# Patient Record
Sex: FEMALE | Race: OTHER | HISPANIC OR LATINO | URBAN - METROPOLITAN AREA
[De-identification: names, ages, dates, MRNs, and addresses within clinical notes are randomized per-mention and may not be internally consistent; named-entity substitution may affect disease eponyms.]

---

## 2023-02-14 ENCOUNTER — INPATIENT (INPATIENT)
Facility: HOSPITAL | Age: 48
LOS: 13 days | Discharge: HOME | DRG: 871 | End: 2023-02-28
Attending: INTERNAL MEDICINE | Admitting: HOSPITALIST
Payer: COMMERCIAL

## 2023-02-14 VITALS
TEMPERATURE: 101 F | DIASTOLIC BLOOD PRESSURE: 68 MMHG | RESPIRATION RATE: 20 BRPM | OXYGEN SATURATION: 98 % | HEIGHT: 67 IN | HEART RATE: 150 BPM | SYSTOLIC BLOOD PRESSURE: 102 MMHG | WEIGHT: 143.96 LBS

## 2023-02-14 DIAGNOSIS — Z90.49 ACQUIRED ABSENCE OF OTHER SPECIFIED PARTS OF DIGESTIVE TRACT: Chronic | ICD-10-CM

## 2023-02-14 LAB
ALBUMIN SERPL ELPH-MCNC: 3.7 G/DL — SIGNIFICANT CHANGE UP (ref 3.3–5)
ALP SERPL-CCNC: 51 U/L — SIGNIFICANT CHANGE UP (ref 40–120)
ALT FLD-CCNC: 15 U/L — SIGNIFICANT CHANGE UP (ref 10–45)
ANION GAP SERPL CALC-SCNC: 11 MMOL/L — SIGNIFICANT CHANGE UP (ref 5–17)
APPEARANCE UR: CLEAR — SIGNIFICANT CHANGE UP
APTT BLD: 29.7 SEC — SIGNIFICANT CHANGE UP (ref 27.5–35.5)
AST SERPL-CCNC: 23 U/L — SIGNIFICANT CHANGE UP (ref 10–40)
BASOPHILS # BLD AUTO: 0.04 K/UL — SIGNIFICANT CHANGE UP (ref 0–0.2)
BASOPHILS NFR BLD AUTO: 1.7 % — SIGNIFICANT CHANGE UP (ref 0–2)
BILIRUB SERPL-MCNC: 0.7 MG/DL — SIGNIFICANT CHANGE UP (ref 0.2–1.2)
BILIRUB UR-MCNC: NEGATIVE — SIGNIFICANT CHANGE UP
BUN SERPL-MCNC: 13 MG/DL — SIGNIFICANT CHANGE UP (ref 7–23)
CALCIUM SERPL-MCNC: 8.8 MG/DL — SIGNIFICANT CHANGE UP (ref 8.4–10.5)
CHLORIDE SERPL-SCNC: 103 MMOL/L — SIGNIFICANT CHANGE UP (ref 96–108)
CO2 SERPL-SCNC: 23 MMOL/L — SIGNIFICANT CHANGE UP (ref 22–31)
COLOR SPEC: SIGNIFICANT CHANGE UP
CREAT SERPL-MCNC: 0.8 MG/DL — SIGNIFICANT CHANGE UP (ref 0.5–1.3)
DIFF PNL FLD: NEGATIVE — SIGNIFICANT CHANGE UP
EGFR: 93 ML/MIN/1.73M2 — SIGNIFICANT CHANGE UP
EOSINOPHIL # BLD AUTO: 0.04 K/UL — SIGNIFICANT CHANGE UP (ref 0–0.5)
EOSINOPHIL NFR BLD AUTO: 1.7 % — SIGNIFICANT CHANGE UP (ref 0–6)
GAS PNL BLDV: SIGNIFICANT CHANGE UP
GAS PNL BLDV: SIGNIFICANT CHANGE UP
GLUCOSE SERPL-MCNC: 101 MG/DL — HIGH (ref 70–99)
GLUCOSE UR QL: NEGATIVE — SIGNIFICANT CHANGE UP
HCT VFR BLD CALC: 30.7 % — LOW (ref 34.5–45)
HGB BLD-MCNC: 9.5 G/DL — LOW (ref 11.5–15.5)
INR BLD: 1.3 RATIO — HIGH (ref 0.88–1.16)
KETONES UR-MCNC: NEGATIVE — SIGNIFICANT CHANGE UP
LEUKOCYTE ESTERASE UR-ACNC: NEGATIVE — SIGNIFICANT CHANGE UP
LYMPHOCYTES # BLD AUTO: 0.21 K/UL — LOW (ref 1–3.3)
LYMPHOCYTES # BLD AUTO: 8.7 % — LOW (ref 13–44)
MCHC RBC-ENTMCNC: 26.8 PG — LOW (ref 27–34)
MCHC RBC-ENTMCNC: 30.9 GM/DL — LOW (ref 32–36)
MCV RBC AUTO: 86.7 FL — SIGNIFICANT CHANGE UP (ref 80–100)
MONOCYTES # BLD AUTO: 0.29 K/UL — SIGNIFICANT CHANGE UP (ref 0–0.9)
MONOCYTES NFR BLD AUTO: 12.2 % — SIGNIFICANT CHANGE UP (ref 2–14)
NEUTROPHILS # BLD AUTO: 1.73 K/UL — LOW (ref 1.8–7.4)
NEUTROPHILS NFR BLD AUTO: 73.1 % — SIGNIFICANT CHANGE UP (ref 43–77)
NITRITE UR-MCNC: NEGATIVE — SIGNIFICANT CHANGE UP
PH UR: 6 — SIGNIFICANT CHANGE UP (ref 5–8)
PLATELET # BLD AUTO: 147 K/UL — LOW (ref 150–400)
POTASSIUM SERPL-MCNC: 3.4 MMOL/L — LOW (ref 3.5–5.3)
POTASSIUM SERPL-SCNC: 3.4 MMOL/L — LOW (ref 3.5–5.3)
PROT SERPL-MCNC: 5.9 G/DL — LOW (ref 6–8.3)
PROT UR-MCNC: NEGATIVE — SIGNIFICANT CHANGE UP
PROTHROM AB SERPL-ACNC: 15 SEC — HIGH (ref 10.5–13.4)
RAPID RVP RESULT: SIGNIFICANT CHANGE UP
RBC # BLD: 3.54 M/UL — LOW (ref 3.8–5.2)
RBC # FLD: 17.4 % — HIGH (ref 10.3–14.5)
SARS-COV-2 RNA SPEC QL NAA+PROBE: SIGNIFICANT CHANGE UP
SODIUM SERPL-SCNC: 137 MMOL/L — SIGNIFICANT CHANGE UP (ref 135–145)
SP GR SPEC: 1.01 — SIGNIFICANT CHANGE UP (ref 1.01–1.02)
UROBILINOGEN FLD QL: NEGATIVE — SIGNIFICANT CHANGE UP
WBC # BLD: 2.37 K/UL — LOW (ref 3.8–10.5)
WBC # FLD AUTO: 2.37 K/UL — LOW (ref 3.8–10.5)

## 2023-02-14 PROCEDURE — 71046 X-RAY EXAM CHEST 2 VIEWS: CPT | Mod: 26

## 2023-02-14 PROCEDURE — 99285 EMERGENCY DEPT VISIT HI MDM: CPT

## 2023-02-14 RX ORDER — POTASSIUM CHLORIDE 20 MEQ
40 PACKET (EA) ORAL ONCE
Refills: 0 | Status: COMPLETED | OUTPATIENT
Start: 2023-02-14 | End: 2023-02-14

## 2023-02-14 RX ORDER — KETOROLAC TROMETHAMINE 30 MG/ML
15 SYRINGE (ML) INJECTION ONCE
Refills: 0 | Status: DISCONTINUED | OUTPATIENT
Start: 2023-02-14 | End: 2023-02-14

## 2023-02-14 RX ORDER — SODIUM CHLORIDE 9 MG/ML
1000 INJECTION INTRAMUSCULAR; INTRAVENOUS; SUBCUTANEOUS ONCE
Refills: 0 | Status: COMPLETED | OUTPATIENT
Start: 2023-02-14 | End: 2023-02-14

## 2023-02-14 RX ORDER — VANCOMYCIN HCL 1 G
1000 VIAL (EA) INTRAVENOUS ONCE
Refills: 0 | Status: COMPLETED | OUTPATIENT
Start: 2023-02-14 | End: 2023-02-14

## 2023-02-14 RX ORDER — ACETAMINOPHEN 500 MG
975 TABLET ORAL ONCE
Refills: 0 | Status: COMPLETED | OUTPATIENT
Start: 2023-02-14 | End: 2023-02-14

## 2023-02-14 RX ORDER — PIPERACILLIN AND TAZOBACTAM 4; .5 G/20ML; G/20ML
3.38 INJECTION, POWDER, LYOPHILIZED, FOR SOLUTION INTRAVENOUS ONCE
Refills: 0 | Status: COMPLETED | OUTPATIENT
Start: 2023-02-14 | End: 2023-02-14

## 2023-02-14 RX ADMIN — Medication 975 MILLIGRAM(S): at 22:36

## 2023-02-14 RX ADMIN — SODIUM CHLORIDE 1000 MILLILITER(S): 9 INJECTION INTRAMUSCULAR; INTRAVENOUS; SUBCUTANEOUS at 22:29

## 2023-02-14 RX ADMIN — SODIUM CHLORIDE 1000 MILLILITER(S): 9 INJECTION INTRAMUSCULAR; INTRAVENOUS; SUBCUTANEOUS at 22:36

## 2023-02-14 RX ADMIN — SODIUM CHLORIDE 1000 MILLILITER(S): 9 INJECTION INTRAMUSCULAR; INTRAVENOUS; SUBCUTANEOUS at 21:27

## 2023-02-14 RX ADMIN — Medication 975 MILLIGRAM(S): at 21:27

## 2023-02-14 RX ADMIN — Medication 250 MILLIGRAM(S): at 22:48

## 2023-02-14 RX ADMIN — Medication 15 MILLIGRAM(S): at 23:38

## 2023-02-14 RX ADMIN — Medication 40 MILLIEQUIVALENT(S): at 22:34

## 2023-02-14 RX ADMIN — PIPERACILLIN AND TAZOBACTAM 200 GRAM(S): 4; .5 INJECTION, POWDER, LYOPHILIZED, FOR SOLUTION INTRAVENOUS at 22:30

## 2023-02-14 NOTE — ED PROVIDER NOTE - PROGRESS NOTE DETAILS
During prior hospital admission in October, patient had STARR suspected to be due to antibiotics. She was on meropenem, TMP-SMX, and isovuconazole at the time.

## 2023-02-14 NOTE — ED PROVIDER NOTE - NS ED ROS FT
CONSTITUTIONAL: No weakness, (+) fevers, (+) chills  EYES/ENT: No visual changes  NECK: No pain or stiffness  RESPIRATORY: (+) cough, no wheezing, hemoptysis; No shortness of breath  CARDIOVASCULAR: No chest pain or palpitations  GASTROINTESTINAL: No abdominal or epigastric pain. (+) vomiting, no hematemesis; (+) diarrhea, (+) constipation. No melena or hematochezia.  GENITOURINARY: No dysuria, frequency or hematuria  NEUROLOGICAL: No numbness or weakness  SKIN: No itching, rashes

## 2023-02-14 NOTE — ED PROVIDER NOTE - OBJECTIVE STATEMENT
Patient is a 47-year-old woman with past medical history of non-Hodgkins lymphoma (last chemo 1/23/2023, now in remission) presenting with fever for 2 weeks. Patient had just finished a course of Prednisone when the fever started. Fever was constant, measured at 102-104F for first week, then only at night for the second week. She has also noticed "steaming" of urine. Patient endorses chronic cough, chest tightness, vomiting, alternating constipation/diarrhea, but none of these beginning recently. Patient denies headache, chest pain, dyspnea, abdominal pain, dysuria. Patient had pulmonary embolism in October during admission for COVID, now on Eliquis.

## 2023-02-14 NOTE — ED PROVIDER NOTE - CLINICAL SUMMARY MEDICAL DECISION MAKING FREE TEXT BOX
ap 47 F w/ hx of nonhodgkins lymphoma, last chemo 1/23 here w/ fever intermittent for 2 weeks, pt w/ intermittnet vomiting, cough, burning w/ urination, no back pain, no cp, no sob, pt w/ persitent tachycardia and reports that she is on metoprolol for palpitations, no medications taken today, ptcompliant w/ home eliquis, she has known allergy to iv contrast, plan for alsb imaging and reassessment. ap 47 F w/ hx of non-hodgkin's lymphoma, last chemo 1/23 here w/ fever intermittent for 2 weeks, pt w/ intermittent vomiting, cough, burning w/ urination, no back pain, no cp, no sob, pt w/ persistent tachycardia and reports that she is on metoprolol for palpitations, no medications taken today, compliant w/ home Eliquis, she has known allergy to iv contrast, plan for imaging and reassessment. most likely admission.

## 2023-02-14 NOTE — ED PROVIDER NOTE - DIFFERENTIAL DIAGNOSIS
fever in cancer pt, possible uti vs pna vs intraabdominal infection. vs port infection Differential Diagnosis

## 2023-02-14 NOTE — ED PROVIDER NOTE - PHYSICAL EXAMINATION
Constitutional: NAD  HEENT: PERRL, EOMI  Respiratory: CTA b/l, good air entry b/l, no wheezing, no rhonchi, no rales, without accessory muscle use and no intercostal retractions  Cardiovascular: regular, tachycardic, normal S1S2, no M/R/G  Gastrointestinal: abdomen soft, NTND, no masses palpable, BS normal  Extremities: Warm, well perfused, no edema  Neurological: AAOx3, CN Grossly intact  Skin: Normal temperature, warm, dry

## 2023-02-14 NOTE — ED ADULT NURSE NOTE - OBJECTIVE STATEMENT
pt has a history of non hodgkins lymphoma.  she reports she is in remission and has had a fever for 2 weeks after she stopped her prednisone      piv placed for flluid/  pt is alert and active.

## 2023-02-14 NOTE — ED PROVIDER NOTE - RAPID ASSESSMENT
47y F w/ pmhx non hodgkin's lymphoma (last chemo 1/23/2023, now in remission) presents to the ED c/o fevers, vomiting, burning urination x2wk. Pt went to oncologist yesterday got cultures, blood work, and xray. Reports that the first week pt had diarrhea and second week had constipation. Pt is well appearing in triage.     Eladia FLOWER (Saurabh) have documented this rapid assessment note under the dictation of Mic Kim) which has been reviewed and affirmed to be accurate. Patient was seen as a QDOC patient. The patient will be seen and further worked up in the main emergency department and their care will be completed by the main emergency department team along with a thorough physical exam. Receiving team will follow up on labs, analgesia, any clinical imaging, reassess and disposition as clinically indicated, all decisions regarding the progression of care will be made at their discretion. 47y F w/ pmhx non hodgkin's lymphoma (last chemo 1/23/2023, now in remission) presents to the ED c/o fevers, vomiting, burning urination x2wk. Pt went to oncologist yesterday got cultures, blood work, and xray. Reports that the first week pt had diarrhea and second week had constipation. Pt is well appearing in triage.     IEladia (Svetlana) have documented this rapid assessment note under the dictation of Mic Kim) which has been reviewed and affirmed to be accurate. Patient was seen as a QDOC patient. The patient will be seen and further worked up in the main emergency department and their care will be completed by the main emergency department team along with a thorough physical exam. Receiving team will follow up on labs, analgesia, any clinical imaging, reassess and disposition as clinically indicated, all decisions regarding the progression of care will be made at their discretion.    IDr. Kim saw patient as a rapid assessment initially.  The rest of care was performed by another attending, and the rapid assessment was documented by svetlana in my presence. Receiving team will follow up on labs, analgesia, any clinical imaging, and perform reassessment and disposition of the patient as clinically indicated. All decisions regarding the progression of care will be made at their discretion.

## 2023-02-15 DIAGNOSIS — D64.9 ANEMIA, UNSPECIFIED: ICD-10-CM

## 2023-02-15 DIAGNOSIS — I26.99 OTHER PULMONARY EMBOLISM WITHOUT ACUTE COR PULMONALE: ICD-10-CM

## 2023-02-15 DIAGNOSIS — A41.9 SEPSIS, UNSPECIFIED ORGANISM: ICD-10-CM

## 2023-02-15 DIAGNOSIS — C85.90 NON-HODGKIN LYMPHOMA, UNSPECIFIED, UNSPECIFIED SITE: ICD-10-CM

## 2023-02-15 LAB
ALBUMIN SERPL ELPH-MCNC: 2.8 G/DL — LOW (ref 3.3–5)
ALP SERPL-CCNC: 37 U/L — LOW (ref 40–120)
ALT FLD-CCNC: 9 U/L — LOW (ref 10–45)
ANION GAP SERPL CALC-SCNC: 8 MMOL/L — SIGNIFICANT CHANGE UP (ref 5–17)
AST SERPL-CCNC: 14 U/L — SIGNIFICANT CHANGE UP (ref 10–40)
BILIRUB SERPL-MCNC: 0.5 MG/DL — SIGNIFICANT CHANGE UP (ref 0.2–1.2)
BUN SERPL-MCNC: 10 MG/DL — SIGNIFICANT CHANGE UP (ref 7–23)
CALCIUM SERPL-MCNC: 8.1 MG/DL — LOW (ref 8.4–10.5)
CHLORIDE SERPL-SCNC: 111 MMOL/L — HIGH (ref 96–108)
CO2 SERPL-SCNC: 21 MMOL/L — LOW (ref 22–31)
CREAT SERPL-MCNC: 0.78 MG/DL — SIGNIFICANT CHANGE UP (ref 0.5–1.3)
EGFR: 95 ML/MIN/1.73M2 — SIGNIFICANT CHANGE UP
FERRITIN SERPL-MCNC: 344 NG/ML — HIGH (ref 15–150)
FOLATE SERPL-MCNC: 7.9 NG/ML — SIGNIFICANT CHANGE UP
GLUCOSE SERPL-MCNC: 122 MG/DL — HIGH (ref 70–99)
HAPTOGLOB SERPL-MCNC: <20 MG/DL — LOW (ref 34–200)
HCG SERPL-ACNC: <2 MIU/ML — SIGNIFICANT CHANGE UP
HCT VFR BLD CALC: 25.4 % — LOW (ref 34.5–45)
HCT VFR BLD CALC: 25.9 % — LOW (ref 34.5–45)
HGB BLD-MCNC: 7.6 G/DL — LOW (ref 11.5–15.5)
HGB BLD-MCNC: 8 G/DL — LOW (ref 11.5–15.5)
IRON SATN MFR SERPL: 16 % — SIGNIFICANT CHANGE UP (ref 14–50)
IRON SATN MFR SERPL: 37 UG/DL — SIGNIFICANT CHANGE UP (ref 30–160)
LDH SERPL L TO P-CCNC: 284 U/L — HIGH (ref 50–242)
MCHC RBC-ENTMCNC: 26.5 PG — LOW (ref 27–34)
MCHC RBC-ENTMCNC: 27.2 PG — SIGNIFICANT CHANGE UP (ref 27–34)
MCHC RBC-ENTMCNC: 29.9 GM/DL — LOW (ref 32–36)
MCHC RBC-ENTMCNC: 30.9 GM/DL — LOW (ref 32–36)
MCV RBC AUTO: 88.1 FL — SIGNIFICANT CHANGE UP (ref 80–100)
MCV RBC AUTO: 88.5 FL — SIGNIFICANT CHANGE UP (ref 80–100)
MRSA PCR RESULT.: SIGNIFICANT CHANGE UP
NRBC # BLD: 0 /100 WBCS — SIGNIFICANT CHANGE UP (ref 0–0)
NRBC # BLD: 0 /100 WBCS — SIGNIFICANT CHANGE UP (ref 0–0)
PLATELET # BLD AUTO: 101 K/UL — LOW (ref 150–400)
PLATELET # BLD AUTO: 97 K/UL — LOW (ref 150–400)
POTASSIUM SERPL-MCNC: 3.8 MMOL/L — SIGNIFICANT CHANGE UP (ref 3.5–5.3)
POTASSIUM SERPL-SCNC: 3.8 MMOL/L — SIGNIFICANT CHANGE UP (ref 3.5–5.3)
PROT SERPL-MCNC: 4.5 G/DL — LOW (ref 6–8.3)
RBC # BLD: 2.87 M/UL — LOW (ref 3.8–5.2)
RBC # BLD: 2.87 M/UL — LOW (ref 3.8–5.2)
RBC # BLD: 2.94 M/UL — LOW (ref 3.8–5.2)
RBC # FLD: 17.5 % — HIGH (ref 10.3–14.5)
RBC # FLD: 17.5 % — HIGH (ref 10.3–14.5)
RETICS #: 66.3 K/UL — SIGNIFICANT CHANGE UP (ref 25–125)
RETICS/RBC NFR: 2.4 % — SIGNIFICANT CHANGE UP (ref 0.5–2.5)
S AUREUS DNA NOSE QL NAA+PROBE: SIGNIFICANT CHANGE UP
SODIUM SERPL-SCNC: 140 MMOL/L — SIGNIFICANT CHANGE UP (ref 135–145)
TIBC SERPL-MCNC: 226 UG/DL — SIGNIFICANT CHANGE UP (ref 220–430)
TRANSFERRIN SERPL-MCNC: 175 MG/DL — LOW (ref 200–360)
UIBC SERPL-MCNC: 189 UG/DL — SIGNIFICANT CHANGE UP (ref 110–370)
VIT B12 SERPL-MCNC: 1316 PG/ML — HIGH (ref 232–1245)
WBC # BLD: 1.54 K/UL — LOW (ref 3.8–10.5)
WBC # BLD: 1.92 K/UL — LOW (ref 3.8–10.5)
WBC # FLD AUTO: 1.54 K/UL — LOW (ref 3.8–10.5)
WBC # FLD AUTO: 1.92 K/UL — LOW (ref 3.8–10.5)

## 2023-02-15 PROCEDURE — 74177 CT ABD & PELVIS W/CONTRAST: CPT | Mod: 26

## 2023-02-15 PROCEDURE — 71275 CT ANGIOGRAPHY CHEST: CPT | Mod: 26

## 2023-02-15 PROCEDURE — 99223 1ST HOSP IP/OBS HIGH 75: CPT

## 2023-02-15 PROCEDURE — 93306 TTE W/DOPPLER COMPLETE: CPT | Mod: 26

## 2023-02-15 RX ORDER — METOPROLOL TARTRATE 50 MG
2.5 TABLET ORAL ONCE
Refills: 0 | Status: COMPLETED | OUTPATIENT
Start: 2023-02-15 | End: 2023-02-15

## 2023-02-15 RX ORDER — ACETAMINOPHEN 500 MG
325 TABLET ORAL ONCE
Refills: 0 | Status: COMPLETED | OUTPATIENT
Start: 2023-02-15 | End: 2023-02-15

## 2023-02-15 RX ORDER — FILGRASTIM 480MCG/1.6
480 VIAL (ML) INJECTION DAILY
Refills: 0 | Status: DISCONTINUED | OUTPATIENT
Start: 2023-02-15 | End: 2023-02-17

## 2023-02-15 RX ORDER — METOPROLOL TARTRATE 50 MG
5 TABLET ORAL ONCE
Refills: 0 | Status: COMPLETED | OUTPATIENT
Start: 2023-02-15 | End: 2023-02-15

## 2023-02-15 RX ORDER — DIGOXIN 250 MCG
1 TABLET ORAL
Qty: 0 | Refills: 0 | DISCHARGE

## 2023-02-15 RX ORDER — SODIUM CHLORIDE 9 MG/ML
1000 INJECTION INTRAMUSCULAR; INTRAVENOUS; SUBCUTANEOUS
Refills: 0 | Status: COMPLETED | OUTPATIENT
Start: 2023-02-15 | End: 2023-02-15

## 2023-02-15 RX ORDER — VANCOMYCIN HCL 1 G
1000 VIAL (EA) INTRAVENOUS EVERY 12 HOURS
Refills: 0 | Status: DISCONTINUED | OUTPATIENT
Start: 2023-02-15 | End: 2023-02-17

## 2023-02-15 RX ORDER — APIXABAN 2.5 MG/1
1 TABLET, FILM COATED ORAL
Qty: 0 | Refills: 0 | DISCHARGE

## 2023-02-15 RX ORDER — ACETAMINOPHEN 500 MG
1000 TABLET ORAL ONCE
Refills: 0 | Status: COMPLETED | OUTPATIENT
Start: 2023-02-15 | End: 2023-02-15

## 2023-02-15 RX ORDER — PIPERACILLIN AND TAZOBACTAM 4; .5 G/20ML; G/20ML
3.38 INJECTION, POWDER, LYOPHILIZED, FOR SOLUTION INTRAVENOUS EVERY 8 HOURS
Refills: 0 | Status: DISCONTINUED | OUTPATIENT
Start: 2023-02-15 | End: 2023-02-17

## 2023-02-15 RX ORDER — METOPROLOL TARTRATE 50 MG
25 TABLET ORAL DAILY
Refills: 0 | Status: DISCONTINUED | OUTPATIENT
Start: 2023-02-15 | End: 2023-02-16

## 2023-02-15 RX ORDER — METOPROLOL TARTRATE 50 MG
1 TABLET ORAL
Qty: 0 | Refills: 0 | DISCHARGE

## 2023-02-15 RX ORDER — LANOLIN ALCOHOL/MO/W.PET/CERES
3 CREAM (GRAM) TOPICAL AT BEDTIME
Refills: 0 | Status: DISCONTINUED | OUTPATIENT
Start: 2023-02-15 | End: 2023-02-17

## 2023-02-15 RX ORDER — DIGOXIN 250 MCG
125 TABLET ORAL DAILY
Refills: 0 | Status: DISCONTINUED | OUTPATIENT
Start: 2023-02-15 | End: 2023-02-28

## 2023-02-15 RX ORDER — APIXABAN 2.5 MG/1
5 TABLET, FILM COATED ORAL EVERY 12 HOURS
Refills: 0 | Status: DISCONTINUED | OUTPATIENT
Start: 2023-02-15 | End: 2023-02-17

## 2023-02-15 RX ORDER — SELEXIPAG 800 UG/1
1 TABLET, COATED ORAL
Qty: 0 | Refills: 0 | DISCHARGE

## 2023-02-15 RX ORDER — SODIUM CHLORIDE 9 MG/ML
1000 INJECTION INTRAMUSCULAR; INTRAVENOUS; SUBCUTANEOUS ONCE
Refills: 0 | Status: COMPLETED | OUTPATIENT
Start: 2023-02-15 | End: 2023-02-15

## 2023-02-15 RX ORDER — ONDANSETRON 8 MG/1
4 TABLET, FILM COATED ORAL EVERY 8 HOURS
Refills: 0 | Status: DISCONTINUED | OUTPATIENT
Start: 2023-02-15 | End: 2023-02-22

## 2023-02-15 RX ORDER — ACETAMINOPHEN 500 MG
650 TABLET ORAL EVERY 6 HOURS
Refills: 0 | Status: DISCONTINUED | OUTPATIENT
Start: 2023-02-15 | End: 2023-02-17

## 2023-02-15 RX ADMIN — Medication 125 MICROGRAM(S): at 05:11

## 2023-02-15 RX ADMIN — SODIUM CHLORIDE 100 MILLILITER(S): 9 INJECTION INTRAMUSCULAR; INTRAVENOUS; SUBCUTANEOUS at 12:05

## 2023-02-15 RX ADMIN — Medication 250 MILLIGRAM(S): at 05:12

## 2023-02-15 RX ADMIN — Medication 650 MILLIGRAM(S): at 18:12

## 2023-02-15 RX ADMIN — Medication 650 MILLIGRAM(S): at 17:42

## 2023-02-15 RX ADMIN — Medication 2.5 MILLIGRAM(S): at 21:59

## 2023-02-15 RX ADMIN — Medication 15 MILLIGRAM(S): at 00:00

## 2023-02-15 RX ADMIN — APIXABAN 5 MILLIGRAM(S): 2.5 TABLET, FILM COATED ORAL at 05:12

## 2023-02-15 RX ADMIN — SODIUM CHLORIDE 1000 MILLILITER(S): 9 INJECTION INTRAMUSCULAR; INTRAVENOUS; SUBCUTANEOUS at 21:10

## 2023-02-15 RX ADMIN — PIPERACILLIN AND TAZOBACTAM 25 GRAM(S): 4; .5 INJECTION, POWDER, LYOPHILIZED, FOR SOLUTION INTRAVENOUS at 22:16

## 2023-02-15 RX ADMIN — PIPERACILLIN AND TAZOBACTAM 25 GRAM(S): 4; .5 INJECTION, POWDER, LYOPHILIZED, FOR SOLUTION INTRAVENOUS at 14:20

## 2023-02-15 RX ADMIN — Medication 250 MILLIGRAM(S): at 19:06

## 2023-02-15 RX ADMIN — PIPERACILLIN AND TAZOBACTAM 25 GRAM(S): 4; .5 INJECTION, POWDER, LYOPHILIZED, FOR SOLUTION INTRAVENOUS at 06:12

## 2023-02-15 RX ADMIN — APIXABAN 5 MILLIGRAM(S): 2.5 TABLET, FILM COATED ORAL at 19:04

## 2023-02-15 RX ADMIN — Medication 400 MILLIGRAM(S): at 23:45

## 2023-02-15 RX ADMIN — Medication 25 MILLIGRAM(S): at 21:28

## 2023-02-15 RX ADMIN — SODIUM CHLORIDE 100 MILLILITER(S): 9 INJECTION INTRAMUSCULAR; INTRAVENOUS; SUBCUTANEOUS at 15:40

## 2023-02-15 RX ADMIN — Medication 325 MILLIGRAM(S): at 21:15

## 2023-02-15 RX ADMIN — SODIUM CHLORIDE 100 MILLILITER(S): 9 INJECTION INTRAMUSCULAR; INTRAVENOUS; SUBCUTANEOUS at 02:18

## 2023-02-15 NOTE — H&P ADULT - HISTORY OF PRESENT ILLNESS
48 yo f w pmh nhl s/p chemo (?in remission), pe, ?pah, covid, p/w fevers for the last few days, a/w generalized weakness over the last couple of weeks along with poor po intake. denies chest pain, palpitations, lightheadedness, cough, wheeze, abdominal discomfort. patient does endorse n+v, dysuria, arriaza. of note, last session of chemo reportedly on 1/23/2023. Pt grew concerned so went to oncologist yesterday where he got cultures, blood work, and xray. as he did not feel improved, patient presents to [facility] er for further evaluation.  48 yo f w pmh nhl s/p chemo (?in remission), pe, ?pah, covid, p/w fevers for the last few days, a/w generalized weakness over the last couple of weeks along with poor po intake. denies chest pain, palpitations, lightheadedness, cough, wheeze, abdominal discomfort. patient does endorse n+v, dysuria, arriaza. of note, last session of chemo reportedly on 1/23/2023. Pt grew concerned so went to oncologist yesterday where she got cultures, blood work, and xray. as she did not feel improved, patient presents to [facility] er for further evaluation.

## 2023-02-15 NOTE — ED ADULT NURSE REASSESSMENT NOTE - NS ED NURSE REASSESS COMMENT FT1
Handoff report received from ROMEO Ag. Pt resting comfortably in pink 51 with friend at bedside. Pt A&O x 4, ambulatory independently, VSS. Pt denies any pain or needs at this time. Stretcher locked in lowest position and side rails up.
Pt heart rate elevated, Anais Duenas aware on teams at 1848
Pt remains tachy to 140s, contacted  Karsten who is on her team, advised her of v/s and have received orders for fluid bolus and additional meds.
Pt taken upstairs, RN at bedside to receive, pt on Zoll monitor with RN and transport
Pt sleeping comfortable in stretcher, no complaints of pain or discomfort from pt.

## 2023-02-15 NOTE — CONSULT NOTE ADULT - ASSESSMENT
47 f, diagnosed with NHL 7/2022 and started on R-CHOP, last dose 1/23/23 and had a PET and was told she is in remission, usually has 5 days of prednisone after chemo and after that completed started to have fever and chills whish she has been getting since, no significant cough, abd pain, diarrhea or dysuria  here febrile to 102.5, tachy, no hypoxia  WBC: 2  chest/abd CT: No PE, Innumerable lung nodules which are nonspecific. In this clinical setting, differential includes infection (in which case, consider fungus) or neoplasm/known lymphoma. Indeterminate low-attenuation lesion in the liver.    NHL on chemo until 1/23 and was told she is in remission based on a PET now with fever for over 2 weeks and no focal symptoms, CT with innumerable lung nodules which could be neoplasm or infection likely fungal  leukopenia but but no profound neutropenia  * f/u the blood cx, urine cx and MRSA PCR  * check fungitell, galactomannan   * sputum cx  * for now c/w vanco and zosyn  * hem/onc  and pulmonary eval  * will need pt's previous imaging to compare these nodules     The above assessment and plan was discussed with the primary team    Melanie Stubbs MD  contact on teams  After 5pm and on weekends call [phone] 47 f, diagnosed with NHL 7/2022 and started on R-CHOP, last dose 1/23/23 and had a PET and was told she is in remission, usually has 5 days of prednisone after chemo and after that completed started to have fever and chills whish she has been getting since, no significant cough, abd pain, diarrhea or dysuria  here febrile to 102.5, tachy, no hypoxia  WBC: 2  chest/abd CT: No PE, Innumerable lung nodules which are nonspecific. In this clinical setting, differential includes infection (in which case, consider fungus) or neoplasm/known lymphoma. Indeterminate low-attenuation lesion in the liver.    NHL on chemo until 1/23 and was told she is in remission based on a PET now with fever for over 2 weeks and no focal symptoms, CT with innumerable lung nodules which could be neoplasm or infection likely fungal  leukopenia but but no profound neutropenia  fever, tachycardia, sepsis  * f/u the blood cx, urine cx and MRSA PCR  * check fungitell, galactomannan   * sputum cx  * for now c/w vanco and zosyn  * hem/onc  and pulmonary eval  * will need pt's previous imaging to compare these nodules     The above assessment and plan was discussed with the primary team    Melanie Stubbs MD  contact on teams  After 5pm and on weekends call [phone]

## 2023-02-15 NOTE — H&P ADULT - NSHPADDITIONALINFOADULT_GEN_ALL_CORE
full code  activity as tolerated   vte ppx w home ac  regular diet    **obtain outpatient records and clarify home meds in am**

## 2023-02-15 NOTE — CONSULT NOTE ADULT - SUBJECTIVE AND OBJECTIVE BOX
HPI:  48 yo f w pmh nhl s/p chemo (?in remission), pe, ?pah, covid, p/w fevers for the last few days, a/w generalized weakness over the last couple of weeks along with poor po intake. denies chest pain, palpitations, lightheadedness, cough, wheeze, abdominal discomfort. patient does endorse n+v, dysuria, arriaza. of note, last session of chemo reportedly on 1/23/2023. Pt grew concerned so went to oncologist yesterday where she got cultures, blood work, and xray. as she did not feel improved, patient presents to [facility] er for further evaluation.  (15 Feb 2023 01:39)    PAST MEDICAL & SURGICAL HISTORY:  Non-Hodgkin&#x27;s lymphoma      Pulmonary embolism      History of appendectomy      History of cholecystectomy          No Known Allergies      FAMILY HISTORY:    Social History:  no pertinent social history (15 Feb 2023 01:39)    Medications:  acetaminophen     Tablet .. 650 milliGRAM(s) Oral every 6 hours PRN Temp greater or equal to 38C (100.4F), Mild Pain (1 - 3)  aluminum hydroxide/magnesium hydroxide/simethicone Suspension 30 milliLiter(s) Oral every 4 hours PRN Dyspepsia  apixaban 5 milliGRAM(s) Oral every 12 hours  digoxin     Tablet 125 MICROGram(s) Oral daily  melatonin 3 milliGRAM(s) Oral at bedtime PRN Insomnia  metoprolol succinate ER 25 milliGRAM(s) Oral daily  ondansetron Injectable 4 milliGRAM(s) IV Push every 8 hours PRN Nausea and/or Vomiting  piperacillin/tazobactam IVPB.. 3.375 Gram(s) IV Intermittent every 8 hours  vancomycin  IVPB 1000 milliGRAM(s) IV Intermittent every 12 hours    Labs:                        7.6    1.54  )-----------( 101      ( 15 Feb 2023 06:23 )             25.4   Reticulocyte Percent: 2.4 % (02.15.23 @ 06:23)   Auto Neutrophil #: 1.73 K/uL (02.14.23 @ 21:27)   02-15    140  |  111<H>  |  10  ----------------------------<  122<H>  3.8   |  21<L>  |  0.78    Ca    8.1<L>      15 Feb 2023 06:23    TPro  4.5<L>  /  Alb  2.8<L>  /  TBili  0.5  /  DBili  x   /  AST  14  /  ALT  9<L>  /  AlkPhos  37<L>  02-15    Haptoglobin, Serum: <20: Test Repeated mg/dL (02.15.23 @ 06:23)  Vitamin B12, Serum: 1316 pg/mL (02.15.23 @ 06:23)   Ferritin, Serum: 344 ng/mL (02.15.23 @ 06:23)   Iron with Total Binding Capacity in AM (02.15.23 @ 06:23)   Iron - Total Binding Capacity.: 226 ug/dL   % Saturation, Iron: 16 %   Iron Total, Serum: 37 ug/dL   Unsaturated Iron Binding Capacity: 189 ug/dL   Lactate Dehydrogenase, Serum: 284 U/L (02.15.23 @ 06:23)      Radiology:     < from: CT Angio Chest PE Protocol w/ IV Cont (02.15.23 @ 02:17) >  IMPRESSION:    No pulmonary embolism.    Innumerable lung nodules which are nonspecific. In this clinical setting,   differential includes infection (in which case, consider fungus) or   neoplasm/known lymphoma.    Indeterminate low-attenuation lesion in the liver.      < end of copied text >          ROS:  No pain, no fever  No lumps in neck or pain  No Sob or chest pain  No palpitations   No abdominal pain. No change in bowel habit. No blood in stools  No weakness in extremities  No leg swelling  Rest of comprehensive ROS was negative    Vital Signs Last 24 Hrs  T(C): 36.1 (15 Feb 2023 08:15), Max: 39.2 (14 Feb 2023 21:20)  T(F): 97 (15 Feb 2023 08:15), Max: 102.5 (14 Feb 2023 21:20)  HR: 100 (15 Feb 2023 14:24) (80 - 150)  BP: 116/73 (15 Feb 2023 14:24) (94/61 - 122/77)  BP(mean): 75 (15 Feb 2023 03:47) (72 - 81)  RR: 18 (15 Feb 2023 14:24) (18 - 22)  SpO2: 100% (15 Feb 2023 14:24) (98% - 100%)    Parameters below as of 15 Feb 2023 14:24  Patient On (Oxygen Delivery Method): room air        Physical Exam:  Patient comfortable  AXOX3  Neck supple, no LN's  Chest: bilateral breath sounds, no wheeze or rales  CVS: regular heart rate without murmur  Abdomen: soft, BS+, no massess or organomegaly  CNS: no gross deficit  Musculoskeletal: Normal range of motion  Skin: no rash       HPI:  46 yo f w pmh nhl s/p chemo (?in remission), PE, ?pah, covid, admitted 2/15/23 with fevers for the last few days, a/w generalized weakness over the last couple of weeks along with poor po intake. denied chest pain, palpitations, lightheadedness, cough, wheeze, abdominal discomfort. patient did endorse n+v, dysuria, arriaza. of note, last session of chemo reportedly on 1/23/2023. Pt grew concerned so went to oncologist, where she got cultures, blood work, and xray. as she did not feel improved, and thus came to hospital  PAST MEDICAL & SURGICAL HISTORY:  Non-Hodgkin&#x27;s lymphoma      Pulmonary embolism      History of appendectomy      History of cholecystectomy          Allergies ? Iv contrast       FAMILY HISTORY: no h/o of lymphoma    Social History:  [employer] smoker, quit 2009, 2.6 pack years  ETOH not currently, used to drink socially    Medications:  acetaminophen     Tablet .. 650 milliGRAM(s) Oral every 6 hours PRN Temp greater or equal to 38C (100.4F), Mild Pain (1 - 3)  aluminum hydroxide/magnesium hydroxide/simethicone Suspension 30 milliLiter(s) Oral every 4 hours PRN Dyspepsia  apixaban 5 milliGRAM(s) Oral every 12 hours  digoxin     Tablet 125 MICROGram(s) Oral daily  melatonin 3 milliGRAM(s) Oral at bedtime PRN Insomnia  metoprolol succinate ER 25 milliGRAM(s) Oral daily  ondansetron Injectable 4 milliGRAM(s) IV Push every 8 hours PRN Nausea and/or Vomiting  piperacillin/tazobactam IVPB.. 3.375 Gram(s) IV Intermittent every 8 hours  vancomycin  IVPB 1000 milliGRAM(s) IV Intermittent every 12 hours    Labs:                        7.6    1.54  )-----------( 101      ( 15 Feb 2023 06:23 )             25.4   Reticulocyte Percent: 2.4 % (02.15.23 @ 06:23)   Auto Neutrophil #: 1.73 K/uL (02.14.23 @ 21:27)   02-15    140  |  111<H>  |  10  ----------------------------<  122<H>  3.8   |  21<L>  |  0.78    Ca    8.1<L>      15 Feb 2023 06:23    TPro  4.5<L>  /  Alb  2.8<L>  /  TBili  0.5  /  DBili  x   /  AST  14  /  ALT  9<L>  /  AlkPhos  37<L>  02-15    Haptoglobin, Serum: <20: Test Repeated mg/dL (02.15.23 @ 06:23)  Vitamin B12, Serum: 1316 pg/mL (02.15.23 @ 06:23)   Ferritin, Serum: 344 ng/mL (02.15.23 @ 06:23)   Iron with Total Binding Capacity in AM (02.15.23 @ 06:23)   Iron - Total Binding Capacity.: 226 ug/dL   % Saturation, Iron: 16 %   Iron Total, Serum: 37 ug/dL   Unsaturated Iron Binding Capacity: 189 ug/dL   Lactate Dehydrogenase, Serum: 284 U/L (02.15.23 @ 06:23)      Radiology:     < from: CT Angio Chest PE Protocol w/ IV Cont (02.15.23 @ 02:17) >  IMPRESSION:    No pulmonary embolism.    Innumerable lung nodules which are nonspecific. In this clinical setting,   differential includes infection (in which case, consider fungus) or   neoplasm/known lymphoma.    Indeterminate low-attenuation lesion in the liver.      < end of copied text >          ROS:  No pain, fever +, weakness +  No lumps in neck or pain  No Sobon exertion, no  chest pain  No palpitations   No abdominal pain. No change in bowel habit. No blood in stools  No weakness in extremities  No leg swelling  Rest of comprehensive ROS was negative    Vital Signs Last 24 Hrs  T(C): 36.1 (15 Feb 2023 08:15), Max: 39.2 (14 Feb 2023 21:20)  T(F): 97 (15 Feb 2023 08:15), Max: 102.5 (14 Feb 2023 21:20)  HR: 100 (15 Feb 2023 14:24) (80 - 150)  BP: 116/73 (15 Feb 2023 14:24) (94/61 - 122/77)  BP(mean): 75 (15 Feb 2023 03:47) (72 - 81)  RR: 18 (15 Feb 2023 14:24) (18 - 22)  SpO2: 100% (15 Feb 2023 14:24) (98% - 100%)    Parameters below as of 15 Feb 2023 14:24  Patient On (Oxygen Delivery Method): room air        Physical Exam:  Patient comfortable  AXOX3  Neck supple, no LN's  Chest: bilateral breath sounds, no wheeze or rales  CVS: regular heart rate without murmur  Abdomen: soft, BS+, no massess or organomegaly  CNS: no gross deficit  Musculoskeletal: Normal range of motion  Skin: no rash

## 2023-02-15 NOTE — CHART NOTE - NSCHARTNOTEFT_GEN_A_CORE
Notified by RN, patient tachycardic 140s on tele monitor.  Patient seen and examined at bedside.      Vital Signs Last 24 Hrs  T(C): 37.5 (15 Feb 2023 21:25), Max: 37.8 (14 Feb 2023 22:50)  T(F): 99.5 (15 Feb 2023 21:25), Max: 100 (14 Feb 2023 22:50)  HR: 142 (15 Feb 2023 21:25) (80 - 158)  BP: 104/68 (15 Feb 2023 21:25) (94/61 - 133/94)  BP(mean): 76 (15 Feb 2023 21:25) (72 - 81)  RR: 21 (15 Feb 2023 21:25) (18 - 25)  SpO2: 97% (15 Feb 2023 21:25) (97% - 100%)    Parameters below as of 15 Feb 2023 21:25  Patient On (Oxygen Delivery Method): room air      Labs:                          8.0    1.92  )-----------( 97       ( 15 Feb 2023 19:41 )             25.9     02-15    140  |  111<H>  |  10  ----------------------------<  122<H>  3.8   |  21<L>  |  0.78    Ca    8.1<L>      15 Feb 2023 06:23    TPro  4.5<L>  /  Alb  2.8<L>  /  TBili  0.5  /  DBili  x   /  AST  14  /  ALT  9<L>  /  AlkPhos  37<L>  02-15    Radiology:    Physical Exam:  General: NAD, nontoxic  Neurology: A&Ox3, nonfocal, GO x 4  Head:  Normocephalic, atraumatic  Respiratory:   CV: RRR, S1S2  Abdominal: Soft, NT, ND  MSK: No edema, + peripheral pulses, FROM all 4 extremity    Assessment & Plan:  48 yo f w pmh nhl s/p chemo (?in remission), pe, ?pah, covid, p/w fevers for the last few days, a/w generalized weakness over the last couple of weeks along with poor po intake. denies chest pain, palpitations, lightheadedness, cough, wheeze, abdominal discomfort. patient does endorse n+v, dysuria, arriaza. of note, last session of chemo reportedly on 1/23/2023. Pt grew concerned so went to oncologist yesterday where she got cultures, blood work, and xray. as she did not feel improved, patient presents to [facility] er for further evaluation.  (15 Feb 2023 01:39) Notified by RN, patient tachycardic 140s on tele monitor.  Patient seen and examined at bedside. Found patient to be febrile 101.7. Received tylenol 650 earlier per RN.  Endorsed fatigue, +Chills. Denied headache, lightheadedness, SOB, CP, palpitation, dysuria, n/v/abdominal pain.    ROS: as above    PAST MEDICAL HISTORY:  Non-Hodgkin's lymphoma   Pulmonary embolism.     PAST SURGICAL HISTORY:  History of appendectomy   History of cholecystectomy.    Vital Signs Last 24 Hrs  T(C): 37.5 (15 Feb 2023 21:25), Max: 37.8 (14 Feb 2023 22:50)  T(F): 99.5 (15 Feb 2023 21:25), Max: 100 (14 Feb 2023 22:50)  HR: 142 (15 Feb 2023 21:25) (80 - 158)  BP: 104/68 (15 Feb 2023 21:25) (94/61 - 133/94)  BP(mean): 76 (15 Feb 2023 21:25) (72 - 81)  RR: 21 (15 Feb 2023 21:25) (18 - 25)  SpO2: 97% (15 Feb 2023 21:25) (97% - 100%)    Parameters below as of 15 Feb 2023 21:25  Patient On (Oxygen Delivery Method): room air      Labs:                          8.0    1.92  )-----------( 97       ( 15 Feb 2023 19:41 )             25.9     02-15    140  |  111<H>  |  10  ----------------------------<  122<H>  3.8   |  21<L>  |  0.78    Ca    8.1<L>      15 Feb 2023 06:23    TPro  4.5<L>  /  Alb  2.8<L>  /  TBili  0.5  /  DBili  x   /  AST  14  /  ALT  9<L>  /  AlkPhos  37<L>  02-15    Radiology:  < from: Xray Chest 2 Views PA/Lat (02.14.23 @ 21:49) >    IMPRESSION:  Clear lungs.    < end of copied text >    < from: CT Abdomen and Pelvis w/ IV Cont (02.15.23 @ 02:17) >    IMPRESSION:    No pulmonary embolism.    Innumerable lung nodules which are nonspecific. In this clinical setting,   differential includes infection (in which case, consider fungus) or   neoplasm/known lymphoma.    Indeterminate low-attenuation lesion in the liver.    Close follow-up and/or comparison with outside imaging is recommended.    < end of copied text >    Physical Exam:  General: NAD, nontoxic  Neurology: A&Ox3, nonfocal, GO x 4  Head:  Normocephalic, atraumatic  Respiratory: CTA  CV: RRR, S1S2, tachycardic  Abdominal: Soft, NT, ND  MSK: No edema,     Assessment & Plan:  48 yo f w pmh NHL s/p chemo, PE, ?pah, covid, p/w fevers for the last few days, a/w generalized weakness over the last couple of weeks along with poor po intake. denies chest pain, palpitations, lightheadedness, cough, wheeze, abdominal discomfort. patient does endorse n+v, dysuria, arriaza. of note, last session of chemo reportedly on 1/23/2023. Pt grew concerned so went to oncologist yesterday where she got cultures, blood work, and xray. as she did not feel improved, patient presents to [facility] er for further evaluation.  (15 Feb 2023 01:39) Notified by RN, patient tachycardic 140s on tele monitor.  Patient seen and examined at bedside. Found patient to be febrile 101.7. Received tylenol 650 earlier per RN.  Endorsed fatigue, +Chills. Denied headache, lightheadedness, SOB, CP, palpitation, dysuria, n/v/abdominal pain.    ROS: as above    PAST MEDICAL HISTORY:  Non-Hodgkin's lymphoma   Pulmonary embolism.     PAST SURGICAL HISTORY:  History of appendectomy   History of cholecystectomy.    Vital Signs Last 24 Hrs  T(C): 37.5 (15 Feb 2023 21:25), Max: 37.8 (14 Feb 2023 22:50)  T(F): 99.5 (15 Feb 2023 21:25), Max: 100 (14 Feb 2023 22:50)  HR: 142 (15 Feb 2023 21:25) (80 - 158)  BP: 104/68 (15 Feb 2023 21:25) (94/61 - 133/94)  BP(mean): 76 (15 Feb 2023 21:25) (72 - 81)  RR: 21 (15 Feb 2023 21:25) (18 - 25)  SpO2: 97% (15 Feb 2023 21:25) (97% - 100%)    Parameters below as of 15 Feb 2023 21:25  Patient On (Oxygen Delivery Method): room air      Labs:                          8.0    1.92  )-----------( 97       ( 15 Feb 2023 19:41 )             25.9     02-15    140  |  111<H>  |  10  ----------------------------<  122<H>  3.8   |  21<L>  |  0.78    Ca    8.1<L>      15 Feb 2023 06:23    TPro  4.5<L>  /  Alb  2.8<L>  /  TBili  0.5  /  DBili  x   /  AST  14  /  ALT  9<L>  /  AlkPhos  37<L>  02-15    Radiology:  < from: Xray Chest 2 Views PA/Lat (02.14.23 @ 21:49) >    IMPRESSION:  Clear lungs.    < end of copied text >    < from: CT Abdomen and Pelvis w/ IV Cont (02.15.23 @ 02:17) >    IMPRESSION:    No pulmonary embolism.    Innumerable lung nodules which are nonspecific. In this clinical setting,   differential includes infection (in which case, consider fungus) or   neoplasm/known lymphoma.    Indeterminate low-attenuation lesion in the liver.    Close follow-up and/or comparison with outside imaging is recommended.    < end of copied text >    Physical Exam:  General: NAD, nontoxic  Neurology: A&Ox3, nonfocal, GO x 4  Head:  Normocephalic, atraumatic  Respiratory: CTA  CV: RRR, S1S2, tachycardic  Abdominal: Soft, NT, ND  MSK: No edema,     Assessment & Plan:  48 yo f w pmh NHL s/p chemo, PE, ?pah, covid, p/w fevers for the last few days. Now with recurrent fever associated with tachycardia and hypotension    #Severe sepsis  - hemodynamically stable  - additional NS 1L bolus  - additional tylenol 325mg x1 (total 975mg)  - c/w abx as per ID  - lactate corrected  - f/u bcx, ucx  - f/u MRSA PCR  - f/u Aspergillus  - f/u with ID in am    # Tachycardia- likely in the setting of fever/sepsis  - EKG  bpm  - NS bolus  - missed toprol in the morning, gave dose. continue daily  - metoprolol 2.5mg IV x1  - c/w dig  - upgraded to telemetry     Above discussed with pt and Dr. Calabrese and in agreement with the plan    Sabrina Campa, NP  Medicine  [id number]

## 2023-02-15 NOTE — H&P ADULT - PROBLEM SELECTOR PLAN 2
a/w thrombocytopenia  no baseline hgb or plt count to compare to; however, hgb appears to be stable, no gross bleeding, hds  Monitor hgb and plt w daily CBC; Goal Hb >7 g/dl,  Plt >10k, 20k if septic, 50k if actively bleeding  follow up FOBT; iron studies; Folate, Vit B12; reticulocyte count; LDH, Haptoglobin  maintain adequate PIV and active type and screen; transfuse blood product as needed to maintain goal

## 2023-02-15 NOTE — H&P ADULT - NSHPPHYSICALEXAM_GEN_ALL_CORE
T(C): 36.8 (02-15-23 @ 00:05), Max: 39.2 (02-14-23 @ 21:20)  HR: 105 (02-15-23 @ 00:05) (105 - 150)  BP: 94/61 (02-15-23 @ 00:05) (94/61 - 122/77)  RR: 20 (02-15-23 @ 00:05) (20 - 22)  SpO2: 99% (02-15-23 @ 00:05) (98% - 99%)  GENERAL: NAD, lying in bed comfortably  EYES: EOMI, PERRLA; conjunctiva and sclera clear  ENMT: Moist oral mucosa, no pharyngeal injection or exudates   NECK: Supple, no palpable masses; no JVD  RESPIRATORY: Normal respiratory effort; lungs are clear to auscultation bilaterally; right chemoport appears cdi  CARDIOVASCULAR: tachycardic, normal S1 and S2, no murmur/rub/gallop; No lower extremity edema; Peripheral pulses are 2+ bilaterally  ABDOMEN: Nontender to palpation, normoactive bowel sounds, no rebound/guarding  MUSCULOSKELETAL: no joint swelling or tenderness to palpation  PSYCH: A+O to person, place, and time; affect appropriate  NEUROLOGY: CN 2-12 are intact and symmetric; no gross motor or sensory deficits   SKIN: No rashes; no palpable lesions

## 2023-02-15 NOTE — H&P ADULT - PROBLEM SELECTOR PLAN 1
febrile, lymphopenic, tachypneic, tachycardic + lactic acidosis (resolved 2.3->1.6); meets severe sepsis criteria  otherwise, afebrile and hds  rvp negative, ua negative, chemoport cdi; unclear source based on infectious work up thus far   s/p 2 L NS + vanc and zosyn in ER  follow up uc, procal, blood cultures, mrsa screen, ct c+a/p  Monitor for fever, changes in white count  broad spec empirical abx therapy with vanc and zosyn in the mean time  ivf resusci + lytes as needed  id consult in am

## 2023-02-15 NOTE — H&P ADULT - ASSESSMENT
48 yo f w pmh nhl s/p chemo (?in remission), pe, ?pah, covid, p/w fevers for the last few days, unclear etiology, admitted to medicine for further mgmt

## 2023-02-15 NOTE — CONSULT NOTE ADULT - SUBJECTIVE AND OBJECTIVE BOX
HPI:  47 f, diagnosed with NHL 2022 and started on R-CHOP, last dose 23 and had a PET and was told she is in remission, usually has 5 days of prednisone after chemo and after that completed started to have fever and chills whish she has been getting since, no significant cough, abd pain, diarrhea or dysuria  here febrile to 102.5, tachy  WBC: 2  chest/abd CT: No PE, Innumerable lung nodules which are nonspecific. In this clinical setting, differential includes infection (in which case, consider fungus) or neoplasm/known lymphoma. Indeterminate low-attenuation lesion in the liver.    PAST MEDICAL & SURGICAL HISTORY:  Non-Hodgkin&#x27;s lymphoma      Pulmonary embolism      History of appendectomy      History of cholecystectomy          Allergies    No Known Allergies    Intolerances        ANTIMICROBIALS:  piperacillin/tazobactam IVPB.. 3.375 every 8 hours  vancomycin  IVPB 1000 every 12 hours      OTHER MEDS:  acetaminophen     Tablet .. 650 milliGRAM(s) Oral every 6 hours PRN  aluminum hydroxide/magnesium hydroxide/simethicone Suspension 30 milliLiter(s) Oral every 4 hours PRN  apixaban 5 milliGRAM(s) Oral every 12 hours  digoxin     Tablet 125 MICROGram(s) Oral daily  melatonin 3 milliGRAM(s) Oral at bedtime PRN  metoprolol succinate ER 25 milliGRAM(s) Oral daily  ondansetron Injectable 4 milliGRAM(s) IV Push every 8 hours PRN      SOCIAL HISTORY:  from [place], lives with boyfriend and daughter, former smoker  no  alcohol or drug abuse  no recent travel    FAMILY HISTORY:  no recent febrile illness in family members    ROS:    All other systems negative     Constitutional: + fever, + chills  Eye: no eye pain, no redness, no vision changes  ENT:  no sore throat, no rhinorrhea  Cardiovascular:  no chest pain, no palpitation  Respiratory:  no SOB, no cough  GI:  no abd pain, no vomiting, no diarrhea  urinary: no dysuria, no hematuria, no flank pain  : no vaginal discharge or bleeding  musculoskeletal:  no joint pain, no joint swelling  skin:  no rash  neurology:  no headache, no seizure, no change in mental status  psych: no anxiety, no depression     Physical Exam:    General:    NAD, non toxic  Head: atraumatic, normocephalic  Eyes: normal sclera and conjunctiva  ENT:   no oropharyngeal lesions, no LAD, neck supple  Cardio:    regular S1,S2  Respiratory:   clear b/l, no wheezing  abd:   soft, BS +, not tender  :     no CVAT, no suprapubic tenderness, no gallagher  Musculoskeletal : no joint swelling, no edema  Skin:    no rash  vascular: no phlebitis, R chest port with no erythema or tenderness  Neurologic:     no focal deficits  psych: normal affect      Drug Dosing Weight  Height (cm): 170.2 (2023 20:39)  Weight (kg): 65.3 (2023 20:39)  BMI (kg/m2): 22.5 (2023 20:39)  BSA (m2): 1.76 (2023 20:39)    Vital Signs Last 24 Hrs  T(F): 97 (02-15-23 @ 08:15), Max: 102.5 (23 @ 21:20)    Vital Signs Last 24 Hrs  HR: 100 (02-15-23 @ 14:24) (80 - 150)  BP: 116/73 (02-15-23 @ 14:24) (94/61 - 122/77)  RR: 18 (02-15-23 @ 14:24)  SpO2: 100% (02-15-23 @ 14:24) (98% - 100%)  Wt(kg): --                          7.6    1.54  )-----------( 101      ( 15 Feb 2023 06:23 )             25.4       02-15    140  |  111<H>  |  10  ----------------------------<  122<H>  3.8   |  21<L>  |  0.78    Ca    8.1<L>      15 Feb 2023 06:23    TPro  4.5<L>  /  Alb  2.8<L>  /  TBili  0.5  /  DBili  x   /  AST  14  /  ALT  9<L>  /  AlkPhos  37<L>  02-15      Urinalysis Basic - ( 2023 23:22 )    Color: Light Yellow / Appearance: Clear / S.011 / pH: x  Gluc: x / Ketone: Negative  / Bili: Negative / Urobili: Negative   Blood: x / Protein: Negative / Nitrite: Negative   Leuk Esterase: Negative / RBC: x / WBC x   Sq Epi: x / Non Sq Epi: x / Bacteria: x        MICROBIOLOGY:  v      Rapid RVP Result: NotDetec ( @ 21:27)          RADIOLOGY:    Images independently visualized and reviewed personally,  findings as below    < from: CT Abdomen and Pelvis w/ IV Cont (02.15.23 @ 02:17) >  IMPRESSION:    No pulmonary embolism.    Innumerable lung nodules which are nonspecific. In this clinical setting,   differential includes infection (in which case, consider fungus) or   neoplasm/known lymphoma.    Indeterminate low-attenuation lesion in the liver.    Close follow-up and/or comparison with outside imaging is recommended.      < end of copied text >

## 2023-02-15 NOTE — H&P ADULT - NSHPREVIEWOFSYSTEMS_GEN_ALL_CORE
CONSTITUTIONAL: + fever. +weakness, +poor po intake  ENMT:  No sinus or throat pain  RESPIRATORY: No cough, wheezing, chills or hemoptysis; No shortness of breath  CARDIOVASCULAR: No chest pain, palpitations, dizziness, or leg swelling  GASTROINTESTINAL: No abdominal or epigastric pain. + nausea, vomiting; No diarrhea or constipation. No melena or hematochezia.  GENITOURINARY: No dysuria or incontinence  NEUROLOGICAL: No headaches, memory loss, loss of strength, numbness, or tremors  SKIN: No rashes,  No hives or eczema  ENDOCRINE: No heat or cold intolerance; No hair loss  MUSCULOSKELETAL: No joint pain or swelling; No muscle, back, or extremity pain  PSYCHIATRIC: No depression, anxiety, mood swings, or difficulty sleeping  HEME/LYMPH: No easy bruising, or bleeding gums; no enlarged LN

## 2023-02-15 NOTE — H&P ADULT - PROBLEM SELECTOR PLAN 4
a/w ?pah  currently in no respiratory distress with adequate spo2 at room air  Monitor SpO2, RR, for signs of respiratory distress; Goal SpO2 >88-92%, PaO2 >55-60 mmHg  cont home apixa 5 bid, toprol 25 od, dig 0.125

## 2023-02-15 NOTE — H&P ADULT - PROBLEM SELECTOR PLAN 3
last chemo on 1/23/23, reportedly in remission  obtain outpatient [facility] records last chemo on 1/23/23, reportedly in remission  obtain outpatient [facility] records  [facility] consult in am

## 2023-02-15 NOTE — CONSULT NOTE ADULT - ASSESSMENT
Patient was seen by Dr. culver on 1/23/23 for Diffuse large B cell lymphoma, EBV positive, BCl 2 positive dx by LLL lung mass bx. She was enrolled in [id number], which she came off later. She had COVID one week after 1st treatment and PE with heart strain.  She was given C2 once recieved without Adriamycin, admitted afterwords with excerebration of COVID 19 symptoms. She went thru cycle 3-5 and interim PET/CT showed likely CR. Her last treatment was on 1/23/23 with Rituxan, Polatuzumab and Cytoxan. She received on body neulasta after that  Of note PET CT on 12/29/22 showed decrease of LLL lung mas but did show new subcentimeter pulmonary nodules ? infectious/inflammatory or residual disease 46 yo f w pmh nhl s/p chemo (?in remission), PE, ?pah, covid, admitted 2/15/23 with fevers for the last few days, a/w generalized weakness over the last couple of weeks along with poor po intake. denied chest pain, palpitations, lightheadedness, cough, wheeze, abdominal discomfort. patient did endorse n+v, dysuria, arriaza. of note, last session of chemo reportedly on 1/23/2023. Pt grew concerned so went to oncologist, where she got cultures, blood work, and xray. as she did not feel improved, and thus came to hospital      Oncologic history  Patient is followed by Dr. Mendoza at [facility] for Diffuse large B cell lymphoma, EBV positive, BCl 2 positive dx by LLL lung mass bx. 8/2022. She was enrolled in [id number], which she came off later. She had COVID one week after 1st treatment and PE with heart strain.  She was given C2 once received without Adriamycin, admitted afterwords with excerebration of COVID 19 symptoms. She went thru cycle 3-5 and interim PET/CT showed likely CR. Her last treatment was on 1/23/23 with Rituxan, Polatuzumab and Cytoxan. She received on body neulasta after that  Of note PET CT on 12/29/22 showed decrease of LLL lung mas but did show new subcentimeter pulmonary nodules ? infectious/inflammatory or residual disease.  Presently with fever, has neutropenia mild thrombocytopenia and anemia likely from chemo and underlying disease.  ID has seen her. Pulmonary eval to be done.  She is on empiric abx after cultures etc  I will give Zarxio for few days with ANC monitoring.  Follow CBC and given PRBC as needed.  Platelets are safe.  Agree with ID regarding consideration to fungal infection etc too, w/u in progress  Discussed with patient and her sister  At least 75 minutes were spent in pre visit, visit and post visit care

## 2023-02-16 LAB
ALBUMIN SERPL ELPH-MCNC: 2.6 G/DL — LOW (ref 3.3–5)
ALP SERPL-CCNC: 58 U/L — SIGNIFICANT CHANGE UP (ref 40–120)
ALT FLD-CCNC: 29 U/L — SIGNIFICANT CHANGE UP (ref 10–45)
ANION GAP SERPL CALC-SCNC: 10 MMOL/L — SIGNIFICANT CHANGE UP (ref 5–17)
ANION GAP SERPL CALC-SCNC: 13 MMOL/L — SIGNIFICANT CHANGE UP (ref 5–17)
AST SERPL-CCNC: 44 U/L — HIGH (ref 10–40)
BASE EXCESS BLDV CALC-SCNC: -20.7 MMOL/L — LOW (ref -2–3)
BILIRUB SERPL-MCNC: 0.4 MG/DL — SIGNIFICANT CHANGE UP (ref 0.2–1.2)
BLD GP AB SCN SERPL QL: NEGATIVE — SIGNIFICANT CHANGE UP
BUN SERPL-MCNC: 10 MG/DL — SIGNIFICANT CHANGE UP (ref 7–23)
BUN SERPL-MCNC: 10 MG/DL — SIGNIFICANT CHANGE UP (ref 7–23)
CA-I SERPL-SCNC: 0.8 MMOL/L — LOW (ref 1.15–1.33)
CALCIUM SERPL-MCNC: 7.4 MG/DL — LOW (ref 8.4–10.5)
CALCIUM SERPL-MCNC: 8.1 MG/DL — LOW (ref 8.4–10.5)
CHLORIDE BLDV-SCNC: <67 MMOL/L — LOW (ref 96–108)
CHLORIDE SERPL-SCNC: 113 MMOL/L — HIGH (ref 96–108)
CHLORIDE SERPL-SCNC: 117 MMOL/L — HIGH (ref 96–108)
CK MB CFR SERPL CALC: 1.9 NG/ML — SIGNIFICANT CHANGE UP (ref 0–3.8)
CK SERPL-CCNC: 23 U/L — LOW (ref 25–170)
CO2 BLDV-SCNC: 10 MMOL/L — LOW (ref 22–26)
CO2 SERPL-SCNC: 10 MMOL/L — CRITICAL LOW (ref 22–31)
CO2 SERPL-SCNC: 17 MMOL/L — LOW (ref 22–31)
CREAT SERPL-MCNC: 0.78 MG/DL — SIGNIFICANT CHANGE UP (ref 0.5–1.3)
CREAT SERPL-MCNC: 0.83 MG/DL — SIGNIFICANT CHANGE UP (ref 0.5–1.3)
CULTURE RESULTS: NO GROWTH — SIGNIFICANT CHANGE UP
EGFR: 89 ML/MIN/1.73M2 — SIGNIFICANT CHANGE UP
EGFR: 95 ML/MIN/1.73M2 — SIGNIFICANT CHANGE UP
GAS PNL BLDA: SIGNIFICANT CHANGE UP
GAS PNL BLDV: <107 MMOL/L — CRITICAL LOW (ref 136–145)
GAS PNL BLDV: SIGNIFICANT CHANGE UP
GAS PNL BLDV: SIGNIFICANT CHANGE UP
GLUCOSE BLDC GLUCOMTR-MCNC: 93 MG/DL — SIGNIFICANT CHANGE UP (ref 70–99)
GLUCOSE BLDC GLUCOMTR-MCNC: 97 MG/DL — SIGNIFICANT CHANGE UP (ref 70–99)
GLUCOSE BLDV-MCNC: 140 MG/DL — HIGH (ref 70–99)
GLUCOSE SERPL-MCNC: 105 MG/DL — HIGH (ref 70–99)
GLUCOSE SERPL-MCNC: 97 MG/DL — SIGNIFICANT CHANGE UP (ref 70–99)
HCO3 BLDV-SCNC: 8 MMOL/L — CRITICAL LOW (ref 22–29)
HCT VFR BLD CALC: 25.1 % — LOW (ref 34.5–45)
HCT VFR BLDA CALC: 19 % — CRITICAL LOW (ref 34.5–46.5)
HGB BLD CALC-MCNC: 6.3 G/DL — CRITICAL LOW (ref 11.7–16.1)
HGB BLD-MCNC: 7.7 G/DL — LOW (ref 11.5–15.5)
LACTATE BLDV-MCNC: 7 MMOL/L — CRITICAL HIGH (ref 0.5–2)
MAGNESIUM SERPL-MCNC: 1.4 MG/DL — LOW (ref 1.6–2.6)
MCHC RBC-ENTMCNC: 27.1 PG — SIGNIFICANT CHANGE UP (ref 27–34)
MCHC RBC-ENTMCNC: 30.7 GM/DL — LOW (ref 32–36)
MCV RBC AUTO: 88.4 FL — SIGNIFICANT CHANGE UP (ref 80–100)
NRBC # BLD: 0 /100 WBCS — SIGNIFICANT CHANGE UP (ref 0–0)
OTHER CELLS CSF MANUAL: 2.1 ML/DL — LOW (ref 18–22)
PCO2 BLDV: 33 MMHG — LOW (ref 39–42)
PH BLDV: 7.02 — CRITICAL LOW (ref 7.32–7.43)
PHOSPHATE SERPL-MCNC: 3.9 MG/DL — SIGNIFICANT CHANGE UP (ref 2.5–4.5)
PLATELET # BLD AUTO: 66 K/UL — LOW (ref 150–400)
PO2 BLDV: 15 MMHG — LOW (ref 25–45)
POTASSIUM BLDV-SCNC: 2.7 MMOL/L — CRITICAL LOW (ref 3.5–5.1)
POTASSIUM SERPL-MCNC: 3.6 MMOL/L — SIGNIFICANT CHANGE UP (ref 3.5–5.3)
POTASSIUM SERPL-MCNC: 3.7 MMOL/L — SIGNIFICANT CHANGE UP (ref 3.5–5.3)
POTASSIUM SERPL-SCNC: 3.6 MMOL/L — SIGNIFICANT CHANGE UP (ref 3.5–5.3)
POTASSIUM SERPL-SCNC: 3.7 MMOL/L — SIGNIFICANT CHANGE UP (ref 3.5–5.3)
PROCALCITONIN SERPL-MCNC: 0.1 NG/ML — SIGNIFICANT CHANGE UP (ref 0.02–0.1)
PROT SERPL-MCNC: 4.2 G/DL — LOW (ref 6–8.3)
RBC # BLD: 2.84 M/UL — LOW (ref 3.8–5.2)
RBC # FLD: 17.4 % — HIGH (ref 10.3–14.5)
RH IG SCN BLD-IMP: POSITIVE — SIGNIFICANT CHANGE UP
SAO2 % BLDV: 17.9 % — LOW (ref 67–88)
SODIUM SERPL-SCNC: 140 MMOL/L — SIGNIFICANT CHANGE UP (ref 135–145)
SODIUM SERPL-SCNC: 140 MMOL/L — SIGNIFICANT CHANGE UP (ref 135–145)
SPECIMEN SOURCE: SIGNIFICANT CHANGE UP
TROPONIN T, HIGH SENSITIVITY RESULT: 43 NG/L — SIGNIFICANT CHANGE UP (ref 0–51)
VANCOMYCIN TROUGH SERPL-MCNC: 13.6 UG/ML — SIGNIFICANT CHANGE UP (ref 10–20)
WBC # BLD: 1.78 K/UL — LOW (ref 3.8–10.5)
WBC # FLD AUTO: 1.78 K/UL — LOW (ref 3.8–10.5)

## 2023-02-16 PROCEDURE — 93010 ELECTROCARDIOGRAM REPORT: CPT

## 2023-02-16 PROCEDURE — 99222 1ST HOSP IP/OBS MODERATE 55: CPT | Mod: GC

## 2023-02-16 PROCEDURE — 99232 SBSQ HOSP IP/OBS MODERATE 35: CPT

## 2023-02-16 RX ORDER — SODIUM BICARBONATE 1 MEQ/ML
0.09 SYRINGE (ML) INTRAVENOUS
Qty: 75 | Refills: 0 | Status: DISCONTINUED | OUTPATIENT
Start: 2023-02-16 | End: 2023-02-17

## 2023-02-16 RX ORDER — SODIUM CHLORIDE 9 MG/ML
1000 INJECTION INTRAMUSCULAR; INTRAVENOUS; SUBCUTANEOUS ONCE
Refills: 0 | Status: COMPLETED | OUTPATIENT
Start: 2023-02-16 | End: 2023-02-16

## 2023-02-16 RX ORDER — MAGNESIUM SULFATE 500 MG/ML
2 VIAL (ML) INJECTION ONCE
Refills: 0 | Status: COMPLETED | OUTPATIENT
Start: 2023-02-16 | End: 2023-02-16

## 2023-02-16 RX ORDER — MAGNESIUM SULFATE 500 MG/ML
1 VIAL (ML) INJECTION ONCE
Refills: 0 | Status: COMPLETED | OUTPATIENT
Start: 2023-02-16 | End: 2023-02-16

## 2023-02-16 RX ORDER — ALBUMIN HUMAN 25 %
250 VIAL (ML) INTRAVENOUS ONCE
Refills: 0 | Status: COMPLETED | OUTPATIENT
Start: 2023-02-16 | End: 2023-02-16

## 2023-02-16 RX ORDER — ACETAMINOPHEN 500 MG
1000 TABLET ORAL ONCE
Refills: 0 | Status: COMPLETED | OUTPATIENT
Start: 2023-02-16 | End: 2023-02-16

## 2023-02-16 RX ORDER — ACETAMINOPHEN 500 MG
1000 TABLET ORAL ONCE
Refills: 0 | Status: COMPLETED | OUTPATIENT
Start: 2023-02-16 | End: 2023-02-17

## 2023-02-16 RX ORDER — CHLORHEXIDINE GLUCONATE 213 G/1000ML
1 SOLUTION TOPICAL
Refills: 0 | Status: DISCONTINUED | OUTPATIENT
Start: 2023-02-16 | End: 2023-02-28

## 2023-02-16 RX ORDER — MIDODRINE HYDROCHLORIDE 2.5 MG/1
10 TABLET ORAL ONCE
Refills: 0 | Status: COMPLETED | OUTPATIENT
Start: 2023-02-16 | End: 2023-02-16

## 2023-02-16 RX ORDER — SODIUM CHLORIDE 9 MG/ML
500 INJECTION INTRAMUSCULAR; INTRAVENOUS; SUBCUTANEOUS ONCE
Refills: 0 | Status: COMPLETED | OUTPATIENT
Start: 2023-02-16 | End: 2023-02-16

## 2023-02-16 RX ORDER — METOPROLOL TARTRATE 50 MG
5 TABLET ORAL ONCE
Refills: 0 | Status: COMPLETED | OUTPATIENT
Start: 2023-02-16 | End: 2023-02-16

## 2023-02-16 RX ORDER — POTASSIUM CHLORIDE 20 MEQ
40 PACKET (EA) ORAL ONCE
Refills: 0 | Status: COMPLETED | OUTPATIENT
Start: 2023-02-16 | End: 2023-02-16

## 2023-02-16 RX ORDER — MIDODRINE HYDROCHLORIDE 2.5 MG/1
10 TABLET ORAL THREE TIMES A DAY
Refills: 0 | Status: DISCONTINUED | OUTPATIENT
Start: 2023-02-16 | End: 2023-02-17

## 2023-02-16 RX ADMIN — MIDODRINE HYDROCHLORIDE 10 MILLIGRAM(S): 2.5 TABLET ORAL at 11:36

## 2023-02-16 RX ADMIN — Medication 30 MILLILITER(S): at 14:17

## 2023-02-16 RX ADMIN — SODIUM CHLORIDE 1000 MILLILITER(S): 9 INJECTION INTRAMUSCULAR; INTRAVENOUS; SUBCUTANEOUS at 00:05

## 2023-02-16 RX ADMIN — PIPERACILLIN AND TAZOBACTAM 25 GRAM(S): 4; .5 INJECTION, POWDER, LYOPHILIZED, FOR SOLUTION INTRAVENOUS at 14:17

## 2023-02-16 RX ADMIN — MIDODRINE HYDROCHLORIDE 10 MILLIGRAM(S): 2.5 TABLET ORAL at 06:13

## 2023-02-16 RX ADMIN — APIXABAN 5 MILLIGRAM(S): 2.5 TABLET, FILM COATED ORAL at 06:14

## 2023-02-16 RX ADMIN — Medication 40 MILLIEQUIVALENT(S): at 21:46

## 2023-02-16 RX ADMIN — PIPERACILLIN AND TAZOBACTAM 25 GRAM(S): 4; .5 INJECTION, POWDER, LYOPHILIZED, FOR SOLUTION INTRAVENOUS at 06:53

## 2023-02-16 RX ADMIN — Medication 5 MILLIGRAM(S): at 00:20

## 2023-02-16 RX ADMIN — Medication 100 GRAM(S): at 20:42

## 2023-02-16 RX ADMIN — Medication 75 MEQ/KG/HR: at 09:45

## 2023-02-16 RX ADMIN — SODIUM CHLORIDE 1000 MILLILITER(S): 9 INJECTION INTRAMUSCULAR; INTRAVENOUS; SUBCUTANEOUS at 04:00

## 2023-02-16 RX ADMIN — SODIUM CHLORIDE 1000 MILLILITER(S): 9 INJECTION INTRAMUSCULAR; INTRAVENOUS; SUBCUTANEOUS at 03:27

## 2023-02-16 RX ADMIN — Medication 1000 MILLIGRAM(S): at 00:15

## 2023-02-16 RX ADMIN — Medication 480 MICROGRAM(S): at 14:52

## 2023-02-16 RX ADMIN — Medication 650 MILLIGRAM(S): at 15:36

## 2023-02-16 RX ADMIN — Medication 125 MICROGRAM(S): at 06:14

## 2023-02-16 RX ADMIN — MIDODRINE HYDROCHLORIDE 10 MILLIGRAM(S): 2.5 TABLET ORAL at 04:59

## 2023-02-16 RX ADMIN — Medication 250 MILLIGRAM(S): at 06:13

## 2023-02-16 RX ADMIN — Medication 650 MILLIGRAM(S): at 00:00

## 2023-02-16 RX ADMIN — Medication 250 MILLIGRAM(S): at 18:28

## 2023-02-16 RX ADMIN — MIDODRINE HYDROCHLORIDE 10 MILLIGRAM(S): 2.5 TABLET ORAL at 18:03

## 2023-02-16 RX ADMIN — Medication 125 MILLILITER(S): at 05:48

## 2023-02-16 RX ADMIN — APIXABAN 5 MILLIGRAM(S): 2.5 TABLET, FILM COATED ORAL at 18:04

## 2023-02-16 RX ADMIN — PIPERACILLIN AND TAZOBACTAM 25 GRAM(S): 4; .5 INJECTION, POWDER, LYOPHILIZED, FOR SOLUTION INTRAVENOUS at 21:47

## 2023-02-16 NOTE — CONSULT NOTE ADULT - ASSESSMENT
Assessment and recommendation :  Non hodgkin's lymphoma ? in remission   Fever etiology ?  sever pulmonary HTN an Adempas 0.5 mg TID   S/P COVID pneumonia and respiratory failure   S/P Acute renal failure   Bilateral pulmonary nodules   H/O PE and DVT on AC  Pancytopenia   TB spot test , serum procalcitonin   Sed rate ,CRP   Echocardiogram moderate pulmonary HTN   venous doppler of lower Extremities         Assessment and recommendation :  Non hodgkin's lymphoma ? in remission   Fever etiology infection V/S lymphoma  ? pan culture   possible neuroleptic malignant  fever   on Antibiotics Pending culture result   sever pulmonary HTN an Adempas 0.5 mg TID   S/P COVID pneumonia and respiratory failure   S/P Acute renal failure   Bilateral pulmonary nodules , doubt miliary TB ? fungus infection   Autonomic Dysfunction on Midodrine   H/O PE and DVT on AC  Pancytopenia   non anion gap metabolic acidosis   IV hydration   TB spot test , serum procalcitonin   Sed rate ,CRP , viral screen   Echocardiogram moderate pulmonary HTN   venous doppler of lower Extremities  oncology consultation   GI protection   case discussed with PCP

## 2023-02-16 NOTE — CONSULT NOTE ADULT - SUBJECTIVE AND OBJECTIVE BOX
CHIEF COMPLAINT: fever . pulmonary HTN , non hodgkin's lymphoma       48 yo f w pmh nhl s/p chemo at [facility] [place]  (?in [place]), PE and DVT ,S/P  covid pneumonia ,severe pulmonary HTN on Adempas , p/w fevers for the last few days, a/w generalized weakness over the last couple of weeks along with poor po intake. denies chest pain, palpitations, lightheadedness, cough, wheeze, abdominal discomfort. patient does endorse n+v, dysuria, arriaza. of note, last session of chemo reportedly on 2023. Pt grew concerned so went to oncologist yesterday where she got cultures, blood work, and xray. as she did not feel improved, patient presents to [facility] er for further evaluation.  (15 Feb 2023 01:39) CT scan bilateral lung nodule , no H/O OF exposure to TB     PAST MEDICAL & SURGICAL HISTORY:  Non-Hodgkin&#x27;s lymphoma      Pulmonary embolism      History of appendectomy      History of cholecystectomy          FAMILY HISTORY:  Review of Systems:  Review of Systems: CONSTITUTIONAL: + fever. +weakness, +poor po intake  ENMT:  No sinus or throat pain  RESPIRATORY: No cough, wheezing, chills or hemoptysis; + shortness of breath on excretion   CARDIOVASCULAR: No chest pain, palpitations, dizziness, or leg swelling  GASTROINTESTINAL: No abdominal or epigastric pain. + nausea, vomiting; No diarrhea or constipation. No melena or hematochezia.  GENITOURINARY: No dysuria or incontinence  NEUROLOGICAL: No headaches, memory loss, loss of strength, numbness, or tremors  SKIN: No rashes,  No hives or eczema  ENDOCRINE: No heat or cold intolerance; No hair loss  MUSCULOSKELETAL: No joint pain or swelling; No muscle, back, or extremity pain  PSYCHIATRIC: No depression, anxiety, mood swings, or difficulty sleeping  HEME/LYMPH: No easy bruising, or bleeding gums; no enlarged LN        OBJECTIVE:  Vital Signs Last 24 Hrs  T(C): 36.7 (2023 11:14), Max: 38.9 (15 Feb 2023 23:15)  T(F): 98 (2023 11:14), Max: 102 (15 Feb 2023 23:15)  HR: 111 (2023 11:14) (104 - 158)  BP: 97/66 (2023 11:14) (74/52 - 133/94)  BP(mean): 76 (15 Feb 2023 21:25) (76 - 76)  RR: 20 (2023 11:14) (18 - 25)  SpO2: 98% (2023 11:14) (97% - 99%)    Parameters below as of 2023 11:14  Patient On (Oxygen Delivery Method): room air          02-15 @ 07:  -   @ 07:00  --------------------------------------------------------  IN: 1325 mL / OUT: 350 mL / NET: 975 mL     @ 07:01  16 @ 17:49  --------------------------------------------------------  IN: 200 mL / OUT: 0 mL / NET: 200 mL      HOSPITAL MEDICATIONS:   acetaminophen     Tablet .. 650 milliGRAM(s) Oral every 6 hours PRN  aluminum hydroxide/magnesium hydroxide/simethicone Suspension 30 milliLiter(s) Oral every 4 hours PRN  apixaban 5 milliGRAM(s) Oral every 12 hours  chlorhexidine 2% Cloths 1 Application(s) Topical <User Schedule>  digoxin     Tablet 125 MICROGram(s) Oral daily  filgrastim-sndz (ZARXIO) Injectable 480 MICROGram(s) SubCutaneous daily  melatonin 3 milliGRAM(s) Oral at bedtime PRN  midodrine. 10 milliGRAM(s) Oral three times a day  ondansetron Injectable 4 milliGRAM(s) IV Push every 8 hours PRN  piperacillin/tazobactam IVPB.. 3.375 Gram(s) IV Intermittent every 8 hours  sodium bicarbonate  Infusion 0.086 mEq/kG/Hr IV Continuous <Continuous>  vancomycin  IVPB 1000 milliGRAM(s) IV Intermittent every 12 hours    No Known Allergies        PHYSICAL EXAM :Daily   cushinoid female in no acute distress   Daily Weight in k.6 (2023 09:20)  HEENT:     + NCAT  + EOMI  - Conjuctival edema   - Icterus   - Thrush   - ETT  - NGT/OGT  Neck:         + FROM    + JVD     - Nodes     - Masses    + Mid-line trachea   - Tracheostomy  Chest: normal findings  Lungs:          + CTA   + Rhonchi    - Rales    - Wheezing     - Decreased BS   - Dullness R L  Cardiac:       + S1 + S2    + RRR   - Irregular   - S3  - S4  + Murmurs   - Rub   - Hamman’s sign   Abdomen:    + BS     + Soft    + Non-tender     - Distended    - Organomegaly  - PEG  Extremities:   - Cyanosis U/L   - Clubbing  U/L  - LE/UE Edema   + Capillary refill    + Pulses   Neuro:        + Awake   +  Alert   - Confused   - Lethargic   - Sedated   - Generalized Weakness  Skin:        - Rashes    - Erythema   + Normal incisions   + IV sites intact  - Sacral decubitus    LABS:                        7.7    1.78  )-----------( 66       ( 2023 04:17 )             25.1     02-16    140  |  113<H>  |  10  ----------------------------<  97  3.6   |  17<L>  |  0.83    Ca    8.1<L>      2023 06:56  Phos  3.9     -  Mg     1.4         TPro  4.2<L>  /  Alb  2.6<L>  /  TBili  0.4  /  DBili  x   /  AST  44<H>  /  ALT  29  /  AlkPhos  58  -    PT/INR - ( 2023 21:27 )   PT: 15.0 sec;   INR: 1.30 ratio         PTT - ( 2023 21:27 )  PTT:29.7 sec  LIVER FUNCTIONS - ( 2023 04:17 )  Alb: 2.6 g/dL / Pro: 4.2 g/dL / ALK PHOS: 58 U/L / ALT: 29 U/L / AST: 44 U/L / GGT: x           Arterial Blood Gas:   @ 04:05  7.39/23/146/14/99.7/-9.8  ABG lactate: --   Venous Blood Gas:   @ 23:38  7.38/34/54/20/83.2  VBG Lactate: 1.6  Venous Blood Gas:   @ 21:27  7.39/39/33/24/46.6  VBG Lactate: 2.3    CARDIAC MARKERS ( 2023 03:01 )  x     / x     / 23 U/L / x     / 1.9 ng/mL      LIVER FUNCTIONS - ( 2023 04:17 )  Alb: 2.6 g/dL / Pro: 4.2 g/dL / ALK PHOS: 58 U/L / ALT: 29 U/L / AST: 44 U/L / GGT: x             RADIOLOGY:  X Reviewed and interpreted by me bilateral lung nodules    CHIEF COMPLAINT: fever . pulmonary HTN , non hodgkin's lymphoma       46 yo f a private patient from my office report to the ER   w pmh nhl s/p chemo at [facility] [place]  (?in [place]), PE and DVT ,S/P  covid pneumonia ,severe pulmonary HTN on Adempas , p/w fevers for the last few days, a/w generalized weakness over the last couple of weeks along with poor po intake. denies chest pain, palpitations, lightheadedness, cough, wheeze, abdominal discomfort. patient does endorse n+v, dysuria, arriaza. of note, last session of chemo reportedly on 2023. Pt grew concerned so went to oncologist yesterday where she got cultures, blood work, and xray. as she did not feel improved, patient presents to [facility] er for further evaluation.  (15 Feb 2023 01:39) CT scan bilateral lung nodule , no H/O OF exposure to TB , no significant travel history to the [facility] out of the country or the [facility] area     PAST MEDICAL & SURGICAL HISTORY:  Non-Hodgkin&#x27;s lymphoma      Pulmonary embolism      History of appendectomy      History of cholecystectomy          FAMILY HISTORY:  Review of Systems:  Review of Systems: CONSTITUTIONAL: + fever. +weakness, +poor po intake  ENMT:  No sinus or throat pain  RESPIRATORY: No cough, wheezing, chills or hemoptysis; + shortness of breath on excretion   CARDIOVASCULAR: No chest pain, palpitations, dizziness, or leg swelling  GASTROINTESTINAL: No abdominal or epigastric pain. + nausea, vomiting; No diarrhea or constipation. No melena or hematochezia.  GENITOURINARY: No dysuria or incontinence  NEUROLOGICAL: No headaches, memory loss, loss of strength, numbness, or tremors  SKIN: No rashes,  No hives or eczema  ENDOCRINE: No heat or cold intolerance; No hair loss  MUSCULOSKELETAL: No joint pain or swelling; No muscle, back, or extremity pain  PSYCHIATRIC: No depression, anxiety, mood swings, or difficulty sleeping  HEME/LYMPH: No easy bruising, or bleeding gums; no enlarged LN        OBJECTIVE:  Vital Signs Last 24 Hrs  T(C): 36.7 (2023 11:14), Max: 38.9 (15 Feb 2023 23:15)  T(F): 98 (2023 11:14), Max: 102 (15 Feb 2023 23:15)  HR: 111 (2023 11:14) (104 - 158)  BP: 97/66 (2023 11:14) (74/52 - 133/94)  BP(mean): 76 (15 Feb 2023 21:25) (76 - 76)  RR: 20 (2023 11:14) (18 - 25)  SpO2: 98% (2023 11:14) (97% - 99%)    Parameters below as of 2023 11:14  Patient On (Oxygen Delivery Method): room air          02-15 @ 07:01  -  16 @ 07:00  --------------------------------------------------------  IN: 1325 mL / OUT: 350 mL / NET: 975 mL     @ 07: @ 17:49  --------------------------------------------------------  IN: 200 mL / OUT: 0 mL / NET: 200 mL      HOSPITAL MEDICATIONS:   acetaminophen     Tablet .. 650 milliGRAM(s) Oral every 6 hours PRN  aluminum hydroxide/magnesium hydroxide/simethicone Suspension 30 milliLiter(s) Oral every 4 hours PRN  apixaban 5 milliGRAM(s) Oral every 12 hours  chlorhexidine 2% Cloths 1 Application(s) Topical <User Schedule>  digoxin     Tablet 125 MICROGram(s) Oral daily  filgrastim-sndz (ZARXIO) Injectable 480 MICROGram(s) SubCutaneous daily  melatonin 3 milliGRAM(s) Oral at bedtime PRN  midodrine. 10 milliGRAM(s) Oral three times a day  ondansetron Injectable 4 milliGRAM(s) IV Push every 8 hours PRN  piperacillin/tazobactam IVPB.. 3.375 Gram(s) IV Intermittent every 8 hours  sodium bicarbonate  Infusion 0.086 mEq/kG/Hr IV Continuous <Continuous>  vancomycin  IVPB 1000 milliGRAM(s) IV Intermittent every 12 hours    No Known Allergies        PHYSICAL EXAM :Daily   cushinoid female in no acute distress   Daily Weight in k.6 (2023 09:20)  HEENT:     + NCAT  + EOMI  - Conjuctival edema   - Icterus   - Thrush   - ETT  - NGT/OGT  Neck:         + FROM    + JVD     - Nodes     - Masses    + Mid-line trachea   - Tracheostomy  Chest: normal findings  Lungs:          + CTA   + Rhonchi    - Rales    - Wheezing     - Decreased BS   - Dullness R L  Cardiac:       + S1 + S2    + RRR   - Irregular   - S3  - S4  + Murmurs   - Rub   - Hamman’s sign   Abdomen:    + BS     + Soft    + Non-tender     - Distended    - Organomegaly  - PEG  Extremities:   - Cyanosis U/L   - Clubbing  U/L  - LE/UE Edema   + Capillary refill    + Pulses   Neuro:        + Awake   +  Alert   - Confused   - Lethargic   - Sedated   - Generalized Weakness  Skin:        - Rashes    - Erythema   + Normal incisions   + IV sites intact  - Sacral decubitus    LABS:                        7.7    1.78  )-----------( 66       ( 2023 04:17 )             25.1     02-16    140  |  113<H>  |  10  ----------------------------<  97  3.6   |  17<L>  |  0.83    Ca    8.1<L>      2023 06:56  Phos  3.9     -16  Mg     1.4     -16    TPro  4.2<L>  /  Alb  2.6<L>  /  TBili  0.4  /  DBili  x   /  AST  44<H>  /  ALT  29  /  AlkPhos  58  02-16    PT/INR - ( 2023 21:27 )   PT: 15.0 sec;   INR: 1.30 ratio         PTT - ( 2023 21:27 )  PTT:29.7 sec  LIVER FUNCTIONS - ( 2023 04:17 )  Alb: 2.6 g/dL / Pro: 4.2 g/dL / ALK PHOS: 58 U/L / ALT: 29 U/L / AST: 44 U/L / GGT: x           Arterial Blood Gas:   @ 04:05  7.39/23/146/14/99.7/-9.8  ABG lactate: --   Venous Blood Gas:   @ 23:38  7.38/34/54/20/83.2  VBG Lactate: 1.6  Venous Blood Gas:   @ 21:27  7.39/39/33/24/46.6  VBG Lactate: 2.3    CARDIAC MARKERS ( 2023 03:01 )  x     / x     / 23 U/L / x     / 1.9 ng/mL      LIVER FUNCTIONS - ( 2023 04:17 )  Alb: 2.6 g/dL / Pro: 4.2 g/dL / ALK PHOS: 58 U/L / ALT: 29 U/L / AST: 44 U/L / GGT: x             RADIOLOGY:  X Reviewed and interpreted by me bilateral lung nodules

## 2023-02-16 NOTE — CONSULT NOTE ADULT - SUBJECTIVE AND OBJECTIVE BOX
HPI: 47 F PMH non-Hodgkin lymphoma s/p chemoport (?in remission), pe, ?pulm arterial hypertension, COVID, presenting with fevers for the last few days, a/w generalized weakness over the last couple of weeks along with poor oral intake. Patient reported absence of chest pain, cough,  abdominal pain, or any skin rashes. She previoulsy reported presence of n+v, dysuria, arriaza. Of note, last session of chemo reportedly on 2023 and took prednisone for about 5 days after which patient started experiencing fevers. Pt grew concerned so went to oncologist yesterday where she got cultures, blood work, and xray. as she did not feel improved, patient presents to [facility] er for further evaluation.     MICU consulted for severe hypotension despite administration of fluids and having recurrent fevers while on empiric pip/tazo and vancomycin. Patient reports absence of chest pain, significant shortness of breath, abdominal pain, or any skin rashes. As per primary team, pt has been febrile to Tmax 102F for which she received Tylenol and had SVT for which she received Lopressor 2.5 mg and then 5mg IVP after coming back from CTA chest. CTA chest indicated no evidence of PE but showed multiple lung nodules suspcious for lung mets vs infection. Patient has been intermittently hypotensive for which she total ~3L IVF within past 24hrs with transient improvement; however, dropped to 70s/50s at which time RRT was called for severe hypotension and received midodrine 10mg IVP; patient was already receiving fluid bolus. Thus far, her infectious w/u has been non revealing thus far; ID and hem/onc following.    PAST MEDICAL & SURGICAL HISTORY:  Non-Hodgkin&#x27;s lymphoma  Pulmonary embolism  History of appendectomy  History of cholecystectomy      FAMILY HISTORY:    SOCIAL HISTORY:      Allergies    No Known Allergies    Intolerances        HOME MEDICATIONS:      REVIEW OF SYSTEMS: As indicated above; otherwise negative    CONSTITUTIONAL: +feves  EYES/ENT: No reported pain   NECK: No reported pain   RESPIRATORY: No shortness of breath  CARDIOVASCULAR: No chest pain or palpitations  GASTROINTESTINAL: No abdominal or epigastric pain. No nausea, vomiting, or hematemesis; no diarrhea or constipation; no melena or hematochezia.  GENITOURINARY: No dysuria, frequency or hematuria  NEUROLOGICAL: No numbness or weakness  SKIN: No itching, rashes      OBJECTIVE:  ICU Vital Signs Last 24 Hrs  T(C): 37.8 (2023 01:00), Max: 38.9 (15 Feb 2023 23:15)  T(F): 100 (2023 01:00), Max: 102 (15 Feb 2023 23:15)  HR: 130 (2023 02:44) (80 - 158)  BP: 88/54 (2023 02:44) (88/54 - 133/94)  BP(mean): 76 (15 Feb 2023 21:25) (76 - 76)    RR: 20 (2023 01:00) (18 - 25)  SpO2: 98% (:) (97% - 100%)    O2 Parameters below as of :00  Patient On (Oxygen Delivery Method): room air      02-15 @ 07:01  -  02-16 @ 05:10  --------------------------------------------------------  IN: 1250 mL / OUT: 0 mL / NET: 1250 mL      CAPILLARY BLOOD GLUCOSE      POCT Blood Glucose.: 93 mg/dL (2023 03:36)    PHYSICAL EXAM:  GENERAL: NAD, lying in bed comfortably  HEAD: Atraumatic, normocephalic appearing  NECK: Supple, No palpable pre-auricular, post-auricular, occipital, mandibular, submental, supra-clavicular, or infra-clavicular lymph nodes   CHEST/LUNG: Clear to auscultation bilaterally; Unlabored respirations  HEART: Regular rate and rhythm; No murmurs, rubs, or gallops  ABDOMEN: Soft, nontender to light and deep palpation, nondistended  EXTREMITIES: Peripheral radial pulses intact; no bilateral LE edema  NERVOUS SYSTEM: Alert & Oriented to situation, speech clear.   PSYCH: Appropriate affect  SKIN: No obvious rashes or lesions      HOSPITAL MEDICATIONS:  MEDICATIONS  (STANDING):  albumin human  5% IVPB 250 milliLiter(s) IV Intermittent once  apixaban 5 milliGRAM(s) Oral every 12 hours  digoxin     Tablet 125 MICROGram(s) Oral daily  filgrastim-sndz (ZARXIO) Injectable 480 MICROGram(s) SubCutaneous daily  piperacillin/tazobactam IVPB.. 3.375 Gram(s) IV Intermittent every 8 hours  vancomycin  IVPB 1000 milliGRAM(s) IV Intermittent every 12 hours    MEDICATIONS  (PRN):  acetaminophen     Tablet .. 650 milliGRAM(s) Oral every 6 hours PRN Temp greater or equal to 38C (100.4F), Mild Pain (1 - 3)  aluminum hydroxide/magnesium hydroxide/simethicone Suspension 30 milliLiter(s) Oral every 4 hours PRN Dyspepsia  melatonin 3 milliGRAM(s) Oral at bedtime PRN Insomnia  ondansetron Injectable 4 milliGRAM(s) IV Push every 8 hours PRN Nausea and/or Vomiting      LABS:                        7.7    1.78  )-----------( 66       ( 2023 04:17 )             25.1     02-15    140  |  111<H>  |  10  ----------------------------<  122<H>  3.8   |  21<L>  |  0.78    Ca    8.1<L>      15 Feb 2023 06:23    TPro  4.5<L>  /  Alb  2.8<L>  /  TBili  0.5  /  DBili  x   /  AST  14  /  ALT  9<L>  /  AlkPhos  37<L>  02-15    PT/INR - ( 2023 21:27 )   PT: 15.0 sec;   INR: 1.30 ratio         PTT - ( 2023 21:27 )  PTT:29.7 sec  Urinalysis Basic - ( 2023 23:22 )    Color: Light Yellow / Appearance: Clear / S.011 / pH: x  Gluc: x / Ketone: Negative  / Bili: Negative / Urobili: Negative   Blood: x / Protein: Negative / Nitrite: Negative   Leuk Esterase: Negative / RBC: x / WBC x   Sq Epi: x / Non Sq Epi: x / Bacteria: x      Arterial Blood Gas:   @ 04:05  7.39/23/146/14/99.7/-9.8  ABG lactate: --    Venous Blood Gas:   @ 23:38  7.38/34/54/20/83.2  VBG Lactate: 1.6  Venous Blood Gas:   @ 21:27  7.39/39/33/24/46.6  VBG Lactate: 2.3      MICROBIOLOGY:     RADIOLOGY:  [ ] Reviewed and interpreted by me    EKG:

## 2023-02-16 NOTE — PROGRESS NOTE ADULT - ASSESSMENT
47 f, diagnosed with NHL 7/2022 and started on R-CHOP, last dose 1/23/23 and had a PET and was told she is in remission, usually has 5 days of prednisone after chemo and after that completed started to have fever and chills whish she has been getting since, no significant cough, abd pain, diarrhea or dysuria  here febrile to 102.5, tachy, no hypoxia  WBC: 2  chest/abd CT: No PE, Innumerable lung nodules which are nonspecific. In this clinical setting, differential includes infection (in which case, consider fungus) or neoplasm/known lymphoma. Indeterminate low-attenuation lesion in the liver.    NHL on chemo until 1/23 and was told she is in remission based on a PET now with fever for over 2 weeks and no focal symptoms, CT with innumerable lung nodules which could be neoplasm or infection likely fungal  leukopenia but no profound neutropenia  fever, tachycardia, sepsis  * blood cx, urine cx and MRSA PCR negative  * f/u fungitell, galactomannan   * sputum cx if pt can provide  * for now c/w vanco and zosyn  * hem/onc  and pulmonary eval  * will need pt's previous imaging to compare these nodules     The above assessment and plan was discussed with the primary team    Melanie Stubbs MD  contact on teams  After 5pm and on weekends call [phone]

## 2023-02-16 NOTE — CONSULT NOTE ADULT - ASSESSMENT
47 F PMH non-Hodgkin lymphoma s/p chemoport (?in remission), pe, pulm arterial hypertension, COVID, presenting with fevers with pancytopenia in setting of recent chemo treatment and prednisone concerning for severe sepsis.    EXAM: Notable to have hemodynamically stable with 90's/60's s/p fluid resuscitation    RECOMMENDATIONS:  -Continue with midodrine 10mg TID and wean as tolerated  -Continue with fluid resuscitation as patient's vitals are fluid responsive  -Follow up infectious workup

## 2023-02-16 NOTE — CONSULT NOTE ADULT - ASSESSMENT
48 yo f w pmh nhl s/p chemo (?in remission), pe, ?pah, covid, p/w fevers for the last few days, a/w generalized weakness over the last couple of weeks along with poor po intake. denies chest pain, palpitations, lightheadedness, cough, wheeze, abdominal discomfort. patient does endorse n+v, dysuria, arriaza. of note, last session of chemo reportedly on 1/23/2023. Pt grew concerned so went to oncologist yesterday where she got cultures, blood work, and xray. as she did not feel improved, patient presents to [facility] er for further evaluation.  (15 Feb 2023 01:39)    BP monitoring,continue current antihypertensive meds, low salt diet,followup with PMD in 1-2 weeks      CHRONIC KIDNEY DISEASE, STAGE 1:   Serum creatinine is stable at ***, approximating a GFR of *** ml/min.   There is no progression.  No uremic symptoms. No evidence of  worsening  Anemia. Fluid status stable.   Will continue to avoid nephrotoxic drugs.  Patient remains asymptomatic.  Continue current therapy.         46 yo f w pmh nhl s/p chemo (?in remission), pe, ?pah, covid, p/w fevers for the last few days, a/w generalized weakness over the last couple of weeks along with poor po intake. denies chest pain, palpitations, lightheadedness, cough, wheeze, abdominal discomfort. patient does endorse n+v, dysuria, arriaza. of note, last session of chemo reportedly on 1/23/2023. Pt grew concerned so went to oncologist yesterday where she got cultures, blood work, and xray. as she did not feel improved, patient presents to [facility] er for further evaluation.  (15 Feb 2023 01:39)  BP monitoring,continue current antihypertensive meds, low salt diet,followup with PMD in 1-2 weeks        Serum creatinine is stable at 0.8, approximating a GFR of is controlled  ml/min.   There is no progression.  No uremic symptoms. pos  evidence of  worsening  Anemia. Fluid status stable.   Will continue to avoid nephrotoxic drugs.  Patient remains asymptomatic.  Continue current therapy.      BP monitoring,continue current , low salt diet,followup with PMD in 1-2 weeks    Admit for septic workup and ID evaluation,send blood and urine cx,serial lactate levels,monitor vitals closley,ivfs hydration,monitor urine output and renal profile,iv abx as per id cons

## 2023-02-16 NOTE — PROGRESS NOTE ADULT - SUBJECTIVE AND OBJECTIVE BOX
Follow Up: fever    Interval History/ROS: pt was febrile last night and hypotensive but no more fever today, no SOB, abd pain, diarrhea          Allergies  No Known Allergies        ANTIMICROBIALS:  piperacillin/tazobactam IVPB.. 3.375 every 8 hours  vancomycin  IVPB 1000 every 12 hours      OTHER MEDS:  acetaminophen     Tablet .. 650 milliGRAM(s) Oral every 6 hours PRN  aluminum hydroxide/magnesium hydroxide/simethicone Suspension 30 milliLiter(s) Oral every 4 hours PRN  apixaban 5 milliGRAM(s) Oral every 12 hours  chlorhexidine 2% Cloths 1 Application(s) Topical <User Schedule>  digoxin     Tablet 125 MICROGram(s) Oral daily  filgrastim-sndz (ZARXIO) Injectable 480 MICROGram(s) SubCutaneous daily  melatonin 3 milliGRAM(s) Oral at bedtime PRN  midodrine. 10 milliGRAM(s) Oral three times a day  ondansetron Injectable 4 milliGRAM(s) IV Push every 8 hours PRN  sodium bicarbonate  Infusion 0.086 mEq/kG/Hr IV Continuous <Continuous>      Vital Signs Last 24 Hrs  T(C): 36.7 (2023 11:14), Max: 38.9 (15 Feb 2023 23:15)  T(F): 98 (2023 11:14), Max: 102 (15 Feb 2023 23:15)  HR: 111 (2023 11:14) (104 - 158)  BP: 97/66 (2023 11:14) (74/52 - 133/94)  BP(mean): 76 (15 Feb 2023 21:25) (76 - 76)  RR: 20 (2023 11:14) (18 - 25)  SpO2: 98% (2023 11:14) (97% - 99%)    Parameters below as of 2023 11:14  Patient On (Oxygen Delivery Method): room air        Physical Exam:  General:    NAD, non toxic    Respiratory:   clear b/l, no wheezing  abd:   soft, BS +, not tender  :     no CVAT, no suprapubic tenderness, no gallagher  Musculoskeletal : no joint swelling, no edema  Skin:    no rash  vascular: no phlebitis, R chest port with no erythema or tenderness                            7.7    1.78  )-----------( 66       ( 2023 04:17 )             25.1       02-    140  |  113<H>  |  10  ----------------------------<  97  3.6   |  17<L>  |  0.83    Ca    8.1<L>      2023 06:56  Phos  3.9       Mg     1.4         TPro  4.2<L>  /  Alb  2.6<L>  /  TBili  0.4  /  DBili  x   /  AST  44<H>  /  ALT  29  /  AlkPhos  58        Urinalysis Basic - ( 2023 23:22 )    Color: Light Yellow / Appearance: Clear / S.011 / pH: x  Gluc: x / Ketone: Negative  / Bili: Negative / Urobili: Negative   Blood: x / Protein: Negative / Nitrite: Negative   Leuk Esterase: Negative / RBC: x / WBC x   Sq Epi: x / Non Sq Epi: x / Bacteria: x        MICROBIOLOGY:  v  Clean Catch Clean Catch (Midstream)  23   No growth  --  --      .Blood Blood-Peripheral  23   No growth to date.  --  --      .Blood Blood-Peripheral  23   No growth to date.  --  --          Rapid RVP Result: NotDetec ( @ 21:27)        RADIOLOGY:  Images independently visualized and reviewed personally, findings as below  < from: CT Abdomen and Pelvis w/ IV Cont (02.15.23 @ 02:17) >  IMPRESSION:    No pulmonary embolism.    Innumerable lung nodules which are nonspecific. In this clinical setting,   differential includes infection (in which case, consider fungus) or   neoplasm/known lymphoma.    Indeterminate low-attenuation lesion in the liver.    Close follow-up and/or comparison with outside imaging is recommended.      < end of copied text >

## 2023-02-16 NOTE — RAPID RESPONSE TEAM SUMMARY - NSOTHERINTERVENTIONSRRT_GEN_ALL_CORE
Pt with improvement to BP 90s/50s for MAP >55 without intervention, 1L IVF hung by primary team prior to RRT and still infusing. Pt remaining asymptomatic, midodrine 10mg given and full set of labs including blood cultures, procalcitonin, fungitell and ABG with lytes drawn.     Shock likely septic in nature, less likely cardiogenic/obstructive. Continue empiric Zosyn and Vancomycin, would consider anti-fungal coverage, f/u remainder of w/u and ID recommendations. Pt with TTE revealing EF 40s% and RV dysfunction; would hold metoprolol at this time. Pt with h/o PE on Eliquis, CTA negative for PE on admission and pt breathing comfortably on room air - would continue Eliquis and f/u pulmonary regarding lung nodules.     RRT ended with stable hemodynamics, discussed plan with primary team and pt at bedside. MICU to see pt for consult, previously consulted by primary team prior to RRT - f/u recommendations.

## 2023-02-16 NOTE — CONSULT NOTE ADULT - ATTENDING COMMENTS
47F Hx DLBCL on CXT last 1/23/23, PE, Pulm HTN, Covid PNA admitted for Neutropenic Sepsis in RRT for Febrile Hypotension, Tachycardia 130, BP 88/54 mmHg.   - Notable for Fever of 102*F and Tachycardia improved after fluid resuscitation   - CT Chest c/w Multiple Lung Nodules concerns for infection vs. metastasis    - Neutropenic Sepsis empiric ABx Coverage plus Antifungal coverage   - Continue IV fluid and 5%v albumin resuscitation, stress dose steroid   - Midodrine 10mg PO TID   - Not MICU Candidate at this time    Patient seen and examined with ICU Resident/Fellow at bedside after lab data, medical records and radiology reports reviewed. I have read and agreeable in general with resident's Documented Note, Assessment and Management Plan which reflected my opinions from bedside round and discussion.

## 2023-02-16 NOTE — CONSULT NOTE ADULT - SUBJECTIVE AND OBJECTIVE BOX
Patient is a 47y Female whom presented to the hospital with     PAST MEDICAL & SURGICAL HISTORY:  Non-Hodgkin&#x27;s lymphoma      Pulmonary embolism      History of appendectomy      History of cholecystectomy          MEDICATIONS  (STANDING):  apixaban 5 milliGRAM(s) Oral every 12 hours  chlorhexidine 2% Cloths 1 Application(s) Topical <User Schedule>  digoxin     Tablet 125 MICROGram(s) Oral daily  filgrastim-sndz (ZARXIO) Injectable 480 MICROGram(s) SubCutaneous daily  midodrine. 10 milliGRAM(s) Oral three times a day  piperacillin/tazobactam IVPB.. 3.375 Gram(s) IV Intermittent every 8 hours  potassium chloride    Tablet ER 40 milliEquivalent(s) Oral once  sodium bicarbonate  Infusion 0.086 mEq/kG/Hr (75 mL/Hr) IV Continuous <Continuous>  vancomycin  IVPB 1000 milliGRAM(s) IV Intermittent every 12 hours      Allergies    No Known Allergies    Intolerances        SOCIAL HISTORY:  Denies ETOh,Smoking,     FAMILY HISTORY:      REVIEW OF SYSTEMS:    CONSTITUTIONAL: No weakness, fevers or chills  EYES/ENT: No visual changes;  no throat pain   NECK: No pain or stiffness  RESPIRATORY: No cough, wheezing, hemoptysis; No shortness of breath  CARDIOVASCULAR: No chest pain or palpitations  GASTROINTESTINAL: No abdominal or epigastric pain. No nausea, vomiting,     No diarrhea or constipation. No melena   GENITOURINARY: No dysuria, frequency or hematuria  NEUROLOGICAL: No numbness or weakness  SKIN: dry      VITAL:  T(C): , Max: 38.9 (02-15-23 @ 23:15)  T(F): , Max: 102 (02-15-23 @ 23:15)  HR: 119 (23 @ 20:20)  BP: 95/63 (23 @ 20:20)  BP(mean): 76 (02-15-23 @ 21:25)  RR: 17 (23 @ 20:20)  SpO2: 96% (23 @ 20:20)  Wt(kg): --    I and O's:    02-15 @ 07:01  -   @ 07:00  --------------------------------------------------------  IN: 1325 mL / OUT: 350 mL / NET: 975 mL     @ 07:01  -   @ 21:09  --------------------------------------------------------  IN: 950 mL / OUT: 0 mL / NET: 950 mL          PHYSICAL EXAM:    Constitutional: NAD  HEENT: conjunctive   clear   Neck:  No JVD  Respiratory: CTAB  Cardiovascular: S1 and S2  Gastrointestinal: BS+, soft, NT/ND  Extremities: No peripheral edema  Neurological: A/O x 3, no focal deficits  Psychiatric: Normal mood, normal affect  : No Roman  Skin: No rashes  Access: Not applicable    LABS:                        7.7    1.78  )-----------( 66       ( 2023 04:17 )             25.1     02-16    140  |  113<H>  |  10  ----------------------------<  97  3.6   |  17<L>  |  0.83    Ca    8.1<L>      2023 06:56  Phos  3.9       Mg     1.4         TPro  4.2<L>  /  Alb  2.6<L>  /  TBili  0.4  /  DBili  x   /  AST  44<H>  /  ALT  29  /  AlkPhos  58        Urine Studies:  Urinalysis Basic - ( 2023 23:22 )    Color: Light Yellow / Appearance: Clear / S.011 / pH: x  Gluc: x / Ketone: Negative  / Bili: Negative / Urobili: Negative   Blood: x / Protein: Negative / Nitrite: Negative   Leuk Esterase: Negative / RBC: x / WBC x   Sq Epi: x / Non Sq Epi: x / Bacteria: x            RADIOLOGY & ADDITIONAL STUDIES:                   Patient is a 47y Female whom presented to the hospital with abnormal electrolyte     PAST MEDICAL & SURGICAL HISTORY:  Non-Hodgkin&#x27;s lymphoma      Pulmonary embolism      History of appendectomy      History of cholecystectomy          MEDICATIONS  (STANDING):  apixaban 5 milliGRAM(s) Oral every 12 hours  chlorhexidine 2% Cloths 1 Application(s) Topical <User Schedule>  digoxin     Tablet 125 MICROGram(s) Oral daily  filgrastim-sndz (ZARXIO) Injectable 480 MICROGram(s) SubCutaneous daily  midodrine. 10 milliGRAM(s) Oral three times a day  piperacillin/tazobactam IVPB.. 3.375 Gram(s) IV Intermittent every 8 hours  potassium chloride    Tablet ER 40 milliEquivalent(s) Oral once  sodium bicarbonate  Infusion 0.086 mEq/kG/Hr (75 mL/Hr) IV Continuous <Continuous>  vancomycin  IVPB 1000 milliGRAM(s) IV Intermittent every 12 hours      Allergies    No Known Allergies    Intolerances        SOCIAL HISTORY:  Denies ETOh,Smoking,     FAMILY HISTORY:      REVIEW OF SYSTEMS:    CONSTITUTIONAL: No weakness, fevers or chills  RESPIRATORY: No cough, wheezing, hemoptysis; No shortness of breath  CARDIOVASCULAR: No chest pain or palpitations  GASTROINTESTINAL: No abdominal or epigastric pain. No nausea, vomiting,     No diarrhea or constipation. No melena   SKIN: dry      VITAL:  T(C): , Max: 38.9 (02-15-23 @ 23:15)  T(F): , Max: 102 (02-15-23 @ 23:15)  HR: 119 (23 @ 20:20)  BP: 95/63 (23 @ 20:20)  BP(mean): 76 (02-15-23 @ 21:25)  RR: 17 (23 @ 20:20)  SpO2: 96% (23 @ 20:20)  Wt(kg): --    I and O's:    02-15 @ 07:01  -   @ 07:00  --------------------------------------------------------  IN: 1325 mL / OUT: 350 mL / NET: 975 mL    02-16 @ 07:01  -   @ 21:09  --------------------------------------------------------  IN: 950 mL / OUT: 0 mL / NET: 950 mL          PHYSICAL EXAM:    Constitutional: NAD  HEENT: conjunctive   clear   Neck:  No JVD  Respiratory: CTAB  Cardiovascular: S1 and S2  Gastrointestinal: BS+, soft, NT/ND  Extremities: No peripheral edema  Neurological:  no focal deficits  Psychiatric: Normal mood, normal affect  : No Roman  Skin: dry   Access: Not applicable    LABS:                        7.7    1.78  )-----------( 66       ( 2023 04:17 )             25.1     02-16    140  |  113<H>  |  10  ----------------------------<  97  3.6   |  17<L>  |  0.83    Ca    8.1<L>      2023 06:56  Phos  3.9       Mg     1.4         TPro  4.2<L>  /  Alb  2.6<L>  /  TBili  0.4  /  DBili  x   /  AST  44<H>  /  ALT  29  /  AlkPhos  58        Urine Studies:  Urinalysis Basic - ( 2023 23:22 )    Color: Light Yellow / Appearance: Clear / S.011 / pH: x  Gluc: x / Ketone: Negative  / Bili: Negative / Urobili: Negative   Blood: x / Protein: Negative / Nitrite: Negative   Leuk Esterase: Negative / RBC: x / WBC x   Sq Epi: x / Non Sq Epi: x / Bacteria: x            RADIOLOGY & ADDITIONAL STUDIES:

## 2023-02-16 NOTE — PROGRESS NOTE ADULT - ASSESSMENT
46 yo f w pmh nhl s/p chemo (?in remission), pe, ?pah, covid, p/w fevers for the last few days, unclear etiology, admitted to medicine for further mgmt

## 2023-02-16 NOTE — CHART NOTE - NSCHARTNOTEFT_GEN_A_CORE
Medicine PA Note     48 yo f w pmh nhl s/p chemo (?in remission), pe, ?pah, covid, p/w fevers for the last few days, unclear etiology, admitted to medicine for further mgmt.    Pt arrived to the floor from ED with HR in the 150s and Febrile to 102F orally. Ofrimev 1G x1 dose give, NS 1L bolus given to manage blood pressure and 5mg lopressor IVP x1. Pts HR improved, however became increasingly hypotensive throughout the night. MICU consult called. RRT called. NS 500cc x1 given and midodrine 10mg x1 dose given for BP support.  See RRT note for full details. Awaiting final MICU recommendations, in the interim, recommend 5% Albumin 250cc bolus x1.     Will continue to monitor patient. Will endorse to AM team.       Vital Signs Last 24 Hrs  T(C): 37.8 (16 Feb 2023 01:00), Max: 38.9 (15 Feb 2023 23:15)  T(F): 100 (16 Feb 2023 01:00), Max: 102 (15 Feb 2023 23:15)  HR: 130 (16 Feb 2023 02:44) (80 - 158)  BP: 88/54 (16 Feb 2023 02:44) (88/54 - 133/94)  BP(mean): 76 (15 Feb 2023 21:25) (76 - 76)  RR: 20 (16 Feb 2023 01:00) (18 - 25)  SpO2: 98% (16 Feb 2023 01:00) (97% - 100%)    Parameters below as of 16 Feb 2023 01:00  Patient On (Oxygen Delivery Method): room air    Labs:                        7.7    1.78  )-----------( 66       ( 16 Feb 2023 04:17 )             25.1     02-15    140  |  111<H>  |  10  ----------------------------<  122<H>  3.8   |  21<L>  |  0.78    Ca    8.1<L>      15 Feb 2023 06:23    TPro  4.5<L>  /  Alb  2.8<L>  /  TBili  0.5  /  DBili  x   /  AST  14  /  ALT  9<L>  /  AlkPhos  37<L>  02-15    ABG - ( 16 Feb 2023 04:05 )  pH, Arterial: 7.39  pH, Blood: x     /  pCO2: 23    /  pO2: 146   / HCO3: 14    / Base Excess: -9.8  /  SaO2: 99.7 Medicine PA Note     48 yo f w pmh nhl s/p chemo (?in remission), pe, ?pah, covid, p/w fevers for the last few days, unclear etiology, admitted to medicine for further mgmt.    Pt arrived to the floor from ED with HR in the 150s and Febrile to 102F orally. Ofrimev 1G x1 dose give, NS 1L bolus given to manage blood pressure and 5mg lopressor IVP x1. Pts HR improved, however became increasingly hypotensive throughout the night. MICU consult called. RRT called. NS 500cc x1 given and midodrine 10mg x1 dose given for BP support.  See RRT note for full details. Will follow MICU recommendations, in the interim, recommend 5% Albumin 250cc bolus x1.     Will continue to monitor patient. Will endorse to AM team.       Vital Signs Last 24 Hrs  T(C): 37.8 (16 Feb 2023 01:00), Max: 38.9 (15 Feb 2023 23:15)  T(F): 100 (16 Feb 2023 01:00), Max: 102 (15 Feb 2023 23:15)  HR: 130 (16 Feb 2023 02:44) (80 - 158)  BP: 88/54 (16 Feb 2023 02:44) (88/54 - 133/94)  BP(mean): 76 (15 Feb 2023 21:25) (76 - 76)  RR: 20 (16 Feb 2023 01:00) (18 - 25)  SpO2: 98% (16 Feb 2023 01:00) (97% - 100%)    Parameters below as of 16 Feb 2023 01:00  Patient On (Oxygen Delivery Method): room air    Labs:                        7.7    1.78  )-----------( 66       ( 16 Feb 2023 04:17 )             25.1     02-15    140  |  111<H>  |  10  ----------------------------<  122<H>  3.8   |  21<L>  |  0.78    Ca    8.1<L>      15 Feb 2023 06:23    TPro  4.5<L>  /  Alb  2.8<L>  /  TBili  0.5  /  DBili  x   /  AST  14  /  ALT  9<L>  /  AlkPhos  37<L>  02-15    ABG - ( 16 Feb 2023 04:05 )  pH, Arterial: 7.39  pH, Blood: x     /  pCO2: 23    /  pO2: 146   / HCO3: 14    / Base Excess: -9.8  /  SaO2: 99.7 Medicine PA Note     46 yo f w pmh nhl s/p chemo (?in remission), pe, ?pah, covid, p/w fevers for the last few days, unclear etiology, admitted to medicine for further mgmt.    Pt arrived to the floor from ED with HR in the 150s and Febrile to 102F orally. Ofrimev 1G x1 dose give, NS 1L bolus given to manage blood pressure and 5mg lopressor IVP x1. Pts HR improved, however became increasingly hypotensive throughout the night. MICU consult called. RRT called. NS 500cc x1 given and midodrine 10mg x1 dose given for BP support.  See RRT note for full details. Will follow MICU recommendations, in the interim, recommended 5% Albumin 250cc bolus x1.     Will continue to monitor patient. Will discuss with Dr. Alva. Will endorse to AM team.       Vital Signs Last 24 Hrs  T(C): 37.8 (16 Feb 2023 01:00), Max: 38.9 (15 Feb 2023 23:15)  T(F): 100 (16 Feb 2023 01:00), Max: 102 (15 Feb 2023 23:15)  HR: 130 (16 Feb 2023 02:44) (80 - 158)  BP: 88/54 (16 Feb 2023 02:44) (88/54 - 133/94)  BP(mean): 76 (15 Feb 2023 21:25) (76 - 76)  RR: 20 (16 Feb 2023 01:00) (18 - 25)  SpO2: 98% (16 Feb 2023 01:00) (97% - 100%)    Parameters below as of 16 Feb 2023 01:00  Patient On (Oxygen Delivery Method): room air    Labs:                        7.7    1.78  )-----------( 66       ( 16 Feb 2023 04:17 )             25.1     02-15    140  |  111<H>  |  10  ----------------------------<  122<H>  3.8   |  21<L>  |  0.78    Ca    8.1<L>      15 Feb 2023 06:23    TPro  4.5<L>  /  Alb  2.8<L>  /  TBili  0.5  /  DBili  x   /  AST  14  /  ALT  9<L>  /  AlkPhos  37<L>  02-15    ABG - ( 16 Feb 2023 04:05 )  pH, Arterial: 7.39  pH, Blood: x     /  pCO2: 23    /  pO2: 146   / HCO3: 14    / Base Excess: -9.8  /  SaO2: 99.7    Becca Hicks PA-C  Department of Medicine  Spectra #[id number]

## 2023-02-16 NOTE — PROGRESS NOTE ADULT - SUBJECTIVE AND OBJECTIVE BOX
Patient is a 47y old  Female who presents with a chief complaint of fever (2023 21:09)      SUBJECTIVE / OVERNIGHT EVENTS: s/p RRT for Hypotesnion this morning   RRT for Febrile Hypotension, Tachycardia 130, BP 88/54 mmHg. Fluid bolus recommended   -     MEDICATIONS  (STANDING):  apixaban 5 milliGRAM(s) Oral every 12 hours  chlorhexidine 2% Cloths 1 Application(s) Topical <User Schedule>  digoxin     Tablet 125 MICROGram(s) Oral daily  filgrastim-sndz (ZARXIO) Injectable 480 MICROGram(s) SubCutaneous daily  midodrine. 10 milliGRAM(s) Oral three times a day  piperacillin/tazobactam IVPB.. 3.375 Gram(s) IV Intermittent every 8 hours  sodium bicarbonate  Infusion 0.086 mEq/kG/Hr (75 mL/Hr) IV Continuous <Continuous>  vancomycin  IVPB 1000 milliGRAM(s) IV Intermittent every 12 hours    MEDICATIONS  (PRN):  acetaminophen     Tablet .. 650 milliGRAM(s) Oral every 6 hours PRN Temp greater or equal to 38C (100.4F), Mild Pain (1 - 3)  aluminum hydroxide/magnesium hydroxide/simethicone Suspension 30 milliLiter(s) Oral every 4 hours PRN Dyspepsia  melatonin 3 milliGRAM(s) Oral at bedtime PRN Insomnia  ondansetron Injectable 4 milliGRAM(s) IV Push every 8 hours PRN Nausea and/or Vomiting      CAPILLARY BLOOD GLUCOSE      POCT Blood Glucose.: 93 mg/dL (2023 03:36)    I&O's Summary    15 Feb 2023 07:  -  2023 07:00  --------------------------------------------------------  IN: 1325 mL / OUT: 350 mL / NET: 975 mL    2023 07:01  -  2023 22:29  --------------------------------------------------------  IN: 950 mL / OUT: 0 mL / NET: 950 mL      T(C): 37.2 (23 @ 20:20), Max: 37.2 (23 @ 20:20)  HR: 119 (23 @ 20:20) (111 - 119)  BP: 95/63 (23 @ 20:20) (95/63 - 97/66)  RR: 17 (23 @ 20:20) (17 - 20)  SpO2: 96% (23 @ 20:20) (96% - 98%)    PHYSICAL EXAM:  GENERAL: NAD, well-developed  HEAD:  Atraumatic, Normocephalic  EYES: EOMI, PERRLA, conjunctiva and sclera clear  NECK: Supple, No JVD  CHEST/LUNG: Clear to auscultation bilaterally; No wheeze  HEART: Regular rate and rhythm; No murmurs, rubs, or gallops  ABDOMEN: Soft, Nontender, Nondistended; Bowel sounds present  EXTREMITIES:  2+ Peripheral Pulses, No clubbing, cyanosis, or edema  PSYCH: AAOx3  NEUROLOGY: non-focal  SKIN: No rashes or lesions    LABS:                        7.7    1.78  )-----------( 66       ( 2023 04:17 )             25.1     02-16    140  |  113<H>  |  10  ----------------------------<  97  3.6   |  17<L>  |  0.83    Ca    8.1<L>      2023 06:56  Phos  3.9     02-16  Mg     1.4     -16    TPro  4.2<L>  /  Alb  2.6<L>  /  TBili  0.4  /  DBili  x   /  AST  44<H>  /  ALT  29  /  AlkPhos  58  02-16      CARDIAC MARKERS ( 2023 03:01 )  x     / x     / 23 U/L / x     / 1.9 ng/mL      Urinalysis Basic - ( 2023 23:22 )    Color: Light Yellow / Appearance: Clear / S.011 / pH: x  Gluc: x / Ketone: Negative  / Bili: Negative / Urobili: Negative   Blood: x / Protein: Negative / Nitrite: Negative   Leuk Esterase: Negative / RBC: x / WBC x   Sq Epi: x / Non Sq Epi: x / Bacteria: x        RADIOLOGY & ADDITIONAL TESTS:    Imaging Personally Reviewed:    Consultant(s) Notes Reviewed:      Care Discussed with Consultants/Other Providers:

## 2023-02-17 LAB
A1C WITH ESTIMATED AVERAGE GLUCOSE RESULT: 5 % — SIGNIFICANT CHANGE UP (ref 4–5.6)
ALBUMIN SERPL ELPH-MCNC: 2.1 G/DL — LOW (ref 3.3–5)
ALBUMIN SERPL ELPH-MCNC: 3.2 G/DL — LOW (ref 3.3–5)
ALP SERPL-CCNC: 37 U/L — LOW (ref 40–120)
ALP SERPL-CCNC: 64 U/L — SIGNIFICANT CHANGE UP (ref 40–120)
ALT FLD-CCNC: 21 U/L — SIGNIFICANT CHANGE UP (ref 10–45)
ALT FLD-CCNC: 36 U/L — SIGNIFICANT CHANGE UP (ref 10–45)
ANION GAP SERPL CALC-SCNC: 14 MMOL/L — SIGNIFICANT CHANGE UP (ref 5–17)
ANION GAP SERPL CALC-SCNC: 15 MMOL/L — SIGNIFICANT CHANGE UP (ref 5–17)
ANION GAP SERPL CALC-SCNC: 16 MMOL/L — SIGNIFICANT CHANGE UP (ref 5–17)
APPEARANCE UR: CLEAR — SIGNIFICANT CHANGE UP
AST SERPL-CCNC: 24 U/L — SIGNIFICANT CHANGE UP (ref 10–40)
AST SERPL-CCNC: 43 U/L — HIGH (ref 10–40)
BACTERIA # UR AUTO: NEGATIVE — SIGNIFICANT CHANGE UP
BASE EXCESS BLDV CALC-SCNC: -12.1 MMOL/L — LOW (ref -2–3)
BASE EXCESS BLDV CALC-SCNC: -8.4 MMOL/L — LOW (ref -2–3)
BASOPHILS # BLD AUTO: 0.03 K/UL — SIGNIFICANT CHANGE UP (ref 0–0.2)
BASOPHILS NFR BLD AUTO: 0.3 % — SIGNIFICANT CHANGE UP (ref 0–2)
BILIRUB SERPL-MCNC: 0.5 MG/DL — SIGNIFICANT CHANGE UP (ref 0.2–1.2)
BILIRUB SERPL-MCNC: 0.8 MG/DL — SIGNIFICANT CHANGE UP (ref 0.2–1.2)
BILIRUB UR-MCNC: NEGATIVE — SIGNIFICANT CHANGE UP
BUN SERPL-MCNC: 10 MG/DL — SIGNIFICANT CHANGE UP (ref 7–23)
BUN SERPL-MCNC: 12 MG/DL — SIGNIFICANT CHANGE UP (ref 7–23)
BUN SERPL-MCNC: 6 MG/DL — LOW (ref 7–23)
CA-I SERPL-SCNC: 1.17 MMOL/L — SIGNIFICANT CHANGE UP (ref 1.15–1.33)
CA-I SERPL-SCNC: 1.19 MMOL/L — SIGNIFICANT CHANGE UP (ref 1.15–1.33)
CALCIUM SERPL-MCNC: 5 MG/DL — CRITICAL LOW (ref 8.4–10.5)
CALCIUM SERPL-MCNC: 8.3 MG/DL — LOW (ref 8.4–10.5)
CALCIUM SERPL-MCNC: 8.7 MG/DL — SIGNIFICANT CHANGE UP (ref 8.4–10.5)
CHLORIDE BLDV-SCNC: 108 MMOL/L — SIGNIFICANT CHANGE UP (ref 96–108)
CHLORIDE BLDV-SCNC: 109 MMOL/L — HIGH (ref 96–108)
CHLORIDE SERPL-SCNC: 106 MMOL/L — SIGNIFICANT CHANGE UP (ref 96–108)
CHLORIDE SERPL-SCNC: 110 MMOL/L — HIGH (ref 96–108)
CHLORIDE SERPL-SCNC: <70 MMOL/L — LOW (ref 96–108)
CK MB CFR SERPL CALC: 1.4 NG/ML — SIGNIFICANT CHANGE UP (ref 0–3.8)
CK MB CFR SERPL CALC: 1.9 NG/ML — SIGNIFICANT CHANGE UP (ref 0–3.8)
CK SERPL-CCNC: 17 U/L — LOW (ref 25–170)
CK SERPL-CCNC: 24 U/L — LOW (ref 25–170)
CO2 BLDV-SCNC: 15 MMOL/L — LOW (ref 22–26)
CO2 BLDV-SCNC: 18 MMOL/L — LOW (ref 22–26)
CO2 SERPL-SCNC: 14 MMOL/L — LOW (ref 22–31)
CO2 SERPL-SCNC: 19 MMOL/L — LOW (ref 22–31)
CO2 SERPL-SCNC: <10 MMOL/L — CRITICAL LOW (ref 22–31)
COLOR SPEC: YELLOW — SIGNIFICANT CHANGE UP
CORTIS AM PEAK SERPL-MCNC: 17.1 UG/DL — SIGNIFICANT CHANGE UP (ref 6–18.4)
CREAT SERPL-MCNC: 0.39 MG/DL — LOW (ref 0.5–1.3)
CREAT SERPL-MCNC: 0.89 MG/DL — SIGNIFICANT CHANGE UP (ref 0.5–1.3)
CREAT SERPL-MCNC: 0.95 MG/DL — SIGNIFICANT CHANGE UP (ref 0.5–1.3)
DIFF PNL FLD: NEGATIVE — SIGNIFICANT CHANGE UP
EGFR: 125 ML/MIN/1.73M2 — SIGNIFICANT CHANGE UP
EGFR: 75 ML/MIN/1.73M2 — SIGNIFICANT CHANGE UP
EGFR: 81 ML/MIN/1.73M2 — SIGNIFICANT CHANGE UP
EOSINOPHIL # BLD AUTO: 0.04 K/UL — SIGNIFICANT CHANGE UP (ref 0–0.5)
EOSINOPHIL NFR BLD AUTO: 0.4 % — SIGNIFICANT CHANGE UP (ref 0–6)
EPI CELLS # UR: 4 /HPF — SIGNIFICANT CHANGE UP
ESTIMATED AVERAGE GLUCOSE: 97 MG/DL — SIGNIFICANT CHANGE UP (ref 68–114)
FUNGITELL: <31 PG/ML — SIGNIFICANT CHANGE UP
GAS PNL BLDV: 136 MMOL/L — SIGNIFICANT CHANGE UP (ref 136–145)
GAS PNL BLDV: 138 MMOL/L — SIGNIFICANT CHANGE UP (ref 136–145)
GAS PNL BLDV: SIGNIFICANT CHANGE UP
GLUCOSE BLDV-MCNC: 111 MG/DL — HIGH (ref 70–99)
GLUCOSE BLDV-MCNC: 94 MG/DL — SIGNIFICANT CHANGE UP (ref 70–99)
GLUCOSE SERPL-MCNC: 103 MG/DL — HIGH (ref 70–99)
GLUCOSE SERPL-MCNC: 77 MG/DL — SIGNIFICANT CHANGE UP (ref 70–99)
GLUCOSE SERPL-MCNC: 98 MG/DL — SIGNIFICANT CHANGE UP (ref 70–99)
GLUCOSE UR QL: NEGATIVE — SIGNIFICANT CHANGE UP
GRAM STN FLD: SIGNIFICANT CHANGE UP
HCO3 BLDV-SCNC: 14 MMOL/L — LOW (ref 22–29)
HCO3 BLDV-SCNC: 17 MMOL/L — LOW (ref 22–29)
HCT VFR BLD CALC: 21.2 % — LOW (ref 34.5–45)
HCT VFR BLD CALC: 26.5 % — LOW (ref 34.5–45)
HCT VFR BLDA CALC: 27 % — LOW (ref 34.5–46.5)
HCT VFR BLDA CALC: 27 % — LOW (ref 34.5–46.5)
HGB BLD CALC-MCNC: 8.9 G/DL — LOW (ref 11.7–16.1)
HGB BLD CALC-MCNC: 9.1 G/DL — LOW (ref 11.7–16.1)
HGB BLD-MCNC: 6.5 G/DL — CRITICAL LOW (ref 11.5–15.5)
HGB BLD-MCNC: 8.4 G/DL — LOW (ref 11.5–15.5)
HYALINE CASTS # UR AUTO: 3 /LPF — HIGH (ref 0–2)
IMM GRANULOCYTES NFR BLD AUTO: 1.4 % — HIGH (ref 0–0.9)
KETONES UR-MCNC: NEGATIVE — SIGNIFICANT CHANGE UP
LACTATE BLDV-MCNC: 3.4 MMOL/L — HIGH (ref 0.5–2)
LACTATE BLDV-MCNC: 6.7 MMOL/L — CRITICAL HIGH (ref 0.5–2)
LACTATE SERPL-SCNC: 4.7 MMOL/L — CRITICAL HIGH (ref 0.5–2)
LEUKOCYTE ESTERASE UR-ACNC: NEGATIVE — SIGNIFICANT CHANGE UP
LYMPHOCYTES # BLD AUTO: 0.52 K/UL — LOW (ref 1–3.3)
LYMPHOCYTES # BLD AUTO: 5.1 % — LOW (ref 13–44)
MAGNESIUM SERPL-MCNC: 1.2 MG/DL — LOW (ref 1.6–2.6)
MAGNESIUM SERPL-MCNC: 2.1 MG/DL — SIGNIFICANT CHANGE UP (ref 1.6–2.6)
MCHC RBC-ENTMCNC: 27.7 PG — SIGNIFICANT CHANGE UP (ref 27–34)
MCHC RBC-ENTMCNC: 27.7 PG — SIGNIFICANT CHANGE UP (ref 27–34)
MCHC RBC-ENTMCNC: 30.7 GM/DL — LOW (ref 32–36)
MCHC RBC-ENTMCNC: 31.7 GM/DL — LOW (ref 32–36)
MCV RBC AUTO: 87.5 FL — SIGNIFICANT CHANGE UP (ref 80–100)
MCV RBC AUTO: 90.2 FL — SIGNIFICANT CHANGE UP (ref 80–100)
MONOCYTES # BLD AUTO: 0.58 K/UL — SIGNIFICANT CHANGE UP (ref 0–0.9)
MONOCYTES NFR BLD AUTO: 5.7 % — SIGNIFICANT CHANGE UP (ref 2–14)
NEUTROPHILS # BLD AUTO: 8.85 K/UL — HIGH (ref 1.8–7.4)
NEUTROPHILS NFR BLD AUTO: 87.1 % — HIGH (ref 43–77)
NITRITE UR-MCNC: NEGATIVE — SIGNIFICANT CHANGE UP
NRBC # BLD: 0 /100 WBCS — SIGNIFICANT CHANGE UP (ref 0–0)
NRBC # BLD: 0 /100 WBCS — SIGNIFICANT CHANGE UP (ref 0–0)
PCO2 BLDV: 32 MMHG — LOW (ref 39–42)
PCO2 BLDV: 35 MMHG — LOW (ref 39–42)
PH BLDV: 7.25 — LOW (ref 7.32–7.43)
PH BLDV: 7.3 — LOW (ref 7.32–7.43)
PH UR: 6 — SIGNIFICANT CHANGE UP (ref 5–8)
PHOSPHATE SERPL-MCNC: 3.2 MG/DL — SIGNIFICANT CHANGE UP (ref 2.5–4.5)
PHOSPHATE SERPL-MCNC: 3.9 MG/DL — SIGNIFICANT CHANGE UP (ref 2.5–4.5)
PLATELET # BLD AUTO: 108 K/UL — LOW (ref 150–400)
PLATELET # BLD AUTO: 83 K/UL — LOW (ref 150–400)
PO2 BLDV: 26 MMHG — SIGNIFICANT CHANGE UP (ref 25–45)
PO2 BLDV: 29 MMHG — SIGNIFICANT CHANGE UP (ref 25–45)
POTASSIUM BLDV-SCNC: 3.8 MMOL/L — SIGNIFICANT CHANGE UP (ref 3.5–5.1)
POTASSIUM BLDV-SCNC: 4.2 MMOL/L — SIGNIFICANT CHANGE UP (ref 3.5–5.1)
POTASSIUM SERPL-MCNC: 2.8 MMOL/L — CRITICAL LOW (ref 3.5–5.3)
POTASSIUM SERPL-MCNC: 3.8 MMOL/L — SIGNIFICANT CHANGE UP (ref 3.5–5.3)
POTASSIUM SERPL-MCNC: 4 MMOL/L — SIGNIFICANT CHANGE UP (ref 3.5–5.3)
POTASSIUM SERPL-SCNC: 2.8 MMOL/L — CRITICAL LOW (ref 3.5–5.3)
POTASSIUM SERPL-SCNC: 3.8 MMOL/L — SIGNIFICANT CHANGE UP (ref 3.5–5.3)
POTASSIUM SERPL-SCNC: 4 MMOL/L — SIGNIFICANT CHANGE UP (ref 3.5–5.3)
PROT SERPL-MCNC: 3.3 G/DL — LOW (ref 6–8.3)
PROT SERPL-MCNC: 5.1 G/DL — LOW (ref 6–8.3)
PROT UR-MCNC: ABNORMAL
RBC # BLD: 2.35 M/UL — LOW (ref 3.8–5.2)
RBC # BLD: 3.03 M/UL — LOW (ref 3.8–5.2)
RBC # FLD: 17.9 % — HIGH (ref 10.3–14.5)
RBC # FLD: 18.1 % — HIGH (ref 10.3–14.5)
RBC CASTS # UR COMP ASSIST: 3 /HPF — SIGNIFICANT CHANGE UP (ref 0–4)
SAO2 % BLDV: 28.3 % — LOW (ref 67–88)
SAO2 % BLDV: 36.4 % — LOW (ref 67–88)
SODIUM SERPL-SCNC: 139 MMOL/L — SIGNIFICANT CHANGE UP (ref 135–145)
SODIUM SERPL-SCNC: 141 MMOL/L — SIGNIFICANT CHANGE UP (ref 135–145)
SODIUM SERPL-SCNC: 87 MMOL/L — CRITICAL LOW (ref 135–145)
SP GR SPEC: 1.02 — SIGNIFICANT CHANGE UP (ref 1.01–1.02)
SPECIMEN SOURCE: SIGNIFICANT CHANGE UP
TROPONIN T, HIGH SENSITIVITY RESULT: 16 NG/L — SIGNIFICANT CHANGE UP (ref 0–51)
TROPONIN T, HIGH SENSITIVITY RESULT: 34 NG/L — SIGNIFICANT CHANGE UP (ref 0–51)
TSH SERPL-MCNC: 2.19 UIU/ML — SIGNIFICANT CHANGE UP (ref 0.27–4.2)
UROBILINOGEN FLD QL: NEGATIVE — SIGNIFICANT CHANGE UP
VANCOMYCIN TROUGH SERPL-MCNC: 14.7 UG/ML — SIGNIFICANT CHANGE UP (ref 10–20)
WBC # BLD: 10.31 K/UL — SIGNIFICANT CHANGE UP (ref 3.8–10.5)
WBC # BLD: 12.52 K/UL — HIGH (ref 3.8–10.5)
WBC # FLD AUTO: 10.31 K/UL — SIGNIFICANT CHANGE UP (ref 3.8–10.5)
WBC # FLD AUTO: 12.52 K/UL — HIGH (ref 3.8–10.5)
WBC UR QL: 4 /HPF — SIGNIFICANT CHANGE UP (ref 0–5)

## 2023-02-17 PROCEDURE — 99291 CRITICAL CARE FIRST HOUR: CPT | Mod: GC,25

## 2023-02-17 PROCEDURE — 93308 TTE F-UP OR LMTD: CPT | Mod: 26,GC

## 2023-02-17 PROCEDURE — 76604 US EXAM CHEST: CPT | Mod: 26,GC

## 2023-02-17 PROCEDURE — 99233 SBSQ HOSP IP/OBS HIGH 50: CPT

## 2023-02-17 PROCEDURE — 93970 EXTREMITY STUDY: CPT | Mod: 26

## 2023-02-17 RX ORDER — CEFEPIME 1 G/1
INJECTION, POWDER, FOR SOLUTION INTRAMUSCULAR; INTRAVENOUS
Refills: 0 | Status: COMPLETED | OUTPATIENT
Start: 2023-02-17 | End: 2023-02-21

## 2023-02-17 RX ORDER — ENOXAPARIN SODIUM 100 MG/ML
40 INJECTION SUBCUTANEOUS EVERY 24 HOURS
Refills: 0 | Status: DISCONTINUED | OUTPATIENT
Start: 2023-02-17 | End: 2023-02-18

## 2023-02-17 RX ORDER — CEFEPIME 1 G/1
2000 INJECTION, POWDER, FOR SOLUTION INTRAMUSCULAR; INTRAVENOUS EVERY 8 HOURS
Refills: 0 | Status: COMPLETED | OUTPATIENT
Start: 2023-02-17 | End: 2023-02-21

## 2023-02-17 RX ORDER — VORICONAZOLE 10 MG/ML
200 INJECTION, POWDER, LYOPHILIZED, FOR SOLUTION INTRAVENOUS EVERY 12 HOURS
Refills: 0 | Status: DISCONTINUED | OUTPATIENT
Start: 2023-02-17 | End: 2023-02-28

## 2023-02-17 RX ORDER — SODIUM CHLORIDE 9 MG/ML
1000 INJECTION INTRAMUSCULAR; INTRAVENOUS; SUBCUTANEOUS ONCE
Refills: 0 | Status: COMPLETED | OUTPATIENT
Start: 2023-02-17 | End: 2023-02-17

## 2023-02-17 RX ORDER — ACETAMINOPHEN 500 MG
1000 TABLET ORAL ONCE
Refills: 0 | Status: COMPLETED | OUTPATIENT
Start: 2023-02-17 | End: 2023-02-18

## 2023-02-17 RX ORDER — POLYETHYLENE GLYCOL 3350 17 G/17G
17 POWDER, FOR SOLUTION ORAL ONCE
Refills: 0 | Status: DISCONTINUED | OUTPATIENT
Start: 2023-02-17 | End: 2023-02-17

## 2023-02-17 RX ORDER — ENOXAPARIN SODIUM 100 MG/ML
60 INJECTION SUBCUTANEOUS EVERY 12 HOURS
Refills: 0 | Status: DISCONTINUED | OUTPATIENT
Start: 2023-02-17 | End: 2023-02-17

## 2023-02-17 RX ORDER — FUROSEMIDE 40 MG
20 TABLET ORAL ONCE
Refills: 0 | Status: COMPLETED | OUTPATIENT
Start: 2023-02-17 | End: 2023-02-17

## 2023-02-17 RX ORDER — SENNA PLUS 8.6 MG/1
2 TABLET ORAL AT BEDTIME
Refills: 0 | Status: DISCONTINUED | OUTPATIENT
Start: 2023-02-17 | End: 2023-02-17

## 2023-02-17 RX ORDER — CEFEPIME 1 G/1
INJECTION, POWDER, FOR SOLUTION INTRAMUSCULAR; INTRAVENOUS
Refills: 0 | Status: DISCONTINUED | OUTPATIENT
Start: 2023-02-17 | End: 2023-02-17

## 2023-02-17 RX ORDER — CHLORHEXIDINE GLUCONATE 213 G/1000ML
1 SOLUTION TOPICAL
Refills: 0 | Status: DISCONTINUED | OUTPATIENT
Start: 2023-02-17 | End: 2023-02-22

## 2023-02-17 RX ORDER — MIDODRINE HYDROCHLORIDE 2.5 MG/1
10 TABLET ORAL THREE TIMES A DAY
Refills: 0 | Status: DISCONTINUED | OUTPATIENT
Start: 2023-02-17 | End: 2023-02-28

## 2023-02-17 RX ORDER — CEFEPIME 1 G/1
2000 INJECTION, POWDER, FOR SOLUTION INTRAMUSCULAR; INTRAVENOUS ONCE
Refills: 0 | Status: COMPLETED | OUTPATIENT
Start: 2023-02-17 | End: 2023-02-17

## 2023-02-17 RX ORDER — ENOXAPARIN SODIUM 100 MG/ML
70 INJECTION SUBCUTANEOUS EVERY 12 HOURS
Refills: 0 | Status: DISCONTINUED | OUTPATIENT
Start: 2023-02-17 | End: 2023-02-17

## 2023-02-17 RX ADMIN — MIDODRINE HYDROCHLORIDE 10 MILLIGRAM(S): 2.5 TABLET ORAL at 18:04

## 2023-02-17 RX ADMIN — Medication 480 MICROGRAM(S): at 12:33

## 2023-02-17 RX ADMIN — CHLORHEXIDINE GLUCONATE 1 APPLICATION(S): 213 SOLUTION TOPICAL at 05:08

## 2023-02-17 RX ADMIN — MIDODRINE HYDROCHLORIDE 10 MILLIGRAM(S): 2.5 TABLET ORAL at 12:33

## 2023-02-17 RX ADMIN — VORICONAZOLE 200 MILLIGRAM(S): 10 INJECTION, POWDER, LYOPHILIZED, FOR SOLUTION INTRAVENOUS at 18:05

## 2023-02-17 RX ADMIN — Medication 20 MILLIGRAM(S): at 09:31

## 2023-02-17 RX ADMIN — Medication 1000 MILLIGRAM(S): at 00:35

## 2023-02-17 RX ADMIN — ENOXAPARIN SODIUM 60 MILLIGRAM(S): 100 INJECTION SUBCUTANEOUS at 05:41

## 2023-02-17 RX ADMIN — ONDANSETRON 4 MILLIGRAM(S): 8 TABLET, FILM COATED ORAL at 00:01

## 2023-02-17 RX ADMIN — MIDODRINE HYDROCHLORIDE 10 MILLIGRAM(S): 2.5 TABLET ORAL at 05:41

## 2023-02-17 RX ADMIN — Medication 20 MILLIGRAM(S): at 14:44

## 2023-02-17 RX ADMIN — VORICONAZOLE 200 MILLIGRAM(S): 10 INJECTION, POWDER, LYOPHILIZED, FOR SOLUTION INTRAVENOUS at 05:41

## 2023-02-17 RX ADMIN — Medication 75 MEQ/KG/HR: at 01:48

## 2023-02-17 RX ADMIN — CEFEPIME 100 MILLIGRAM(S): 1 INJECTION, POWDER, FOR SOLUTION INTRAMUSCULAR; INTRAVENOUS at 13:41

## 2023-02-17 RX ADMIN — Medication 400 MILLIGRAM(S): at 00:06

## 2023-02-17 RX ADMIN — Medication 25 GRAM(S): at 00:17

## 2023-02-17 RX ADMIN — CEFEPIME 100 MILLIGRAM(S): 1 INJECTION, POWDER, FOR SOLUTION INTRAMUSCULAR; INTRAVENOUS at 21:49

## 2023-02-17 RX ADMIN — Medication 5 MILLIGRAM(S): at 00:07

## 2023-02-17 RX ADMIN — SODIUM CHLORIDE 1000 MILLILITER(S): 9 INJECTION INTRAMUSCULAR; INTRAVENOUS; SUBCUTANEOUS at 01:06

## 2023-02-17 RX ADMIN — CEFEPIME 100 MILLIGRAM(S): 1 INJECTION, POWDER, FOR SOLUTION INTRAMUSCULAR; INTRAVENOUS at 05:02

## 2023-02-17 RX ADMIN — Medication 125 MICROGRAM(S): at 05:41

## 2023-02-17 RX ADMIN — CHLORHEXIDINE GLUCONATE 1 APPLICATION(S): 213 SOLUTION TOPICAL at 05:04

## 2023-02-17 RX ADMIN — ENOXAPARIN SODIUM 70 MILLIGRAM(S): 100 INJECTION SUBCUTANEOUS at 18:04

## 2023-02-17 NOTE — PROGRESS NOTE ADULT - ASSESSMENT
46 yo f w pmh nhl s/p chemo (?in remission), PE, ?pah, covid, admitted 2/15/23 with fevers for the last few days, a/w generalized weakness over the last couple of weeks along with poor po intake. denied chest pain, palpitations, lightheadedness, cough, wheeze, abdominal discomfort. patient did endorse n+v, dysuria, arriaza. of note, last session of chemo reportedly on 1/23/2023. Pt grew concerned so went to oncologist, where she got cultures, blood work, and xray. as she did not feel improved, and thus came to hospital      Oncologic history  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 8/2022. She was enrolled in [id number], which she came off later. She had COVID one week after 1st treatment and PE with heart strain.  She was given C2 once received without Adriamycin, admitted afterwords with excerebration of COVID 19 symptoms. She went thru cycle 3-5 and interim PET/CT showed likely CR. Her last treatment was on 1/23/23 with Rituxan, Polatuzumab and Cytoxan. She received on body neulasta after that  Of note PET CT on 12/29/22 showed decrease of LLL lung mas but did show new subcentimeter pulmonary nodules ? infectious/inflammatory or residual disease.  After admission on 2/15/23 being unmanaged for sepsis, ID following, on abx and Voriconazole. Pulmonary has also seen her  Seen by cardiology for SVT  I had started her on Zarxio for neutropenia, now ANC better and thus can dc. Platelets are stable and safe  Presently with fever, has neutropenia mild thrombocytopenia and anemia likely from chemo and underlying disease.   46 yo f w pmh nhl s/p chemo (?in remission), PE, ?pah, covid, admitted 2/15/23 with fevers for the last few days, a/w generalized weakness over the last couple of weeks along with poor po intake. denied chest pain, palpitations, lightheadedness, cough, wheeze, abdominal discomfort. patient did endorse n+v, dysuria, arriaza. of note, last session of chemo reportedly on 1/23/2023. Pt grew concerned so went to oncologist, where she got cultures, blood work, and xray. as she did not feel improved, and thus came to hospital      Oncologic history  Patient is followed by Dr. Mendoza at [facility] for Diffuse large B cell lymphoma, EBV positive, BCl 2 positive dx by LLL lung mass bx. 8/2022. She was enrolled in [id number], which she came off later. She had COVID one week after 1st treatment and PE with heart strain.  She was given C2 once received without Adriamycin, admitted afterwords with excerebration of COVID 19 symptoms. She went thru cycle 3-5 and interim PET/CT showed likely CR. Her last treatment was on 1/23/23 with Rituxan, Polatuzumab and Cytoxan. She received on body neulasta after that  Of note PET CT on 12/29/22 showed decrease of LLL lung mas but did show new subcentimeter pulmonary nodules ? infectious/inflammatory or residual disease.  After admission on 2/15/23 being unmanaged for sepsis, ID following, on abx and Voriconazole. Pulmonary has also seen her  Seen by cardiology for SVT  I had started her on Zarxio for neutropenia, now ANC better and thus can dc. Platelets are stable and safe  Management will stay as per MICU, ID and pulmonary

## 2023-02-17 NOTE — PROGRESS NOTE ADULT - ASSESSMENT
48 yo f w pmh nhl s/p chemo (?in remission), pe, ?pah, covid, p/w fevers for the last few days, a/w generalized weakness over the last couple of weeks along with poor po intake. denies chest pain, palpitations, lightheadedness, cough, wheeze, abdominal discomfort. patient does endorse n+v, dysuria, arriaza. of note, last session of chemo reportedly on 1/23/2023. Pt grew concerned so went to oncologist yesterday where she got cultures, blood work, and xray. as she did not feel improved, patient presents to [facility] er for further evaluation.  (15 Feb 2023 01:39)  BP monitoring,continue current antihypertensive meds, low salt diet,followup with PMD in 1-2 weeks        Serum creatinine is stable at 0.8, approximating a GFR of is controlled  ml/min.   There is no progression.  No uremic symptoms. pos  evidence of  worsening  Anemia. Fluid status stable.   Will continue to avoid nephrotoxic drugs.  Patient remains asymptomatic.  Continue current therapy.      BP monitoring,continue current , low salt diet,followup with PMD in 1-2 weeks    Admit for septic workup and ID evaluation,send blood and urine cx,serial lactate levels,monitor vitals closley,ivfs hydration,monitor urine output and renal profile,iv abx as per id cons\  cefepime   IVPB      cefepime   IVPB 2000 milliGRAM(s) IV Intermittent every 8 hours  voriconazole 200 milliGRAM(s) Oral every 12 hours    dvt enoxaparin Injectable 70 milliGRAM(s) SubCutaneous every 12 hours

## 2023-02-17 NOTE — RAPID RESPONSE TEAM SUMMARY - NSADDTLFINDINGSRRT_GEN_ALL_CORE
Upon arrival the patient was noted to have sever Rigors. IV tylenol was ordered prior to RRT. 's Hrt rate also 170's spo2 100% on RA Rectal temperature 102.9. Per primary team the patient is currently on Vanco and Piptazo. Blood cx RVP UA and sputum if possible to be sent. May also check MRSA. Patient currently on sodium Hco3 gtt. 
On arrival pt SpO2 100% on RA, RR 18, BP 90s/60s, HR 110s sinus tach, afebrile, BG 90s. Pt awake and alert, lying comfortably in bed, denying all complaints at this time - states she is just "scared." As per primary team, pt has been febrile to Tmax 102F for which she received tylenol, and tachycardic to 140s for which she received Lopressor 2.5 mg IVP x2. She also has been intermittently hypotensive for which she total 2.5L IVF this evening with transient improvement however dropped to 70s/50s at which time RRT was called. To note, she is on empiric vancomycin and zosyn, her infectious w/u has been non revealing thus far; ID following.

## 2023-02-17 NOTE — CHART NOTE - NSCHARTNOTEFT_GEN_A_CORE
: Radha Matamoros    INDICATION: hypotension, abnormal CT chest    PROCEDURE:  [x] LIMITED ECHO  [x] LIMITED CHEST  [ ] LIMITED RETROPERITONEAL  [ ] LIMITED ABDOMINAL  [ ] LIMITED DVT  [ ] NEEDLE GUIDANCE VASCULAR  [ ] NEEDLE GUIDANCE THORACENTESIS  [ ] NEEDLE GUIDANCE PARACENTESIS  [ ] NEEDLE GUIDANCE PERICARDIOCENTESIS  [ ] OTHER    FINDINGS:  A line predominance in anterior bilateral lung fields  B lines at bilater bases, trace R pleural effusion   LVSF normal  RV severely enlarged with septal bowing during both systole and diastole   IVC 1.1 cm      INTERPRETATION:  Bibasilar B lines, trace R pleural effusion not amenable to drainage   Severe RV enlargement with septal bowing    Images uploaded on Photos to [employer] : Christy Marks    INDICATION: hypotension, abnormal CT chest    PROCEDURE:  [x] LIMITED ECHO  [x] LIMITED CHEST  [ ] LIMITED RETROPERITONEAL  [ ] LIMITED ABDOMINAL  [ ] LIMITED DVT  [ ] NEEDLE GUIDANCE VASCULAR  [ ] NEEDLE GUIDANCE THORACENTESIS  [ ] NEEDLE GUIDANCE PARACENTESIS  [ ] NEEDLE GUIDANCE PERICARDIOCENTESIS  [ ] OTHER    FINDINGS:  A line predominance in anterior bilateral lung fields  B lines at bilateral bases, trace R pleural effusion   LVSF normal  RV severely enlarged with septal bowing during both systole and diastole   IVC 1.1 cm      INTERPRETATION:  Bibasilar B lines, trace R pleural effusion not amenable to drainage   Severe RV enlargement with septal bowing    Images uploaded on [employer]    I have assisted and supervised entire procedure.    Elie Harrison MD

## 2023-02-17 NOTE — CHART NOTE - NSCHARTNOTEFT_GEN_A_CORE
HPI:  48 yo f w pmh nhl s/p chemo (?in remission), pe, ?pah, covid, p/w fevers for the last few days, a/w generalized weakness over the last couple of weeks along with poor po intake. denies chest pain, palpitations, lightheadedness, cough, wheeze, abdominal discomfort. patient does endorse n+v, dysuria, arriaza. of note, last session of chemo reportedly on 1/23/2023. Pt grew concerned so went to oncologist yesterday where she got cultures, blood work, and xray. as she did not feel improved, patient presents to [facility] er for further evaluation.  (15 Feb 2023 01:39)     Patient is s/p RRT for symptomatic SVT above one 160s. During RRT with rectal temperature of 103F. IV tylenol given. Lopressor 5mg IVP x1 given. See RRT note for full detail.  Post RRT, patient remains on the unit with improved HR to 120s.    F/u with CBC, CMP, mag, phos, PT/PTT/INR, cardiac enzymes, VBG with lytes and lactate, and BCx x 2  Discussed with RN  Will Discuss with Dr Alva.   Continue to monitor  Will endorse to primary team in AM.    ICU Vital Signs Last 24 Hrs  T(C): 37.2 (16 Feb 2023 20:20), Max: 37.8 (16 Feb 2023 01:00)  T(F): 99 (16 Feb 2023 20:20), Max: 100 (16 Feb 2023 01:00)  HR: 119 (16 Feb 2023 20:20) (104 - 130)  BP: 95/63 (16 Feb 2023 20:20) (74/52 - 97/66)  BP(mean): --  ABP: --  ABP(mean): --  RR: 17 (16 Feb 2023 20:20) (17 - 20)  SpO2: 96% (16 Feb 2023 20:20) (96% - 98%)    O2 Parameters below as of 16 Feb 2023 20:20  Patient On (Oxygen Delivery Method): room air    Becca Hicks PA-C  Department of Medicine  Spectra [phone]

## 2023-02-17 NOTE — PROGRESS NOTE ADULT - SUBJECTIVE AND OBJECTIVE BOX
Ender Francois, PGY3  Internal Medicine  Pager [phone] ([facility])    OVERNIGHT EVENTS / SUBJECTIVE: Patient seen and examined at bedside.     OBJECTIVE:    VITAL SIGNS:  ICU Vital Signs Last 24 Hrs  T(C): 36.5 (17 Feb 2023 04:00), Max: 39.6 (17 Feb 2023 01:36)  T(F): 97.7 (17 Feb 2023 04:00), Max: 103.2 (17 Feb 2023 01:36)  HR: 117 (17 Feb 2023 07:00) (104 - 167)  BP: 84/60 (17 Feb 2023 07:00) (84/60 - 97/66)  BP(mean): 67 (17 Feb 2023 07:00) (65 - 75)  ABP: --  ABP(mean): --  RR: 33 (17 Feb 2023 07:00) (17 - 33)  SpO2: 95% (17 Feb 2023 07:00) (94% - 100%)    O2 Parameters below as of 17 Feb 2023 02:40  Patient On (Oxygen Delivery Method): room air              02-16 @ 07:01  -  02-17 @ 07:00  --------------------------------------------------------  IN: 1100 mL / OUT: 0 mL / NET: 1100 mL      CAPILLARY BLOOD GLUCOSE      POCT Blood Glucose.: 97 mg/dL (16 Feb 2023 23:22)      PHYSICAL EXAM:    General: NAD  HEENT: NC/AT; PERRL, clear conjunctiva  Neck: supple  Respiratory: CTA b/l  Cardiovascular: +S1/S2; RRR  Abdomen: soft, NT/ND; +BS x4  Extremities: WWP, 2+ peripheral pulses b/l; no LE edema  Skin: normal color and turgor; no rash  Neurological:    MEDICATIONS:  MEDICATIONS  (STANDING):  cefepime   IVPB      cefepime   IVPB 2000 milliGRAM(s) IV Intermittent every 8 hours  chlorhexidine 2% Cloths 1 Application(s) Topical <User Schedule>  chlorhexidine 4% Liquid 1 Application(s) Topical <User Schedule>  digoxin     Tablet 125 MICROGram(s) Oral daily  enoxaparin Injectable 60 milliGRAM(s) SubCutaneous every 12 hours  filgrastim-sndz (ZARXIO) Injectable 480 MICROGram(s) SubCutaneous daily  midodrine. 10 milliGRAM(s) Oral three times a day  voriconazole 200 milliGRAM(s) Oral every 12 hours    MEDICATIONS  (PRN):  ondansetron Injectable 4 milliGRAM(s) IV Push every 8 hours PRN Nausea and/or Vomiting      ALLERGIES:  Allergies    No Known Allergies    Intolerances        LABS:                        8.4    10.31 )-----------( 83       ( 17 Feb 2023 01:21 )             26.5     Hemoglobin: 8.4 g/dL (02-17 @ 01:21)  Hemoglobin: 6.5 g/dL (02-16 @ 23:54)  Hemoglobin: 7.7 g/dL (02-16 @ 04:17)  Hemoglobin: 8.0 g/dL (02-15 @ 19:41)  Hemoglobin: 7.6 g/dL (02-15 @ 06:23)    CBC Full  -  ( 17 Feb 2023 01:21 )  WBC Count : 10.31 K/uL  RBC Count : 3.03 M/uL  Hemoglobin : 8.4 g/dL  Hematocrit : 26.5 %  Platelet Count - Automated : 83 K/uL  Mean Cell Volume : 87.5 fl  Mean Cell Hemoglobin : 27.7 pg  Mean Cell Hemoglobin Concentration : 31.7 gm/dL  Auto Neutrophil # : 8.85 K/uL  Auto Lymphocyte # : 0.52 K/uL  Auto Monocyte # : 0.58 K/uL  Auto Eosinophil # : 0.04 K/uL  Auto Basophil # : 0.03 K/uL  Auto Neutrophil % : 87.1 %  Auto Lymphocyte % : 5.1 %  Auto Monocyte % : 5.7 %  Auto Eosinophil % : 0.4 %  Auto Basophil % : 0.3 %    02-17    139  |  110<H>  |  10  ----------------------------<  103<H>  3.8   |  14<L>  |  0.95    Ca    8.3<L>      17 Feb 2023 01:21  Phos  3.2     02-16  Mg     1.2     02-16    TPro  5.1<L>  /  Alb  3.2<L>  /  TBili  0.8  /  DBili  x   /  AST  43<H>  /  ALT  36  /  AlkPhos  64  02-17    Creatinine Trend: 0.95<--, 0.39<--, 0.83<--, 0.78<--, 0.78<--, 0.80<--  LIVER FUNCTIONS - ( 17 Feb 2023 01:21 )  Alb: 3.2 g/dL / Pro: 5.1 g/dL / ALK PHOS: 64 U/L / ALT: 36 U/L / AST: 43 U/L / GGT: x               hs Troponin:                34 <<== 02-17-23 @ 01:21                16 <<== 02-16-23 @ 23:51                43 <<== 02-16-23 @ 03:01    ABG - ( 16 Feb 2023 04:05 )  pH, Arterial: 7.39  pH, Blood: x     /  pCO2: 23    /  pO2: 146   / HCO3: 14    / Base Excess: -9.8  /  SaO2: 99.7        04:45 - VBG - pH: 7.30  | pCO2: 35    | pO2: 29    | Lactate: 3.4    00:25 - VBG - pH: 7.25  | pCO2: 32    | pO2: 26    | Lactate: 6.7    23:26 - VBG - pH: 7.02  | pCO2: 33    | pO2: 15    | Lactate: 7.0      CSF:    EKG:   MICROBIOLOGY:    Culture - Blood (collected 16 Feb 2023 04:17)  Source: .Blood Blood-Peripheral  Preliminary Report (17 Feb 2023 07:02):    No growth to date.    Culture - Urine (collected 14 Feb 2023 23:22)  Source: Clean Catch Clean Catch (Midstream)  Final Report (16 Feb 2023 03:59):    No growth    Culture - Blood (collected 14 Feb 2023 21:15)  Source: .Blood Blood-Peripheral  Preliminary Report (16 Feb 2023 02:03):    No growth to date.    Culture - Blood (collected 14 Feb 2023 21:00)  Source: .Blood Blood-Peripheral  Preliminary Report (16 Feb 2023 02:03):    No growth to date.      IMAGING:      Labs, imaging, EKG personally reviewed    RADIOLOGY & ADDITIONAL TESTS: Reviewed. Ender Francois, PGY3  Internal Medicine  Pager [phone] ([facility])    OVERNIGHT EVENTS / SUBJECTIVE: Patient seen and examined at bedside. Reports feeling tired. Denies cough or SOB.    OBJECTIVE:    VITAL SIGNS:  ICU Vital Signs Last 24 Hrs  T(C): 36.5 (17 Feb 2023 04:00), Max: 39.6 (17 Feb 2023 01:36)  T(F): 97.7 (17 Feb 2023 04:00), Max: 103.2 (17 Feb 2023 01:36)  HR: 117 (17 Feb 2023 07:00) (104 - 167)  BP: 84/60 (17 Feb 2023 07:00) (84/60 - 97/66)  BP(mean): 67 (17 Feb 2023 07:00) (65 - 75)  ABP: --  ABP(mean): --  RR: 33 (17 Feb 2023 07:00) (17 - 33)  SpO2: 95% (17 Feb 2023 07:00) (94% - 100%)    O2 Parameters below as of 17 Feb 2023 02:40  Patient On (Oxygen Delivery Method): room air      02-16 @ 07:01  -  02-17 @ 07:00  --------------------------------------------------------  IN: 1100 mL / OUT: 0 mL / NET: 1100 mL      CAPILLARY BLOOD GLUCOSE      POCT Blood Glucose.: 97 mg/dL (16 Feb 2023 23:22)      PHYSICAL EXAM:    General: NAD  HEENT: NC/AT; PERRL, clear conjunctiva  Neck: supple  Respiratory: CTA b/l  Cardiovascular: +S1/S2; RRR  Abdomen: soft, NT/ND; +BS x4  Extremities: WWP, 2+ peripheral pulses b/l; no LE edema  Skin: normal color and turgor; no rash  Neurological:    MEDICATIONS:  MEDICATIONS  (STANDING):  cefepime   IVPB      cefepime   IVPB 2000 milliGRAM(s) IV Intermittent every 8 hours  chlorhexidine 2% Cloths 1 Application(s) Topical <User Schedule>  chlorhexidine 4% Liquid 1 Application(s) Topical <User Schedule>  digoxin     Tablet 125 MICROGram(s) Oral daily  enoxaparin Injectable 60 milliGRAM(s) SubCutaneous every 12 hours  filgrastim-sndz (ZARXIO) Injectable 480 MICROGram(s) SubCutaneous daily  midodrine. 10 milliGRAM(s) Oral three times a day  voriconazole 200 milliGRAM(s) Oral every 12 hours    MEDICATIONS  (PRN):  ondansetron Injectable 4 milliGRAM(s) IV Push every 8 hours PRN Nausea and/or Vomiting    ALLERGIES:  Allergies    No Known Allergies    Intolerances    LABS:                        8.4    10.31 )-----------( 83       ( 17 Feb 2023 01:21 )             26.5     Hemoglobin: 8.4 g/dL (02-17 @ 01:21)  Hemoglobin: 6.5 g/dL (02-16 @ 23:54)  Hemoglobin: 7.7 g/dL (02-16 @ 04:17)  Hemoglobin: 8.0 g/dL (02-15 @ 19:41)  Hemoglobin: 7.6 g/dL (02-15 @ 06:23)    CBC Full  -  ( 17 Feb 2023 01:21 )  WBC Count : 10.31 K/uL  RBC Count : 3.03 M/uL  Hemoglobin : 8.4 g/dL  Hematocrit : 26.5 %  Platelet Count - Automated : 83 K/uL  Mean Cell Volume : 87.5 fl  Mean Cell Hemoglobin : 27.7 pg  Mean Cell Hemoglobin Concentration : 31.7 gm/dL  Auto Neutrophil # : 8.85 K/uL  Auto Lymphocyte # : 0.52 K/uL  Auto Monocyte # : 0.58 K/uL  Auto Eosinophil # : 0.04 K/uL  Auto Basophil # : 0.03 K/uL  Auto Neutrophil % : 87.1 %  Auto Lymphocyte % : 5.1 %  Auto Monocyte % : 5.7 %  Auto Eosinophil % : 0.4 %  Auto Basophil % : 0.3 %    02-17    139  |  110<H>  |  10  ----------------------------<  103<H>  3.8   |  14<L>  |  0.95    Ca    8.3<L>      17 Feb 2023 01:21  Phos  3.2     02-16  Mg     1.2     02-16    TPro  5.1<L>  /  Alb  3.2<L>  /  TBili  0.8  /  DBili  x   /  AST  43<H>  /  ALT  36  /  AlkPhos  64  02-17    Creatinine Trend: 0.95<--, 0.39<--, 0.83<--, 0.78<--, 0.78<--, 0.80<--  LIVER FUNCTIONS - ( 17 Feb 2023 01:21 )  Alb: 3.2 g/dL / Pro: 5.1 g/dL / ALK PHOS: 64 U/L / ALT: 36 U/L / AST: 43 U/L / GGT: x               hs Troponin:                34 <<== 02-17-23 @ 01:21                16 <<== 02-16-23 @ 23:51                43 <<== 02-16-23 @ 03:01    ABG - ( 16 Feb 2023 04:05 )  pH, Arterial: 7.39  pH, Blood: x     /  pCO2: 23    /  pO2: 146   / HCO3: 14    / Base Excess: -9.8  /  SaO2: 99.7        04:45 - VBG - pH: 7.30  | pCO2: 35    | pO2: 29    | Lactate: 3.4    00:25 - VBG - pH: 7.25  | pCO2: 32    | pO2: 26    | Lactate: 6.7    23:26 - VBG - pH: 7.02  | pCO2: 33    | pO2: 15    | Lactate: 7.0      CSF:    EKG:   MICROBIOLOGY:    Culture - Blood (collected 16 Feb 2023 04:17)  Source: .Blood Blood-Peripheral  Preliminary Report (17 Feb 2023 07:02):    No growth to date.    Culture - Urine (collected 14 Feb 2023 23:22)  Source: Clean Catch Clean Catch (Midstream)  Final Report (16 Feb 2023 03:59):    No growth    Culture - Blood (collected 14 Feb 2023 21:15)  Source: .Blood Blood-Peripheral  Preliminary Report (16 Feb 2023 02:03):    No growth to date.    Culture - Blood (collected 14 Feb 2023 21:00)  Source: .Blood Blood-Peripheral  Preliminary Report (16 Feb 2023 02:03):    No growth to date.      IMAGING:      Labs, imaging, EKG personally reviewed    RADIOLOGY & ADDITIONAL TESTS: Reviewed.

## 2023-02-17 NOTE — PROGRESS NOTE ADULT - ASSESSMENT
47 F PMH non-Hodgkin lymphoma s/p chemoport (?in remission), PE, pHTN, presenting with recurrent fevers with pancytopenia in setting of recent chemo treatment and prednisone concerning for severe sepsis. Found to be requiring intermittent fluids and continues to be febrile while on broad-spectrum bacterial antibiotic coverage. CT chest imaging concerning for fungal infection vs metastasis. MICU consulted for further management.      PLAN    =====Neurologic=====  - No active issues, AOx4    =====Pulmonary=====  # DLBCL with pulmonary nodules  - as in heme-onc section below    # workup for pna  - as in ID section below    # pulmonary arterial HTN  > appreciate pulm recs    =====Cardiovascular=====  # HFrEF (EF ~42) with RV dysfunction based on previous echo (2/15/23)  - Bedside POCUS noted to have dilated RV  > caution with fluids  > DC-ed coreg     # SVT  > digoxin 125 mcg po qD  > follow up digoxin level    # hypotension  - to SBP 70s  - possibly multifactorial, including from sepsis  - s/p IV albumin x1  > midodrine 10 mg po tid    =====GI=====  #Diet - regular    =====Renal/=====  - PMH STARR while on antibiotics  > appreciate renal recs    =====Endocrine=====  - No active issues.     =====Infectious Disease=====  # severe sepsis with unclear etiology in setting of known malginancy and recent chemotx for cancer   - iso immunocompromise, hx and CT chest imaging c/f infection (e.g. fungal pna) vs progression of known cancer  > switched Zosyn to cefepime  > continue vanc level   > follow up vanc level  > started voriconazole 200mg po bid  > appreciate ID recs re: voriconazole; hold off antiviral given low suspicion for viral infection (RVP negative)  > supportive care: Tylenol and cooling measures (if patient agrees as she reported that cooling measures made her uncomfortable)  > follow up BCx, UA, UCx  > Aspergillus galactomannan Ag, Aspergillus Ab  > appreciate ID recs    =====Heme/Onc=====  # DLBCL  > appreciate heme-onc recs    #leukopenia on filgastrim  #anemia  #thrombocytopenia  - possibly from recent chemo and cancer  > Hb goal >7  > Zarxio per heme-onc (5-day course)  > Trend Plt    #DVT PPX  > Lovenox 60 bid - if Doppler neg, switch to Lovenox 40 qD for DVT PPX  > Follow up doppler b/l LE    =====Ethics=====  FULL CODE. 47 F PMH non-Hodgkin lymphoma s/p chemoport (?in remission), PE, pHTN, presenting with recurrent fevers with pancytopenia in setting of recent chemo treatment and prednisone concerning for severe sepsis. Found to be requiring intermittent fluids and continues to be febrile while on broad-spectrum bacterial antibiotic coverage. CT chest imaging concerning for fungal infection vs metastasis. MICU consulted for further management.      PLAN    =====Neurologic=====  - No active issues, AOx4    =====Pulmonary=====  # DLBCL with pulmonary nodules  - as in heme-onc section below    # workup for pna  - as in ID section below    # pulmonary arterial HTN  > RV systolic pressure =51mmHg on TTE    =====Cardiovascular=====  # HFrEF (EF ~42) with RV dysfunction based on previous echo (2/15/23)  - Bedside POCUS noted to have dilated RV  > caution with fluids  > DC-ed coreg     # SVT  > digoxin 125 mcg po qD  > follow up digoxin level    # hypotension  - to SBP 70s  - possibly multifactorial, including from sepsis  - s/p IV albumin x1  > midodrine 10 mg po tid    =====GI=====  #Diet - regular    =====Renal/=====  - PMH STARR while on antibiotics  > appreciate renal recs    =====Endocrine=====  - No active issues.     =====Infectious Disease=====  # severe sepsis with unclear etiology in setting of known malginancy and recent chemotx for cancer   - iso immunocompromise, hx and CT chest imaging c/f infection (e.g. fungal pna) vs progression of known cancer  > switched Zosyn to cefepime (2/17-)  > MRSA swab negative, vanc dc'd  > started voriconazole 200mg po bid  > appreciate ID recs re: voriconazole; hold off antiviral given low suspicion for viral infection (RVP negative)  > supportive care: Tylenol and cooling measures (if patient agrees as she reported that cooling measures made her uncomfortable)  > follow up BCx, UA, UCx  > Aspergillus galactomannan Ag, Aspergillus Ab  > appreciate ID recs    =====Heme/Onc=====  # DLBCL  > appreciate heme-onc recs    #leukopenia on filgastrim  #anemia  #thrombocytopenia  - possibly from recent chemo and cancer  > Hb goal >7  > Zarxio per heme-onc (5-day course)  > Trend Plt    #DVT PPX  > Lovenox 60 bid - if Doppler neg, switch to Lovenox 40 qD for DVT PPX  > Follow up doppler b/l LE    =====Ethics=====  FULL CODE.

## 2023-02-17 NOTE — PROGRESS NOTE ADULT - SUBJECTIVE AND OBJECTIVE BOX
Patient is a 47y Female whom presented to the hospital with abnormal electrolyte     PAST MEDICAL & SURGICAL HISTORY:  Non-Hodgkin&#x27;s lymphoma      Pulmonary embolism      History of appendectomy      History of cholecystectomy          MEDICATIONS  (STANDING):  apixaban 5 milliGRAM(s) Oral every 12 hours  chlorhexidine 2% Cloths 1 Application(s) Topical <User Schedule>  digoxin     Tablet 125 MICROGram(s) Oral daily  filgrastim-sndz (ZARXIO) Injectable 480 MICROGram(s) SubCutaneous daily  midodrine. 10 milliGRAM(s) Oral three times a day  piperacillin/tazobactam IVPB.. 3.375 Gram(s) IV Intermittent every 8 hours  potassium chloride    Tablet ER 40 milliEquivalent(s) Oral once  sodium bicarbonate  Infusion 0.086 mEq/kG/Hr (75 mL/Hr) IV Continuous <Continuous>  vancomycin  IVPB 1000 milliGRAM(s) IV Intermittent every 12 hours      Allergies    No Known Allergies    Intolerances        SOCIAL HISTORY:  Denies ETOh,Smoking,     FAMILY HISTORY:      REVIEW OF SYSTEMS:    CONSTITUTIONAL: No weakness, fevers or chills  RESPIRATORY: No cough, wheezing, hemoptysis; No shortness of breath  CARDIOVASCULAR: No chest pain or palpitations  GASTROINTESTINAL: No abdominal or epigastric pain. No nausea, vomiting,     No diarrhea or constipation. No melena   SKIN: dry      VITAL:  T(C): , Max: 38.9 (02-15-23 @ 23:15)  T(F): , Max: 102 (02-15-23 @ 23:15)  HR: 119 (23 @ 20:20)  BP: 95/63 (23 @ 20:20)  BP(mean): 76 (02-15-23 @ 21:25)  RR: 17 (23 @ 20:20)  SpO2: 96% (23 @ 20:20)  Wt(kg): --    I and O's:    02-15 @ 07:01  -   @ 07:00  --------------------------------------------------------  IN: 1325 mL / OUT: 350 mL / NET: 975 mL    02-16 @ 07:01  -   @ 21:09  --------------------------------------------------------  IN: 950 mL / OUT: 0 mL / NET: 950 mL          PHYSICAL EXAM:    Constitutional: NAD  HEENT: conjunctive   clear   Neck:  No JVD  Respiratory: CTAB  Cardiovascular: S1 and S2  Gastrointestinal: BS+, soft, NT/ND  Extremities: No peripheral edema  Neurological:  no focal deficits  Psychiatric: Normal mood, normal affect  : No Roman  Skin: dry   Access: Not applicable    LABS:                        7.7    1.78  )-----------( 66       ( 2023 04:17 )             25.1     02-16    140  |  113<H>  |  10  ----------------------------<  97  3.6   |  17<L>  |  0.83    Ca    8.1<L>      2023 06:56  Phos  3.9       Mg     1.4         TPro  4.2<L>  /  Alb  2.6<L>  /  TBili  0.4  /  DBili  x   /  AST  44<H>  /  ALT  29  /  AlkPhos  58        Urine Studies:  Urinalysis Basic - ( 2023 23:22 )    Color: Light Yellow / Appearance: Clear / S.011 / pH: x  Gluc: x / Ketone: Negative  / Bili: Negative / Urobili: Negative   Blood: x / Protein: Negative / Nitrite: Negative   Leuk Esterase: Negative / RBC: x / WBC x   Sq Epi: x / Non Sq Epi: x / Bacteria: x            RADIOLOGY & ADDITIONAL STUDIES:

## 2023-02-17 NOTE — PROGRESS NOTE ADULT - SUBJECTIVE AND OBJECTIVE BOX
Follow Up: fever    Interval History/ROS: s/p RRT overnight for tachycardia, fever, rigors and pt was transferred to MICU but no SOB, abd pain, diarrhea        Allergies  No Known Allergies        ANTIMICROBIALS:  cefepime   IVPB    cefepime   IVPB 2000 every 8 hours  voriconazole 200 every 12 hours      OTHER MEDS:  chlorhexidine 2% Cloths 1 Application(s) Topical <User Schedule>  chlorhexidine 4% Liquid 1 Application(s) Topical <User Schedule>  digoxin     Tablet 125 MICROGram(s) Oral daily  enoxaparin Injectable 70 milliGRAM(s) SubCutaneous every 12 hours  filgrastim-sndz (ZARXIO) Injectable 480 MICROGram(s) SubCutaneous daily  midodrine. 10 milliGRAM(s) Oral three times a day  ondansetron Injectable 4 milliGRAM(s) IV Push every 8 hours PRN      Vital Signs Last 24 Hrs  T(C): 36.6 (2023 11:00), Max: 39.6 (2023 01:36)  T(F): 97.8 (2023 11:00), Max: 103.2 (2023 01:36)  HR: 118 (2023 13:00) (114 - 167)  BP: 84/61 (2023 13:00) (80/55 - 108/66)  BP(mean): 67 (2023 13:00) (60 - 81)  RR: 25 (2023 13:00) (17 - 33)  SpO2: 97% (2023 13:00) (94% - 100%)    Parameters below as of 2023 02:40  Patient On (Oxygen Delivery Method): room air        Physical Exam:  General:    NAD  Respiratory:   clear b/l, no wheezing  abd:   soft, BS +, not tender  :     no CVAT, no suprapubic tenderness, no gallagher  Musculoskeletal : no joint swelling, no edema  Skin:    no rash  vascular: no phlebitis, R chest port with no erythema or tenderness                        8.4    10.31 )-----------( 83       ( 2023 01:21 )             26.5       02-17    139  |  110<H>  |  10  ----------------------------<  103<H>  3.8   |  14<L>  |  0.95    Ca    8.3<L>      2023 01:21  Phos  3.2       Mg     1.2         TPro  5.1<L>  /  Alb  3.2<L>  /  TBili  0.8  /  DBili  x   /  AST  43<H>  /  ALT  36  /  AlkPhos  64        Urinalysis Basic - ( 2023 10:00 )    Color: Yellow / Appearance: Clear / S.022 / pH: x  Gluc: x / Ketone: Negative  / Bili: Negative / Urobili: Negative   Blood: x / Protein: Trace / Nitrite: Negative   Leuk Esterase: Negative / RBC: 3 /hpf / WBC 4 /HPF   Sq Epi: x / Non Sq Epi: 4 /hpf / Bacteria: Negative        MICROBIOLOGY:  Vancomycin Level, Trough: 14.7 ug/mL (23 @ 04:46)  Vancomycin Level, Trough: 13.6 ug/mL (23 @ 17:38)  v  .Blood Blood-Peripheral  23   No growth to date.  --  --      Clean Catch Clean Catch (Midstream)  23   No growth  --  --      .Blood Blood-Peripheral  23   No growth to date.  --  --      .Blood Blood-Peripheral  23   No growth to date.  --  --          Rapid RVP Result: NotDetec ( @ 21:27)        RADIOLOGY:  Images independently visualized and reviewed personally, findings as below  < from: CT Abdomen and Pelvis w/ IV Cont (02.15.23 @ 02:17) >  IMPRESSION:    No pulmonary embolism.    Innumerable lung nodules which are nonspecific. In this clinical setting,   differential includes infection (in which case, consider fungus) or   neoplasm/known lymphoma.    Indeterminate low-attenuation lesion in the liver.    Close follow-up and/or comparison with outside imaging is recommended.    < end of copied text >

## 2023-02-17 NOTE — CONSULT NOTE ADULT - SUBJECTIVE AND OBJECTIVE BOX
CARDIOLOGY CONSULT - Dr. Webber         HPI:  46 yo f w pmh nhl s/p chemo (?in remission), pe, ?pah, covid, p/w fevers for the last few days, a/w generalized weakness over the last couple of weeks along with poor po intake. denies chest pain, palpitations, lightheadedness, cough, wheeze, abdominal discomfort. patient does endorse n+v, dysuria, arriaza. of note, last session of chemo reportedly on 1/23/2023. Pt grew concerned so went to oncologist yesterday where she got cultures, blood work, and xray. as she did not feel improved, patient presents to [facility] er for further evaluation.  (15 Feb 2023 01:39)      PAST MEDICAL & SURGICAL HISTORY:  Non-Hodgkin&#x27;s lymphoma      Pulmonary embolism      History of appendectomy      History of cholecystectomy              PREVIOUS DIAGNOSTIC TESTING:    [x] Echocardiogram:  < from: Transthoracic Echocardiogram (02.15.23 @ 07:31) >  Conclusions:  1. Normal mitral valve. Minimal mitral regurgitation.  2. Normal trileaflet aortic valve. No aortic valve  regurgitation seen.  3. Endocardium not well visualized; mild global left  ventricular systolic dysfunction. Septal flattening  consistent with right ventricular overload.  4. Right ventricular enlargement with decreased right  ventricular systolic function. Basal RV diameter 5.1 cm.  TAPSE=1.7 cm, systolic TDI=9 cm/sec.  5. Estimated right ventricular systolic pressure equals 51  mm Hg, assuming right atrial pressure equals 3 mm Hg,  consistent with moderate pulmonary hypertension.  *** No previous Echo exam.    < end of copied text >    [ ]  Catheterization:  [ ] Stress Test:  	    MEDICATIONS:  Home Medications:  digoxin 125 mcg (0.125 mg) oral tablet: 1 tab(s) orally once a day (15 Feb 2023 02:23)  Eliquis 5 mg oral tablet: 1 tab(s) orally 2 times a day (15 Feb 2023 02:23)  metoprolol succinate 25 mg oral tablet, extended release: 1 tab(s) orally once a day (15 Feb 2023 02:23)  selexipag 800 mcg oral tablet: 1 tab(s) orally 2 times a day (15 Feb 2023 02:23)      MEDICATIONS  (STANDING):  cefepime   IVPB      cefepime   IVPB 2000 milliGRAM(s) IV Intermittent every 8 hours  chlorhexidine 2% Cloths 1 Application(s) Topical <User Schedule>  chlorhexidine 4% Liquid 1 Application(s) Topical <User Schedule>  digoxin     Tablet 125 MICROGram(s) Oral daily  enoxaparin Injectable 70 milliGRAM(s) SubCutaneous every 12 hours  filgrastim-sndz (ZARXIO) Injectable 480 MICROGram(s) SubCutaneous daily  midodrine. 10 milliGRAM(s) Oral three times a day  voriconazole 200 milliGRAM(s) Oral every 12 hours      FAMILY HISTORY:      SOCIAL HISTORY:    [ ] Non-smoker  [x] Smoker  [ ] Alcohol    Allergies    No Known Allergies    Intolerances    	    REVIEW OF SYSTEMS:  CONSTITUTIONAL: +Generalized weakness  EYES: No eye pain, visual disturbances, or discharge  ENMT:  No difficulty hearing, tinnitus, vertigo; No sinus or throat pain  NECK: No pain or stiffness  RESPIRATORY: No cough, wheezing, chills or hemoptysis; No Shortness of Breath  CARDIOVASCULAR: +palpitations, no cp, no dizziness  GASTROINTESTINAL: No abdominal or epigastric pain. No nausea, vomiting, or hematemesis; No diarrhea or constipation. No melena or hematochezia.  GENITOURINARY: No dysuria, frequency, hematuria, or incontinence  NEUROLOGICAL: No headaches, memory loss, loss of strength, numbness, or tremors  SKIN: No itching, burning, rashes, or lesions   	    [x] All others negative	  [ ] Unable to obtain    PHYSICAL EXAM:  T(C): 36.6 (02-17-23 @ 11:00), Max: 39.6 (02-17-23 @ 01:36)  HR: 118 (02-17-23 @ 13:00) (114 - 167)  BP: 84/61 (02-17-23 @ 13:00) (80/55 - 108/66)  RR: 25 (02-17-23 @ 13:00) (17 - 33)  SpO2: 97% (02-17-23 @ 13:00) (94% - 100%)  Wt(kg): --  I&O's Summary    16 Feb 2023 07:01  -  17 Feb 2023 07:00  --------------------------------------------------------  IN: 1100 mL / OUT: 0 mL / NET: 1100 mL    17 Feb 2023 07:01  -  17 Feb 2023 16:04  --------------------------------------------------------  IN: 500 mL / OUT: 1000 mL / NET: -500 mL        Appearance: Ill appearing female	  Psychiatry: A & O x 3, Mood & affect appropriate  HEENT:   Normal oral mucosa, PERRL, EOMI	  Lymphatic: No lymphadenopathy  Cardiovascular: Normal S1 S2,Sinus tachycardia, No JVD, No murmurs  Respiratory: Lungs clear to auscultation	  Gastrointestinal:  Soft, Non-tender, + BS	  Skin: No rashes, No ecchymoses, No cyanosis	  Neurologic: Non-focal  Extremities: Normal range of motion, No clubbing, cyanosis or edema  Vascular: Peripheral pulses palpable 2+ bilaterally    TELEMETRY: Sinus tachy 120s	    ECG:  	  RADIOLOGY:  < from: CT Angio Chest PE Protocol w/ IV Cont (02.15.23 @ 02:17) >    No pulmonary embolism.    Innumerable lung nodules which are nonspecific. In this clinical setting,   differential includes infection (in which case, consider fungus) or   neoplasm/known lymphoma.    Indeterminate low-attenuation lesion in the liver.    Close follow-up and/or comparison with outside imaging is recommended.    < end of copied text >    OTHER: 	  	  LABS:	 	    CARDIAC MARKERS:  Troponin T, High Sensitivity Result: 34 ng/L (02-17 @ 01:21)  Troponin T, High Sensitivity Result: 16 ng/L (02-16 @ 23:51)  Troponin T, High Sensitivity Result: 43 ng/L (02-16 @ 03:01)                                  8.4    10.31 )-----------( 83       ( 17 Feb 2023 01:21 )             26.5     02-17    139  |  110<H>  |  10  ----------------------------<  103<H>  3.8   |  14<L>  |  0.95    Ca    8.3<L>      17 Feb 2023 01:21  Phos  3.2     02-16  Mg     1.2     02-16    TPro  5.1<L>  /  Alb  3.2<L>  /  TBili  0.8  /  DBili  x   /  AST  43<H>  /  ALT  36  /  AlkPhos  64  02-17      proBNP:   Lipid Profile:   HgA1c:   TSH: Thyroid Stimulating Hormone, Serum: 2.19 uIU/mL (02-17 @ 10:00)

## 2023-02-17 NOTE — RAPID RESPONSE TEAM SUMMARY - NSSITUATIONBACKGROUNDRRT_GEN_ALL_CORE
46 yo F with PMH of diffuse large B cell lymphoma, NHL s/p chemo (last treatment 1/23/23), EBV, Left lower lung mass s/p biopsy (8/2022), PE on Eliquis, COVID a/w severe sepsis of unclear etiology on empiric antibiotics. RRT called this evening for hypotension.
47 F PMH non-Hodgkin lymphoma s/p chemoport (?in remission), pe, pulm arterial hypertension, COVID, presenting with fevers with pancytopenia in setting of recent chemo treatment and prednisone concerning for severe sepsis. Multiple RRTs called this admission as well as MICU consult. Today RRT was called for SVT hrt rates note to be 170's.

## 2023-02-17 NOTE — PROGRESS NOTE ADULT - ASSESSMENT
Assessment and Recommendation:   · Assessment	  Assessment and recommendation :  Non hodgkin's lymphoma ? in remission   Fever etiology infection V/S lymphoma  ? pan culture   possible neuroleptic malignant  fever   on Antibiotics Pending culture result   sever pulmonary HTN an Adempas 0.5 mg TID   S/P COVID pneumonia and respiratory failure   S/P Acute renal failure   Bilateral pulmonary nodules , doubt miliary TB ? fungus infection   On voriconazole   Autonomic Dysfunction on Midodrine   H/O PE and DVT on AC  Pancytopenia   non anion gap metabolic acidosis   IV hydration   TB spot test , serum procalcitonin   Sed rate ,CRP , viral screen   Echocardiogram moderate pulmonary HTN   venous doppler of lower Extremities  oncology consultation   GI protection   case discussed with PCP

## 2023-02-17 NOTE — PROGRESS NOTE ADULT - ASSESSMENT
47 f, PE, pulm HTN, diagnosed with NHL 7/2022 and started on R-CHOP, last dose 1/23/23 and had a PET and was told she is in remission, usually has 5 days of prednisone after chemo and after that completed started to have fever and chills whish she has been getting since, no significant cough, abd pain, diarrhea or dysuria  here febrile to 102.5, tachy, no hypoxia  WBC: 2  chest/abd CT: No PE, Innumerable lung nodules which are nonspecific. In this clinical setting, differential includes infection (in which case, consider fungus) or neoplasm/known lymphoma. Indeterminate low-attenuation lesion in the liver.    NHL on chemo until 1/23 and was told she is in remission based on a PET now with fever for over 2 weeks and no focal symptoms, CT with innumerable lung nodules which could be neoplasm or infection including fungal  leukopenia but no profound neutropenia and now resolved  fever, tachycardia, sepsis  had RRT for fever, rigors, tachycardia, hypotension, transferred to MICU, but still on RA, being diuresed   * blood cx, urine cx and MRSA PCR, fungitell negative  * check serum crypt and histo Ag, f/u galactomannan and aspergillus Ab  * sputum cx   * pt was started on vori for possible fungal pneumonia  * blood cx and MRSA PCR negative so discontinue vanco  * zosyn was switched to cefepime, will continue for now  * no definite diagnosis, for now will c/w antifungal and bacterial but pt will need bronc and biopsy when stable      The above assessment and plan was discussed with the primary team    Melanie Stubbs MD  contact on teams  After 5pm and on weekends call [phone]   47 f, PE, pulm HTN, diagnosed with NHL 7/2022 and started on R-CHOP, last dose 1/23/23 and had a PET and was told she is in remission, usually has 5 days of prednisone after chemo and after that completed started to have fever and chills whish she has been getting since, no significant cough, abd pain, diarrhea or dysuria  here febrile to 102.5, tachy, no hypoxia  WBC: 2  chest/abd CT: No PE, Innumerable lung nodules which are nonspecific. In this clinical setting, differential includes infection (in which case, consider fungus) or neoplasm/known lymphoma. Indeterminate low-attenuation lesion in the liver.    NHL on chemo until 1/23 and was told she is in remission based on a PET now with fever for over 2 weeks and no focal symptoms, CT with innumerable lung nodules which could be neoplasm or infection including fungal, I reviewed with radiology and it can be a fungal pneumonia in the right clinical setting  leukopenia but no profound neutropenia and now resolved  fever, tachycardia, sepsis  had RRT for fever, rigors, tachycardia, hypotension, transferred to MICU, but still on RA, being diuresed   * blood cx, urine cx and MRSA PCR, fungitell negative  * check serum crypt and histo Ag, f/u galactomannan and aspergillus Ab  * sputum cx   * pt was started on vori for possible fungal pneumonia  * blood cx and MRSA PCR negative so discontinue vanco  * zosyn was switched to cefepime, will continue for now  * no definite diagnosis, for now will c/w antifungal and bacterial but pt will need bronc and biopsy when stable      The above assessment and plan was discussed with the primary team    Melanie Stubbs MD  contact on teams  After 5pm and on weekends call [phone]

## 2023-02-17 NOTE — CONSULT NOTE ADULT - ASSESSMENT
Echo 2/15/23: Global lv dysfxn EF 42%, septal flattening, RV overload, mod pulm htn    A/P  46 yo f w pmh nhl s/p chemo (?in remission), pe, ?pah, covid, p/w fevers for the last few days, a/w generalized weakness, found to be in severe sepsis and w/ episode of SVT to 170s.    #SVT  -Pt with known hx of SVT  -Has been evaluated in past, was not candidate for ablation d/t chemo  -Continue digoxin  -Hypotension precludes bb use  -Can use IV amiodarone prn  -If persists, start amio gtt and oral load  -If amio initiated, trend LFTs    #CHF  -Echo with global lv dysfxn  -Likely d/t chemotherapy  -No evidence of fluid overload on exam    #Sepsis  -Lung nodules noted on CT, c/f fungal infection  -W/u & abx per micu, ID    #Hypotension  -I/S/O sepsis and tachycardia  -Midodrine, pressors per micu    #Leukopenia  -Hx NHL  -Heme/onc following

## 2023-02-17 NOTE — CHART NOTE - NSCHARTNOTEFT_GEN_A_CORE
MICU ACCEPT NOTE    CHIEF COMPLAINT: Patient is a 47y old  Female who presents with a chief complaint of fever (16 Feb 2023 21:09)    == template placeholder draft - patient to be seen - plan not finalized till attested by attending ==   HPI:  48 yo f w pmh nhl s/p chemo (?in remission), pe, ?pah, covid, p/w fevers for the last few days, a/w generalized weakness over the last couple of weeks along with poor po intake. denies chest pain, palpitations, lightheadedness, cough, wheeze, abdominal discomfort. patient does endorse n+v, dysuria, arriaza. of note, last session of chemo reportedly on 1/23/2023. Pt grew concerned so went to oncologist yesterday where she got cultures, blood work, and xray. as she did not feel improved, patient presents to [facility] er for further evaluation.  (15 Feb 2023 01:39)    47F PMH DLBCL (follows at [facility], [place], EBV+, Bcl2+, s/p diagnostic bx of LLL mass, off clinical trial; s/p C2, Rituxan, Polatuzumab and Cytoxan; last dose 1/23/23; Neulasta), COVID-19 and PE with heart strain Aug?Oct? 2022, p/w 2 weeks intermittent fever and intermittent vomiting, as well as cough, dysuria; found to have fever and pancytopenia possibly 2/2 chemo and cancer; admitted to medicine; RRT 2/17 for SVT to 170s, fever to 102.9F, lactate ~7, concerning for severe sepsis; admitted to MICU.      PAST MEDICAL & SURGICAL HISTORY:  Non-Hodgkin&#x27;s lymphoma      Pulmonary embolism      History of appendectomy      History of cholecystectomy          FAMILY HISTORY:  **update      SOCIAL HISTORY:  **update  Smoking:   Substance Use:   EtOH Use:   Advance Directives:     MEDICATIONS  (STANDING):  apixaban 5 milliGRAM(s) Oral every 12 hours  chlorhexidine 2% Cloths 1 Application(s) Topical <User Schedule>  digoxin     Tablet 125 MICROGram(s) Oral daily  filgrastim-sndz (ZARXIO) Injectable 480 MICROGram(s) SubCutaneous daily  midodrine. 10 milliGRAM(s) Oral three times a day  piperacillin/tazobactam IVPB.. 3.375 Gram(s) IV Intermittent every 8 hours  sodium bicarbonate  Infusion 0.086 mEq/kG/Hr (75 mL/Hr) IV Continuous <Continuous>  vancomycin  IVPB 1000 milliGRAM(s) IV Intermittent every 12 hours    MEDICATIONS  (PRN):  acetaminophen     Tablet .. 650 milliGRAM(s) Oral every 6 hours PRN Temp greater or equal to 38C (100.4F), Mild Pain (1 - 3)  aluminum hydroxide/magnesium hydroxide/simethicone Suspension 30 milliLiter(s) Oral every 4 hours PRN Dyspepsia  melatonin 3 milliGRAM(s) Oral at bedtime PRN Insomnia  ondansetron Injectable 4 milliGRAM(s) IV Push every 8 hours PRN Nausea and/or Vomiting    Allergies **update  No Known Allergies? Allergy to IV contrast?      REVIEW OF SYSTEMS: **update  **update  Constitutional: No fevers, no chills  Eyes/Ears: No vision or hearing changes  Nose: No nasal congestion, no rhinorrhea  Throat: No sore throat, no dysphagia  CV: No chest pain, no palpitations  Resp: No cough, no dyspnea, no wheezing, no hemoptysis  GI: No nausea, no vomiting, no diarrhea, no constipation  : No dysuria, no hematuria  Musculoskeletal: No myalgia, no arthralgia  Skin: No rashes, no itching  Neurological: No numbness, no tingling  Psychiatric: No anxiety, no depression  Endocrine: No diabetes, no thyroid problem  Hematologic/Lymphatic: No epistaxis, no bleeding gums     OBJECTIVE:  ICU Vital Signs Last 24 Hrs  T(C): 36.6 (17 Feb 2023 02:40), Max: 39.6 (17 Feb 2023 01:36)  T(F): 97.9 (17 Feb 2023 02:40), Max: 103.2 (17 Feb 2023 01:36)  HR: 135 (17 Feb 2023 02:40) (104 - 167)  BP: 91/59 (17 Feb 2023 02:40) (74/52 - 97/66)  RR: 18 (17 Feb 2023 02:40) (17 - 20)  SpO2: 94% (17 Feb 2023 02:40) (94% - 98%)    O2 Parameters below as of 17 Feb 2023 02:40  Patient On (Oxygen Delivery Method): room air      02-15 @ 07:01 - 02-16 @ 07:00  --------------------------------------------------------  IN: 1325 mL / OUT: 350 mL / NET: 975 mL    02-16 @ 07:01 - 02-17 @ 02:55  --------------------------------------------------------  IN: 950 mL / OUT: 0 mL / NET: 950 mL      CAPILLARY BLOOD GLUCOSE      POCT Blood Glucose.: 97 mg/dL (16 Feb 2023 23:22)      PHYSICAL EXAM:**update  General: Reclining in bed in no acute distress.  HEENT: Normocephalic, atraumatic. Extraocular movements grossly intact. No nasal discharge.  Neuro: Alert and oriented. No facial asymmetry or dysarthria. Moving all extremities.  CV: Regular rate and rhythm. S1/S2. No murmurs appreciated. No distal edema.  Respiratory: Lung fields clear bilaterally. No crackles or wheezes appreciated.  Abdomen: Soft; nontender to palpation, all quadrants; nondistended. No guarding.  : Suprapubic region nontender.  Skin: No rashes or bruising of face, forearms, or calves bilaterally.  Psych: Mood and affect appropriate.     LABS:                        8.4    10.31 )-----------( 83       ( 17 Feb 2023 01:21 )             26.5     Hgb Trend: 8.4<--, 6.5<--, 7.7<--, 8.0<--, 7.6<--  02-17    139  |  110<H>  |  10  ----------------------------<  103<H>  3.8   |  14<L>  |  0.95    Ca    8.3<L>      17 Feb 2023 01:21  Phos  3.2     02-16  Mg     1.2     02-16    TPro  5.1<L>  /  Alb  3.2<L>  /  TBili  0.8  /  DBili  x   /  AST  43<H>  /  ALT  36  /  AlkPhos  64  02-17    Creatinine Trend: 0.95<--, 0.39<--, 0.83<--, 0.78<--, 0.78<--, 0.80<--      Arterial Blood Gas:  02-16 @ 04:05  7.39/23/146/14/99.7/-9.8  ABG lactate: --    Venous Blood Gas:  02-17 @ 00:25  7.25/32/26/14/28.3  VBG Lactate: 6.7  Venous Blood Gas:  02-16 @ 23:26  7.02/33/15/8/17.9  VBG Lactate: 7.0      MICROBIOLOGY: UCx and BCx*2 2/14 NGTD    RADIOLOGY & ADDITIONAL TESTS:   < from: CT Angio Chest PE Protocol w/ IV Cont (02.15.23 @ 02:17) >      IMPRESSION:    No pulmonary embolism.    Innumerable lung nodules which are nonspecific. In this clinical setting,   differential includes infection (in which case, consider fungus) or   neoplasm/known lymphoma.    Indeterminate low-attenuation lesion in the liver.    Close follow-up and/or comparison with outside imaging is recommended.      < end of copied text >    < from: Xray Chest 2 Views PA/Lat (02.14.23 @ 21:49) >    FINDINGS:  Right IJ approach medication port with its tip in the SVC.  The heart is normal in size.  The lungs are clear.  There is no pneumothorax or pleural effusion.  No acute bony abnormality.    IMPRESSION:  Clear lungs.      < end of copied text >          ASSESSMENT    **update    PLAN  **update  =====Neurologic=====  Patient is AOx4    =====Pulmonary=====  Patient breathing comfortably on room air    =====Cardiovascular=====  Patient does not currently require vasopressors and is normotensive    =====GI=====  #GERD  - Protonix 40mg IV QD    #Diet  - Full diet    =====Renal/=====  #Electrolytes  - Maintain K>4, Phos>3, Mag>2, iCal>1    =====Endocrine=====  #DM2  - While NPO, FSBG and ASCENCION q6h    =====Infectious Disease=====  > voriconazole    =====Heme/Onc=====  #DVT Ppx  > Lovenox 60 bid  > Doppler BLE    =====Ethics=====  FULL CODE MICU ACCEPT NOTE    CHIEF COMPLAINT: Patient is a 47y old  Female who presents with a chief complaint of fever (16 Feb 2023 21:09)    == template placeholder draft - patient to be seen - plan not finalized till attested by attending ==   HPI:  48 yo f w pmh nhl s/p chemo (?in remission), pe, ?pah, covid, p/w fevers for the last few days, a/w generalized weakness over the last couple of weeks along with poor po intake. denies chest pain, palpitations, lightheadedness, cough, wheeze, abdominal discomfort. patient does endorse n+v, dysuria, arriaza. of note, last session of chemo reportedly on 1/23/2023. Pt grew concerned so went to oncologist yesterday where she got cultures, blood work, and xray. as she did not feel improved, patient presents to [facility] er for further evaluation.  (15 Feb 2023 01:39)    47F PMH DLBCL (follows at [facility], EBV+, Bcl2+, s/p diagnostic bx of LLL mass, off clinical trial; s/p C2, Rituxan, Polatuzumab and Cytoxan; last dose 1/23/23; Neulasta), COVID-19 and PE with heart strain Aug?Oct? 2022, pulmonary HTN, p/w 2 weeks intermittent fever and intermittent vomiting, as well as cough, dysuria; found to have fever and pancytopenia possibly 2/2 chemo and cancer; admitted to medicine; RRT 2/17 for SVT to 170s, fever to 102.9F, lactate ~7, concerning for severe sepsis; admitted to MICU.      PAST MEDICAL & SURGICAL HISTORY:  Non-Hodgkin&#x27;s lymphoma      Pulmonary embolism      History of appendectomy      History of cholecystectomy          FAMILY HISTORY:  **update      SOCIAL HISTORY:  **update  Smoking:   Substance Use:   EtOH Use:   Advance Directives:     MEDICATIONS  (STANDING):  apixaban 5 milliGRAM(s) Oral every 12 hours  chlorhexidine 2% Cloths 1 Application(s) Topical <User Schedule>  digoxin     Tablet 125 MICROGram(s) Oral daily  filgrastim-sndz (ZARXIO) Injectable 480 MICROGram(s) SubCutaneous daily  midodrine. 10 milliGRAM(s) Oral three times a day  piperacillin/tazobactam IVPB.. 3.375 Gram(s) IV Intermittent every 8 hours  sodium bicarbonate  Infusion 0.086 mEq/kG/Hr (75 mL/Hr) IV Continuous <Continuous>  vancomycin  IVPB 1000 milliGRAM(s) IV Intermittent every 12 hours    MEDICATIONS  (PRN):  acetaminophen     Tablet .. 650 milliGRAM(s) Oral every 6 hours PRN Temp greater or equal to 38C (100.4F), Mild Pain (1 - 3)  aluminum hydroxide/magnesium hydroxide/simethicone Suspension 30 milliLiter(s) Oral every 4 hours PRN Dyspepsia  melatonin 3 milliGRAM(s) Oral at bedtime PRN Insomnia  ondansetron Injectable 4 milliGRAM(s) IV Push every 8 hours PRN Nausea and/or Vomiting    Allergies **update  No Known Allergies? Allergy to IV contrast?      REVIEW OF SYSTEMS: **update  **update  Constitutional: No fevers, no chills  Eyes/Ears: No vision or hearing changes  Nose: No nasal congestion, no rhinorrhea  Throat: No sore throat, no dysphagia  CV: No chest pain, no palpitations  Resp: No cough, no dyspnea, no wheezing, no hemoptysis  GI: No nausea, no vomiting, no diarrhea, no constipation  : No dysuria, no hematuria  Musculoskeletal: No myalgia, no arthralgia  Skin: No rashes, no itching  Neurological: No numbness, no tingling  Psychiatric: No anxiety, no depression  Endocrine: No diabetes, no thyroid problem  Hematologic/Lymphatic: No epistaxis, no bleeding gums     OBJECTIVE:  ICU Vital Signs Last 24 Hrs  T(C): 36.6 (17 Feb 2023 02:40), Max: 39.6 (17 Feb 2023 01:36)  T(F): 97.9 (17 Feb 2023 02:40), Max: 103.2 (17 Feb 2023 01:36)  HR: 135 (17 Feb 2023 02:40) (104 - 167)  BP: 91/59 (17 Feb 2023 02:40) (74/52 - 97/66)  RR: 18 (17 Feb 2023 02:40) (17 - 20)  SpO2: 94% (17 Feb 2023 02:40) (94% - 98%)    O2 Parameters below as of 17 Feb 2023 02:40  Patient On (Oxygen Delivery Method): room air      02-15 @ 07:01 - 02-16 @ 07:00  --------------------------------------------------------  IN: 1325 mL / OUT: 350 mL / NET: 975 mL    02-16 @ 07:01 - 02-17 @ 02:55  --------------------------------------------------------  IN: 950 mL / OUT: 0 mL / NET: 950 mL      CAPILLARY BLOOD GLUCOSE      POCT Blood Glucose.: 97 mg/dL (16 Feb 2023 23:22)      PHYSICAL EXAM:**update  General: Reclining in bed in no acute distress.  HEENT: Normocephalic, atraumatic. Extraocular movements grossly intact. No nasal discharge.  Neuro: Alert and oriented. No facial asymmetry or dysarthria. Moving all extremities.  CV: Regular rate and rhythm. S1/S2. No murmurs appreciated. No distal edema.  Respiratory: Lung fields clear bilaterally. No crackles or wheezes appreciated.  Abdomen: Soft; nontender to palpation, all quadrants; nondistended. No guarding.  : Suprapubic region nontender.  Skin: No rashes or bruising of face, forearms, or calves bilaterally.  Psych: Mood and affect appropriate.   POCUS: b/l consolidations in lung bases R>L, small-moderate R PLEF, RV dilated, IVC plump 2 cm    LABS:                        8.4    10.31 )-----------( 83       ( 17 Feb 2023 01:21 )             26.5     Hgb Trend: 8.4<--, 6.5<--, 7.7<--, 8.0<--, 7.6<--  02-17    139  |  110<H>  |  10  ----------------------------<  103<H>  3.8   |  14<L>  |  0.95    Ca    8.3<L>      17 Feb 2023 01:21  Phos  3.2     02-16  Mg     1.2     02-16    TPro  5.1<L>  /  Alb  3.2<L>  /  TBili  0.8  /  DBili  x   /  AST  43<H>  /  ALT  36  /  AlkPhos  64  02-17    Creatinine Trend: 0.95<--, 0.39<--, 0.83<--, 0.78<--, 0.78<--, 0.80<--      Arterial Blood Gas:  02-16 @ 04:05  7.39/23/146/14/99.7/-9.8  ABG lactate: --    Venous Blood Gas:  02-17 @ 00:25  7.25/32/26/14/28.3  VBG Lactate: 6.7  Venous Blood Gas:  02-16 @ 23:26  7.02/33/15/8/17.9  VBG Lactate: 7.0      MICROBIOLOGY: UCx and BCx*2 2/14 NGTD    RADIOLOGY & ADDITIONAL TESTS:     Personally reviewed CTA PE, CXR, EKG    < from: CT Angio Chest PE Protocol w/ IV Cont (02.15.23 @ 02:17) >      IMPRESSION:    No pulmonary embolism.    Innumerable lung nodules which are nonspecific. In this clinical setting,   differential includes infection (in which case, consider fungus) or   neoplasm/known lymphoma.    Indeterminate low-attenuation lesion in the liver.    Close follow-up and/or comparison with outside imaging is recommended.      < end of copied text >    < from: Xray Chest 2 Views PA/Lat (02.14.23 @ 21:49) >    FINDINGS:  Right IJ approach medication port with its tip in the SVC.  The heart is normal in size.  The lungs are clear.  There is no pneumothorax or pleural effusion.  No acute bony abnormality.    IMPRESSION:  Clear lungs.      < end of copied text >      ASSESSMENT    **update    PLAN  **update  =====Neurologic=====  Patient is AOx4    =====Pulmonary=====  Patient breathing comfortably on room air    =====Cardiovascular=====  # RV failure  > caution with fluids    # SVT  > digoxin level    =====GI=====  #GERD  > Protonix 40mg IV QD    #Diet  - Full diet    =====Renal/=====  #Electrolytes  - Maintain K>4, Phos>3, Mag>2, iCal>1    =====Endocrine=====  #DM2  - While NPO, FSBG and ASCENCION q6h    =====Infectious Disease=====  # severe sepsis  - iso immunocompromise, hx and imaging c/f infection (e.g. fungal) vs progression of known cancer  > switched Zosyn to cefepime  > vanc  > vanc level  > started voriconazole 200mg po bid  > appreciate ID recs re: voriconazole, possible acyclovir PPX  > supportive care: Tylenol, cooling measures  > BCx, UA, UCx  > Aspergillus galactomanna Ag, Aspergillus Ab    =====Heme/Onc=====  # DLBCL  > appreciate heme-onc recs    # Hb goal >7    #DVT PPX  > Lovenox 60 bid - if Doppler neg, switch to Lovenox 40 qD for DVT PPX  > Doppler BLE    =====Ethics=====  FULL CODE MICU ACCEPT NOTE    CHIEF COMPLAINT: Patient is a 47y old  Female who presents with a chief complaint of fever (16 Feb 2023 21:09)    == template placeholder draft - patient to be seen - plan not finalized till attested by attending ==   HPI:  48 yo f w pmh nhl s/p chemo (?in remission), pe, ?pah, covid, p/w fevers for the last few days, a/w generalized weakness over the last couple of weeks along with poor po intake. denies chest pain, palpitations, lightheadedness, cough, wheeze, abdominal discomfort. patient does endorse n+v, dysuria, arriaza. of note, last session of chemo reportedly on 1/23/2023. Pt grew concerned so went to oncologist yesterday where she got cultures, blood work, and xray. as she did not feel improved, patient presents to [facility] er for further evaluation.  (15 Feb 2023 01:39)    47F PMH DLBCL (follows at [facility], EBV+, Bcl2+, s/p diagnostic bx of LLL mass, off clinical trial; s/p C2, Rituxan, Polatuzumab and Cytoxan; last dose 1/23/23; Neulasta), COVID-19 and PE with heart strain Aug?Oct? 2022, pulmonary HTN, p/w 2 weeks intermittent fever and intermittent vomiting, as well as cough, dysuria; found to have fever and pancytopenia possibly 2/2 chemo and cancer; admitted to medicine; RRT 2/17 for SVT to 170s, fever to 102.9F, lactate ~7, concerning for severe sepsis; admitted to MICU.      PAST MEDICAL & SURGICAL HISTORY:  Non-Hodgkin&#x27;s lymphoma      Pulmonary embolism      History of appendectomy      History of cholecystectomy          FAMILY HISTORY:  **update      SOCIAL HISTORY:  **update  Smoking:   Substance Use:   EtOH Use:   Advance Directives:     MEDICATIONS  (STANDING):  apixaban 5 milliGRAM(s) Oral every 12 hours  chlorhexidine 2% Cloths 1 Application(s) Topical <User Schedule>  digoxin     Tablet 125 MICROGram(s) Oral daily  filgrastim-sndz (ZARXIO) Injectable 480 MICROGram(s) SubCutaneous daily  midodrine. 10 milliGRAM(s) Oral three times a day  piperacillin/tazobactam IVPB.. 3.375 Gram(s) IV Intermittent every 8 hours  sodium bicarbonate  Infusion 0.086 mEq/kG/Hr (75 mL/Hr) IV Continuous <Continuous>  vancomycin  IVPB 1000 milliGRAM(s) IV Intermittent every 12 hours    MEDICATIONS  (PRN):  acetaminophen     Tablet .. 650 milliGRAM(s) Oral every 6 hours PRN Temp greater or equal to 38C (100.4F), Mild Pain (1 - 3)  aluminum hydroxide/magnesium hydroxide/simethicone Suspension 30 milliLiter(s) Oral every 4 hours PRN Dyspepsia  melatonin 3 milliGRAM(s) Oral at bedtime PRN Insomnia  ondansetron Injectable 4 milliGRAM(s) IV Push every 8 hours PRN Nausea and/or Vomiting    Allergies **update  No Known Allergies? Allergy to IV contrast?      REVIEW OF SYSTEMS: **update  **update  Constitutional: No fevers, no chills  Eyes/Ears: No vision or hearing changes  Nose: No nasal congestion, no rhinorrhea  Throat: No sore throat, no dysphagia  CV: No chest pain, no palpitations  Resp: No cough, no dyspnea, no wheezing, no hemoptysis  GI: No nausea, no vomiting, no diarrhea, no constipation  : No dysuria, no hematuria  Musculoskeletal: No myalgia, no arthralgia  Skin: No rashes, no itching  Neurological: No numbness, no tingling  Psychiatric: No anxiety, no depression  Endocrine: No diabetes, no thyroid problem  Hematologic/Lymphatic: No epistaxis, no bleeding gums     OBJECTIVE:  ICU Vital Signs Last 24 Hrs  T(C): 36.6 (17 Feb 2023 02:40), Max: 39.6 (17 Feb 2023 01:36)  T(F): 97.9 (17 Feb 2023 02:40), Max: 103.2 (17 Feb 2023 01:36)  HR: 135 (17 Feb 2023 02:40) (104 - 167)  BP: 91/59 (17 Feb 2023 02:40) (74/52 - 97/66)  RR: 18 (17 Feb 2023 02:40) (17 - 20)  SpO2: 94% (17 Feb 2023 02:40) (94% - 98%)    O2 Parameters below as of 17 Feb 2023 02:40  Patient On (Oxygen Delivery Method): room air      02-15 @ 07:01 - 02-16 @ 07:00  --------------------------------------------------------  IN: 1325 mL / OUT: 350 mL / NET: 975 mL    02-16 @ 07:01 - 02-17 @ 02:55  --------------------------------------------------------  IN: 950 mL / OUT: 0 mL / NET: 950 mL      CAPILLARY BLOOD GLUCOSE      POCT Blood Glucose.: 97 mg/dL (16 Feb 2023 23:22)      PHYSICAL EXAM:**update  General: Reclining in bed in no acute distress.  HEENT: Normocephalic, atraumatic. Extraocular movements grossly intact. No nasal discharge.  Neuro: Alert and oriented. No facial asymmetry or dysarthria. Moving all extremities.  CV: Regular rate and rhythm. S1/S2. No murmurs appreciated. No distal edema.  Respiratory: Lung fields clear bilaterally. No crackles or wheezes appreciated.  Abdomen: Soft; nontender to palpation, all quadrants; nondistended. No guarding.  : Suprapubic region nontender.  Skin: No rashes or bruising of face, forearms, or calves bilaterally.  Psych: Mood and affect appropriate.   POCUS: b/l consolidations in lung bases R>L, small-moderate R PLEF, RV dilated, IVC plump 2 cm    LABS:                        8.4    10.31 )-----------( 83       ( 17 Feb 2023 01:21 )             26.5     Hgb Trend: 8.4<--, 6.5<--, 7.7<--, 8.0<--, 7.6<--  02-17    139  |  110<H>  |  10  ----------------------------<  103<H>  3.8   |  14<L>  |  0.95    Ca    8.3<L>      17 Feb 2023 01:21  Phos  3.2     02-16  Mg     1.2     02-16    TPro  5.1<L>  /  Alb  3.2<L>  /  TBili  0.8  /  DBili  x   /  AST  43<H>  /  ALT  36  /  AlkPhos  64  02-17    Creatinine Trend: 0.95<--, 0.39<--, 0.83<--, 0.78<--, 0.78<--, 0.80<--      Arterial Blood Gas:  02-16 @ 04:05  7.39/23/146/14/99.7/-9.8  ABG lactate: --    Venous Blood Gas:  02-17 @ 00:25  7.25/32/26/14/28.3  VBG Lactate: 6.7  Venous Blood Gas:  02-16 @ 23:26  7.02/33/15/8/17.9  VBG Lactate: 7.0      MICROBIOLOGY: UCx and BCx*2 2/14 NGTD    RADIOLOGY & ADDITIONAL TESTS:     Personally reviewed CTA PE, CXR, EKG    < from: CT Angio Chest PE Protocol w/ IV Cont (02.15.23 @ 02:17) >      IMPRESSION:    No pulmonary embolism.    Innumerable lung nodules which are nonspecific. In this clinical setting,   differential includes infection (in which case, consider fungus) or   neoplasm/known lymphoma.    Indeterminate low-attenuation lesion in the liver.    Close follow-up and/or comparison with outside imaging is recommended.      < end of copied text >    < from: Xray Chest 2 Views PA/Lat (02.14.23 @ 21:49) >    FINDINGS:  Right IJ approach medication port with its tip in the SVC.  The heart is normal in size.  The lungs are clear.  There is no pneumothorax or pleural effusion.  No acute bony abnormality.    IMPRESSION:  Clear lungs.      < end of copied text >      ASSESSMENT    **update    PLAN  **update  =====Neurologic=====  Patient is AOx4    =====Pulmonary=====    # DLBCL with pulmonary nodules  # workup for pna  # pulmonary HTN      =====Cardiovascular=====  # RV failure  > caution with fluids    # SVT  > digoxin level    =====GI=====  #GERD  > Protonix 40mg IV QD    #Diet  - Full diet    =====Renal/=====  #Electrolytes  - Maintain K>4, Phos>3, Mag>2    > appreciate renal recs    =====Endocrine=====  #DM2  - While NPO, FSBG and ASCENCION q6h    =====Infectious Disease=====  # severe sepsis  - iso immunocompromise, hx and imaging c/f infection (e.g. fungal) vs progression of known cancer  > switched Zosyn to cefepime  > vanc  > vanc level  > started voriconazole 200mg po bid  > appreciate ID recs re: voriconazole, possible acyclovir PPX  > supportive care: Tylenol, cooling measures  > BCx, UA, UCx  > Aspergillus galactomanna Ag, Aspergillus Ab    =====Heme/Onc=====  # DLBCL  > appreciate heme-onc recs    # Hb goal >7    #DVT PPX  > Lovenox 60 bid - if Doppler neg, switch to Lovenox 40 qD for DVT PPX  > Doppler BLE    =====Ethics=====  FULL CODE MICU ACCEPT NOTE    CHIEF COMPLAINT: Patient is a 47y old  Female who presents with a chief complaint of fever (16 Feb 2023 21:09)    == template placeholder draft - patient to be seen - plan not finalized till attested by attending ==   HPI:  46 yo f w pmh nhl s/p chemo (?in remission), pe, ?pah, covid, p/w fevers for the last few days, a/w generalized weakness over the last couple of weeks along with poor po intake. denies chest pain, palpitations, lightheadedness, cough, wheeze, abdominal discomfort. patient does endorse n+v, dysuria, arriaza. of note, last session of chemo reportedly on 1/23/2023. Pt grew concerned so went to oncologist yesterday where she got cultures, blood work, and xray. as she did not feel improved, patient presents to [facility] er for further evaluation.  (15 Feb 2023 01:39)    47F PMH DLBCL (follows at [facility], EBV+, Bcl2+, s/p diagnostic bx of LLL mass, off clinical trial; s/p C2, Rituxan, Polatuzumab and Cytoxan; last dose 1/23/23; Neulasta), COVID-19 and PE with heart strain Aug?Oct? 2022, pulmonary HTN, p/w 2 weeks intermittent fever and intermittent vomiting, as well as cough, dysuria; found to have fever and pancytopenia possibly 2/2 chemo and cancer; admitted to medicine; RRT 2/17 for SVT to 170s, fever to 102.9F, lactate ~7, concerning for severe sepsis; admitted to MICU.      PAST MEDICAL & SURGICAL HISTORY:  Non-Hodgkin&#x27;s lymphoma      Pulmonary embolism      History of appendectomy      History of cholecystectomy          FAMILY HISTORY:  **update      SOCIAL HISTORY:  **update  Smoking:   Substance Use:   EtOH Use:   Advance Directives:     MEDICATIONS  (STANDING):  apixaban 5 milliGRAM(s) Oral every 12 hours  chlorhexidine 2% Cloths 1 Application(s) Topical <User Schedule>  digoxin     Tablet 125 MICROGram(s) Oral daily  filgrastim-sndz (ZARXIO) Injectable 480 MICROGram(s) SubCutaneous daily  midodrine. 10 milliGRAM(s) Oral three times a day  piperacillin/tazobactam IVPB.. 3.375 Gram(s) IV Intermittent every 8 hours  sodium bicarbonate  Infusion 0.086 mEq/kG/Hr (75 mL/Hr) IV Continuous <Continuous>  vancomycin  IVPB 1000 milliGRAM(s) IV Intermittent every 12 hours    MEDICATIONS  (PRN):  acetaminophen     Tablet .. 650 milliGRAM(s) Oral every 6 hours PRN Temp greater or equal to 38C (100.4F), Mild Pain (1 - 3)  aluminum hydroxide/magnesium hydroxide/simethicone Suspension 30 milliLiter(s) Oral every 4 hours PRN Dyspepsia  melatonin 3 milliGRAM(s) Oral at bedtime PRN Insomnia  ondansetron Injectable 4 milliGRAM(s) IV Push every 8 hours PRN Nausea and/or Vomiting    Allergies **update  No Known Allergies? Allergy to IV contrast?      REVIEW OF SYSTEMS: **update  **update  Constitutional: No fevers, no chills  Eyes/Ears: No vision or hearing changes  Nose: No nasal congestion, no rhinorrhea  Throat: No sore throat, no dysphagia  CV: No chest pain, no palpitations  Resp: No cough, no dyspnea, no wheezing, no hemoptysis  GI: No nausea, no vomiting, no diarrhea, no constipation  : No dysuria, no hematuria  Musculoskeletal: No myalgia, no arthralgia  Skin: No rashes, no itching  Neurological: No numbness, no tingling  Psychiatric: No anxiety, no depression  Endocrine: No diabetes, no thyroid problem  Hematologic/Lymphatic: No epistaxis, no bleeding gums     OBJECTIVE:  ICU Vital Signs Last 24 Hrs  T(C): 36.6 (17 Feb 2023 02:40), Max: 39.6 (17 Feb 2023 01:36)  T(F): 97.9 (17 Feb 2023 02:40), Max: 103.2 (17 Feb 2023 01:36)  HR: 135 (17 Feb 2023 02:40) (104 - 167)  BP: 91/59 (17 Feb 2023 02:40) (74/52 - 97/66)  RR: 18 (17 Feb 2023 02:40) (17 - 20)  SpO2: 94% (17 Feb 2023 02:40) (94% - 98%)    O2 Parameters below as of 17 Feb 2023 02:40  Patient On (Oxygen Delivery Method): room air      02-15 @ 07:01 - 02-16 @ 07:00  --------------------------------------------------------  IN: 1325 mL / OUT: 350 mL / NET: 975 mL    02-16 @ 07:01 - 02-17 @ 02:55  --------------------------------------------------------  IN: 950 mL / OUT: 0 mL / NET: 950 mL      CAPILLARY BLOOD GLUCOSE      POCT Blood Glucose.: 97 mg/dL (16 Feb 2023 23:22)      PHYSICAL EXAM:**update  General: Reclining in bed in no acute distress.  HEENT: Normocephalic, atraumatic. Extraocular movements grossly intact. No nasal discharge.  Neuro: Alert and oriented. No facial asymmetry or dysarthria. Moving all extremities.  CV: Regular rate and rhythm. S1/S2. No murmurs appreciated. No distal edema.  Respiratory: Lung fields clear bilaterally. No crackles or wheezes appreciated.  Abdomen: Soft; nontender to palpation, all quadrants; nondistended. No guarding.  : Suprapubic region nontender.  Skin: No rashes or bruising of face, forearms, or calves bilaterally.  Psych: Mood and affect appropriate.   POCUS: b/l consolidations in lung bases R>L, small-moderate R PLEF, RV dilated, IVC plump 2 cm    LABS:                        8.4    10.31 )-----------( 83       ( 17 Feb 2023 01:21 )             26.5     Hgb Trend: 8.4<--, 6.5<--, 7.7<--, 8.0<--, 7.6<--  02-17    139  |  110<H>  |  10  ----------------------------<  103<H>  3.8   |  14<L>  |  0.95    Ca    8.3<L>      17 Feb 2023 01:21  Phos  3.2     02-16  Mg     1.2     02-16    TPro  5.1<L>  /  Alb  3.2<L>  /  TBili  0.8  /  DBili  x   /  AST  43<H>  /  ALT  36  /  AlkPhos  64  02-17    Creatinine Trend: 0.95<--, 0.39<--, 0.83<--, 0.78<--, 0.78<--, 0.80<--      Arterial Blood Gas:  02-16 @ 04:05  7.39/23/146/14/99.7/-9.8  ABG lactate: --    Venous Blood Gas:  02-17 @ 00:25  7.25/32/26/14/28.3  VBG Lactate: 6.7  Venous Blood Gas:  02-16 @ 23:26  7.02/33/15/8/17.9  VBG Lactate: 7.0      MICROBIOLOGY: UCx and BCx*2 2/14 NGTD    RADIOLOGY & ADDITIONAL TESTS:     Personally reviewed CTA PE, CXR, EKG    < from: CT Angio Chest PE Protocol w/ IV Cont (02.15.23 @ 02:17) >      IMPRESSION:    No pulmonary embolism.    Innumerable lung nodules which are nonspecific. In this clinical setting,   differential includes infection (in which case, consider fungus) or   neoplasm/known lymphoma.    Indeterminate low-attenuation lesion in the liver.    Close follow-up and/or comparison with outside imaging is recommended.      < end of copied text >    < from: Xray Chest 2 Views PA/Lat (02.14.23 @ 21:49) >    FINDINGS:  Right IJ approach medication port with its tip in the SVC.  The heart is normal in size.  The lungs are clear.  There is no pneumothorax or pleural effusion.  No acute bony abnormality.    IMPRESSION:  Clear lungs.      < end of copied text >      ASSESSMENT    **update    PLAN  **update  =====Neurologic=====  - No active issues, AOx4    =====Pulmonary=====  # DLBCL with pulmonary nodules  - as in heme-onc section below    # workup for pna  - as in ID section below    # pulmonary HTN  > appreciate pulm recs    =====Cardiovascular=====  # RV failure  > caution with fluids    # SVT  > digoxin 125 mcg po qD  > digoxin level    # hypotension  - possibly multifactorial, including from sepsis  > midodrine 10 mg po tid    =====GI=====  #Diet - regular    =====Renal/=====  - PMH STARR while on antibiotics  > appreciate renal recs    =====Endocrine=====  - No active issues.     =====Infectious Disease=====  # severe sepsis  - iso immunocompromise, hx and imaging c/f infection (e.g. fungal pna) vs progression of known cancer  > switched Zosyn to cefepime  > vanc  > vanc level  > started voriconazole 200mg po bid  > appreciate ID recs re: voriconazole, possible acyclovir PPX  > supportive care: Tylenol, cooling measures  > BCx, UA, UCx  > Aspergillus galactomanna Ag, Aspergillus Ab  > appreciate ID recs    =====Heme/Onc=====  # DLBCL  > appreciate heme-onc recs    # Hb goal >7    #DVT PPX  > Lovenox 60 bid - if Doppler neg, switch to Lovenox 40 qD for DVT PPX  > Doppler BLE    =====Ethics=====  FULL CODE MICU ACCEPT NOTE    CHIEF COMPLAINT: Patient is a 47y old  Female who presents with a chief complaint of fever (16 Feb 2023 21:09)    == template placeholder draft - patient to be seen - plan not finalized till attested by attending ==   HPI:  46 yo f w pmh nhl s/p chemo (?in remission), pe, ?pah, covid, p/w fevers for the last few days, a/w generalized weakness over the last couple of weeks along with poor po intake. denies chest pain, palpitations, lightheadedness, cough, wheeze, abdominal discomfort. patient does endorse n+v, dysuria, arriaza. of note, last session of chemo reportedly on 1/23/2023. Pt grew concerned so went to oncologist yesterday where she got cultures, blood work, and xray. as she did not feel improved, patient presents to [facility] er for further evaluation.  (15 Feb 2023 01:39)    47F PMH DLBCL (follows at [facility], EBV+, Bcl2+, s/p diagnostic bx of LLL mass, off clinical trial; s/p C2, rituximab, polatuzumab, cyclophosphamide, last dose 1/23/23; Neulasta), COVID-19 and PE with heart strain Aug?Oct? 2022, pulmonary HTN, p/w 2 weeks intermittent fever and intermittent vomiting, as well as cough, dysuria; found to have fever and pancytopenia possibly 2/2 chemo and cancer; admitted to medicine; RRT 2/17 for SVT to 170s, fever to 102.9F, lactate ~7, concerning for severe sepsis; admitted to MICU.      PAST MEDICAL & SURGICAL HISTORY:  Non-Hodgkin&#x27;s lymphoma      Pulmonary embolism      History of appendectomy      History of cholecystectomy          FAMILY HISTORY:  **update      SOCIAL HISTORY:  **update  Smoking:   Substance Use:   EtOH Use:   Advance Directives:     MEDICATIONS  (STANDING):  apixaban 5 milliGRAM(s) Oral every 12 hours  chlorhexidine 2% Cloths 1 Application(s) Topical <User Schedule>  digoxin     Tablet 125 MICROGram(s) Oral daily  filgrastim-sndz (ZARXIO) Injectable 480 MICROGram(s) SubCutaneous daily  midodrine. 10 milliGRAM(s) Oral three times a day  piperacillin/tazobactam IVPB.. 3.375 Gram(s) IV Intermittent every 8 hours  sodium bicarbonate  Infusion 0.086 mEq/kG/Hr (75 mL/Hr) IV Continuous <Continuous>  vancomycin  IVPB 1000 milliGRAM(s) IV Intermittent every 12 hours    MEDICATIONS  (PRN):  acetaminophen     Tablet .. 650 milliGRAM(s) Oral every 6 hours PRN Temp greater or equal to 38C (100.4F), Mild Pain (1 - 3)  aluminum hydroxide/magnesium hydroxide/simethicone Suspension 30 milliLiter(s) Oral every 4 hours PRN Dyspepsia  melatonin 3 milliGRAM(s) Oral at bedtime PRN Insomnia  ondansetron Injectable 4 milliGRAM(s) IV Push every 8 hours PRN Nausea and/or Vomiting    Allergies **update  No Known Allergies? Allergy to IV contrast?      REVIEW OF SYSTEMS: **update  **update  Constitutional: No fevers, no chills  Eyes/Ears: No vision or hearing changes  Nose: No nasal congestion, no rhinorrhea  Throat: No sore throat, no dysphagia  CV: No chest pain, no palpitations  Resp: No cough, no dyspnea, no wheezing, no hemoptysis  GI: No nausea, no vomiting, no diarrhea, no constipation  : No dysuria, no hematuria  Musculoskeletal: No myalgia, no arthralgia  Skin: No rashes, no itching  Neurological: No numbness, no tingling  Psychiatric: No anxiety, no depression  Endocrine: No diabetes, no thyroid problem  Hematologic/Lymphatic: No epistaxis, no bleeding gums     OBJECTIVE:  ICU Vital Signs Last 24 Hrs  T(C): 36.6 (17 Feb 2023 02:40), Max: 39.6 (17 Feb 2023 01:36)  T(F): 97.9 (17 Feb 2023 02:40), Max: 103.2 (17 Feb 2023 01:36)  HR: 135 (17 Feb 2023 02:40) (104 - 167)  BP: 91/59 (17 Feb 2023 02:40) (74/52 - 97/66)  RR: 18 (17 Feb 2023 02:40) (17 - 20)  SpO2: 94% (17 Feb 2023 02:40) (94% - 98%)    O2 Parameters below as of 17 Feb 2023 02:40  Patient On (Oxygen Delivery Method): room air      02-15 @ 07:01 - 02-16 @ 07:00  --------------------------------------------------------  IN: 1325 mL / OUT: 350 mL / NET: 975 mL    02-16 @ 07:01 - 02-17 @ 02:55  --------------------------------------------------------  IN: 950 mL / OUT: 0 mL / NET: 950 mL      CAPILLARY BLOOD GLUCOSE      POCT Blood Glucose.: 97 mg/dL (16 Feb 2023 23:22)      PHYSICAL EXAM:**update  General: Reclining in bed in no acute distress.  HEENT: Normocephalic, atraumatic. Extraocular movements grossly intact. No nasal discharge.  Neuro: Alert and oriented. No facial asymmetry or dysarthria. Moving all extremities.  CV: Regular rate and rhythm. S1/S2. No murmurs appreciated. No distal edema.  Respiratory: Lung fields clear bilaterally. No crackles or wheezes appreciated.  Abdomen: Soft; nontender to palpation, all quadrants; nondistended. No guarding.  : Suprapubic region nontender.  Skin: No rashes or bruising of face, forearms, or calves bilaterally.  Psych: Mood and affect appropriate.   POCUS: b/l consolidations in lung bases R>L, small-moderate R PLEF, RV dilated, IVC plump 2 cm    LABS:                        8.4    10.31 )-----------( 83       ( 17 Feb 2023 01:21 )             26.5     Hgb Trend: 8.4<--, 6.5<--, 7.7<--, 8.0<--, 7.6<--  02-17    139  |  110<H>  |  10  ----------------------------<  103<H>  3.8   |  14<L>  |  0.95    Ca    8.3<L>      17 Feb 2023 01:21  Phos  3.2     02-16  Mg     1.2     02-16    TPro  5.1<L>  /  Alb  3.2<L>  /  TBili  0.8  /  DBili  x   /  AST  43<H>  /  ALT  36  /  AlkPhos  64  02-17    Creatinine Trend: 0.95<--, 0.39<--, 0.83<--, 0.78<--, 0.78<--, 0.80<--      Arterial Blood Gas:  02-16 @ 04:05  7.39/23/146/14/99.7/-9.8  ABG lactate: --    Venous Blood Gas:  02-17 @ 00:25  7.25/32/26/14/28.3  VBG Lactate: 6.7  Venous Blood Gas:  02-16 @ 23:26  7.02/33/15/8/17.9  VBG Lactate: 7.0      MICROBIOLOGY: UCx and BCx*2 2/14 NGTD    RADIOLOGY & ADDITIONAL TESTS:     Personally reviewed CTA PE, CXR, EKG    < from: CT Angio Chest PE Protocol w/ IV Cont (02.15.23 @ 02:17) >      IMPRESSION:    No pulmonary embolism.    Innumerable lung nodules which are nonspecific. In this clinical setting,   differential includes infection (in which case, consider fungus) or   neoplasm/known lymphoma.    Indeterminate low-attenuation lesion in the liver.    Close follow-up and/or comparison with outside imaging is recommended.      < end of copied text >    < from: Xray Chest 2 Views PA/Lat (02.14.23 @ 21:49) >    FINDINGS:  Right IJ approach medication port with its tip in the SVC.  The heart is normal in size.  The lungs are clear.  There is no pneumothorax or pleural effusion.  No acute bony abnormality.    IMPRESSION:  Clear lungs.      < end of copied text >      ASSESSMENT    **update    PLAN  **update  =====Neurologic=====  - No active issues, AOx4    =====Pulmonary=====  # DLBCL with pulmonary nodules  - as in heme-onc section below    # workup for pna  - as in ID section below    # pulmonary arterial HTN  > appreciate pulm recs    =====Cardiovascular=====  # RV failure  > caution with fluids    # SVT  > digoxin 125 mcg po qD  > digoxin level    # hypotension  - to SBP 70s  - possibly multifactorial, including from sepsis  - s/p albumin *1  > midodrine 10 mg po tid    =====GI=====  #Diet - regular    =====Renal/=====  - PMH STARR while on antibiotics  > appreciate renal recs    =====Endocrine=====  - No active issues.     =====Infectious Disease=====  # severe sepsis  - iso immunocompromise, hx and imaging c/f infection (e.g. fungal pna) vs progression of known cancer  > switched Zosyn to cefepime  > vanc  > vanc level  > started voriconazole 200mg po bid  > appreciate ID recs re: voriconazole, possible acyclovir PPX  > supportive care: Tylenol, cooling measures  > BCx, UA, UCx  > Aspergillus galactomannan Ag, Aspergillus Ab  > appreciate ID recs    =====Heme/Onc=====  # DLBCL  > appreciate heme-onc recs    # anemia  # thrombocytopenia  - possibly from recent chemo and cancer  > Hb goal >7    #DVT PPX  > Lovenox 60 bid - if Doppler neg, switch to Lovenox 40 qD for DVT PPX  > Doppler BLE    =====Ethics=====  FULL CODE MICU ACCEPT NOTE    CHIEF COMPLAINT: Patient is a 47y old  Female who presents with a chief complaint of fever (16 Feb 2023 21:09)    == template placeholder draft - patient to be seen - plan not finalized till attested by attending ==   HPI:  46 yo f w pmh nhl s/p chemo (?in remission), pe, ?pah, covid, p/w fevers for the last few days, a/w generalized weakness over the last couple of weeks along with poor po intake. denies chest pain, palpitations, lightheadedness, cough, wheeze, abdominal discomfort. patient does endorse n+v, dysuria, arriaza. of note, last session of chemo reportedly on 1/23/2023. Pt grew concerned so went to oncologist yesterday where she got cultures, blood work, and xray. as she did not feel improved, patient presents to [facility] er for further evaluation.  (15 Feb 2023 01:39)    47F PMH DLBCL (follows at [facility], EBV+, Bcl2+, s/p diagnostic bx of LLL mass, off clinical trial; s/p C2, rituximab, polatuzumab, cyclophosphamide, last dose 1/23/23; Neulasta), COVID-19 and PE with heart strain Aug?Oct? 2022, pulmonary HTN, p/w 2 weeks intermittent fever and intermittent vomiting, as well as cough, dysuria; found to have fever and pancytopenia possibly 2/2 chemo and cancer; admitted to medicine; RRT 2/17 for SVT to 170s, fever to 102.9F, lactate ~7, concerning for severe sepsis; admitted to MICU.      PAST MEDICAL & SURGICAL HISTORY:  Non-Hodgkin&#x27;s lymphoma  Pulmonary embolism  History of appendectomy  History of cholecystectomy      FAMILY HISTORY:  **update      SOCIAL HISTORY:  **update  Smoking:   Substance Use:   EtOH Use:   Advance Directives:     MEDICATIONS  (STANDING):  apixaban 5 milliGRAM(s) Oral every 12 hours  chlorhexidine 2% Cloths 1 Application(s) Topical <User Schedule>  digoxin     Tablet 125 MICROGram(s) Oral daily  filgrastim-sndz (ZARXIO) Injectable 480 MICROGram(s) SubCutaneous daily  midodrine. 10 milliGRAM(s) Oral three times a day  piperacillin/tazobactam IVPB.. 3.375 Gram(s) IV Intermittent every 8 hours  sodium bicarbonate  Infusion 0.086 mEq/kG/Hr (75 mL/Hr) IV Continuous <Continuous>  vancomycin  IVPB 1000 milliGRAM(s) IV Intermittent every 12 hours    MEDICATIONS  (PRN):  acetaminophen     Tablet .. 650 milliGRAM(s) Oral every 6 hours PRN Temp greater or equal to 38C (100.4F), Mild Pain (1 - 3)  aluminum hydroxide/magnesium hydroxide/simethicone Suspension 30 milliLiter(s) Oral every 4 hours PRN Dyspepsia  melatonin 3 milliGRAM(s) Oral at bedtime PRN Insomnia  ondansetron Injectable 4 milliGRAM(s) IV Push every 8 hours PRN Nausea and/or Vomiting    Allergies **update  No Known Allergies? Allergy to IV contrast?      REVIEW OF SYSTEMS: As indicated above; otherwise negative  CONSTITUTIONAL: + fevers and +weakness  EYES/ENT: No visual changes  NECK: No pain  RESPIRATORY: No shortness of breath  CARDIOVASCULAR: No chest pain or palpitations  GASTROINTESTINAL: No abdominal or epigastric pain. No nausea, vomiting; sometimes has runs of diarrhea; no melena or hematochezia.  GENITOURINARY: No dysuria or hematuria  SKIN: No rashes    OBJECTIVE:  ICU Vital Signs Last 24 Hrs  T(C): 36.6 (17 Feb 2023 02:40), Max: 39.6 (17 Feb 2023 01:36)  T(F): 97.9 (17 Feb 2023 02:40), Max: 103.2 (17 Feb 2023 01:36)  HR: 135 (17 Feb 2023 02:40) (104 - 167)  BP: 91/59 (17 Feb 2023 02:40) (74/52 - 97/66)  RR: 18 (17 Feb 2023 02:40) (17 - 20)  SpO2: 94% (17 Feb 2023 02:40) (94% - 98%)    O2 Parameters below as of 17 Feb 2023 02:40  Patient On (Oxygen Delivery Method): room air      02-15 @ 07:01  -  02-16 @ 07:00  --------------------------------------------------------  IN: 1325 mL / OUT: 350 mL / NET: 975 mL    02-16 @ 07:01  -  02-17 @ 02:55  --------------------------------------------------------  IN: 950 mL / OUT: 0 mL / NET: 950 mL      CAPILLARY BLOOD GLUCOSE      POCT Blood Glucose.: 97 mg/dL (16 Feb 2023 23:22)      PHYSICAL EXAM:  General: Reclining in bed in no acute distress.  HEENT: Normocephalic, atraumatic.   Neuro: Alert and oriented. No facial asymmetry or dysarthria. Moving all extremities.  CV: tachycardic rate and rhythm. S1/S2. No murmurs appreciated. No distal edema.  Respiratory: Lung fields grossly clear bilaterally. No crackles or wheezes appreciated.  Abdomen: Soft; nontender to palpation, all quadrants; nondistended. No guarding.  : Suprapubic region nontender.  Skin: No rashes or bruising of face, forearms, or calves bilaterally.  Psych: Mood and affect appropriate.   POCUS: b/l consolidations in lung bases R>L, small-moderate R PLEF, RV dilated, IVC plump 2 cm    LABS:                        8.4    10.31 )-----------( 83       ( 17 Feb 2023 01:21 )             26.5     Hgb Trend: 8.4<--, 6.5<--, 7.7<--, 8.0<--, 7.6<--  02-17    139  |  110<H>  |  10  ----------------------------<  103<H>  3.8   |  14<L>  |  0.95    Ca    8.3<L>      17 Feb 2023 01:21  Phos  3.2     02-16  Mg     1.2     02-16    TPro  5.1<L>  /  Alb  3.2<L>  /  TBili  0.8  /  DBili  x   /  AST  43<H>  /  ALT  36  /  AlkPhos  64  02-17    Creatinine Trend: 0.95<--, 0.39<--, 0.83<--, 0.78<--, 0.78<--, 0.80<--      Arterial Blood Gas:  02-16 @ 04:05  7.39/23/146/14/99.7/-9.8  ABG lactate: --    Venous Blood Gas:  02-17 @ 00:25  7.25/32/26/14/28.3  VBG Lactate: 6.7  Venous Blood Gas:  02-16 @ 23:26  7.02/33/15/8/17.9  VBG Lactate: 7.0      MICROBIOLOGY: UCx and BCx*2 2/14 NGTD    RADIOLOGY & ADDITIONAL TESTS:     Personally reviewed CTA PE, CXR, EKG    < from: CT Angio Chest PE Protocol w/ IV Cont (02.15.23 @ 02:17) >      IMPRESSION:    No pulmonary embolism.    Innumerable lung nodules which are nonspecific. In this clinical setting,   differential includes infection (in which case, consider fungus) or   neoplasm/known lymphoma.    Indeterminate low-attenuation lesion in the liver.    Close follow-up and/or comparison with outside imaging is recommended.      < end of copied text >    < from: Xray Chest 2 Views PA/Lat (02.14.23 @ 21:49) >    FINDINGS:  Right IJ approach medication port with its tip in the SVC.  The heart is normal in size.  The lungs are clear.  There is no pneumothorax or pleural effusion.  No acute bony abnormality.    IMPRESSION:  Clear lungs.      < end of copied text >      ASSESSMENT  47 F PMH non-Hodgkin lymphoma s/p chemoport (?in remission), PE, pHTN, presenting with recurrent fevers with pancytopenia in setting of recent chemo treatment and prednisone concerning for severe sepsis. Found to be requiring intermittent fluids and continues to be febrile while on broad-spectrum bacterial antibiotic coverage. CT chest imaging concerning for metastatic disease vs fungal infection. MICU consulted for further management.      PLAN    =====Neurologic=====  - No active issues, AOx4    =====Pulmonary=====  # DLBCL with pulmonary nodules  - as in heme-onc section below    # workup for pna  - as in ID section below    # pulmonary arterial HTN  > appreciate pulm recs    =====Cardiovascular=====  # HFrEF (EF ~42) with RV dysfunction based on previous echo (2/15/23)  - Bedside POCUS noted to have dilated RV  > caution with fluids  > DC-ed coreg     # SVT  > digoxin 125 mcg po qD  > follow up digoxin level    # hypotension  - to SBP 70s  - possibly multifactorial, including from sepsis  - s/p IV albumin x1  > midodrine 10 mg po tid    =====GI=====  #Diet - regular    =====Renal/=====  - PMH STARR while on antibiotics  > appreciate renal recs    =====Endocrine=====  - No active issues.     =====Infectious Disease=====  # severe sepsis with unclear etiology in setting of known malginancy and recent chemotx for cancer   - iso immunocompromise, hx and CT chest imaging c/f infection (e.g. fungal pna) vs progression of known cancer  > switched Zosyn to cefepime  > continue vanc level   > follow up vanc level  > started voriconazole 200mg po bid  > appreciate ID recs re: voriconazole; hold off antiviral given low suspicion for viral infection (RVP negative)  > supportive care: Tylenol and cooling measures (if patient agrees as she reported that cooling measures made her uncomfortable)  > follow up BCx, UA, UCx  > Aspergillus galactomannan Ag, Aspergillus Ab  > appreciate ID recs    =====Heme/Onc=====  # DLBCL  > appreciate heme-onc recs    #leukopenia on filgastrim  #anemia  #thrombocytopenia  - possibly from recent chemo and cancer  > Hb goal >7    #DVT PPX  > Lovenox 60 bid - if Doppler neg, switch to Lovenox 40 qD for DVT PPX  > Follow up doppler b/l LE    =====Ethics=====  FULL CODE MICU ACCEPT NOTE    CHIEF COMPLAINT: Patient is a 47y old  Female who presents with a chief complaint of fever (16 Feb 2023 21:09)    == template placeholder draft - patient to be seen - plan not finalized till attested by attending ==   HPI:  46 yo f w pmh nhl s/p chemo (?in remission), pe, ?pah, covid, p/w fevers for the last few days, a/w generalized weakness over the last couple of weeks along with poor po intake. denies chest pain, palpitations, lightheadedness, cough, wheeze, abdominal discomfort. patient does endorse n+v, dysuria, arriaza. of note, last session of chemo reportedly on 1/23/2023. Pt grew concerned so went to oncologist yesterday where she got cultures, blood work, and xray. as she did not feel improved, patient presents to [facility] er for further evaluation.  (15 Feb 2023 01:39)    47F PMH DLBCL (follows at [facility], EBV+, Bcl2+, s/p diagnostic bx of LLL mass, off clinical trial; s/p C2, rituximab, polatuzumab, cyclophosphamide, last dose 1/23/23; Neulasta), COVID-19 and PE with heart strain and pulmonary HTN 9/2022, p/w 2 weeks intermittent fever and intermittent vomiting, as well as cough, dysuria; found to have fever and pancytopenia possibly 2/2 chemo and cancer; admitted to medicine; RRT 2/17 for SVT to 170s, fever to 102.9F, lactate ~7, concerning for severe sepsis; admitted to MICU.     Patient denied hx heart issues, bleeding/clotting prior to 9/2022. Family history significant for cancer in mother, possibly ovarian, dx in 30s; stroke in grandmother (~age 50s).     PAST MEDICAL & SURGICAL HISTORY:  Non-Hodgkin lymphoma  Pulmonary embolism    PSH:  History of appendectomy  History of cholecystectomy    FAMILY HISTORY:  mother +cancer (?ovarian, dx in 30s, had hysterectomy)  paternal half-brother +CAD  grandmother +stroke (age 50s)  diabetes in father, grandmother, paternal uncles    SOCIAL HISTORY:  Smoking: former smoker, age 19yo-28yo and [age]yo, 0-3 cigarettes/day, last in April 2022 after death of brother in car accident  Substance Use: occasional MJ   EtOH Use: social, 1-2 glasses wine/wk  Denied illicits  Lives with daughter (17yo) and boyfriend  On leave - works with [employer]  Sister is emergency healthcare decision maker    MEDICATIONS  (STANDING):  apixaban 5 milliGRAM(s) Oral every 12 hours  chlorhexidine 2% Cloths 1 Application(s) Topical <User Schedule>  digoxin     Tablet 125 MICROGram(s) Oral daily  filgrastim-sndz (ZARXIO) Injectable 480 MICROGram(s) SubCutaneous daily  midodrine. 10 milliGRAM(s) Oral three times a day  piperacillin/tazobactam IVPB.. 3.375 Gram(s) IV Intermittent every 8 hours  sodium bicarbonate  Infusion 0.086 mEq/kG/Hr (75 mL/Hr) IV Continuous <Continuous>  vancomycin  IVPB 1000 milliGRAM(s) IV Intermittent every 12 hours    MEDICATIONS  (PRN):  acetaminophen     Tablet .. 650 milliGRAM(s) Oral every 6 hours PRN Temp greater or equal to 38C (100.4F), Mild Pain (1 - 3)  aluminum hydroxide/magnesium hydroxide/simethicone Suspension 30 milliLiter(s) Oral every 4 hours PRN Dyspepsia  melatonin 3 milliGRAM(s) Oral at bedtime PRN Insomnia  ondansetron Injectable 4 milliGRAM(s) IV Push every 8 hours PRN Nausea and/or Vomiting    Allergies   No Known Allergies  ED note mentioned allergy to IV contrast - on further questioning, patient endorsed vomiting after 3 scans involving contrast, no rash, tolerated last IV contrast with no reaction and no premedication.    REVIEW OF SYSTEMS:   Constitutional: No fevers, no chills  Eyes/Ears: No vision or hearing changes  Nose: No nasal congestion, no rhinorrhea, +irritation of nares from nasal cannula  Throat: No sore throat, no hemoptysis  CV: No chest pain, no palpitations  Resp: +dry cough, sometimes clear or green phlegm; no shortness of breath   GI: No nausea, no vomiting, +diarrhea for past 3 days, watery, last earlier this evening, neither bloody nor black  : No dysuria, no hematuria  Musculoskeletal: No myalgia, no arthralgia  Neurological: No numbness, no tingling  Psychiatric: +anxiety and depression  Endocrine: No diabetes, no thyroid problem  Hematologic/Lymphatic: No epistaxis, no bleeding gums     OBJECTIVE:  ICU Vital Signs Last 24 Hrs  T(C): 36.6 (17 Feb 2023 02:40), Max: 39.6 (17 Feb 2023 01:36)  T(F): 97.9 (17 Feb 2023 02:40), Max: 103.2 (17 Feb 2023 01:36)  HR: 135 (17 Feb 2023 02:40) (104 - 167)  BP: 91/59 (17 Feb 2023 02:40) (74/52 - 97/66)  RR: 18 (17 Feb 2023 02:40) (17 - 20)  SpO2: 94% (17 Feb 2023 02:40) (94% - 98%)    O2 Parameters below as of 17 Feb 2023 02:40  Patient On (Oxygen Delivery Method): room air      02-15 @ 07:01  -  02-16 @ 07:00  --------------------------------------------------------  IN: 1325 mL / OUT: 350 mL / NET: 975 mL    02-16 @ 07:01  -  02-17 @ 02:55  --------------------------------------------------------  IN: 950 mL / OUT: 0 mL / NET: 950 mL      CAPILLARY BLOOD GLUCOSE      POCT Blood Glucose.: 97 mg/dL (16 Feb 2023 23:22)      PHYSICAL EXAM:  General: Seated in bed in no acute distress.  Head/Eyes: Normocephalic, atraumatic. Extraocular movements grossly intact.  Throat: MMM, no oral thrush or oropharyngeal erythema appreciated.  Neuro: Alert and oriented. No facial asymmetry or dysarthria. Moving all extremities.  CV: tachycardia, regular rhythm. S1/S2. No murmurs appreciated. No distal edema.  Respiratory: Lung fields grossly clear bilaterally. No crackles or wheezes appreciated.  Abdomen: Soft; nontender to palpation, all quadrants; nondistended. No guarding.  : Suprapubic region nontender.  Skin: No rashes or bruising face, forearms, or calves bilaterally except some mild bruising of forearms c/w venipuncture.  Psych: Mood and affect appropriate.   POCUS: b/l consolidations in lung bases R>L, small-moderate R PLEF, RV dilated, IVC plump 2 cm    LABS:                        8.4    10.31 )-----------( 83       ( 17 Feb 2023 01:21 )             26.5     Hgb Trend: 8.4<--, 6.5<--, 7.7<--, 8.0<--, 7.6<--  02-17    139  |  110<H>  |  10  ----------------------------<  103<H>  3.8   |  14<L>  |  0.95    Ca    8.3<L>      17 Feb 2023 01:21  Phos  3.2     02-16  Mg     1.2     02-16    TPro  5.1<L>  /  Alb  3.2<L>  /  TBili  0.8  /  DBili  x   /  AST  43<H>  /  ALT  36  /  AlkPhos  64  02-17    Creatinine Trend: 0.95<--, 0.39<--, 0.83<--, 0.78<--, 0.78<--, 0.80<--      Arterial Blood Gas:  02-16 @ 04:05  7.39/23/146/14/99.7/-9.8  ABG lactate: --    Venous Blood Gas:  02-17 @ 00:25  7.25/32/26/14/28.3  VBG Lactate: 6.7  Venous Blood Gas:  02-16 @ 23:26  7.02/33/15/8/17.9  VBG Lactate: 7.0      MICROBIOLOGY: UCx and BCx*2 2/14 NGTD    RADIOLOGY & ADDITIONAL TESTS:     Personally reviewed CTA PE, CXR, EKG    < from: CT Angio Chest PE Protocol w/ IV Cont (02.15.23 @ 02:17) >      IMPRESSION:    No pulmonary embolism.    Innumerable lung nodules which are nonspecific. In this clinical setting,   differential includes infection (in which case, consider fungus) or   neoplasm/known lymphoma.    Indeterminate low-attenuation lesion in the liver.    Close follow-up and/or comparison with outside imaging is recommended.      < end of copied text >    < from: Xray Chest 2 Views PA/Lat (02.14.23 @ 21:49) >    FINDINGS:  Right IJ approach medication port with its tip in the SVC.  The heart is normal in size.  The lungs are clear.  There is no pneumothorax or pleural effusion.  No acute bony abnormality.    IMPRESSION:  Clear lungs.      < end of copied text >      ASSESSMENT  47 F PMH non-Hodgkin lymphoma s/p chemoport (?in remission), PE, pHTN, presenting with recurrent fevers with pancytopenia in setting of recent chemo treatment and prednisone concerning for severe sepsis. Found to be requiring intermittent fluids and continues to be febrile while on broad-spectrum bacterial antibiotic coverage. CT chest imaging concerning for metastatic disease vs fungal infection. MICU consulted for further management.      PLAN    =====Neurologic=====  - No active issues, AOx4    =====Pulmonary=====  # DLBCL with pulmonary nodules  - as in heme-onc section below    # workup for pna  - as in ID section below    # pulmonary arterial HTN  > appreciate pulm recs    =====Cardiovascular=====  # HFrEF (EF ~42) with RV dysfunction based on previous echo (2/15/23)  - Bedside POCUS noted to have dilated RV  > caution with fluids  > DC-ed coreg     # SVT  > digoxin 125 mcg po qD  > follow up digoxin level    # hypotension  - to SBP 70s  - possibly multifactorial, including from sepsis  - s/p IV albumin x1  > midodrine 10 mg po tid    =====GI=====  #Diet - regular    =====Renal/=====  - PMH STARR while on antibiotics  > appreciate renal recs    =====Endocrine=====  - No active issues.     =====Infectious Disease=====  # severe sepsis with unclear etiology in setting of known malginancy and recent chemotx for cancer   - iso immunocompromise, hx and CT chest imaging c/f infection (e.g. fungal pna) vs progression of known cancer  > switched Zosyn to cefepime  > continue vanc level   > follow up vanc level  > started voriconazole 200mg po bid  > appreciate ID recs re: voriconazole; hold off antiviral given low suspicion for viral infection (RVP negative)  > supportive care: Tylenol and cooling measures (if patient agrees as she reported that cooling measures made her uncomfortable)  > follow up BCx, UA, UCx  > Aspergillus galactomannan Ag, Aspergillus Ab  > appreciate ID recs    =====Heme/Onc=====  # DLBCL  > appreciate heme-onc recs    #leukopenia on filgastrim  #anemia  #thrombocytopenia  - possibly from recent chemo and cancer  > Hb goal >7  > Trend Plt    #DVT PPX  > Lovenox 60 bid - if Doppler neg, switch to Lovenox 40 qD for DVT PPX  > Follow up doppler b/l LE    =====Ethics=====  FULL CODE MICU ACCEPT NOTE    CHIEF COMPLAINT: Patient is a 47y old  Female who presents with a chief complaint of fever (16 Feb 2023 21:09)    HPI:  46 yo f w pmh nhl s/p chemo (?in remission), pe, ?pah, covid, p/w fevers for the last few days, a/w generalized weakness over the last couple of weeks along with poor po intake. denies chest pain, palpitations, lightheadedness, cough, wheeze, abdominal discomfort. patient does endorse n+v, dysuria, arriaza. of note, last session of chemo reportedly on 1/23/2023. Pt grew concerned so went to oncologist yesterday where she got cultures, blood work, and xray. as she did not feel improved, patient presents to [facility] er for further evaluation.  (15 Feb 2023 01:39)    47F PMH DLBCL (follows at [facility], EBV+, Bcl2+, s/p diagnostic bx of LLL mass, off clinical trial; s/p C2, rituximab, polatuzumab, cyclophosphamide, last dose 1/23/23; Neulasta), COVID-19 and PE with heart strain and pulmonary HTN 9/2022, p/w 2 weeks intermittent fever and intermittent vomiting, as well as cough, dysuria; found to have fever and pancytopenia possibly 2/2 chemo and cancer; admitted to medicine; RRT 2/17 for SVT to 170s, fever to 102.9F, lactate ~7, concerning for severe sepsis; admitted to MICU.     Patient denied hx heart issues, bleeding/clotting prior to 9/2022. Family history significant for cancer in mother, possibly ovarian, dx in 30s; stroke in grandmother (~age 50s).     PAST MEDICAL & SURGICAL HISTORY:  Non-Hodgkin lymphoma  Pulmonary embolism    PSH:  History of appendectomy  History of cholecystectomy    FAMILY HISTORY:  mother +cancer (?ovarian, dx in 30s, had hysterectomy)  paternal half-brother +CAD  grandmother +stroke (age 50s)  diabetes in father, grandmother, paternal uncles    SOCIAL HISTORY:  Smoking: former smoker, age 19yo-30yo and [age]yo, 0-3 cigarettes/day, last in April 2022 after death of brother in car accident  Substance Use: occasional MJ   EtOH Use: social, 1-2 glasses wine/wk  Denied illicits  Lives with daughter (19yo) and boyfriend  On leave - works with [employer]  Sister is emergency healthcare decision maker    MEDICATIONS  (STANDING):  apixaban 5 milliGRAM(s) Oral every 12 hours  chlorhexidine 2% Cloths 1 Application(s) Topical <User Schedule>  digoxin     Tablet 125 MICROGram(s) Oral daily  filgrastim-sndz (ZARXIO) Injectable 480 MICROGram(s) SubCutaneous daily  midodrine. 10 milliGRAM(s) Oral three times a day  piperacillin/tazobactam IVPB.. 3.375 Gram(s) IV Intermittent every 8 hours  sodium bicarbonate  Infusion 0.086 mEq/kG/Hr (75 mL/Hr) IV Continuous <Continuous>  vancomycin  IVPB 1000 milliGRAM(s) IV Intermittent every 12 hours    MEDICATIONS  (PRN):  acetaminophen     Tablet .. 650 milliGRAM(s) Oral every 6 hours PRN Temp greater or equal to 38C (100.4F), Mild Pain (1 - 3)  aluminum hydroxide/magnesium hydroxide/simethicone Suspension 30 milliLiter(s) Oral every 4 hours PRN Dyspepsia  melatonin 3 milliGRAM(s) Oral at bedtime PRN Insomnia  ondansetron Injectable 4 milliGRAM(s) IV Push every 8 hours PRN Nausea and/or Vomiting    Allergies   No Known Allergies  ED note mentioned allergy to IV contrast - on further questioning, patient endorsed vomiting after 3 scans involving contrast, no rash, tolerated last IV contrast with no reaction and no premedication.    REVIEW OF SYSTEMS:   Constitutional: No fevers, no chills  Eyes/Ears: No vision or hearing changes  Nose: No nasal congestion, no rhinorrhea, +irritation of nares from nasal cannula  Throat: No sore throat, no hemoptysis  CV: No chest pain, no palpitations  Resp: +dry cough, sometimes clear or green phlegm; no shortness of breath   GI: No nausea, no vomiting, +diarrhea for past 3 days, watery, last earlier this evening, neither bloody nor black  : No dysuria, no hematuria  Musculoskeletal: No myalgia, no arthralgia  Neurological: No numbness, no tingling  Psychiatric: +anxiety and depression  Endocrine: No diabetes, no thyroid problem  Hematologic/Lymphatic: No epistaxis, no bleeding gums     OBJECTIVE:  ICU Vital Signs Last 24 Hrs  T(C): 36.6 (17 Feb 2023 02:40), Max: 39.6 (17 Feb 2023 01:36)  T(F): 97.9 (17 Feb 2023 02:40), Max: 103.2 (17 Feb 2023 01:36)  HR: 135 (17 Feb 2023 02:40) (104 - 167)  BP: 91/59 (17 Feb 2023 02:40) (74/52 - 97/66)  RR: 18 (17 Feb 2023 02:40) (17 - 20)  SpO2: 94% (17 Feb 2023 02:40) (94% - 98%)    O2 Parameters below as of 17 Feb 2023 02:40  Patient On (Oxygen Delivery Method): room air      02-15 @ 07:01  -  02-16 @ 07:00  --------------------------------------------------------  IN: 1325 mL / OUT: 350 mL / NET: 975 mL    02-16 @ 07:01  -  02-17 @ 02:55  --------------------------------------------------------  IN: 950 mL / OUT: 0 mL / NET: 950 mL      CAPILLARY BLOOD GLUCOSE      POCT Blood Glucose.: 97 mg/dL (16 Feb 2023 23:22)      PHYSICAL EXAM:  General: Seated in bed in no acute distress.  Head/Eyes: Normocephalic, atraumatic. Extraocular movements grossly intact.  Throat: MMM, no oral thrush or oropharyngeal erythema appreciated.  Neuro: Alert and oriented. No facial asymmetry or dysarthria. Moving all extremities.  CV: tachycardia, regular rhythm. S1/S2. No murmurs appreciated. No distal edema.  Respiratory: Lung fields grossly clear bilaterally. No crackles or wheezes appreciated.  Abdomen: Soft; nontender to palpation, all quadrants; nondistended. No guarding.  : Suprapubic region nontender.  Skin: No rashes or bruising face, forearms, or calves bilaterally except some mild bruising of forearms c/w venipuncture.  Psych: Mood and affect appropriate.   POCUS: b/l consolidations in lung bases R>L, small-moderate R PLEF, RV dilated, IVC plump 2 cm    LABS:                        8.4    10.31 )-----------( 83       ( 17 Feb 2023 01:21 )             26.5     Hgb Trend: 8.4<--, 6.5<--, 7.7<--, 8.0<--, 7.6<--  02-17    139  |  110<H>  |  10  ----------------------------<  103<H>  3.8   |  14<L>  |  0.95    Ca    8.3<L>      17 Feb 2023 01:21  Phos  3.2     02-16  Mg     1.2     02-16    TPro  5.1<L>  /  Alb  3.2<L>  /  TBili  0.8  /  DBili  x   /  AST  43<H>  /  ALT  36  /  AlkPhos  64  02-17    Creatinine Trend: 0.95<--, 0.39<--, 0.83<--, 0.78<--, 0.78<--, 0.80<--      Arterial Blood Gas:  02-16 @ 04:05  7.39/23/146/14/99.7/-9.8  ABG lactate: --    Venous Blood Gas:  02-17 @ 00:25  7.25/32/26/14/28.3  VBG Lactate: 6.7  Venous Blood Gas:  02-16 @ 23:26  7.02/33/15/8/17.9  VBG Lactate: 7.0      MICROBIOLOGY: UCx and BCx*2 2/14 NGTD    RADIOLOGY & ADDITIONAL TESTS:     Personally reviewed CTA PE, CXR, EKG    < from: CT Angio Chest PE Protocol w/ IV Cont (02.15.23 @ 02:17) >      IMPRESSION:    No pulmonary embolism.    Innumerable lung nodules which are nonspecific. In this clinical setting,   differential includes infection (in which case, consider fungus) or   neoplasm/known lymphoma.    Indeterminate low-attenuation lesion in the liver.    Close follow-up and/or comparison with outside imaging is recommended.      < end of copied text >    < from: Xray Chest 2 Views PA/Lat (02.14.23 @ 21:49) >    FINDINGS:  Right IJ approach medication port with its tip in the SVC.  The heart is normal in size.  The lungs are clear.  There is no pneumothorax or pleural effusion.  No acute bony abnormality.    IMPRESSION:  Clear lungs.      < end of copied text >      ASSESSMENT  47 F PMH non-Hodgkin lymphoma s/p chemoport (?in remission), PE, pHTN, presenting with recurrent fevers with pancytopenia in setting of recent chemo treatment and prednisone concerning for severe sepsis. Found to be requiring intermittent fluids and continues to be febrile while on broad-spectrum bacterial antibiotic coverage. CT chest imaging concerning for fungal infection vs metastasis. MICU consulted for further management.      PLAN    =====Neurologic=====  - No active issues, AOx4    =====Pulmonary=====  # DLBCL with pulmonary nodules  - as in heme-onc section below    # workup for pna  - as in ID section below    # pulmonary arterial HTN  > appreciate pulm recs    =====Cardiovascular=====  # HFrEF (EF ~42) with RV dysfunction based on previous echo (2/15/23)  - Bedside POCUS noted to have dilated RV  > caution with fluids  > DC-ed coreg     # SVT  > digoxin 125 mcg po qD  > follow up digoxin level    # hypotension  - to SBP 70s  - possibly multifactorial, including from sepsis  - s/p IV albumin x1  > midodrine 10 mg po tid    =====GI=====  #Diet - regular    =====Renal/=====  - PMH STARR while on antibiotics  > appreciate renal recs    =====Endocrine=====  - No active issues.     =====Infectious Disease=====  # severe sepsis with unclear etiology in setting of known malginancy and recent chemotx for cancer   - iso immunocompromise, hx and CT chest imaging c/f infection (e.g. fungal pna) vs progression of known cancer  > switched Zosyn to cefepime  > continue vanc level   > follow up vanc level  > started voriconazole 200mg po bid  > appreciate ID recs re: voriconazole; hold off antiviral given low suspicion for viral infection (RVP negative)  > supportive care: Tylenol and cooling measures (if patient agrees as she reported that cooling measures made her uncomfortable)  > follow up BCx, UA, UCx  > Aspergillus galactomannan Ag, Aspergillus Ab  > appreciate ID recs    =====Heme/Onc=====  # DLBCL  > appreciate heme-onc recs    #leukopenia on filgastrim  #anemia  #thrombocytopenia  - possibly from recent chemo and cancer  > Hb goal >7  > Trend Plt    #DVT PPX  > Lovenox 60 bid - if Doppler neg, switch to Lovenox 40 qD for DVT PPX  > Follow up doppler b/l LE    =====Ethics=====  FULL CODE MICU ACCEPT NOTE    CHIEF COMPLAINT: Patient is a 47y old  Female who presents with a chief complaint of fever (16 Feb 2023 21:09)    HPI:  48 yo f w pmh nhl s/p chemo (?in remission), pe, ?pah, covid, p/w fevers for the last few days, a/w generalized weakness over the last couple of weeks along with poor po intake. denies chest pain, palpitations, lightheadedness, cough, wheeze, abdominal discomfort. patient does endorse n+v, dysuria, arriaza. of note, last session of chemo reportedly on 1/23/2023. Pt grew concerned so went to oncologist yesterday where she got cultures, blood work, and xray. as she did not feel improved, patient presents to [facility] er for further evaluation.  (15 Feb 2023 01:39)    47F PMH DLBCL (follows at [facility], EBV+, Bcl2+, s/p diagnostic bx of LLL mass, off clinical trial; s/p C2, rituximab, polatuzumab, cyclophosphamide, last dose 1/23/23; Neulasta), COVID-19 and PE with heart strain and pulmonary HTN 9/2022, p/w 2 weeks intermittent fever and intermittent vomiting, as well as cough, dysuria; found to have fever and pancytopenia possibly 2/2 chemo and cancer; admitted to medicine; RRT 2/17 for SVT to 170s, fever to 102.9F, lactate ~7, concerning for severe sepsis; admitted to MICU.     Patient denied hx heart issues, bleeding/clotting prior to 9/2022. Family history significant for cancer in mother, possibly ovarian, dx in 30s; stroke in grandmother (~age 50s).     PAST MEDICAL & SURGICAL HISTORY:  Non-Hodgkin lymphoma  Pulmonary embolism    PSH:  History of appendectomy  History of cholecystectomy    FAMILY HISTORY:  mother +cancer (?ovarian, dx in 30s, had hysterectomy)  paternal half-brother +CAD  grandmother +stroke (age 50s)  diabetes in father, grandmother, paternal uncles    SOCIAL HISTORY:  Smoking: former smoker, age 17yo-30yo and [age]yo, 0-3 cigarettes/day, last in April 2022 after death of brother in car accident  Substance Use: occasional MJ   EtOH Use: social, 1-2 glasses wine/wk  Denied illicits  Lives with daughter (17yo) and boyfriend  On leave - works with [employer]  Sister is emergency healthcare decision maker    MEDICATIONS  (STANDING):  apixaban 5 milliGRAM(s) Oral every 12 hours  chlorhexidine 2% Cloths 1 Application(s) Topical <User Schedule>  digoxin     Tablet 125 MICROGram(s) Oral daily  filgrastim-sndz (ZARXIO) Injectable 480 MICROGram(s) SubCutaneous daily  midodrine. 10 milliGRAM(s) Oral three times a day  piperacillin/tazobactam IVPB.. 3.375 Gram(s) IV Intermittent every 8 hours  sodium bicarbonate  Infusion 0.086 mEq/kG/Hr (75 mL/Hr) IV Continuous <Continuous>  vancomycin  IVPB 1000 milliGRAM(s) IV Intermittent every 12 hours    MEDICATIONS  (PRN):  acetaminophen     Tablet .. 650 milliGRAM(s) Oral every 6 hours PRN Temp greater or equal to 38C (100.4F), Mild Pain (1 - 3)  aluminum hydroxide/magnesium hydroxide/simethicone Suspension 30 milliLiter(s) Oral every 4 hours PRN Dyspepsia  melatonin 3 milliGRAM(s) Oral at bedtime PRN Insomnia  ondansetron Injectable 4 milliGRAM(s) IV Push every 8 hours PRN Nausea and/or Vomiting    Allergies   No Known Allergies  ED note mentioned allergy to IV contrast - on further questioning, patient endorsed vomiting after 3 scans involving contrast, no rash, tolerated last IV contrast with no reaction and no premedication.    REVIEW OF SYSTEMS:   Constitutional: No fevers, no chills  Eyes/Ears: No vision or hearing changes  Nose: No nasal congestion, no rhinorrhea, +irritation of nares from nasal cannula  Throat: No sore throat, no hemoptysis  CV: No chest pain, no palpitations  Resp: +dry cough, sometimes clear or green phlegm; no shortness of breath   GI: No nausea, no vomiting, +diarrhea for past 3 days, watery, last earlier this evening, neither bloody nor black  : No dysuria, no hematuria  Musculoskeletal: No myalgia, no arthralgia  Neurological: No numbness, no tingling  Psychiatric: +anxiety and depression  Endocrine: No diabetes, no thyroid problem  Hematologic/Lymphatic: No epistaxis, no bleeding gums     OBJECTIVE:  ICU Vital Signs Last 24 Hrs  T(C): 36.6 (17 Feb 2023 02:40), Max: 39.6 (17 Feb 2023 01:36)  T(F): 97.9 (17 Feb 2023 02:40), Max: 103.2 (17 Feb 2023 01:36)  HR: 135 (17 Feb 2023 02:40) (104 - 167)  BP: 91/59 (17 Feb 2023 02:40) (74/52 - 97/66)  RR: 18 (17 Feb 2023 02:40) (17 - 20)  SpO2: 94% (17 Feb 2023 02:40) (94% - 98%)    O2 Parameters below as of 17 Feb 2023 02:40  Patient On (Oxygen Delivery Method): room air      02-15 @ 07:01  -  02-16 @ 07:00  --------------------------------------------------------  IN: 1325 mL / OUT: 350 mL / NET: 975 mL    02-16 @ 07:01  -  02-17 @ 02:55  --------------------------------------------------------  IN: 950 mL / OUT: 0 mL / NET: 950 mL      CAPILLARY BLOOD GLUCOSE      POCT Blood Glucose.: 97 mg/dL (16 Feb 2023 23:22)      PHYSICAL EXAM:  General: Seated in bed in no acute distress.  Head/Eyes: Normocephalic, atraumatic. Extraocular movements grossly intact.  Throat: MMM, no oral thrush or oropharyngeal erythema appreciated.  Neuro: Alert and oriented. No facial asymmetry or dysarthria. Moving all extremities.  CV: tachycardia, regular rhythm. S1/S2. No murmurs appreciated. No distal edema.  Respiratory: Lung fields grossly clear bilaterally. No crackles or wheezes appreciated.  Abdomen: Soft; nontender to palpation, all quadrants; nondistended. No guarding.  : Suprapubic region nontender.  Skin: No rashes or bruising face, forearms, or calves bilaterally except some mild bruising of forearms c/w venipuncture.  Psych: Mood and affect appropriate.   POCUS: b/l consolidations in lung bases R>L, small-moderate R PLEF, RV dilated, IVC plump 2 cm    LABS:                        8.4    10.31 )-----------( 83       ( 17 Feb 2023 01:21 )             26.5     Hgb Trend: 8.4<--, 6.5<--, 7.7<--, 8.0<--, 7.6<--  02-17    139  |  110<H>  |  10  ----------------------------<  103<H>  3.8   |  14<L>  |  0.95    Ca    8.3<L>      17 Feb 2023 01:21  Phos  3.2     02-16  Mg     1.2     02-16    TPro  5.1<L>  /  Alb  3.2<L>  /  TBili  0.8  /  DBili  x   /  AST  43<H>  /  ALT  36  /  AlkPhos  64  02-17    Creatinine Trend: 0.95<--, 0.39<--, 0.83<--, 0.78<--, 0.78<--, 0.80<--      Arterial Blood Gas:  02-16 @ 04:05  7.39/23/146/14/99.7/-9.8  ABG lactate: --    Venous Blood Gas:  02-17 @ 00:25  7.25/32/26/14/28.3  VBG Lactate: 6.7  Venous Blood Gas:  02-16 @ 23:26  7.02/33/15/8/17.9  VBG Lactate: 7.0      MICROBIOLOGY: UCx and BCx*2 2/14 NGTD    RADIOLOGY & ADDITIONAL TESTS:     Personally reviewed CTA PE, CXR, EKG    < from: CT Angio Chest PE Protocol w/ IV Cont (02.15.23 @ 02:17) >      IMPRESSION:    No pulmonary embolism.    Innumerable lung nodules which are nonspecific. In this clinical setting,   differential includes infection (in which case, consider fungus) or   neoplasm/known lymphoma.    Indeterminate low-attenuation lesion in the liver.    Close follow-up and/or comparison with outside imaging is recommended.      < end of copied text >    < from: Xray Chest 2 Views PA/Lat (02.14.23 @ 21:49) >    FINDINGS:  Right IJ approach medication port with its tip in the SVC.  The heart is normal in size.  The lungs are clear.  There is no pneumothorax or pleural effusion.  No acute bony abnormality.    IMPRESSION:  Clear lungs.      < end of copied text >      ASSESSMENT  47 F PMH non-Hodgkin lymphoma s/p chemoport (?in remission), PE, pHTN, presenting with recurrent fevers with pancytopenia in setting of recent chemo treatment and prednisone concerning for severe sepsis. Found to be requiring intermittent fluids and continues to be febrile while on broad-spectrum bacterial antibiotic coverage. CT chest imaging concerning for fungal infection vs metastasis. MICU consulted for further management.      PLAN    =====Neurologic=====  - No active issues, AOx4    =====Pulmonary=====  # DLBCL with pulmonary nodules  - as in heme-onc section below    # workup for pna  - as in ID section below    # pulmonary arterial HTN  > appreciate pulm recs    =====Cardiovascular=====  # HFrEF (EF ~42) with RV dysfunction based on previous echo (2/15/23)  - Bedside POCUS noted to have dilated RV  > caution with fluids  > DC-ed coreg     # SVT  > digoxin 125 mcg po qD  > follow up digoxin level    # hypotension  - to SBP 70s  - possibly multifactorial, including from sepsis  - s/p IV albumin x1  > midodrine 10 mg po tid    =====GI=====  #Diet - regular    =====Renal/=====  - PMH STARR while on antibiotics  > appreciate renal recs    =====Endocrine=====  - No active issues.     =====Infectious Disease=====  # severe sepsis with unclear etiology in setting of known malginancy and recent chemotx for cancer   - iso immunocompromise, hx and CT chest imaging c/f infection (e.g. fungal pna) vs progression of known cancer  > switched Zosyn to cefepime  > continue vanc level   > follow up vanc level  > started voriconazole 200mg po bid  > appreciate ID recs re: voriconazole; hold off antiviral given low suspicion for viral infection (RVP negative)  > supportive care: Tylenol and cooling measures (if patient agrees as she reported that cooling measures made her uncomfortable)  > follow up BCx, UA, UCx  > Aspergillus galactomannan Ag, Aspergillus Ab  > appreciate ID recs    =====Heme/Onc=====  # DLBCL  > appreciate heme-onc recs    #leukopenia on filgastrim  #anemia  #thrombocytopenia  - possibly from recent chemo and cancer  > Hb goal >7  > Zarxio per heme-onc (5-day course)  > Trend Plt    #DVT PPX  > Lovenox 60 bid - if Doppler neg, switch to Lovenox 40 qD for DVT PPX  > Follow up doppler b/l LE    =====Ethics=====  FULL CODE MICU ACCEPT NOTE    CHIEF COMPLAINT: Patient is a 47y old  Female who presents with a chief complaint of fever (16 Feb 2023 21:09)    HPI:  46 yo f w pmh nhl s/p chemo (?in remission), pe, ?pah, covid, p/w fevers for the last few days, a/w generalized weakness over the last couple of weeks along with poor po intake. denies chest pain, palpitations, lightheadedness, cough, wheeze, abdominal discomfort. patient does endorse n+v, dysuria, arriaza. of note, last session of chemo reportedly on 1/23/2023. Pt grew concerned so went to oncologist yesterday where she got cultures, blood work, and xray. as she did not feel improved, patient presents to [facility] er for further evaluation.  (15 Feb 2023 01:39)    47F PMH DLBCL (follows at [facility], EBV+, Bcl2+, s/p diagnostic bx of LLL mass, off clinical trial; s/p C2, rituximab, polatuzumab, cyclophosphamide, last dose 1/23/23; Neulasta), COVID-19 and PE with heart strain and pulmonary HTN 9/2022, p/w 2 weeks intermittent fever and intermittent vomiting, as well as cough, dysuria; found to have fever and pancytopenia possibly 2/2 chemo and cancer; admitted to medicine; RRT 2/17 for SVT to 170s, fever to 102.9F, lactate ~7, concerning for severe sepsis; admitted to MICU.     Patient denied hx heart issues, bleeding/clotting prior to 9/2022. Family history significant for cancer in mother, possibly ovarian, dx in 30s; stroke in grandmother (~age 50s).     PAST MEDICAL & SURGICAL HISTORY:  Non-Hodgkin lymphoma  Pulmonary embolism    PSH:  History of appendectomy  History of cholecystectomy    FAMILY HISTORY:  mother +cancer (?ovarian, dx in 30s, had hysterectomy)  paternal half-brother +CAD  grandmother +stroke (age 50s)  diabetes in father, grandmother, paternal uncles    SOCIAL HISTORY:  Smoking: former smoker, age 17yo-30yo and [age]yo, 0-3 cigarettes/day, last in April 2022 after death of brother in car accident  Substance Use: occasional MJ   EtOH Use: social, 1-2 glasses wine/wk  Denied illicits  Lives with daughter (17yo) and boyfriend  On leave - works with [employer]  Sister is emergency healthcare decision maker    MEDICATIONS  (STANDING):  apixaban 5 milliGRAM(s) Oral every 12 hours  chlorhexidine 2% Cloths 1 Application(s) Topical <User Schedule>  digoxin     Tablet 125 MICROGram(s) Oral daily  filgrastim-sndz (ZARXIO) Injectable 480 MICROGram(s) SubCutaneous daily  midodrine. 10 milliGRAM(s) Oral three times a day  piperacillin/tazobactam IVPB.. 3.375 Gram(s) IV Intermittent every 8 hours  sodium bicarbonate  Infusion 0.086 mEq/kG/Hr (75 mL/Hr) IV Continuous <Continuous>  vancomycin  IVPB 1000 milliGRAM(s) IV Intermittent every 12 hours    MEDICATIONS  (PRN):  acetaminophen     Tablet .. 650 milliGRAM(s) Oral every 6 hours PRN Temp greater or equal to 38C (100.4F), Mild Pain (1 - 3)  aluminum hydroxide/magnesium hydroxide/simethicone Suspension 30 milliLiter(s) Oral every 4 hours PRN Dyspepsia  melatonin 3 milliGRAM(s) Oral at bedtime PRN Insomnia  ondansetron Injectable 4 milliGRAM(s) IV Push every 8 hours PRN Nausea and/or Vomiting    Allergies   No Known Allergies  ED note mentioned allergy to IV contrast - on further questioning, patient endorsed vomiting after 3 scans involving contrast, no rash, tolerated last IV contrast with no reaction and no premedication.    REVIEW OF SYSTEMS:   Constitutional: No fevers, no chills  Eyes/Ears: No vision or hearing changes  Nose: No nasal congestion, no rhinorrhea, +irritation of nares from nasal cannula  Throat: No sore throat, no hemoptysis  CV: No chest pain, no palpitations  Resp: +dry cough, sometimes clear or green phlegm; no shortness of breath   GI: No nausea, no vomiting, +diarrhea for past 3 days, watery, last earlier this evening, neither bloody nor black  : No dysuria, no hematuria  Musculoskeletal: No myalgia, no arthralgia  Neurological: No numbness, no tingling  Psychiatric: +anxiety and depression  Endocrine: No diabetes, no thyroid problem  Hematologic/Lymphatic: No epistaxis, no bleeding gums     OBJECTIVE:  ICU Vital Signs Last 24 Hrs  T(C): 36.6 (17 Feb 2023 02:40), Max: 39.6 (17 Feb 2023 01:36)  T(F): 97.9 (17 Feb 2023 02:40), Max: 103.2 (17 Feb 2023 01:36)  HR: 135 (17 Feb 2023 02:40) (104 - 167)  BP: 91/59 (17 Feb 2023 02:40) (74/52 - 97/66)  RR: 18 (17 Feb 2023 02:40) (17 - 20)  SpO2: 94% (17 Feb 2023 02:40) (94% - 98%)    O2 Parameters below as of 17 Feb 2023 02:40  Patient On (Oxygen Delivery Method): room air      02-15 @ 07:01  -  02-16 @ 07:00  --------------------------------------------------------  IN: 1325 mL / OUT: 350 mL / NET: 975 mL    02-16 @ 07:01  -  02-17 @ 02:55  --------------------------------------------------------  IN: 950 mL / OUT: 0 mL / NET: 950 mL      CAPILLARY BLOOD GLUCOSE      POCT Blood Glucose.: 97 mg/dL (16 Feb 2023 23:22)      PHYSICAL EXAM:  General: Seated in bed in no acute distress.  Head/Eyes: Normocephalic, atraumatic. Extraocular movements grossly intact.  Throat: MMM, no oral thrush or oropharyngeal erythema appreciated.  Neuro: Alert and oriented. No facial asymmetry or dysarthria. Moving all extremities.  CV: tachycardia, regular rhythm. S1/S2. No murmurs appreciated. No distal edema.  Respiratory: Lung fields grossly clear bilaterally. No crackles or wheezes appreciated.  Abdomen: Soft; nontender to palpation, all quadrants; nondistended. No guarding.  : Suprapubic region nontender.  Skin: No rashes or bruising face, forearms, or calves bilaterally except some mild bruising of forearms c/w venipuncture.  Psych: Mood and affect appropriate.   POCUS: b/l consolidations in lung bases R>L, small-moderate R PLEF, RV dilated, IVC plump 2 cm    LABS:                        8.4    10.31 )-----------( 83       ( 17 Feb 2023 01:21 )             26.5     Hgb Trend: 8.4<--, 6.5<--, 7.7<--, 8.0<--, 7.6<--  02-17    139  |  110<H>  |  10  ----------------------------<  103<H>  3.8   |  14<L>  |  0.95    Ca    8.3<L>      17 Feb 2023 01:21  Phos  3.2     02-16  Mg     1.2     02-16    TPro  5.1<L>  /  Alb  3.2<L>  /  TBili  0.8  /  DBili  x   /  AST  43<H>  /  ALT  36  /  AlkPhos  64  02-17    Creatinine Trend: 0.95<--, 0.39<--, 0.83<--, 0.78<--, 0.78<--, 0.80<--      Arterial Blood Gas:  02-16 @ 04:05  7.39/23/146/14/99.7/-9.8  ABG lactate: --    Venous Blood Gas:  02-17 @ 00:25  7.25/32/26/14/28.3  VBG Lactate: 6.7  Venous Blood Gas:  02-16 @ 23:26  7.02/33/15/8/17.9  VBG Lactate: 7.0      MICROBIOLOGY: UCx and BCx*2 2/14 NGTD    RADIOLOGY & ADDITIONAL TESTS:     Personally reviewed CTA PE, CXR, EKG    < from: CT Angio Chest PE Protocol w/ IV Cont (02.15.23 @ 02:17) >      IMPRESSION:    No pulmonary embolism.    Innumerable lung nodules which are nonspecific. In this clinical setting,   differential includes infection (in which case, consider fungus) or   neoplasm/known lymphoma.    Indeterminate low-attenuation lesion in the liver.    Close follow-up and/or comparison with outside imaging is recommended.      < end of copied text >    < from: Xray Chest 2 Views PA/Lat (02.14.23 @ 21:49) >    FINDINGS:  Right IJ approach medication port with its tip in the SVC.  The heart is normal in size.  The lungs are clear.  There is no pneumothorax or pleural effusion.  No acute bony abnormality.    IMPRESSION:  Clear lungs.      < end of copied text >      ASSESSMENT  47 F PMH non-Hodgkin lymphoma s/p chemoport (?in remission), PE, pHTN, presenting with recurrent fevers with pancytopenia in setting of recent chemo treatment and prednisone concerning for severe sepsis. Found to be requiring intermittent fluids and continues to be febrile while on broad-spectrum bacterial antibiotic coverage. CT chest imaging concerning for fungal infection vs metastasis. MICU consulted for further management.      PLAN    =====Neurologic=====  - No active issues, AOx4    =====Pulmonary=====  # DLBCL with pulmonary nodules  - as in heme-onc section below    # workup for pna  - as in ID section below    # pulmonary arterial HTN  > appreciate pulm recs    =====Cardiovascular=====  # HFrEF (EF ~42) with RV dysfunction based on previous echo (2/15/23)  - Bedside POCUS noted to have dilated RV  > caution with fluids  > DC-ed coreg     # SVT  > digoxin 125 mcg po qD  > follow up digoxin level    # hypotension  - to SBP 70s  - possibly multifactorial, including from sepsis  - s/p IV albumin x1  > midodrine 10 mg po tid    =====GI=====  #Diet - regular    =====Renal/=====  - PMH STARR while on antibiotics  > appreciate renal recs    =====Endocrine=====  - No active issues.     =====Infectious Disease=====  # severe sepsis with unclear etiology in setting of known malginancy and recent chemotx for cancer   - iso immunocompromise, hx and CT chest imaging c/f infection (e.g. fungal pna) vs progression of known cancer  > switched Zosyn to cefepime  > continue vanc level   > follow up vanc level  > started voriconazole 200mg po bid  > appreciate ID recs re: voriconazole; hold off antiviral given low suspicion for viral infection (RVP negative)  > supportive care: Tylenol and cooling measures (if patient agrees as she reported that cooling measures made her uncomfortable)  > follow up BCx, UA, UCx  > Aspergillus galactomannan Ag, Aspergillus Ab  > appreciate ID recs    =====Heme/Onc=====  # DLBCL  > appreciate heme-onc recs    #leukopenia on filgastrim  #anemia  #thrombocytopenia  - possibly from recent chemo and cancer  > Hb goal >7  > Zarxio per heme-onc (5-day course)  > Trend Plt    #DVT PPX  > Lovenox 60 bid - if Doppler neg, switch to Lovenox 40 qD for DVT PPX  > Follow up doppler b/l LE    =====Ethics=====  FULL CODE        ----------Attending attestaion---------------  Attending with resident/fellow:  I have personally provided_____35_minutes of critical care time concurrently with the resident/fellow. This time excludes time spent on separate procedures and time spent teaching. I have reviewed the resident/fellow’s documentation and I agree with the assessment and plan of care.    47 F PMH non-Hodgkin lymphoma s/p chemoport (?in remission), PE, pHTN, presenting with recurrent fevers with pancytopenia in setting of recent chemo treatment and prednisone concerning for severe sepsis. Found to be requiring intermittent fluids and continues to be febrile while on broad-spectrum bacterial antibiotic coverage. CT chest imaging concerning for fungal infection vs metastasis. MICU consulted for further management.  She has now had multiple rapids and ICU consults, and is clinically and symptomatically worse despite 48 hours of broad ABX.       Neuro: no issues completely normal  CV: Despite No sign of hypovolemia and known RV dysfunction she has now recived 8.75L bolus fluid not counting abx, and meds.  POCUS is with IVC of 2.25 with no variation and RV with dialation and poor function with pulmonary pressures by gradient of 60.  No pe on recent CT.  SVT in the setting of fever and not in shock though lactate remains high.   Pulm: progressive infiltrates. Recent CT with tiny nodules. now s/p chemo with basically all the nodules increased ins size and number sugesstive of infection.  Now with new effusions and consolidation on pocus and new hypoxia likely 2/2 progression vs volume overload or etelactasis.   - incentive spirometer,   - Sputum CX if able to produce  - Add voriconozole   - Diureses  GI: regular diet  Renal: hx of ATN with last hospitalization now ok though may need diureses for RV  ID: will change to cefepime especially as she was told she went into starr and needed HD due to abx before, will actually obtain galactomanin.  - hold vanc with negative MRSA though will discuss with ID given her risk would be ok with expanding if recommended  - start vori, clincally concerning for invasive fungal pna based on imaging with negative pet with small nodules now blossomed nodules after chemo  - would consider bronch if risk benefit profile improves give intubation would feroz significant risk with her RV,   Heme: call out pt heme to obtain further records, c/w filgastrim  -no longer leukopenic, now thrombocyto penic still on full a/c though PE is 6 months ago  - obtoain dopllers and consider transition to prophylactic dose, for now just changed back to lovenox bid for full a/c  Endo no issues  Ethics: "I have an 18 year old son I need to live"  She is not in a place to hear about potential situation where desire doesn't matter but understandable.

## 2023-02-17 NOTE — RAPID RESPONSE TEAM SUMMARY - NSOTHERINTERVENTIONSRRT_GEN_ALL_CORE
Primary team called for redraw on all Blood work   If high suspicion for possible fungal infection may initiate x 1 dose of caspofungin tonight and follow up with ID in the am   Please continue to provide cool environment patient refusing cooling measures.   Please consider serial BMP q 8 hrs  IV fluid precautions with RV failure please.  If HCo3 gtt still needed consider Bicitra Po if able.  If anemia would transfuse for goal Hg 7   Check retic Ldh and haptoglobin    Primary team called for redraw on all Blood work   If high suspicion for possible fungal infection may initiate x 1 dose of caspofungin tonight and follow up with ID in the am   Please continue to provide cool environment patient refusing cooling measures.   Please consider serial BMP q 8 hrs  IV fluid precautions with RV failure please.  Please add differential to CBC  If HCo3 gtt still needed consider Bicitra Po if able.  If anemia would transfuse for goal Hg 7   Check retic Ldh and haptoglobin   f/u hem/on for possible Acyclovir PPX

## 2023-02-17 NOTE — PROGRESS NOTE ADULT - NUTRITIONAL ASSESSMENT
MEDICATIONS  (STANDING):  acetaminophen   IVPB .. 1000 milliGRAM(s) IV Intermittent once  cefepime   IVPB      cefepime   IVPB 2000 milliGRAM(s) IV Intermittent every 8 hours  chlorhexidine 2% Cloths 1 Application(s) Topical <User Schedule>  chlorhexidine 4% Liquid 1 Application(s) Topical <User Schedule>  digoxin     Tablet 125 MICROGram(s) Oral daily  enoxaparin Injectable 70 milliGRAM(s) SubCutaneous every 12 hours  midodrine. 10 milliGRAM(s) Oral three times a day  voriconazole 200 milliGRAM(s) Oral every 12 hours

## 2023-02-17 NOTE — PROGRESS NOTE ADULT - SUBJECTIVE AND OBJECTIVE BOX
OZ BARBOSA  MRN#: [id number]  Subjective:  CHIEF COMPLAINT: fever . pulmonary HTN , non hodgkin's lymphoma   48 yo f a private patient from my office report to the ER   w pmh nhl s/p chemo at [facility] [place]  (?in remission), PE and DVT ,S/P  covid pneumonia ,severe pulmonary HTN on Adempas , p/w fevers for the last few days, a/w generalized weakness over the last couple of weeks along with poor po intake. denies chest pain, palpitations, lightheadedness, cough, wheeze, abdominal discomfort. patient does endorse n+v, dysuria, arriaza. of note, last session of chemo reportedly on 2023. Pt grew concerned so went to oncologist yesterday where she got cultures, blood work, and xray. as she did not feel improved, patient presents to [facility] er for further evaluation.  (15 Feb 2023 01:39) CT scan bilateral lung nodule , no H/O OF exposure to TB , no significant travel history to the [facility] out of the country or the [facility] area  transfer to the MICU for SVT patient is on Digoxin and B-Blockers , hypotension rebound       PAST MEDICAL & SURGICAL HISTORY:  Non-Hodgkin&#x27;s lymphoma      Pulmonary embolism      History of appendectomy      History of cholecystectomy                OBJECTIVE:  ICU Vital Signs Last 24 Hrs  T(C): 37.7 (2023 18:00), Max: 39.6 (2023 01:36)  T(F): 99.9 (2023 18:00), Max: 103.2 (2023 01:36)  HR: 122 (2023 18:00) (114 - 167)  BP: 91/63 (2023 18:00) (80/55 - 108/66)  BP(mean): 73 (2023 18:00) (60 - 81)  ABP: --  ABP(mean): --  RR: 29 (2023 18:00) (16 - 33)  SpO2: 97% (2023 18:00) (94% - 100%)    O2 Parameters below as of 2023 02:40  Patient On (Oxygen Delivery Method): room air            02-16 @ 07:01  -  02-17 @ 07:00  --------------------------------------------------------  IN: 1100 mL / OUT: 0 mL / NET: 1100 mL     @ 07:01   @ 19:19  --------------------------------------------------------  IN: 700 mL / OUT: 2600 mL / NET: -1900 mL      PHYSICAL EXAM :Daily   cushinoid female in no acute distress   Daily Weight in k.6 (2023 09:20)  HEENT:     + NCAT  + EOMI  - Conjuctival edema   - Icterus   - Thrush   - ETT  - NGT/OGT  Neck:         + FROM    + JVD     - Nodes     - Masses    + Mid-line trachea   - Tracheostomy  Chest: normal findings  Lungs:          + CTA   + Rhonchi    - Rales    - Wheezing     - Decreased BS   - Dullness R L  Cardiac:       + S1 + S2    + RRR   - Irregular   - S3  - S4  + Murmurs   - Rub   - Hamman’s sign   Abdomen:    + BS     + Soft    + Non-tender     - Distended    - Organomegaly  - PEG  Extremities:   - Cyanosis U/L   - Clubbing  U/L  - LE/UE Edema   + Capillary refill    + Pulses   Neuro:        + Awake   +  Alert   - Confused   - Lethargic   - Sedated   - Generalized Weakness  Skin:        - Rashes    - Erythema   + Normal incisions   + IV sites intact        HOSPITAL MEDICATIONS: All mediciations reviewed and analyzed  MEDICATIONS  (STANDING):  acetaminophen   IVPB .. 1000 milliGRAM(s) IV Intermittent once  cefepime   IVPB      cefepime   IVPB 2000 milliGRAM(s) IV Intermittent every 8 hours  chlorhexidine 2% Cloths 1 Application(s) Topical <User Schedule>  chlorhexidine 4% Liquid 1 Application(s) Topical <User Schedule>  digoxin     Tablet 125 MICROGram(s) Oral daily  enoxaparin Injectable 70 milliGRAM(s) SubCutaneous every 12 hours  midodrine. 10 milliGRAM(s) Oral three times a day  voriconazole 200 milliGRAM(s) Oral every 12 hours    MEDICATIONS  (PRN):  ondansetron Injectable 4 milliGRAM(s) IV Push every 8 hours PRN Nausea and/or Vomiting    LABS: All Lab data reviewed and analyzed                        8.4    10.31 )-----------( 83       ( 2023 01:21 )             26.5    02-    141  |  106  |  12  ----------------------------<  77  4.0   |  19<L>  |  0.89    Ca    8.7      2023 17:21  Phos  3.9     -  Mg     2.1         TPro  5.1<L>  /  Alb  3.2<L>  /  TBili  0.8  /  DBili  x   /  AST  43<H>  /  ALT  36  /  AlkPhos  64  -17     LIVER FUNCTIONS - ( 2023 01:21 )  Alb: 3.2 g/dL / Pro: 5.1 g/dL / ALK PHOS: 64 U/L / ALT: 36 U/L / AST: 43 U/L / GGT: x           RADIOLOGY: - Reviewed and analyzed

## 2023-02-18 LAB
ALBUMIN SERPL ELPH-MCNC: 3.3 G/DL — SIGNIFICANT CHANGE UP (ref 3.3–5)
ALP SERPL-CCNC: 68 U/L — SIGNIFICANT CHANGE UP (ref 40–120)
ALT FLD-CCNC: 54 U/L — HIGH (ref 10–45)
ANION GAP SERPL CALC-SCNC: 15 MMOL/L — SIGNIFICANT CHANGE UP (ref 5–17)
AST SERPL-CCNC: 44 U/L — HIGH (ref 10–40)
BASE EXCESS BLDV CALC-SCNC: -3.9 MMOL/L — LOW (ref -2–3)
BILIRUB SERPL-MCNC: 0.8 MG/DL — SIGNIFICANT CHANGE UP (ref 0.2–1.2)
BUN SERPL-MCNC: 14 MG/DL — SIGNIFICANT CHANGE UP (ref 7–23)
CA-I SERPL-SCNC: 1.2 MMOL/L — SIGNIFICANT CHANGE UP (ref 1.15–1.33)
CALCIUM SERPL-MCNC: 8.7 MG/DL — SIGNIFICANT CHANGE UP (ref 8.4–10.5)
CHLORIDE BLDV-SCNC: 106 MMOL/L — SIGNIFICANT CHANGE UP (ref 96–108)
CHLORIDE SERPL-SCNC: 107 MMOL/L — SIGNIFICANT CHANGE UP (ref 96–108)
CO2 BLDV-SCNC: 23 MMOL/L — SIGNIFICANT CHANGE UP (ref 22–26)
CO2 SERPL-SCNC: 18 MMOL/L — LOW (ref 22–31)
CORTIS AM PEAK SERPL-MCNC: 17.2 UG/DL — SIGNIFICANT CHANGE UP (ref 6–18.4)
CREAT SERPL-MCNC: 0.93 MG/DL — SIGNIFICANT CHANGE UP (ref 0.5–1.3)
CRYPTOC AG FLD QL: NEGATIVE — SIGNIFICANT CHANGE UP
CULTURE RESULTS: NO GROWTH — SIGNIFICANT CHANGE UP
EGFR: 77 ML/MIN/1.73M2 — SIGNIFICANT CHANGE UP
GAS PNL BLDV: 138 MMOL/L — SIGNIFICANT CHANGE UP (ref 136–145)
GAS PNL BLDV: SIGNIFICANT CHANGE UP
GAS PNL BLDV: SIGNIFICANT CHANGE UP
GI PCR PANEL: SIGNIFICANT CHANGE UP
GLUCOSE BLDV-MCNC: 84 MG/DL — SIGNIFICANT CHANGE UP (ref 70–99)
GLUCOSE SERPL-MCNC: 88 MG/DL — SIGNIFICANT CHANGE UP (ref 70–99)
HCO3 BLDV-SCNC: 22 MMOL/L — SIGNIFICANT CHANGE UP (ref 22–29)
HCT VFR BLD CALC: 27 % — LOW (ref 34.5–45)
HCT VFR BLDA CALC: 26 % — LOW (ref 34.5–46.5)
HGB BLD CALC-MCNC: 8.6 G/DL — LOW (ref 11.7–16.1)
HGB BLD-MCNC: 8.3 G/DL — LOW (ref 11.5–15.5)
LACTATE BLDV-MCNC: 2.1 MMOL/L — HIGH (ref 0.5–2)
LIDOCAIN IGE QN: 10 U/L — SIGNIFICANT CHANGE UP (ref 7–60)
MAGNESIUM SERPL-MCNC: 2.1 MG/DL — SIGNIFICANT CHANGE UP (ref 1.6–2.6)
MCHC RBC-ENTMCNC: 27.1 PG — SIGNIFICANT CHANGE UP (ref 27–34)
MCHC RBC-ENTMCNC: 30.7 GM/DL — LOW (ref 32–36)
MCV RBC AUTO: 88.2 FL — SIGNIFICANT CHANGE UP (ref 80–100)
NRBC # BLD: 0 /100 WBCS — SIGNIFICANT CHANGE UP (ref 0–0)
PCO2 BLDV: 40 MMHG — SIGNIFICANT CHANGE UP (ref 39–42)
PH BLDV: 7.34 — SIGNIFICANT CHANGE UP (ref 7.32–7.43)
PHOSPHATE SERPL-MCNC: 3.9 MG/DL — SIGNIFICANT CHANGE UP (ref 2.5–4.5)
PLATELET # BLD AUTO: 91 K/UL — LOW (ref 150–400)
PO2 BLDV: 34 MMHG — SIGNIFICANT CHANGE UP (ref 25–45)
POTASSIUM BLDV-SCNC: 3.4 MMOL/L — LOW (ref 3.5–5.1)
POTASSIUM SERPL-MCNC: 3.5 MMOL/L — SIGNIFICANT CHANGE UP (ref 3.5–5.3)
POTASSIUM SERPL-SCNC: 3.5 MMOL/L — SIGNIFICANT CHANGE UP (ref 3.5–5.3)
PROT SERPL-MCNC: 5.2 G/DL — LOW (ref 6–8.3)
RBC # BLD: 3.06 M/UL — LOW (ref 3.8–5.2)
RBC # FLD: 18.5 % — HIGH (ref 10.3–14.5)
SAO2 % BLDV: 46.6 % — LOW (ref 67–88)
SODIUM SERPL-SCNC: 140 MMOL/L — SIGNIFICANT CHANGE UP (ref 135–145)
SPECIMEN SOURCE: SIGNIFICANT CHANGE UP
WBC # BLD: 11.46 K/UL — HIGH (ref 3.8–10.5)
WBC # FLD AUTO: 11.46 K/UL — HIGH (ref 3.8–10.5)

## 2023-02-18 PROCEDURE — 76604 US EXAM CHEST: CPT | Mod: 26,GC

## 2023-02-18 PROCEDURE — 99291 CRITICAL CARE FIRST HOUR: CPT | Mod: GC

## 2023-02-18 PROCEDURE — 93308 TTE F-UP OR LMTD: CPT | Mod: 26,GC

## 2023-02-18 RX ORDER — POTASSIUM CHLORIDE 20 MEQ
40 PACKET (EA) ORAL ONCE
Refills: 0 | Status: COMPLETED | OUTPATIENT
Start: 2023-02-18 | End: 2023-02-18

## 2023-02-18 RX ORDER — ENOXAPARIN SODIUM 100 MG/ML
70 INJECTION SUBCUTANEOUS EVERY 12 HOURS
Refills: 0 | Status: DISCONTINUED | OUTPATIENT
Start: 2023-02-18 | End: 2023-02-22

## 2023-02-18 RX ORDER — ACETAMINOPHEN 500 MG
1000 TABLET ORAL ONCE
Refills: 0 | Status: COMPLETED | OUTPATIENT
Start: 2023-02-18 | End: 2023-02-18

## 2023-02-18 RX ORDER — FAMOTIDINE 10 MG/ML
20 INJECTION INTRAVENOUS EVERY 12 HOURS
Refills: 0 | Status: DISCONTINUED | OUTPATIENT
Start: 2023-02-18 | End: 2023-02-28

## 2023-02-18 RX ORDER — FUROSEMIDE 40 MG
20 TABLET ORAL ONCE
Refills: 0 | Status: COMPLETED | OUTPATIENT
Start: 2023-02-18 | End: 2023-02-18

## 2023-02-18 RX ORDER — FAMOTIDINE 10 MG/ML
20 INJECTION INTRAVENOUS ONCE
Refills: 0 | Status: COMPLETED | OUTPATIENT
Start: 2023-02-18 | End: 2023-02-18

## 2023-02-18 RX ADMIN — Medication 40 MILLIEQUIVALENT(S): at 07:53

## 2023-02-18 RX ADMIN — CHLORHEXIDINE GLUCONATE 1 APPLICATION(S): 213 SOLUTION TOPICAL at 06:07

## 2023-02-18 RX ADMIN — Medication 1000 MILLIGRAM(S): at 23:35

## 2023-02-18 RX ADMIN — VORICONAZOLE 200 MILLIGRAM(S): 10 INJECTION, POWDER, LYOPHILIZED, FOR SOLUTION INTRAVENOUS at 06:05

## 2023-02-18 RX ADMIN — FAMOTIDINE 20 MILLIGRAM(S): 10 INJECTION INTRAVENOUS at 08:39

## 2023-02-18 RX ADMIN — Medication 125 MICROGRAM(S): at 06:06

## 2023-02-18 RX ADMIN — Medication 400 MILLIGRAM(S): at 23:05

## 2023-02-18 RX ADMIN — Medication 1000 MILLIGRAM(S): at 06:34

## 2023-02-18 RX ADMIN — ENOXAPARIN SODIUM 40 MILLIGRAM(S): 100 INJECTION SUBCUTANEOUS at 06:05

## 2023-02-18 RX ADMIN — ENOXAPARIN SODIUM 70 MILLIGRAM(S): 100 INJECTION SUBCUTANEOUS at 17:21

## 2023-02-18 RX ADMIN — CEFEPIME 100 MILLIGRAM(S): 1 INJECTION, POWDER, FOR SOLUTION INTRAMUSCULAR; INTRAVENOUS at 14:23

## 2023-02-18 RX ADMIN — MIDODRINE HYDROCHLORIDE 10 MILLIGRAM(S): 2.5 TABLET ORAL at 21:17

## 2023-02-18 RX ADMIN — MIDODRINE HYDROCHLORIDE 10 MILLIGRAM(S): 2.5 TABLET ORAL at 06:06

## 2023-02-18 RX ADMIN — Medication 400 MILLIGRAM(S): at 06:05

## 2023-02-18 RX ADMIN — CEFEPIME 100 MILLIGRAM(S): 1 INJECTION, POWDER, FOR SOLUTION INTRAMUSCULAR; INTRAVENOUS at 21:17

## 2023-02-18 RX ADMIN — FAMOTIDINE 20 MILLIGRAM(S): 10 INJECTION INTRAVENOUS at 23:08

## 2023-02-18 RX ADMIN — CEFEPIME 100 MILLIGRAM(S): 1 INJECTION, POWDER, FOR SOLUTION INTRAMUSCULAR; INTRAVENOUS at 06:06

## 2023-02-18 RX ADMIN — CHLORHEXIDINE GLUCONATE 1 APPLICATION(S): 213 SOLUTION TOPICAL at 06:06

## 2023-02-18 RX ADMIN — Medication 20 MILLIGRAM(S): at 10:45

## 2023-02-18 RX ADMIN — VORICONAZOLE 200 MILLIGRAM(S): 10 INJECTION, POWDER, LYOPHILIZED, FOR SOLUTION INTRAVENOUS at 17:21

## 2023-02-18 RX ADMIN — MIDODRINE HYDROCHLORIDE 10 MILLIGRAM(S): 2.5 TABLET ORAL at 11:41

## 2023-02-18 NOTE — PROGRESS NOTE ADULT - SUBJECTIVE AND OBJECTIVE BOX
Patient is a 48y old  Female who presents with a chief complaint of fever (2023 19:19)      SUBJECTIVE / OVERNIGHT EVENTS: Family bed side   Patient  feels ok     MEDICATIONS  (STANDING):  cefepime   IVPB      cefepime   IVPB 2000 milliGRAM(s) IV Intermittent every 8 hours  chlorhexidine 2% Cloths 1 Application(s) Topical <User Schedule>  chlorhexidine 4% Liquid 1 Application(s) Topical <User Schedule>  digoxin     Tablet 125 MICROGram(s) Oral daily  enoxaparin Injectable 70 milliGRAM(s) SubCutaneous every 12 hours  famotidine Injectable 20 milliGRAM(s) IV Push every 12 hours  midodrine. 10 milliGRAM(s) Oral three times a day  voriconazole 200 milliGRAM(s) Oral every 12 hours    MEDICATIONS  (PRN):  ondansetron Injectable 4 milliGRAM(s) IV Push every 8 hours PRN Nausea and/or Vomiting      CAPILLARY BLOOD GLUCOSE        I&O's Summary    2023 07:01  -  2023 07:00  --------------------------------------------------------  IN: 1100 mL / OUT: 2900 mL / NET: -1800 mL    2023 07:01  -  2023 00:14  --------------------------------------------------------  IN: 800 mL / OUT: 1600 mL / NET: -800 mL      T(C): 37.3 (23 @ 20:00), Max: 37.3 (23 @ 20:00)  HR: 125 (23 @ 22:00) (102 - 125)  BP: 111/69 (23 @ 22:00) (98/70 - 111/69)  RR: 27 (23 @ 22:00) (20 - 30)  SpO2: 96% (23 @ 22:00) (95% - 99%)    PHYSICAL EXAM:  GENERAL: NAD, well-developed  HEAD:  Atraumatic, Normocephalic  EYES: EOMI, PERRLA, conjunctiva and sclera clear  NECK: Supple, No JVD  CHEST/LUNG: Clear to auscultation bilaterally; No wheeze  HEART: Regular rate and rhythm; No murmurs, rubs, or gallops  ABDOMEN: Soft, Nontender, Nondistended; Bowel sounds present  EXTREMITIES:  2+ Peripheral Pulses, No clubbing, cyanosis, or edema  PSYCH: AAOx3  NEUROLOGY: non-focal  SKIN: No rashes or lesions    LABS:                        8.3    11.46 )-----------( 91       ( 2023 00:33 )             27.0     02-18    140  |  107  |  14  ----------------------------<  88  3.5   |  18<L>  |  0.93    Ca    8.7      2023 00:32  Phos  3.9     02-18  Mg     2.1     02-18    TPro  5.2<L>  /  Alb  3.3  /  TBili  0.8  /  DBili  x   /  AST  44<H>  /  ALT  54<H>  /  AlkPhos  68  02-18      CARDIAC MARKERS ( 2023 01:21 )  x     / x     / 24 U/L / x     / 1.9 ng/mL      Urinalysis Basic - ( 2023 10:00 )    Color: Yellow / Appearance: Clear / S.022 / pH: x  Gluc: x / Ketone: Negative  / Bili: Negative / Urobili: Negative   Blood: x / Protein: Trace / Nitrite: Negative   Leuk Esterase: Negative / RBC: 3 /hpf / WBC 4 /HPF   Sq Epi: x / Non Sq Epi: 4 /hpf / Bacteria: Negative        RADIOLOGY & ADDITIONAL TESTS:    Imaging Personally Reviewed:    Consultant(s) Notes Reviewed:      Care Discussed with Consultants/Other Providers:

## 2023-02-18 NOTE — PROGRESS NOTE ADULT - ASSESSMENT
46 yo f w pmh nhl s/p chemo (?in remission), pe, ?pah, covid, p/w fevers for the last few days, a/w generalized weakness over the last couple of weeks along with poor po intake. denies chest pain, palpitations, lightheadedness, cough, wheeze, abdominal discomfort. patient does endorse n+v, dysuria, arriaza. of note, last session of chemo reportedly on 1/23/2023. Pt grew concerned so went to oncologist yesterday where she got cultures, blood work, and xray. as she did not feel improved, patient presents to [facility] er for further evaluation.  (15 Feb 2023 01:39)  BP monitoring,continue current antihypertensive meds, low salt diet,followup with PMD in 1-2 weeks        Serum creatinine is stable at 0.8, approximating a GFR of is controlled  ml/min.   There is no progression.  No uremic symptoms. pos  evidence of  worsening  Anemia. Fluid status stable.   Will continue to avoid nephrotoxic drugs.  Patient remains asymptomatic.  Continue current therapy.      BP monitoring,continue current , low salt diet,followup with PMD in 1-2 weeks    Admit for septic workup and ID evaluation,send blood and urine cx,serial lactate levels,monitor vitals closley,ivfs hydration,monitor urine output and renal profile,iv abx as per id cons\  cefepime   IVPB      cefepime   IVPB 2000 milliGRAM(s) IV Intermittent every 8 hours  voriconazole 200 milliGRAM(s) Oral every 12 hours    dvt enoxaparin Injectable 70 milliGRAM(s) SubCutaneous every 12 hours       46 yo f w pmh nhl s/p chemo (?in remission), pe, ?pah, covid, p/w fevers for the last few days, a/w generalized weakness over the last couple of weeks along with poor po intake. denies chest pain, palpitations, lightheadedness, cough, wheeze, abdominal discomfort. patient does endorse n+v, dysuria, arriaza. of note, last session of chemo reportedly on 1/23/2023. Pt grew concerned so went to oncologist yesterday where she got cultures, blood work, and xray. as she did not feel improved, patient presents to [facility] er for further evaluation.  (15 Feb 2023 01:39)  BP monitoring,continue current antihypertensive meds, low salt diet,followup with PMD in 1-2 weeks      pt had letitia and pt was on hemodialysis for w eeks and s/p cvvh at [facility] now gfr is controlled   Serum creatinine is stable at 0.8, approximating a GFR of is controlled  ml/min.   There is no progression.  No uremic symptoms. pos  evidence of  worsening  Anemia. Fluid status stable.   Will continue to avoid nephrotoxic drugs.  Patient remains asymptomatic.  Continue current therapy.      BP monitoring,continue current , low salt diet,followup with PMD in 1-2 weeks    Admit for septic workup and ID evaluation,send blood and urine cx,serial lactate levels,monitor vitals closley,ivfs hydration,monitor urine output and renal profile,iv abx as per id cons\  cefepime   IVPB      cefepime   IVPB 2000 milliGRAM(s) IV Intermittent every 8 hours  voriconazole 200 milliGRAM(s) Oral every 12 hours    dvt enoxaparin Injectable 70 milliGRAM(s) SubCutaneous every 12 hours

## 2023-02-18 NOTE — PROGRESS NOTE ADULT - ASSESSMENT
Assessment and Recommendation:   · Assessment	  Assessment and recommendation :  Non hodgkin's lymphoma ? in remission   Fever etiology infection V/S lymphoma  ? 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

## 2023-02-18 NOTE — CHART NOTE - NSCHARTNOTEFT_GEN_A_CORE
: Radha Matamoros    INDICATION: critical illness    PROCEDURE:  [x] LIMITED ECHO  [x ] LIMITED CHEST  [ ] LIMITED RETROPERITONEAL  [ ] LIMITED ABDOMINAL  [ ] LIMITED DVT  [ ] NEEDLE GUIDANCE VASCULAR  [ ] NEEDLE GUIDANCE THORACENTESIS  [ ] NEEDLE GUIDANCE PARACENTESIS  [ ] NEEDLE GUIDANCE PERICARDIOCENTESIS  [ ] OTHER    FINDINGS:  LVSF normal  RV enlarged, > LV with septal bowing  IVC 1.9 cm   A lines bilaterally  No pleural effusion      INTERPRETATION:  RV enlarged with septal bowing but improved from day prior, continue diuresis       Images uploaded on [employer] Path : Radha Matamoros    INDICATION: critical illness    PROCEDURE:  [x] LIMITED ECHO  [x ] LIMITED CHEST  [ ] LIMITED RETROPERITONEAL  [ ] LIMITED ABDOMINAL  [ ] LIMITED DVT  [ ] NEEDLE GUIDANCE VASCULAR  [ ] NEEDLE GUIDANCE THORACENTESIS  [ ] NEEDLE GUIDANCE PARACENTESIS  [ ] NEEDLE GUIDANCE PERICARDIOCENTESIS  [ ] OTHER    FINDINGS:  LVSF normal  RV enlarged, > LV with septal bowing  IVC 1.9 cm   A lines bilaterally  No pleural effusion      INTERPRETATION:  RV enlarged with septal bowing but improved from day prior, continue diuresis       Images uploaded on [employer]    Attending Addendum:     I was present during the entire procedure and agree with the above findings and interpretation. TIme spent on the procedure was separate from the critical care time spent caring for the patient.     Ted King MD

## 2023-02-18 NOTE — PROGRESS NOTE ADULT - ASSESSMENT
47 F PMH non-Hodgkin lymphoma s/p chemoport (?in remission), PE, pHTN, presenting with recurrent fevers with pancytopenia in setting of recent chemo treatment and prednisone concerning for severe sepsis. Found to be requiring intermittent fluids and continues to be febrile while on broad-spectrum bacterial antibiotic coverage. CT chest imaging concerning for fungal infection vs metastasis. MICU consulted for further management.      PLAN    =====Neurologic=====  - No active issues, AOx4    =====Pulmonary=====  # NHL with pulmonary nodules  - as in heme-onc section below    # workup for pna  - as in ID section below    # pulmonary arterial HTN  > RV systolic pressure =51mmHg on TTE  - diurese    =====Cardiovascular=====  # HFrEF (EF ~42) with RV dysfunction based on previous echo (2/15/23)  - Bedside POCUS noted to have dilated RV  > Caution with fluids  > DC-ed coreg   > Diurese    # SVT  > digoxin 125 mcg po qD (home medication)  > follow up digoxin level    # hypotension  - Stable  - possibly multifactorial, with component of RV overload and possible sepsis  - s/p IV albumin x1  > midodrine 10 mg po tid    =====GI=====  #Diet - regular, 1500cc fluid restriction    =====Renal/=====  - PMH STARR while on antibiotics  > appreciate renal recs    =====Endocrine=====  - No active issues.     =====Infectious Disease=====  # severe sepsis with unclear etiology in setting of known malginancy and recent chemotx for cancer   - iso immunocompromise, hx and CT chest imaging c/f infection (e.g. fungal pna) vs progression of known cancer  > switched Zosyn to cefepime (2/17-)  > MRSA swab negative, vanc dc'd  > started voriconazole 200mg po bid  > appreciate ID recs re: voriconazole; hold off antiviral given low suspicion for viral infection (RVP negative)  > supportive care: Tylenol and cooling measures (if patient agrees as she reported that cooling measures made her uncomfortable)  > follow up BCx, UA, UCx  > Aspergillus galactomannan Ag, Aspergillus Ab, crypt, histo  > appreciate ID recs    =====Heme/Onc=====  # DLBCL  > appreciate heme-onc recs    #leukopenia on filgastrim  #anemia  #thrombocytopenia  - possibly from recent chemo and cancer  > Hb goal >7  > Zarxio per heme-onc (5-day course)  > Trend Plt    #DVT PPX  > Given hx of PE and large RV will continue with therapeutic lovenox    =====Ethics=====  FULL CODE.

## 2023-02-18 NOTE — PROGRESS NOTE ADULT - ASSESSMENT
Echo 2/15/23: Global lv dysfxn EF 42%, septal flattening, RV overload, mod pulm htn    A/P  48 yo f w pmh nhl s/p chemo (?in remission), pe, ?pah, covid, p/w fevers for the last few days, a/w generalized weakness, found to be in severe sepsis and w/ episode of SVT to 170s.    #SVT  -Pt with known hx of SVT  -Has been evaluated in past, was not candidate for ablation d/t chemo  -Continue digoxin  -Hypotension precludes bb use  -event toprol if bp improved  -If persists and bp remains low can consider amio gtt and oral load    #HFrEF, RV failure  -Echo with global lv dysfxn  -likely nonischemic in etiology  -diuretics per icu   -hypotension precludes lv dysfxn meds    #Sepsis, r/o fungemia   -Lung nodules noted on CT, c/f fungal infection  -W/u & abx per micu, ID    #Hypotension  -I/S/O sepsis, HF  -Midodrine, pressors per micu    #Leukopenia  -Hx NHL  -Heme/onc following    35 minutes spent on total encounter; more than 50% of the visit was spent counseling and/or coordinating care by the attending physician.

## 2023-02-18 NOTE — PROGRESS NOTE ADULT - SUBJECTIVE AND OBJECTIVE BOX
Patient is a 47y Female whom presented to the hospital with abnormal electrolyte     PAST MEDICAL & SURGICAL HISTORY:  Non-Hodgkin&#x27;s lymphoma      Pulmonary embolism      History of appendectomy      History of cholecystectomy          MEDICATIONS  (STANDING):  apixaban 5 milliGRAM(s) Oral every 12 hours  chlorhexidine 2% Cloths 1 Application(s) Topical <User Schedule>  digoxin     Tablet 125 MICROGram(s) Oral daily  filgrastim-sndz (ZARXIO) Injectable 480 MICROGram(s) SubCutaneous daily  midodrine. 10 milliGRAM(s) Oral three times a day  piperacillin/tazobactam IVPB.. 3.375 Gram(s) IV Intermittent every 8 hours  potassium chloride    Tablet ER 40 milliEquivalent(s) Oral once  sodium bicarbonate  Infusion 0.086 mEq/kG/Hr (75 mL/Hr) IV Continuous <Continuous>  vancomycin  IVPB 1000 milliGRAM(s) IV Intermittent every 12 hours      Allergies    No Known Allergies    Intolerances        SOCIAL HISTORY:  Denies ETOh,Smoking,     FAMILY HISTORY:      REVIEW OF SYSTEMS:    CONSTITUTIONAL: No weakness, fevers or chills  RESPIRATORY: No cough, wheezing, hemoptysis; No shortness of breath  CARDIOVASCULAR: No chest pain or palpitations  GASTROINTESTINAL: No abdominal or epigastric pain. No nausea, vomiting,     No diarrhea or constipation. No melena   SKIN: dry    MEDICATIONS  (STANDING):  cefepime   IVPB      cefepime   IVPB 2000 milliGRAM(s) IV Intermittent every 8 hours  chlorhexidine 2% Cloths 1 Application(s) Topical <User Schedule>  chlorhexidine 4% Liquid 1 Application(s) Topical <User Schedule>  digoxin     Tablet 125 MICROGram(s) Oral daily  enoxaparin Injectable 70 milliGRAM(s) SubCutaneous every 12 hours  midodrine. 10 milliGRAM(s) Oral three times a day  voriconazole 200 milliGRAM(s) Oral every 12 hours        PHYSICAL EXAM:    Constitutional: NAD  HEENT: conjunctive   clear   Neck:  No JVD  Respiratory: CTAB  Cardiovascular: S1 and S2  Gastrointestinal: BS+, soft, NT/ND  Extremities: No peripheral edema  Neurological:  no focal deficits  Psychiatric: Normal mood, normal affect  : No Roman  Skin: dry   Access: Not applicable

## 2023-02-18 NOTE — PROGRESS NOTE ADULT - SUBJECTIVE AND OBJECTIVE BOX
Ender Francois, PGY3  Internal Medicine  Pager [phone] ([facility])    OVERNIGHT EVENTS / SUBJECTIVE: Patient seen and examined at bedside. No acute events overnight. Good diuresis. 1.8L negative.    OBJECTIVE:    VITAL SIGNS:  ICU Vital Signs Last 24 Hrs  T(C): 38 (2023 06:00), Max: 38 (2023 06:00)  T(F): 100.4 (2023 06:00), Max: 100.4 (2023 06:00)  HR: 138 (2023 06:00) (105 - 138)  BP: 85/54 (2023 06:00) (80/55 - 108/66)  BP(mean): 64 (2023 06:00) (60 - 82)  ABP: --  ABP(mean): --  RR: 30 (2023 06:00) (14 - 32)  SpO2: 93% (2023 06:00) (93% - 99%)    O2 Parameters below as of 2023 20:00  Patient On (Oxygen Delivery Method): room air    02-17 @ 07:01  -  02-18 @ 07:00  --------------------------------------------------------  IN: 1100 mL / OUT: 2900 mL / NET: -1800 mL      CAPILLARY BLOOD GLUCOSE      POCT Blood Glucose.: 97 mg/dL (2023 23:22)      PHYSICAL EXAM:    General: NAD  HEENT: NC/AT; PERRL, clear conjunctiva  Neck: supple  Respiratory: CTA b/l  Cardiovascular: Tachycardic, regular rhythm. Mediport in R. upper chest wall  Abdomen: soft, NT/ND; +BS x4  Extremities: WWP, 2+ peripheral pulses b/l; no LE edema  Skin: normal color and turgor; no rash  Neurological: A&Ox4    MEDICATIONS:  MEDICATIONS  (STANDING):  cefepime   IVPB      cefepime   IVPB 2000 milliGRAM(s) IV Intermittent every 8 hours  chlorhexidine 2% Cloths 1 Application(s) Topical <User Schedule>  chlorhexidine 4% Liquid 1 Application(s) Topical <User Schedule>  digoxin     Tablet 125 MICROGram(s) Oral daily  enoxaparin Injectable 70 milliGRAM(s) SubCutaneous every 12 hours  midodrine. 10 milliGRAM(s) Oral three times a day  potassium chloride    Tablet ER 40 milliEquivalent(s) Oral once  voriconazole 200 milliGRAM(s) Oral every 12 hours    MEDICATIONS  (PRN):  ondansetron Injectable 4 milliGRAM(s) IV Push every 8 hours PRN Nausea and/or Vomiting      ALLERGIES:  Allergies    No Known Allergies    Intolerances        LABS:                        8.3    11.46 )-----------( 91       ( 2023 00:33 )             27.0     Hemoglobin: 8.3 g/dL ( @ 00:33)  Hemoglobin: 8.4 g/dL ( @ 01:21)  Hemoglobin: 6.5 g/dL ( @ 23:54)  Hemoglobin: 7.7 g/dL ( @ 04:17)  Hemoglobin: 8.0 g/dL (02-15 @ 19:41)    CBC Full  -  ( 2023 00:33 )  WBC Count : 11.46 K/uL  RBC Count : 3.06 M/uL  Hemoglobin : 8.3 g/dL  Hematocrit : 27.0 %  Platelet Count - Automated : 91 K/uL  Mean Cell Volume : 88.2 fl  Mean Cell Hemoglobin : 27.1 pg  Mean Cell Hemoglobin Concentration : 30.7 gm/dL  Auto Neutrophil # : x  Auto Lymphocyte # : x  Auto Monocyte # : x  Auto Eosinophil # : x  Auto Basophil # : x  Auto Neutrophil % : x  Auto Lymphocyte % : x  Auto Monocyte % : x  Auto Eosinophil % : x  Auto Basophil % : x        140  |  107  |  14  ----------------------------<  88  3.5   |  18<L>  |  0.93    Ca    8.7      2023 00:32  Phos  3.9       Mg     2.1         TPro  5.2<L>  /  Alb  3.3  /  TBili  0.8  /  DBili  x   /  AST  44<H>  /  ALT  54<H>  /  AlkPhos  68      Creatinine Trend: 0.93<--, 0.89<--, 0.95<--, 0.39<--, 0.83<--, 0.78<--  LIVER FUNCTIONS - ( 2023 00:32 )  Alb: 3.3 g/dL / Pro: 5.2 g/dL / ALK PHOS: 68 U/L / ALT: 54 U/L / AST: 44 U/L / GGT: x               hs Troponin:                34 <<== - @ 01:21                16 <<== 02- @ 23:51        00:22 - VBG - pH: 7.34  | pCO2: 40    | pO2: 34    | Lactate: 2.1        Urinalysis Basic - ( 2023 10:00 )    Color: Yellow / Appearance: Clear / S.022 / pH: x  Gluc: x / Ketone: Negative  / Bili: Negative / Urobili: Negative   Blood: x / Protein: Trace / Nitrite: Negative   Leuk Esterase: Negative / RBC: 3 /hpf / WBC 4 /HPF   Sq Epi: x / Non Sq Epi: 4 /hpf / Bacteria: Negative      CSF:                      EKG:   MICROBIOLOGY:    Culture - Sputum (collected 2023 10:00)  Source: .Sputum Sputum  Gram Stain (2023 21:52):    Rare polymorphonuclear leukocytes per low power field    Rare Squamous epithelial cells per low power field    Rare Gram Negative Rods per oil power field    Culture - Blood (collected 2023 23:35)  Source: .Blood Blood-Peripheral  Preliminary Report (2023 04:02):    No growth to date.    Culture - Blood (collected 2023 04:17)  Source: .Blood Blood-Peripheral  Preliminary Report (2023 12:01):    No growth to date.    Culture - Blood (collected 2023 04:17)  Source: .Blood Blood-Peripheral  Preliminary Report (2023 07:02):    No growth to date.      IMAGING:      Labs, imaging, EKG personally reviewed    RADIOLOGY & ADDITIONAL TESTS: Reviewed.

## 2023-02-18 NOTE — PROGRESS NOTE ADULT - SUBJECTIVE AND OBJECTIVE BOX
CARDIOLOGY FOLLOW UP NOTE - DR. FULTON    Patient Name: OZ BARBOSA  Date of Service: 02-18-23 @ 14:21    events noted  await tx from icu   Subjective:    cv: denies chest pain, + dyspnea, + palpitations, dizziness  pulmonary: denies cough  GI: denies abdominal pain, nausea, vomiting  vascular/legs: no edema   skin: no rash  ROS: otherwise negative   overnight events:      PHYSICAL EXAM:  T(C): 37 (02-18-23 @ 08:00), Max: 38 (02-18-23 @ 06:00)  HR: 106 (02-18-23 @ 14:00) (102 - 138)  BP: 100/66 (02-18-23 @ 14:00) (84/57 - 108/57)  RR: 24 (02-18-23 @ 14:00) (14 - 31)  SpO2: 98% (02-18-23 @ 14:00) (93% - 99%)  Wt(kg): --  I&O's Summary    17 Feb 2023 07:01  -  18 Feb 2023 07:00  --------------------------------------------------------  IN: 1100 mL / OUT: 2900 mL / NET: -1800 mL    18 Feb 2023 07:01  -  18 Feb 2023 14:21  --------------------------------------------------------  IN: 0 mL / OUT: 400 mL / NET: -400 mL      Daily     Daily     Appearance: Normal	  Cardiovascular: Normal S1 S2, tachy   Respiratory: Lungs clear to auscultation	  Gastrointestinal:  Soft, Non-tender, + BS	  Extremities: Normal range of motion, No clubbing, +edema      Home Medications:  digoxin 125 mcg (0.125 mg) oral tablet: 1 tab(s) orally once a day (15 Feb 2023 02:23)  Eliquis 5 mg oral tablet: 1 tab(s) orally 2 times a day (15 Feb 2023 02:23)  metoprolol succinate 25 mg oral tablet, extended release: 1 tab(s) orally once a day (15 Feb 2023 02:23)  selexipag 800 mcg oral tablet: 1 tab(s) orally 2 times a day (15 Feb 2023 02:23)      MEDICATIONS  (STANDING):  cefepime   IVPB      cefepime   IVPB 2000 milliGRAM(s) IV Intermittent every 8 hours  chlorhexidine 2% Cloths 1 Application(s) Topical <User Schedule>  chlorhexidine 4% Liquid 1 Application(s) Topical <User Schedule>  digoxin     Tablet 125 MICROGram(s) Oral daily  enoxaparin Injectable 70 milliGRAM(s) SubCutaneous every 12 hours  midodrine. 10 milliGRAM(s) Oral three times a day  voriconazole 200 milliGRAM(s) Oral every 12 hours      TELEMETRY: 	    ECG:  	  RADIOLOGY:   DIAGNOSTIC TESTING:  [ ] Echocardiogram:  [ ] Catheterization:  [ ] Stress Test:    OTHER: 	    LABS:	 	    CARDIAC MARKERS:        Troponin T, High Sensitivity Result: 34 ng/L (02-17 @ 01:21)  Troponin T, High Sensitivity Result: 16 ng/L (02-16 @ 23:51)  Troponin T, High Sensitivity Result: 43 ng/L (02-16 @ 03:01)                                8.3    11.46 )-----------( 91       ( 18 Feb 2023 00:33 )             27.0     02-18    140  |  107  |  14  ----------------------------<  88  3.5   |  18<L>  |  0.93    Ca    8.7      18 Feb 2023 00:32  Phos  3.9     02-18  Mg     2.1     02-18    TPro  5.2<L>  /  Alb  3.3  /  TBili  0.8  /  DBili  x   /  AST  44<H>  /  ALT  54<H>  /  AlkPhos  68  02-18    proBNP:     Lipid Profile:   HgA1c:     Creatinine, Serum: 0.93 mg/dL (02-18-23 @ 00:32)  Creatinine, Serum: 0.89 mg/dL (02-17-23 @ 17:21)  Creatinine, Serum: 0.95 mg/dL (02-17-23 @ 01:21)  Creatinine, Serum: 0.39 mg/dL (02-16-23 @ 23:51)  Creatinine, Serum: 0.83 mg/dL (02-16-23 @ 06:56)  Creatinine, Serum: 0.78 mg/dL (02-16-23 @ 04:17)

## 2023-02-18 NOTE — PROGRESS NOTE ADULT - SUBJECTIVE AND OBJECTIVE BOX
OZ BARBOSA  MRN#: [id number]  Subjective:  CHIEF COMPLAINT: fever . pulmonary HTN , non hodgkin's lymphoma   46 yo f a private patient from my office report to the ER   w pmh nhl s/p chemo at [facility] [place]  (?in remission), PE and DVT ,S/P  covid pneumonia ,severe pulmonary HTN on Adempas , p/w fevers for the last few days, a/w generalized weakness over the last couple of weeks along with poor po intake. denies chest pain, palpitations, lightheadedness, cough, wheeze, abdominal discomfort. patient does endorse n+v, dysuria, arriaza. of note, last session of chemo reportedly on 2023. Pt grew concerned so went to oncologist yesterday where she got cultures, blood work, and xray. as she did not feel improved, patient presents to [facility] er for further evaluation.  (15 Feb 2023 01:39) CT scan bilateral lung nodule , no H/O OF exposure to TB , no significant travel history to the [facility] out of the country or the [facility] area  transfer to the MICU for SVT patient is on Digoxin and B-Blockers , hypotension rebound back feeling better on cefepime , and voriconazole for presumptive fungal pneumonia , continue to have  fever at night        PAST MEDICAL & SURGICAL HISTORY:  Non-Hodgkin&#x27;s lymphoma      Pulmonary embolism      History of appendectomy      History of cholecystectomy                OBJECTIVE:  ICU Vital Signs Last 24 Hrs  T(C): 37.7 (2023 18:00), Max: 39.6 (2023 01:36)  T(F): 99.9 (2023 18:00), Max: 103.2 (2023 01:36)  HR: 122 (2023 18:00) (114 - 167)  BP: 91/63 (2023 18:00) (80/55 - 108/66)  BP(mean): 73 (2023 18:00) (60 - 81)  ABP: --  ABP(mean): --  RR: 29 (2023 18:00) (16 - 33)  SpO2: 97% (2023 18:00) (94% - 100%)    O2 Parameters below as of 2023 02:40  Patient On (Oxygen Delivery Method): room air             @ 07: @ 07:00  --------------------------------------------------------  IN: 1100 mL / OUT: 0 mL / NET: 1100 mL     @ 07:01   @ 19:19  --------------------------------------------------------  IN: 700 mL / OUT: 2600 mL / NET: -1900 mL      PHYSICAL EXAM :Daily   cushinoid female in no acute distress   Daily Weight in k.6 (2023 09:20)  HEENT:     + NCAT  + EOMI  - Conjuctival edema   - Icterus   - Thrush   - ETT  - NGT/OGT  Neck:         + FROM    + JVD     - Nodes     - Masses    + Mid-line trachea   - Tracheostomy  Chest: normal findings  Lungs:          + CTA   + Rhonchi    - Rales    - Wheezing     - Decreased BS   - Dullness R L  Cardiac:       + S1 + S2    + RRR   - Irregular   - S3  - S4  + Murmurs   - Rub   - Hamman’s sign   Abdomen:    + BS     + Soft    + Non-tender     - Distended    - Organomegaly  - PEG  Extremities:   - Cyanosis U/L   - Clubbing  U/L  - LE/UE Edema   + Capillary refill    + Pulses   Neuro:        + Awake   +  Alert   - Confused   - Lethargic   - Sedated   - Generalized Weakness  Skin:        - Rashes    - Erythema   + Normal incisions   + IV sites intact        HOSPITAL MEDICATIONS: All mediciations reviewed and analyzed  MEDICATIONS  (STANDING):  acetaminophen   IVPB .. 1000 milliGRAM(s) IV Intermittent once  cefepime   IVPB      cefepime   IVPB 2000 milliGRAM(s) IV Intermittent every 8 hours  chlorhexidine 2% Cloths 1 Application(s) Topical <User Schedule>  chlorhexidine 4% Liquid 1 Application(s) Topical <User Schedule>  digoxin     Tablet 125 MICROGram(s) Oral daily  enoxaparin Injectable 70 milliGRAM(s) SubCutaneous every 12 hours  midodrine. 10 milliGRAM(s) Oral three times a day  voriconazole 200 milliGRAM(s) Oral every 12 hours    MEDICATIONS  (PRN):  ondansetron Injectable 4 milliGRAM(s) IV Push every 8 hours PRN Nausea and/or Vomiting    LABS: All Lab data reviewed and analyzed                        8.4    10.31 )-----------( 83       ( 2023 01:21 )             26.5        141  |  106  |  12  ----------------------------<  77  4.0   |  19<L>  |  0.89    Ca    8.7      2023 17:21  Phos  3.9       Mg     2.1         TPro  5.1<L>  /  Alb  3.2<L>  /  TBili  0.8  /  DBili  x   /  AST  43<H>  /  ALT  36  /  AlkPhos  64       LIVER FUNCTIONS - ( 2023 01:21 )  Alb: 3.2 g/dL / Pro: 5.1 g/dL / ALK PHOS: 64 U/L / ALT: 36 U/L / AST: 43 U/L / GGT: x           RADIOLOGY: - Reviewed and analyzed

## 2023-02-18 NOTE — PROGRESS NOTE ADULT - ASSESSMENT
Se[psis  Hypotension resolved     iv abx Antifungals  Blood cx no growth to date   Pulm following     Plan as per ICU

## 2023-02-18 NOTE — CHART NOTE - NSCHARTNOTEFT_GEN_A_CORE
MICU Transfer Note    Transfer from: MICU  Transfer to:  (  ) Medicine    (  ) Telemetry    (  ) RCU    (  ) Palliative    (  ) Stroke Unit    (  ) _______________    Accepting physician:    HPI:  48 yo f w pmh nhl s/p chemo (?in remission), pe, ?pah, covid, p/w fevers for the last few days, a/w generalized weakness over the last couple of weeks along with poor po intake. denies chest pain, palpitations, lightheadedness, cough, wheeze, abdominal discomfort. patient does endorse n+v, dysuria, arriaza. of note, last session of chemo reportedly on 1/23/2023. Pt grew concerned so went to oncologist yesterday where she got cultures, blood work, and xray. as she did not feel improved, patient presents to [facility] er for further evaluation.  (15 Feb 2023 01:39)     Accepted to MICU on 2/17 for lactic acidosis and borderline blood pressures.    MICU COURSE:  Started on voriconazole for possible fungal pneumonia. Patient appropriately diuresed for RV overload. Serial POCUS evaluations showed improvement in RV dilation with diuresis. Collateral was obtained from outpatient records         ASSESSMENT & PLAN:         For Follow-Up:          Vital Signs Last 24 Hrs  T(C): 37 (18 Feb 2023 08:00), Max: 38 (18 Feb 2023 06:00)  T(F): 98.6 (18 Feb 2023 08:00), Max: 100.4 (18 Feb 2023 06:00)  HR: 108 (18 Feb 2023 11:00) (105 - 138)  BP: 93/57 (18 Feb 2023 11:00) (84/57 - 108/57)  BP(mean): 70 (18 Feb 2023 11:00) (64 - 82)  RR: 24 (18 Feb 2023 11:00) (14 - 30)  SpO2: 96% (18 Feb 2023 11:00) (93% - 99%)    Parameters below as of 18 Feb 2023 08:00  Patient On (Oxygen Delivery Method): room air      I&O's Summary    17 Feb 2023 07:01  -  18 Feb 2023 07:00  --------------------------------------------------------  IN: 1100 mL / OUT: 2900 mL / NET: -1800 mL    18 Feb 2023 07:01  -  18 Feb 2023 12:54  --------------------------------------------------------  IN: 0 mL / OUT: 400 mL / NET: -400 mL    MEDICATIONS  (STANDING):  cefepime   IVPB      cefepime   IVPB 2000 milliGRAM(s) IV Intermittent every 8 hours  chlorhexidine 2% Cloths 1 Application(s) Topical <User Schedule>  chlorhexidine 4% Liquid 1 Application(s) Topical <User Schedule>  digoxin     Tablet 125 MICROGram(s) Oral daily  enoxaparin Injectable 70 milliGRAM(s) SubCutaneous every 12 hours  midodrine. 10 milliGRAM(s) Oral three times a day  voriconazole 200 milliGRAM(s) Oral every 12 hours    MEDICATIONS  (PRN):  ondansetron Injectable 4 milliGRAM(s) IV Push every 8 hours PRN Nausea and/or Vomiting        LABS                                            8.3                   Neurophils% (auto):   x      (02-18 @ 00:33):    11.46)-----------(91           Lymphocytes% (auto):  x                                             27.0                   Eosinphils% (auto):   x        Manual%: Neutrophils x    ; Lymphocytes x    ; Eosinophils x    ; Bands%: x    ; Blasts x                                    140    |  107    |  14                  Calcium: 8.7   / iCa: x      (02-18 @ 00:32)    ----------------------------<  88        Magnesium: 2.1                              3.5     |  18     |  0.93             Phosphorous: 3.9      TPro  5.2    /  Alb  3.3    /  TBili  0.8    /  DBili  x      /  AST  44     /  ALT  54     /  AlkPhos  68     18 Feb 2023 00:32        Ender Francois MD  [facility]    Transfer from: MICU  Transfer to:  (  ) Medicine    (  ) Telemetry    (  ) RCU    (  ) Palliative    (  ) Stroke Unit    (  ) _______________    Accepting physician:    HPI:  48 yo f w pmh nhl s/p chemo (?in remission), pe, ?pah, covid, p/w fevers for the last few days, a/w generalized weakness over the last couple of weeks along with poor po intake. denies chest pain, palpitations, lightheadedness, cough, wheeze, abdominal discomfort. patient does endorse n+v, dysuria, arriaza. of note, last session of chemo reportedly on 1/23/2023. Pt grew concerned so went to oncologist yesterday where she got cultures, blood work, and xray. as she did not feel improved, patient presents to [facility] er for further evaluation.  (15 Feb 2023 01:39)     Accepted to MICU on 2/17 for lactic acidosis and borderline blood pressures.    MICU COURSE:  Started on voriconazole for possible fungal pneumonia. Patient appropriately diuresed for RV overload. Serial POCUS evaluations showed improvement in RV dilation with diuresis. Collateral was obtained from outpatient records [facility] from 9/23/22 PA 55/10/30, wedge 10 ,CO: 4.7, CI 2.5, PVR 3.4. Functional Class III. Was previously on Adempas 0.5mg TID and Uptravi 800mcg BID. 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 CT scan from 9/5/22 was compared with findings of LLL mass measuring 3.3x3.1cm, and R. upper lobe subpleural nodule 0.2cm and RLL subpleural groundglass nodule 0.2cm.        ASSESSMENT & PLAN:         For Follow-Up:          Vital Signs Last 24 Hrs  T(C): 37 (18 Feb 2023 08:00), Max: 38 (18 Feb 2023 06:00)  T(F): 98.6 (18 Feb 2023 08:00), Max: 100.4 (18 Feb 2023 06:00)  HR: 108 (18 Feb 2023 11:00) (105 - 138)  BP: 93/57 (18 Feb 2023 11:00) (84/57 - 108/57)  BP(mean): 70 (18 Feb 2023 11:00) (64 - 82)  RR: 24 (18 Feb 2023 11:00) (14 - 30)  SpO2: 96% (18 Feb 2023 11:00) (93% - 99%)    Parameters below as of 18 Feb 2023 08:00  Patient On (Oxygen Delivery Method): room air      I&O's Summary    17 Feb 2023 07:01  -  18 Feb 2023 07:00  --------------------------------------------------------  IN: 1100 mL / OUT: 2900 mL / NET: -1800 mL    18 Feb 2023 07:01  -  18 Feb 2023 12:54  --------------------------------------------------------  IN: 0 mL / OUT: 400 mL / NET: -400 mL    MEDICATIONS  (STANDING):  cefepime   IVPB      cefepime   IVPB 2000 milliGRAM(s) IV Intermittent every 8 hours  chlorhexidine 2% Cloths 1 Application(s) Topical <User Schedule>  chlorhexidine 4% Liquid 1 Application(s) Topical <User Schedule>  digoxin     Tablet 125 MICROGram(s) Oral daily  enoxaparin Injectable 70 milliGRAM(s) SubCutaneous every 12 hours  midodrine. 10 milliGRAM(s) Oral three times a day  voriconazole 200 milliGRAM(s) Oral every 12 hours    MEDICATIONS  (PRN):  ondansetron Injectable 4 milliGRAM(s) IV Push every 8 hours PRN Nausea and/or Vomiting        LABS                                            8.3                   Neurophils% (auto):   x      (02-18 @ 00:33):    11.46)-----------(91           Lymphocytes% (auto):  x                                             27.0                   Eosinphils% (auto):   x        Manual%: Neutrophils x    ; Lymphocytes x    ; Eosinophils x    ; Bands%: x    ; Blasts x                                    140    |  107    |  14                  Calcium: 8.7   / iCa: x      (02-18 @ 00:32)    ----------------------------<  88        Magnesium: 2.1                              3.5     |  18     |  0.93             Phosphorous: 3.9      TPro  5.2    /  Alb  3.3    /  TBili  0.8    /  DBili  x      /  AST  44     /  ALT  54     /  AlkPhos  68     18 Feb 2023 00:32        Ender Francois MD  PGY-3 MICU MICU Transfer Note    Transfer from: MICU  Transfer to:  ( X ) Medicine    (  ) Telemetry    (  ) RCU    (  ) Palliative    (  ) Stroke Unit    (  ) _______________    Accepting physician:    HPI:  46 yo f w pmh nhl s/p chemo (?in remission), pe, ?pah, covid, p/w fevers for the last few days, a/w generalized weakness over the last couple of weeks along with poor po intake. denies chest pain, palpitations, lightheadedness, cough, wheeze, abdominal discomfort. patient does endorse n+v, dysuria, arriaza. of note, last session of chemo reportedly on 1/23/2023. Pt grew concerned so went to oncologist yesterday where she got cultures, blood work, and xray. as she did not feel improved, patient presents to [facility] er for further evaluation.  (15 Feb 2023 01:39)     Accepted to MICU on 2/17 for lactic acidosis and borderline blood pressures.    MICU COURSE:  Started on voriconazole for possible fungal pneumonia. Patient appropriately diuresed for RV overload. Serial POCUS evaluations showed improvement in RV dilation with diuresis. Collateral was obtained from outpatient records RHC from 9/23/22 PA 55/10/30, wedge 10 ,CO: 4.7, CI 2.5, PVR 3.4. Functional Class III. Was previously on Adempas 0.5mg TID and Uptravi 800mcg BID. 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 CT scan from 9/5/22 was compared with findings of LLL mass measuring 3.3x3.1cm, and R. upper lobe subpleural nodule 0.2cm and RLL subpleural groundglass nodule 0.2cm.        ASSESSMENT & PLAN:         For Follow-Up:  [] Diuresis, recommend net even to negative 500cc  [] Caution with IVF given history of pulmonary hypertension  [] F/u on infectious studies, deescalate antibiotics. antifungals as appropriate  [] DC planning per primary team        Vital Signs Last 24 Hrs  T(C): 37 (18 Feb 2023 08:00), Max: 38 (18 Feb 2023 06:00)  T(F): 98.6 (18 Feb 2023 08:00), Max: 100.4 (18 Feb 2023 06:00)  HR: 108 (18 Feb 2023 11:00) (105 - 138)  BP: 93/57 (18 Feb 2023 11:00) (84/57 - 108/57)  BP(mean): 70 (18 Feb 2023 11:00) (64 - 82)  RR: 24 (18 Feb 2023 11:00) (14 - 30)  SpO2: 96% (18 Feb 2023 11:00) (93% - 99%)    Parameters below as of 18 Feb 2023 08:00  Patient On (Oxygen Delivery Method): room air      I&O's Summary    17 Feb 2023 07:01  -  18 Feb 2023 07:00  --------------------------------------------------------  IN: 1100 mL / OUT: 2900 mL / NET: -1800 mL    18 Feb 2023 07:01  -  18 Feb 2023 12:54  --------------------------------------------------------  IN: 0 mL / OUT: 400 mL / NET: -400 mL    MEDICATIONS  (STANDING):  cefepime   IVPB      cefepime   IVPB 2000 milliGRAM(s) IV Intermittent every 8 hours  chlorhexidine 2% Cloths 1 Application(s) Topical <User Schedule>  chlorhexidine 4% Liquid 1 Application(s) Topical <User Schedule>  digoxin     Tablet 125 MICROGram(s) Oral daily  enoxaparin Injectable 70 milliGRAM(s) SubCutaneous every 12 hours  midodrine. 10 milliGRAM(s) Oral three times a day  voriconazole 200 milliGRAM(s) Oral every 12 hours    MEDICATIONS  (PRN):  ondansetron Injectable 4 milliGRAM(s) IV Push every 8 hours PRN Nausea and/or Vomiting        LABS                                            8.3                   Neurophils% (auto):   x      (02-18 @ 00:33):    11.46)-----------(91           Lymphocytes% (auto):  x                                             27.0                   Eosinphils% (auto):   x        Manual%: Neutrophils x    ; Lymphocytes x    ; Eosinophils x    ; Bands%: x    ; Blasts x                                    140    |  107    |  14                  Calcium: 8.7   / iCa: x      (02-18 @ 00:32)    ----------------------------<  88        Magnesium: 2.1                              3.5     |  18     |  0.93             Phosphorous: 3.9      TPro  5.2    /  Alb  3.3    /  TBili  0.8    /  DBili  x      /  AST  44     /  ALT  54     /  AlkPhos  68     18 Feb 2023 00:32        Ender Francois MD  PGY-3 MICU MICU Transfer Note    Transfer from: MICU  Transfer to:  ( X ) Medicine    (  ) Telemetry    (  ) RCU    (  ) Palliative    (  ) Stroke Unit    (  ) _______________    Accepting physician: Dr. Calabrese    HPI:  48 yo f w pmh nhl s/p chemo (?in remission), pe, ?pah, covid, p/w fevers for the last few days, a/w generalized weakness over the last couple of weeks along with poor po intake. denies chest pain, palpitations, lightheadedness, cough, wheeze, abdominal discomfort. patient does endorse n+v, dysuria, arriaza. of note, last session of chemo reportedly on 1/23/2023. Pt grew concerned so went to oncologist yesterday where she got cultures, blood work, and xray. as she did not feel improved, patient presents to [facility] er for further evaluation.  (15 Feb 2023 01:39)     Accepted to MICU on 2/17 for lactic acidosis and borderline blood pressures.    MICU COURSE:  Started on voriconazole for possible fungal pneumonia. Patient appropriately diuresed for RV overload. Serial POCUS evaluations showed improvement in RV dilation with diuresis. Collateral was obtained from outpatient records RHC from 9/23/22 PA 55/10/30, wedge 10 ,CO: 4.7, CI 2.5, PVR 3.4. Functional Class III. Was previously on Adempas 0.5mg TID and Uptravi 800mcg BID. 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 CT scan from 9/5/22 was compared with findings of LLL mass measuring 3.3x3.1cm, and R. upper lobe subpleural nodule 0.2cm and RLL subpleural groundglass nodule 0.2cm.        ASSESSMENT & PLAN:         For Follow-Up:  [] Diuresis, recommend net even to negative 500cc  [] Caution with IVF given history of pulmonary hypertension  [] F/u on infectious studies, deescalate antibiotics. antifungals as appropriate  [] DC planning per primary team        Vital Signs Last 24 Hrs  T(C): 37 (18 Feb 2023 08:00), Max: 38 (18 Feb 2023 06:00)  T(F): 98.6 (18 Feb 2023 08:00), Max: 100.4 (18 Feb 2023 06:00)  HR: 108 (18 Feb 2023 11:00) (105 - 138)  BP: 93/57 (18 Feb 2023 11:00) (84/57 - 108/57)  BP(mean): 70 (18 Feb 2023 11:00) (64 - 82)  RR: 24 (18 Feb 2023 11:00) (14 - 30)  SpO2: 96% (18 Feb 2023 11:00) (93% - 99%)    Parameters below as of 18 Feb 2023 08:00  Patient On (Oxygen Delivery Method): room air      I&O's Summary    17 Feb 2023 07:01  -  18 Feb 2023 07:00  --------------------------------------------------------  IN: 1100 mL / OUT: 2900 mL / NET: -1800 mL    18 Feb 2023 07:01  -  18 Feb 2023 12:54  --------------------------------------------------------  IN: 0 mL / OUT: 400 mL / NET: -400 mL    MEDICATIONS  (STANDING):  cefepime   IVPB      cefepime   IVPB 2000 milliGRAM(s) IV Intermittent every 8 hours  chlorhexidine 2% Cloths 1 Application(s) Topical <User Schedule>  chlorhexidine 4% Liquid 1 Application(s) Topical <User Schedule>  digoxin     Tablet 125 MICROGram(s) Oral daily  enoxaparin Injectable 70 milliGRAM(s) SubCutaneous every 12 hours  midodrine. 10 milliGRAM(s) Oral three times a day  voriconazole 200 milliGRAM(s) Oral every 12 hours    MEDICATIONS  (PRN):  ondansetron Injectable 4 milliGRAM(s) IV Push every 8 hours PRN Nausea and/or Vomiting        LABS                                            8.3                   Neurophils% (auto):   x      (02-18 @ 00:33):    11.46)-----------(91           Lymphocytes% (auto):  x                                             27.0                   Eosinphils% (auto):   x        Manual%: Neutrophils x    ; Lymphocytes x    ; Eosinophils x    ; Bands%: x    ; Blasts x                                    140    |  107    |  14                  Calcium: 8.7   / iCa: x      (02-18 @ 00:32)    ----------------------------<  88        Magnesium: 2.1                              3.5     |  18     |  0.93             Phosphorous: 3.9      TPro  5.2    /  Alb  3.3    /  TBili  0.8    /  DBili  x      /  AST  44     /  ALT  54     /  AlkPhos  68     18 Feb 2023 00:32        Ender Francois MD  PGY-3 MICU MICU Transfer Note    Transfer from: MICU  Transfer to:  ( X ) Medicine    (  ) Telemetry    (  ) RCU    (  ) Palliative    (  ) Stroke Unit    (  ) _______________    Accepting physician: Dr. Calabrese    HPI:  48 yo f w pmh nhl s/p chemo (?in remission), pe, ?pah, covid, p/w fevers for the last few days, a/w generalized weakness over the last couple of weeks along with poor po intake. denies chest pain, palpitations, lightheadedness, cough, wheeze, abdominal discomfort. patient does endorse n+v, dysuria, arriaza. of note, last session of chemo reportedly on 1/23/2023. Pt grew concerned so went to oncologist yesterday where she got cultures, blood work, and xray. as she did not feel improved, patient presents to [facility] er for further evaluation.  (15 Feb 2023 01:39)     Accepted to MICU on 2/17 for lactic acidosis and borderline blood pressures.    MICU COURSE:  Started on voriconazole for possible fungal pneumonia. Patient appropriately diuresed for RV overload. Serial POCUS evaluations showed improvement in RV dilation with diuresis. Collateral was obtained from outpatient records RHC from 9/23/22 PA 55/10/30, wedge 10 ,CO: 4.7, CI 2.5, PVR 3.4. Functional Class III. Was previously on Adempas 0.5mg TID and Uptravi 800mcg BID. 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 CT scan from 9/5/22 was compared with findings of LLL mass measuring 3.3x3.1cm, and R. upper lobe subpleural nodule 0.2cm and RLL subpleural groundglass nodule 0.2cm.        ASSESSMENT & PLAN:         For Follow-Up:  [] Diuresis, recommend net even to negative 500cc (started on lasix 20mg PO)  [] Caution with IVF given history of pulmonary hypertension  [] F/u on infectious studies, deescalate antibiotics. antifungals as appropriate  [] F/U IR consult re transbronchial biopsy  [] DC planning per primary team        Vital Signs Last 24 Hrs  T(C): 37 (18 Feb 2023 08:00), Max: 38 (18 Feb 2023 06:00)  T(F): 98.6 (18 Feb 2023 08:00), Max: 100.4 (18 Feb 2023 06:00)  HR: 108 (18 Feb 2023 11:00) (105 - 138)  BP: 93/57 (18 Feb 2023 11:00) (84/57 - 108/57)  BP(mean): 70 (18 Feb 2023 11:00) (64 - 82)  RR: 24 (18 Feb 2023 11:00) (14 - 30)  SpO2: 96% (18 Feb 2023 11:00) (93% - 99%)    Parameters below as of 18 Feb 2023 08:00  Patient On (Oxygen Delivery Method): room air      I&O's Summary    17 Feb 2023 07:01  -  18 Feb 2023 07:00  --------------------------------------------------------  IN: 1100 mL / OUT: 2900 mL / NET: -1800 mL    18 Feb 2023 07:01  -  18 Feb 2023 12:54  --------------------------------------------------------  IN: 0 mL / OUT: 400 mL / NET: -400 mL    MEDICATIONS  (STANDING):  cefepime   IVPB      cefepime   IVPB 2000 milliGRAM(s) IV Intermittent every 8 hours  chlorhexidine 2% Cloths 1 Application(s) Topical <User Schedule>  chlorhexidine 4% Liquid 1 Application(s) Topical <User Schedule>  digoxin     Tablet 125 MICROGram(s) Oral daily  enoxaparin Injectable 70 milliGRAM(s) SubCutaneous every 12 hours  midodrine. 10 milliGRAM(s) Oral three times a day  voriconazole 200 milliGRAM(s) Oral every 12 hours    MEDICATIONS  (PRN):  ondansetron Injectable 4 milliGRAM(s) IV Push every 8 hours PRN Nausea and/or Vomiting        LABS                                            8.3                   Neurophils% (auto):   x      (02-18 @ 00:33):    11.46)-----------(91           Lymphocytes% (auto):  x                                             27.0                   Eosinphils% (auto):   x        Manual%: Neutrophils x    ; Lymphocytes x    ; Eosinophils x    ; Bands%: x    ; Blasts x                                    140    |  107    |  14                  Calcium: 8.7   / iCa: x      (02-18 @ 00:32)    ----------------------------<  88        Magnesium: 2.1                              3.5     |  18     |  0.93             Phosphorous: 3.9      TPro  5.2    /  Alb  3.3    /  TBili  0.8    /  DBili  x      /  AST  44     /  ALT  54     /  AlkPhos  68     18 Feb 2023 00:32        Ender Francois MD  PGY-3 MICU MICU Transfer Note    Transfer from: MICU  Transfer to:  ( X ) Medicine    (  ) Telemetry    (  ) RCU    (  ) Palliative    (  ) Stroke Unit    (  ) 3Coh 374W  3Coh 374W    Accepting physician: Dr. Calabrese    HPI:  48 yo f w pmh nhl s/p chemo (?in remission), pe, ?pah, covid, p/w fevers for the last few days, a/w generalized weakness over the last couple of weeks along with poor po intake. denies chest pain, palpitations, lightheadedness, cough, wheeze, abdominal discomfort. patient does endorse n+v, dysuria, arriaza. of note, last session of chemo reportedly on 1/23/2023. Pt grew concerned so went to oncologist yesterday where she got cultures, blood work, and xray. as she did not feel improved, patient presents to [facility] er for further evaluation.  (15 Feb 2023 01:39)     Accepted to MICU on 2/17 for lactic acidosis and borderline blood pressures.    MICU COURSE:  Started on voriconazole for possible fungal pneumonia. Patient appropriately diuresed for RV overload. Serial POCUS evaluations showed improvement in RV dilation with diuresis. Collateral was obtained from outpatient records RHC from 9/23/22 PA 55/10/30, wedge 10 ,CO: 4.7, CI 2.5, PVR 3.4. Functional Class III. Was previously on Adempas 0.5mg TID and Uptravi 800mcg BID. 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 CT scan from 9/5/22 was compared with findings of LLL mass measuring 3.3x3.1cm, and R. upper lobe subpleural nodule 0.2cm and RLL subpleural groundglass nodule 0.2cm.        ASSESSMENT & PLAN:         For Follow-Up:  [] Diuresis, recommend net even to negative 500cc (started on lasix 20mg PO)  [] Caution with IVF given history of pulmonary hypertension  [] F/u on infectious studies, deescalate antibiotics. antifungals as appropriate  [] F/U IR consult re transbronchial biopsy  [] DC planning per primary team        Vital Signs Last 24 Hrs  T(C): 37 (18 Feb 2023 08:00), Max: 38 (18 Feb 2023 06:00)  T(F): 98.6 (18 Feb 2023 08:00), Max: 100.4 (18 Feb 2023 06:00)  HR: 108 (18 Feb 2023 11:00) (105 - 138)  BP: 93/57 (18 Feb 2023 11:00) (84/57 - 108/57)  BP(mean): 70 (18 Feb 2023 11:00) (64 - 82)  RR: 24 (18 Feb 2023 11:00) (14 - 30)  SpO2: 96% (18 Feb 2023 11:00) (93% - 99%)    Parameters below as of 18 Feb 2023 08:00  Patient On (Oxygen Delivery Method): room air      I&O's Summary    17 Feb 2023 07:01  -  18 Feb 2023 07:00  --------------------------------------------------------  IN: 1100 mL / OUT: 2900 mL / NET: -1800 mL    18 Feb 2023 07:01  -  18 Feb 2023 12:54  --------------------------------------------------------  IN: 0 mL / OUT: 400 mL / NET: -400 mL    MEDICATIONS  (STANDING):  cefepime   IVPB      cefepime   IVPB 2000 milliGRAM(s) IV Intermittent every 8 hours  chlorhexidine 2% Cloths 1 Application(s) Topical <User Schedule>  chlorhexidine 4% Liquid 1 Application(s) Topical <User Schedule>  digoxin     Tablet 125 MICROGram(s) Oral daily  enoxaparin Injectable 70 milliGRAM(s) SubCutaneous every 12 hours  midodrine. 10 milliGRAM(s) Oral three times a day  voriconazole 200 milliGRAM(s) Oral every 12 hours    MEDICATIONS  (PRN):  ondansetron Injectable 4 milliGRAM(s) IV Push every 8 hours PRN Nausea and/or Vomiting        LABS                                            8.3                   Neurophils% (auto):   x      (02-18 @ 00:33):    11.46)-----------(91           Lymphocytes% (auto):  x                                             27.0                   Eosinphils% (auto):   x        Manual%: Neutrophils x    ; Lymphocytes x    ; Eosinophils x    ; Bands%: x    ; Blasts x                                    140    |  107    |  14                  Calcium: 8.7   / iCa: x      (02-18 @ 00:32)    ----------------------------<  88        Magnesium: 2.1                              3.5     |  18     |  0.93             Phosphorous: 3.9      TPro  5.2    /  Alb  3.3    /  TBili  0.8    /  DBili  x      /  AST  44     /  ALT  54     /  AlkPhos  68     18 Feb 2023 00:32        Ender Francois MD  PGY-3 MICU MICU Transfer Note    Transfer from: MICU  Transfer to:  ( X ) Medicine    (  ) Telemetry    (  ) RCU    (  ) Palliative    (  ) Stroke Unit    (  ) 3Coh 374W  3Coh 374W    Accepting physician: Dr. Calabrese    HPI:  48 yo f w pmh nhl s/p chemo (?in remission), pe, ?pah, covid, p/w fevers for the last few days, a/w generalized weakness over the last couple of weeks along with poor po intake. denies chest pain, palpitations, lightheadedness, cough, wheeze, abdominal discomfort. patient does endorse n+v, dysuria, arriaza. of note, last session of chemo reportedly on 1/23/2023. Pt grew concerned so went to oncologist yesterday where she got cultures, blood work, and xray. as she did not feel improved, patient presents to [facility] er for further evaluation.  (15 Feb 2023 01:39)     Accepted to MICU on 2/17 for lactic acidosis and borderline blood pressures.    MICU COURSE:  Started on voriconazole for possible fungal pneumonia. Patient appropriately diuresed for RV overload. Serial POCUS evaluations showed improvement in RV dilation with diuresis. Collateral was obtained from outpatient records [facility] from 9/23/22 PA 55/10/30, wedge 10 ,CO: 4.7, CI 2.5, PVR 3.4. Functional Class III. Was previously on Adempas 0.5mg TID and Uptravi 800mcg BID. 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 CT scan from 9/5/22 was compared with findings of LLL mass measuring 3.3x3.1cm, and R. upper lobe subpleural nodule 0.2cm and RLL subpleural groundglass nodule 0.2cm.        ASSESSMENT & PLAN:   47 F PMH non-Hodgkin lymphoma s/p chemoport (?in remission), PE, pHTN, presenting with recurrent fevers with pancytopenia in setting of recent chemo treatment and prednisone concerning for severe sepsis. Found to be requiring intermittent fluids and continues to be febrile while on broad-spectrum bacterial antibiotic coverage. CT chest imaging concerning for fungal infection vs metastasis. MICU consulted for further management.      PLAN    =====Neurologic=====  - No active issues, AOx4    =====Pulmonary=====  # NHL with pulmonary nodules  - as in heme-onc section below    # workup for pna  - as in ID section below    # pulmonary HTN, suspected Group 4  > RV systolic pressure =51mmHg on TTE  - diurese (IV in MICU), will transition to PO lasix starting 2/21/23  - Riociguat restarted 2/22    =====Cardiovascular=====  # HFrEF (EF ~42) with RV dysfunction based on previous echo (2/15/23)  - Bedside POCUS noted to have dilated RV  > Caution with fluids  > DC-ed coreg   > Diurese daily    # SVT  > digoxin 125 mcg po qD (home medication)  > follow up digoxin level    # hypotension  - Stable  - possibly multifactorial, with component of RV overload and possible sepsis  - s/p IV albumin x1  > midodrine 10 mg po tid    =====GI=====  #Diet - regular, 1500cc fluid restriction    =====Renal/=====  - PMH STARR while on antibiotics  > appreciate renal recs    =====Endocrine=====  - No active issues.     =====Infectious Disease=====  # severe sepsis with unclear etiology in setting of known malginancy and recent chemotx for cancer   - iso immunocompromise, hx and CT chest imaging c/f infection (e.g. fungal pna) vs progression of known cancer  > switched Zosyn to cefepime (2/17-21)  > MRSA swab negative, vanc dc'd  > started voriconazole 200mg po bid  > appreciate ID recs re: voriconazole; hold off antiviral given low suspicion for viral infection (RVP negative)  > supportive care: Tylenol and cooling measures (if patient agrees as she reported that cooling measures made her uncomfortable)  > follow up BCx, UA, UCx  > Aspergillus galactomannan Ag, Aspergillus Ab, crypt, histo  > appreciate ID recs  > IR consulted for evaluation of pulmonary nodules    =====Heme/Onc=====  # DLBCL  > appreciate heme-onc recs    #leukopenia on filgastrim  #anemia  #thrombocytopenia  - possibly from recent chemo and cancer  > Hb goal >7  > S/p 3d of zarxio  > Trend Plt    #DVT PPX  > Given hx of PE and large RV will continue with therapeutic lovenox    =====Ethics=====  FULL CODE.      For Follow-Up:  [] Diuresis, recommend net even to negative 500cc (started on lasix 20mg PO)  [] Caution with IVF given history of pulmonary hypertension  [] F/u on infectious studies, deescalate antibiotics. antifungals as appropriate  [] F/U IR consult re transbronchial biopsy  [] DC planning per primary team        Vital Signs Last 24 Hrs  T(C): 37 (18 Feb 2023 08:00), Max: 38 (18 Feb 2023 06:00)  T(F): 98.6 (18 Feb 2023 08:00), Max: 100.4 (18 Feb 2023 06:00)  HR: 108 (18 Feb 2023 11:00) (105 - 138)  BP: 93/57 (18 Feb 2023 11:00) (84/57 - 108/57)  BP(mean): 70 (18 Feb 2023 11:00) (64 - 82)  RR: 24 (18 Feb 2023 11:00) (14 - 30)  SpO2: 96% (18 Feb 2023 11:00) (93% - 99%)    Parameters below as of 18 Feb 2023 08:00  Patient On (Oxygen Delivery Method): room air      I&O's Summary    17 Feb 2023 07:01  -  18 Feb 2023 07:00  --------------------------------------------------------  IN: 1100 mL / OUT: 2900 mL / NET: -1800 mL    18 Feb 2023 07:01  -  18 Feb 2023 12:54  --------------------------------------------------------  IN: 0 mL / OUT: 400 mL / NET: -400 mL    MEDICATIONS  (STANDING):  cefepime   IVPB      cefepime   IVPB 2000 milliGRAM(s) IV Intermittent every 8 hours  chlorhexidine 2% Cloths 1 Application(s) Topical <User Schedule>  chlorhexidine 4% Liquid 1 Application(s) Topical <User Schedule>  digoxin     Tablet 125 MICROGram(s) Oral daily  enoxaparin Injectable 70 milliGRAM(s) SubCutaneous every 12 hours  midodrine. 10 milliGRAM(s) Oral three times a day  voriconazole 200 milliGRAM(s) Oral every 12 hours    MEDICATIONS  (PRN):  ondansetron Injectable 4 milliGRAM(s) IV Push every 8 hours PRN Nausea and/or Vomiting        LABS                                            8.3                   Neurophils% (auto):   x      (02-18 @ 00:33):    11.46)-----------(91           Lymphocytes% (auto):  x                                             27.0                   Eosinphils% (auto):   x        Manual%: Neutrophils x    ; Lymphocytes x    ; Eosinophils x    ; Bands%: x    ; Blasts x                                    140    |  107    |  14                  Calcium: 8.7   / iCa: x      (02-18 @ 00:32)    ----------------------------<  88        Magnesium: 2.1                              3.5     |  18     |  0.93             Phosphorous: 3.9      TPro  5.2    /  Alb  3.3    /  TBili  0.8    /  DBili  x      /  AST  44     /  ALT  54     /  AlkPhos  68     18 Feb 2023 00:32        Ender Francois MD  PGY-3 MICU MICU Transfer Note    Transfer from: MICU  Transfer to:  ( X ) Medicine    (  ) Telemetry    (  ) RCU    (  ) Palliative    (  ) Stroke Unit    (  ) 3Coh 374W  3Coh 374W    Accepting physician: Dr. Calabrese    HPI:  46 yo f w pmh nhl s/p chemo (?in remission), pe, ?pah, covid, p/w fevers for the last few days, a/w generalized weakness over the last couple of weeks along with poor po intake. denies chest pain, palpitations, lightheadedness, cough, wheeze, abdominal discomfort. patient does endorse n+v, dysuria, arriaza. of note, last session of chemo reportedly on 1/23/2023. Pt grew concerned so went to oncologist yesterday where she got cultures, blood work, and xray. as she did not feel improved, patient presents to [facility] er for further evaluation.  (15 Feb 2023 01:39)     Accepted to MICU on 2/17 for lactic acidosis and borderline blood pressures.    MICU COURSE:  Started on voriconazole for possible fungal pneumonia. Patient appropriately diuresed for RV overload. Serial POCUS evaluations showed improvement in RV dilation with diuresis. Collateral was obtained from outpatient records [facility] from 9/23/22 PA 55/10/30, wedge 10 ,CO: 4.7, CI 2.5, PVR 3.4. Functional Class III. Was previously on Adempas 0.5mg TID and Uptravi 800mcg BID. 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 CT scan from 9/5/22 was compared with findings of LLL mass measuring 3.3x3.1cm, and R. upper lobe subpleural nodule 0.2cm and RLL subpleural groundglass nodule 0.2cm. Palliative consulted for assistance with symptom management. Riociguat restarted 2/21/23.        ASSESSMENT & PLAN:   47 F PMH non-Hodgkin lymphoma s/p chemoport (?in remission), PE, pHTN, presenting with recurrent fevers with pancytopenia in setting of recent chemo treatment and prednisone concerning for severe sepsis. Found to be requiring intermittent fluids and continues to be febrile while on broad-spectrum bacterial antibiotic coverage. CT chest imaging concerning for fungal infection vs metastasis. MICU consulted for further management.      PLAN    =====Neurologic=====  - No active issues, AOx4    =====Pulmonary=====  # NHL with pulmonary nodules  - as in heme-onc section below    # workup for pna  - as in ID section below    # pulmonary HTN, suspected Group 4  > RV systolic pressure =51mmHg on TTE  - diurese (IV in MICU), will transition to PO lasix starting 2/21/23  - Riociguat restarted 2/22    =====Cardiovascular=====  # HFrEF (EF ~42) with RV dysfunction based on previous echo (2/15/23)  - Bedside POCUS noted to have dilated RV  > Caution with fluids  > DC-ed coreg   > Diurese daily    # SVT  > digoxin 125 mcg po qD (home medication)  > follow up digoxin level    # hypotension  - Stable  - possibly multifactorial, with component of RV overload and possible sepsis  - s/p IV albumin x1  > midodrine 10 mg po tid    =====GI=====  #Diet - regular, 1500cc fluid restriction    =====Renal/=====  - PMH STARR while on antibiotics  > appreciate renal recs    =====Endocrine=====  - No active issues.     =====Infectious Disease=====  # severe sepsis with unclear etiology in setting of known malginancy and recent chemotx for cancer   - iso immunocompromise, hx and CT chest imaging c/f infection (e.g. fungal pna) vs progression of known cancer  > switched Zosyn to cefepime (2/17-21)  > MRSA swab negative, vanc dc'd  > started voriconazole 200mg po bid  > appreciate ID recs re: voriconazole; hold off antiviral given low suspicion for viral infection (RVP negative)  > supportive care: Tylenol and cooling measures (if patient agrees as she reported that cooling measures made her uncomfortable)  > follow up BCx, UA, UCx  > Aspergillus galactomannan Ag, Aspergillus Ab, crypt, histo  > appreciate ID recs  > IR consulted for evaluation of pulmonary nodules    =====Heme/Onc=====  # DLBCL  > appreciate heme-onc recs    #leukopenia on filgastrim  #anemia  #thrombocytopenia  - possibly from recent chemo and cancer  > Hb goal >7  > S/p 3d of zarxio  > Trend Plt    #DVT PPX  > Given hx of PE and large RV will continue with therapeutic lovenox    =====Ethics=====  FULL CODE.      For Follow-Up:  [] Diuresis, recommend net even to negative 500cc (started on lasix 20mg PO)  [] Caution with IVF given history of pulmonary hypertension  [] F/u on infectious studies, deescalate antibiotics. antifungals as appropriate  [] F/U IR consult re transbronchial biopsy  [] DC planning per primary team        Vital Signs Last 24 Hrs  T(C): 37 (18 Feb 2023 08:00), Max: 38 (18 Feb 2023 06:00)  T(F): 98.6 (18 Feb 2023 08:00), Max: 100.4 (18 Feb 2023 06:00)  HR: 108 (18 Feb 2023 11:00) (105 - 138)  BP: 93/57 (18 Feb 2023 11:00) (84/57 - 108/57)  BP(mean): 70 (18 Feb 2023 11:00) (64 - 82)  RR: 24 (18 Feb 2023 11:00) (14 - 30)  SpO2: 96% (18 Feb 2023 11:00) (93% - 99%)    Parameters below as of 18 Feb 2023 08:00  Patient On (Oxygen Delivery Method): room air      I&O's Summary    17 Feb 2023 07:01  -  18 Feb 2023 07:00  --------------------------------------------------------  IN: 1100 mL / OUT: 2900 mL / NET: -1800 mL    18 Feb 2023 07:01  -  18 Feb 2023 12:54  --------------------------------------------------------  IN: 0 mL / OUT: 400 mL / NET: -400 mL    MEDICATIONS  (STANDING):  cefepime   IVPB      cefepime   IVPB 2000 milliGRAM(s) IV Intermittent every 8 hours  chlorhexidine 2% Cloths 1 Application(s) Topical <User Schedule>  chlorhexidine 4% Liquid 1 Application(s) Topical <User Schedule>  digoxin     Tablet 125 MICROGram(s) Oral daily  enoxaparin Injectable 70 milliGRAM(s) SubCutaneous every 12 hours  midodrine. 10 milliGRAM(s) Oral three times a day  voriconazole 200 milliGRAM(s) Oral every 12 hours    MEDICATIONS  (PRN):  ondansetron Injectable 4 milliGRAM(s) IV Push every 8 hours PRN Nausea and/or Vomiting        LABS                                            8.3                   Neurophils% (auto):   x      (02-18 @ 00:33):    11.46)-----------(91           Lymphocytes% (auto):  x                                             27.0                   Eosinphils% (auto):   x        Manual%: Neutrophils x    ; Lymphocytes x    ; Eosinophils x    ; Bands%: x    ; Blasts x                                    140    |  107    |  14                  Calcium: 8.7   / iCa: x      (02-18 @ 00:32)    ----------------------------<  88        Magnesium: 2.1                              3.5     |  18     |  0.93             Phosphorous: 3.9      TPro  5.2    /  Alb  3.3    /  TBili  0.8    /  DBili  x      /  AST  44     /  ALT  54     /  AlkPhos  68     18 Feb 2023 00:32        Ender Francois MD  PGY-3 MICU

## 2023-02-19 LAB
ALBUMIN SERPL ELPH-MCNC: 3.4 G/DL — SIGNIFICANT CHANGE UP (ref 3.3–5)
ALP SERPL-CCNC: 72 U/L — SIGNIFICANT CHANGE UP (ref 40–120)
ALT FLD-CCNC: 82 U/L — HIGH (ref 10–45)
ANION GAP SERPL CALC-SCNC: 15 MMOL/L — SIGNIFICANT CHANGE UP (ref 5–17)
AST SERPL-CCNC: 59 U/L — HIGH (ref 10–40)
BASE EXCESS BLDV CALC-SCNC: -3.5 MMOL/L — LOW (ref -2–3)
BILIRUB SERPL-MCNC: 0.8 MG/DL — SIGNIFICANT CHANGE UP (ref 0.2–1.2)
BUN SERPL-MCNC: 17 MG/DL — SIGNIFICANT CHANGE UP (ref 7–23)
CA-I SERPL-SCNC: 1.22 MMOL/L — SIGNIFICANT CHANGE UP (ref 1.15–1.33)
CALCIUM SERPL-MCNC: 8.9 MG/DL — SIGNIFICANT CHANGE UP (ref 8.4–10.5)
CHLORIDE BLDV-SCNC: 105 MMOL/L — SIGNIFICANT CHANGE UP (ref 96–108)
CHLORIDE SERPL-SCNC: 105 MMOL/L — SIGNIFICANT CHANGE UP (ref 96–108)
CO2 BLDV-SCNC: 22 MMOL/L — SIGNIFICANT CHANGE UP (ref 22–26)
CO2 SERPL-SCNC: 19 MMOL/L — LOW (ref 22–31)
CREAT SERPL-MCNC: 0.91 MG/DL — SIGNIFICANT CHANGE UP (ref 0.5–1.3)
CULTURE RESULTS: SIGNIFICANT CHANGE UP
DIGOXIN SERPL-MCNC: 0.8 NG/ML — SIGNIFICANT CHANGE UP (ref 0.8–2)
EGFR: 79 ML/MIN/1.73M2 — SIGNIFICANT CHANGE UP
GAS PNL BLDV: 136 MMOL/L — SIGNIFICANT CHANGE UP (ref 136–145)
GAS PNL BLDV: SIGNIFICANT CHANGE UP
GAS PNL BLDV: SIGNIFICANT CHANGE UP
GLUCOSE BLDV-MCNC: 79 MG/DL — SIGNIFICANT CHANGE UP (ref 70–99)
GLUCOSE SERPL-MCNC: 87 MG/DL — SIGNIFICANT CHANGE UP (ref 70–99)
HCO3 BLDV-SCNC: 21 MMOL/L — LOW (ref 22–29)
HCT VFR BLD CALC: 28 % — LOW (ref 34.5–45)
HCT VFR BLDA CALC: 26 % — LOW (ref 34.5–46.5)
HGB BLD CALC-MCNC: 8.7 G/DL — LOW (ref 11.7–16.1)
HGB BLD-MCNC: 8.4 G/DL — LOW (ref 11.5–15.5)
HOROWITZ INDEX BLDV+IHG-RTO: SIGNIFICANT CHANGE UP
LACTATE BLDV-MCNC: 1.5 MMOL/L — SIGNIFICANT CHANGE UP (ref 0.5–2)
LACTATE SERPL-SCNC: 2.1 MMOL/L — HIGH (ref 0.5–2)
MAGNESIUM SERPL-MCNC: 2 MG/DL — SIGNIFICANT CHANGE UP (ref 1.6–2.6)
MCHC RBC-ENTMCNC: 26.8 PG — LOW (ref 27–34)
MCHC RBC-ENTMCNC: 30 GM/DL — LOW (ref 32–36)
MCV RBC AUTO: 89.2 FL — SIGNIFICANT CHANGE UP (ref 80–100)
NRBC # BLD: 0 /100 WBCS — SIGNIFICANT CHANGE UP (ref 0–0)
PCO2 BLDV: 37 MMHG — LOW (ref 39–42)
PH BLDV: 7.37 — SIGNIFICANT CHANGE UP (ref 7.32–7.43)
PHOSPHATE SERPL-MCNC: 4.2 MG/DL — SIGNIFICANT CHANGE UP (ref 2.5–4.5)
PLATELET # BLD AUTO: 93 K/UL — LOW (ref 150–400)
PO2 BLDV: 42 MMHG — SIGNIFICANT CHANGE UP (ref 25–45)
POTASSIUM BLDV-SCNC: 3.8 MMOL/L — SIGNIFICANT CHANGE UP (ref 3.5–5.1)
POTASSIUM SERPL-MCNC: 3.9 MMOL/L — SIGNIFICANT CHANGE UP (ref 3.5–5.3)
POTASSIUM SERPL-SCNC: 3.9 MMOL/L — SIGNIFICANT CHANGE UP (ref 3.5–5.3)
PROT SERPL-MCNC: 5.4 G/DL — LOW (ref 6–8.3)
RBC # BLD: 3.14 M/UL — LOW (ref 3.8–5.2)
RBC # FLD: 18.7 % — HIGH (ref 10.3–14.5)
SAO2 % BLDV: 59.4 % — LOW (ref 67–88)
SODIUM SERPL-SCNC: 139 MMOL/L — SIGNIFICANT CHANGE UP (ref 135–145)
SPECIMEN SOURCE: SIGNIFICANT CHANGE UP
WBC # BLD: 8.97 K/UL — SIGNIFICANT CHANGE UP (ref 3.8–10.5)
WBC # FLD AUTO: 8.97 K/UL — SIGNIFICANT CHANGE UP (ref 3.8–10.5)

## 2023-02-19 PROCEDURE — 93308 TTE F-UP OR LMTD: CPT | Mod: 26,GC

## 2023-02-19 PROCEDURE — 76604 US EXAM CHEST: CPT | Mod: 26,GC

## 2023-02-19 PROCEDURE — 99232 SBSQ HOSP IP/OBS MODERATE 35: CPT

## 2023-02-19 PROCEDURE — 99291 CRITICAL CARE FIRST HOUR: CPT | Mod: GC

## 2023-02-19 RX ORDER — FUROSEMIDE 40 MG
20 TABLET ORAL ONCE
Refills: 0 | Status: COMPLETED | OUTPATIENT
Start: 2023-02-19 | End: 2023-02-19

## 2023-02-19 RX ORDER — ACETAMINOPHEN 500 MG
650 TABLET ORAL ONCE
Refills: 0 | Status: COMPLETED | OUTPATIENT
Start: 2023-02-19 | End: 2023-02-19

## 2023-02-19 RX ADMIN — FAMOTIDINE 20 MILLIGRAM(S): 10 INJECTION INTRAVENOUS at 06:25

## 2023-02-19 RX ADMIN — VORICONAZOLE 200 MILLIGRAM(S): 10 INJECTION, POWDER, LYOPHILIZED, FOR SOLUTION INTRAVENOUS at 06:26

## 2023-02-19 RX ADMIN — MIDODRINE HYDROCHLORIDE 10 MILLIGRAM(S): 2.5 TABLET ORAL at 06:25

## 2023-02-19 RX ADMIN — Medication 650 MILLIGRAM(S): at 22:30

## 2023-02-19 RX ADMIN — MIDODRINE HYDROCHLORIDE 10 MILLIGRAM(S): 2.5 TABLET ORAL at 22:15

## 2023-02-19 RX ADMIN — CHLORHEXIDINE GLUCONATE 1 APPLICATION(S): 213 SOLUTION TOPICAL at 06:26

## 2023-02-19 RX ADMIN — MIDODRINE HYDROCHLORIDE 10 MILLIGRAM(S): 2.5 TABLET ORAL at 14:02

## 2023-02-19 RX ADMIN — ENOXAPARIN SODIUM 70 MILLIGRAM(S): 100 INJECTION SUBCUTANEOUS at 17:56

## 2023-02-19 RX ADMIN — CEFEPIME 100 MILLIGRAM(S): 1 INJECTION, POWDER, FOR SOLUTION INTRAMUSCULAR; INTRAVENOUS at 22:15

## 2023-02-19 RX ADMIN — Medication 20 MILLIGRAM(S): at 11:53

## 2023-02-19 RX ADMIN — Medication 650 MILLIGRAM(S): at 23:00

## 2023-02-19 RX ADMIN — ENOXAPARIN SODIUM 70 MILLIGRAM(S): 100 INJECTION SUBCUTANEOUS at 06:25

## 2023-02-19 RX ADMIN — CEFEPIME 100 MILLIGRAM(S): 1 INJECTION, POWDER, FOR SOLUTION INTRAMUSCULAR; INTRAVENOUS at 14:02

## 2023-02-19 RX ADMIN — VORICONAZOLE 200 MILLIGRAM(S): 10 INJECTION, POWDER, LYOPHILIZED, FOR SOLUTION INTRAVENOUS at 17:56

## 2023-02-19 RX ADMIN — CHLORHEXIDINE GLUCONATE 1 APPLICATION(S): 213 SOLUTION TOPICAL at 06:25

## 2023-02-19 RX ADMIN — Medication 125 MICROGRAM(S): at 06:25

## 2023-02-19 RX ADMIN — CEFEPIME 100 MILLIGRAM(S): 1 INJECTION, POWDER, FOR SOLUTION INTRAMUSCULAR; INTRAVENOUS at 06:25

## 2023-02-19 RX ADMIN — FAMOTIDINE 20 MILLIGRAM(S): 10 INJECTION INTRAVENOUS at 17:55

## 2023-02-19 NOTE — PROGRESS NOTE ADULT - SUBJECTIVE AND OBJECTIVE BOX
Follow Up:     Interval History/ROS:     Allergies  No Known Allergies        ANTIMICROBIALS:  cefepime   IVPB    cefepime   IVPB 2000 every 8 hours  voriconazole 200 every 12 hours      OTHER MEDS:  chlorhexidine 2% Cloths 1 Application(s) Topical <User Schedule>  chlorhexidine 4% Liquid 1 Application(s) Topical <User Schedule>  digoxin     Tablet 125 MICROGram(s) Oral daily  enoxaparin Injectable 70 milliGRAM(s) SubCutaneous every 12 hours  famotidine Injectable 20 milliGRAM(s) IV Push every 12 hours  midodrine. 10 milliGRAM(s) Oral three times a day  ondansetron Injectable 4 milliGRAM(s) IV Push every 8 hours PRN      Vital Signs Last 24 Hrs  T(C): 36.5 (2023 08:00), Max: 38.2 (2023 23:00)  T(F): 97.7 (2023 08:00), Max: 100.7 (2023 23:00)  HR: 92 (2023 08:00) (92 - 134)  BP: 113/74 (2023 08:00) (90/63 - 117/71)  BP(mean): 88 (2023 08:00) (67 - 89)  RR: 20 (2023 08:00) (12 - 33)  SpO2: 98% (2023 08:00) (95% - 99%)    Parameters below as of 2023 08:00  Patient On (Oxygen Delivery Method): room air        Physical Exam:  General: non toxic  Cardio: regular rate   Respiratory: nonlabored   abd: nondistended  Musculoskeletal: no focal joint swelling, no edema  vascular: no phlebitis   Skin: no rash                          8.4    8.97  )-----------( 93       ( 2023 01:15 )             28.0       02-19    139  |  105  |  17  ----------------------------<  87  3.9   |  19<L>  |  0.91    Ca    8.9      2023 01:15  Phos  4.2     02-19  Mg     2.0     02-19    TPro  5.4<L>  /  Alb  3.4  /  TBili  0.8  /  DBili  x   /  AST  59<H>  /  ALT  82<H>  /  AlkPhos  72        Urinalysis Basic - ( 2023 10:00 )    Color: Yellow / Appearance: Clear / S.022 / pH: x  Gluc: x / Ketone: Negative  / Bili: Negative / Urobili: Negative   Blood: x / Protein: Trace / Nitrite: Negative   Leuk Esterase: Negative / RBC: 3 /hpf / WBC 4 /HPF   Sq Epi: x / Non Sq Epi: 4 /hpf / Bacteria: Negative        MICROBIOLOGY:  Culture - Sputum (collected 23 @ 10:00)  Source: .Sputum Sputum  Gram Stain (23 @ 21:52):    Rare polymorphonuclear leukocytes per low power field    Rare Squamous epithelial cells per low power field    Rare Gram Negative Rods per oil power field  Preliminary Report (23 @ 15:48):    Normal Respiratory Hortencia present    Culture - Urine (collected 23 @ 10:00)  Source: Clean Catch Clean Catch (Midstream)  Final Report (23 @ 10:41):    No growth    Culture - Blood (collected 23 @ 04:54)  Source: .Blood Blood-Peripheral  Preliminary Report (23 @ 09:01):    No growth to date.    Culture - Blood (collected 23 @ 23:35)  Source: .Blood Blood-Peripheral  Preliminary Report (23 @ 04:02):    No growth to date.    Culture - Blood (collected 23 @ 04:17)  Source: .Blood Blood-Peripheral  Preliminary Report (23 @ 12:01):    No growth to date.    Culture - Blood (collected 23 @ 04:17)  Source: .Blood Blood-Peripheral  Preliminary Report (23 @ 07:02):    No growth to date.    Culture - Urine (collected 23 @ 23:22)  Source: Clean Catch Clean Catch (Midstream)  Final Report (23 @ 03:59):    No growth    Culture - Blood (collected 23 @ 21:15)  Source: .Blood Blood-Peripheral  Preliminary Report (23 @ 02:03):    No growth to date.    Culture - Blood (collected 23 @ 21:00)  Source: .Blood Blood-Peripheral  Preliminary Report (23 @ 02:03):    No growth to date.    Rapid RVP Result: NotDetec ( @ 21:27)      RADIOLOGY:  Images below reviewed personally  CT Abdomen and Pelvis w/ IV Cont (02.15.23 @ 02:17)   No pulmonary embolism.  Innumerable lung nodules which are nonspecific. In this clinical setting,   differential includes infection (in which case, consider fungus) or   neoplasm/known lymphoma.  Indeterminate low-attenuation lesion in the liver.   Follow Up: Fevers    Interval History/ROS: Today is her birthday. I wished her happy birthday. Febrile last night again. Get very tired with palpitations on exertion. No pain. No significant cough. No dysuria. Loose bowel movements, twice yesterday, not watery, none today so far.     Allergies  No Known Allergies        ANTIMICROBIALS:  cefepime   IVPB    cefepime   IVPB 2000 every 8 hours  voriconazole 200 every 12 hours      OTHER MEDS:  chlorhexidine 2% Cloths 1 Application(s) Topical <User Schedule>  chlorhexidine 4% Liquid 1 Application(s) Topical <User Schedule>  digoxin     Tablet 125 MICROGram(s) Oral daily  enoxaparin Injectable 70 milliGRAM(s) SubCutaneous every 12 hours  famotidine Injectable 20 milliGRAM(s) IV Push every 12 hours  midodrine. 10 milliGRAM(s) Oral three times a day  ondansetron Injectable 4 milliGRAM(s) IV Push every 8 hours PRN      Vital Signs Last 24 Hrs  T(C): 36.5 (2023 08:00), Max: 38.2 (2023 23:00)  T(F): 97.7 (2023 08:00), Max: 100.7 (2023 23:00)  HR: 92 (2023 08:00) (92 - 134)  BP: 113/74 (2023 08:00) (90/63 - 117/71)  BP(mean): 88 (2023 08:00) (67 - 89)  RR: 20 (2023 08:00) (12 - 33)  SpO2: 98% (2023 08:00) (95% - 99%)    Parameters below as of 2023 08:00  Patient On (Oxygen Delivery Method): room air        Physical Exam:  General: non toxic, alert   ENT: no cervical adenopathy/mass  Oral: no gross intraoral lesions   Cardio: regular rate   Respiratory: nonlabored on room air, clear   abd: nondistended, soft nontender  Musculoskeletal: no focal joint swelling, no edema  vascular: right chest port not inflamed   Skin: no rash                          8.4    8.97  )-----------( 93       ( 2023 01:15 )             28.0       02-19    139  |  105  |  17  ----------------------------<  87  3.9   |  19<L>  |  0.91    Ca    8.9      2023 01:15  Phos  4.2       Mg     2.0         TPro  5.4<L>  /  Alb  3.4  /  TBili  0.8  /  DBili  x   /  AST  59<H>  /  ALT  82<H>  /  AlkPhos  72        Urinalysis Basic - ( 2023 10:00 )    Color: Yellow / Appearance: Clear / S.022 / pH: x  Gluc: x / Ketone: Negative  / Bili: Negative / Urobili: Negative   Blood: x / Protein: Trace / Nitrite: Negative   Leuk Esterase: Negative / RBC: 3 /hpf / WBC 4 /HPF   Sq Epi: x / Non Sq Epi: 4 /hpf / Bacteria: Negative        MICROBIOLOGY:  Culture - Sputum (collected 23 @ 10:00)  Source: .Sputum Sputum  Gram Stain (23 @ 21:52):    Rare polymorphonuclear leukocytes per low power field    Rare Squamous epithelial cells per low power field    Rare Gram Negative Rods per oil power field  Preliminary Report (23 @ 15:48):    Normal Respiratory Hortencia present    Culture - Urine (collected 23 @ 10:00)  Source: Clean Catch Clean Catch (Midstream)  Final Report (23 @ 10:41):    No growth    Culture - Blood (collected 23 @ 04:54)  Source: .Blood Blood-Peripheral  Preliminary Report (23 @ 09:01):    No growth to date.    Culture - Blood (collected 23 @ 23:35)  Source: .Blood Blood-Peripheral  Preliminary Report (23 @ 04:02):    No growth to date.    Culture - Blood (collected 23 @ 04:17)  Source: .Blood Blood-Peripheral  Preliminary Report (23 @ 12:01):    No growth to date.    Culture - Blood (collected 23 @ 04:17)  Source: .Blood Blood-Peripheral  Preliminary Report (23 @ 07:02):    No growth to date.    Culture - Urine (collected 23 @ 23:22)  Source: Clean Catch Clean Catch (Midstream)  Final Report (23 @ 03:59):    No growth    Culture - Blood (collected 23 @ 21:15)  Source: .Blood Blood-Peripheral  Preliminary Report (23 @ 02:03):    No growth to date.    Culture - Blood (collected 23 @ 21:00)  Source: .Blood Blood-Peripheral  Preliminary Report (23 @ 02:03):    No growth to date.    Rapid RVP Result: NotDetec ( @ 21:27)      RADIOLOGY:  Images below reviewed personally  CT Abdomen and Pelvis w/ IV Cont (02.15.23 @ 02:17)   No pulmonary embolism.  Innumerable lung nodules which are nonspecific. In this clinical setting,   differential includes infection (in which case, consider fungus) or   neoplasm/known lymphoma.  Indeterminate low-attenuation lesion in the liver.

## 2023-02-19 NOTE — PROGRESS NOTE ADULT - ASSESSMENT
Assessment and Recommendation:   · Assessment	  Assessment and recommendation :  Non hodgkin's lymphoma ? in remission   Fever etiology infection V/S lymphoma  ? 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

## 2023-02-19 NOTE — PROGRESS NOTE ADULT - ASSESSMENT
48 yo f w pmh nhl s/p chemo (?in remission), pe, ?pah, covid, p/w fevers for the last few days, a/w generalized weakness over the last couple of weeks along with poor po intake. denies chest pain, palpitations, lightheadedness, cough, wheeze, abdominal discomfort. patient does endorse n+v, dysuria, arriaza. of note, last session of chemo reportedly on 1/23/2023. Pt grew concerned so went to oncologist yesterday where she got cultures, blood work, and xray. as she did not feel improved, patient presents to [facility] er for further evaluation.  (15 Feb 2023 01:39)  BP monitoring,continue current antihypertensive meds, low salt diet,followup with PMD in 1-2 weeks      pt had letitia and pt was on hemodialysis for w eeks and s/p cvvh at [facility] now gfr is controlled   Serum creatinine is stable at 0.8, approximating a GFR of is controlled  ml/min.   There is no progression.  No uremic symptoms. pos  evidence of  worsening  Anemia. Fluid status stable.   Will continue to avoid nephrotoxic drugs.  Patient remains asymptomatic.  Continue current therapy.      BP monitoring,continue current , low salt diet,followup with PMD in 1-2 weeks    Admit for septic workup and ID evaluation,send blood and urine cx,serial lactate levels,monitor vitals closley,ivfs hydration,monitor urine output and renal profile,iv abx as per id cons\  cefepime   IVPB      cefepime   IVPB 2000 milliGRAM(s) IV Intermittent every 8 hours  voriconazole 200 milliGRAM(s) Oral every 12 hours    dvt enoxaparin Injectable 70 milliGRAM(s) SubCutaneous every 12 hours

## 2023-02-19 NOTE — PROGRESS NOTE ADULT - ASSESSMENT
Echo 2/15/23: Global lv dysfxn EF 42%, septal flattening, RV overload, mod pulm htn    A/P  46 yo f w pmh nhl s/p chemo (?in remission), pe, ?pah, covid, p/w fevers for the last few days, a/w generalized weakness, found to be in severe sepsis and w/ episode of SVT to 170s.    #SVT  -Pt with known hx of SVT  -Has been evaluated in past, was not candidate for ablation d/t chemo  -Continue digoxin  -Hypotension precludes bb use  -event toprol if bp improved  -If persists and bp remains low can consider amio gtt and oral load    #HFrEF, RV failure  -Echo with global lv dysfxn  -likely nonischemic in etiology  -diuretics per icu   -hypotension precludes lv dysfxn meds  -pocus noted    #Sepsis, r/o fungemia   -Lung nodules noted on CT, c/f fungal infection  -W/u & abx per micu, ID    #Hypotension  -I/S/O sepsis, HF  -Midodrine, pressors per micu    #Leukopenia  -Hx NHL  -Heme/onc following    35 minutes spent on total encounter; more than 50% of the visit was spent counseling and/or coordinating care by the attending physician.

## 2023-02-19 NOTE — PROGRESS NOTE ADULT - SUBJECTIVE AND OBJECTIVE BOX
Patient is a 47y Female whom presented to the hospital with abnormal electrolyte     PAST MEDICAL & SURGICAL HISTORY:  Non-Hodgkin&#x27;s lymphoma      Pulmonary embolism      History of appendectomy      History of cholecystectomy          MEDICATIONS  (STANDING):  apixaban 5 milliGRAM(s) Oral every 12 hours  chlorhexidine 2% Cloths 1 Application(s) Topical <User Schedule>  digoxin     Tablet 125 MICROGram(s) Oral daily  filgrastim-sndz (ZARXIO) Injectable 480 MICROGram(s) SubCutaneous daily  midodrine. 10 milliGRAM(s) Oral three times a day  piperacillin/tazobactam IVPB.. 3.375 Gram(s) IV Intermittent every 8 hours  potassium chloride    Tablet ER 40 milliEquivalent(s) Oral once  sodium bicarbonate  Infusion 0.086 mEq/kG/Hr (75 mL/Hr) IV Continuous <Continuous>  vancomycin  IVPB 1000 milliGRAM(s) IV Intermittent every 12 hours      Allergies    No Known Allergies    Intolerances        SOCIAL HISTORY:  Denies ETOh,Smoking,     FAMILY HISTORY:      REVIEW OF SYSTEMS:    CONSTITUTIONAL: No weakness, fevers or chills  RESPIRATORY: No cough, wheezing, hemoptysis; No shortness of breath  CARDIOVASCULAR: No chest pain or palpitations  GASTROINTESTINAL: No abdominal or epigastric pain. No nausea, vomiting,     No diarrhea or constipation. No melena   SKIN: dry    MEDICATIONS  (STANDING):  cefepime   IVPB      cefepime   IVPB 2000 milliGRAM(s) IV Intermittent every 8 hours  chlorhexidine 2% Cloths 1 Application(s) Topical <User Schedule>  chlorhexidine 4% Liquid 1 Application(s) Topical <User Schedule>  digoxin     Tablet 125 MICROGram(s) Oral daily  enoxaparin Injectable 70 milliGRAM(s) SubCutaneous every 12 hours  midodrine. 10 milliGRAM(s) Oral three times a day  voriconazole 200 milliGRAM(s) Oral every 12 hours                            8.4    8.97  )-----------( 93       ( 19 Feb 2023 01:15 )             28.0       CBC Full  -  ( 19 Feb 2023 01:15 )  WBC Count : 8.97 K/uL  RBC Count : 3.14 M/uL  Hemoglobin : 8.4 g/dL  Hematocrit : 28.0 %  Platelet Count - Automated : 93 K/uL  Mean Cell Volume : 89.2 fl  Mean Cell Hemoglobin : 26.8 pg  Mean Cell Hemoglobin Concentration : 30.0 gm/dL  Auto Neutrophil # : x  Auto Lymphocyte # : x  Auto Monocyte # : x  Auto Eosinophil # : x  Auto Basophil # : x  Auto Neutrophil % : x  Auto Lymphocyte % : x  Auto Monocyte % : x  Auto Eosinophil % : x  Auto Basophil % : x      02-19    139  |  105  |  17  ----------------------------<  87  3.9   |  19<L>  |  0.91    Ca    8.9      19 Feb 2023 01:15  Phos  4.2     02-19  Mg     2.0     02-19    TPro  5.4<L>  /  Alb  3.4  /  TBili  0.8  /  DBili  x   /  AST  59<H>  /  ALT  82<H>  /  AlkPhos  72  02-19      CAPILLARY BLOOD GLUCOSE          Vital Signs Last 24 Hrs  T(C): 36.4 (19 Feb 2023 16:00), Max: 38.2 (18 Feb 2023 23:00)  T(F): 97.6 (19 Feb 2023 16:00), Max: 100.7 (18 Feb 2023 23:00)  HR: 109 (19 Feb 2023 16:00) (91 - 134)  BP: 105/76 (19 Feb 2023 16:00) (90/63 - 121/75)  BP(mean): 83 (19 Feb 2023 16:00) (67 - 96)  RR: 25 (19 Feb 2023 16:00) (12 - 33)  SpO2: 98% (19 Feb 2023 16:00) (95% - 100%)    Parameters below as of 19 Feb 2023 08:00  Patient On (Oxygen Delivery Method): room air                  PHYSICAL EXAM:    Constitutional: NAD  HEENT: conjunctive   clear   Neck:  No JVD  Respiratory: CTAB  Cardiovascular: S1 and S2  Gastrointestinal: BS+, soft, NT/ND  Extremities: No peripheral edema  Neurological:  no focal deficits  Psychiatric: Normal mood, normal affect  : No Roman  Skin: dry   Access: Not applicable

## 2023-02-19 NOTE — PROGRESS NOTE ADULT - SUBJECTIVE AND OBJECTIVE BOX
ZO BARBOSA  MRN#: [id number]  Subjective:  CHIEF COMPLAINT: fever . pulmonary HTN , non hodgkin's lymphoma   46 yo f a private patient from my office report to the ER   w pmh nhl s/p chemo at [facility] [place]  (?in remission), PE and DVT ,S/P  covid pneumonia ,severe pulmonary HTN on Adempas , p/w fevers for the last few days, a/w generalized weakness over the last couple of weeks along with poor po intake. denies chest pain, palpitations, lightheadedness, cough, wheeze, abdominal discomfort. patient does endorse n+v, dysuria, arriaza. of note, last session of chemo reportedly on 2023. Pt grew concerned so went to oncologist yesterday where she got cultures, blood work, and xray. as she did not feel improved, patient presents to [facility] er for further evaluation.  (15 Feb 2023 01:39) CT scan bilateral lung nodule , no H/O OF exposure to TB , no significant travel history to the [facility] out of the country or the [facility] area  transfer to the MICU for SVT patient is on Digoxin and B-Blockers , hypotension rebound back feeling better on cefepime , and voriconazole for presumptive fungal pneumonia , continue to have  fever at night  up to 101 ,  sweat appetite is suppressed       PAST MEDICAL & SURGICAL HISTORY:  Non-Hodgkin&#x27;s lymphoma      Pulmonary embolism      History of appendectomy      History of cholecystectomy                OBJECTIVE:  ICU Vital Signs Last 24 Hrs  T(C): 37.7 (2023 18:00), Max: 39.6 (2023 01:36)  T(F): 99.9 (2023 18:00), Max: 103.2 (2023 01:36)  HR: 122 (2023 18:00) (114 - 167)  BP: 91/63 (2023 18:00) (80/55 - 108/66)  BP(mean): 73 (2023 18:00) (60 - 81)  ABP: --  ABP(mean): --  RR: 29 (2023 18:00) (16 - 33)  SpO2: 97% (2023 18:00) (94% - 100%)    O2 Parameters below as of 2023 02:40  Patient On (Oxygen Delivery Method): room air             @ 07:  -   @ 07:00  --------------------------------------------------------  IN: 1100 mL / OUT: 0 mL / NET: 1100 mL     @ 07:01   @ 19:19  --------------------------------------------------------  IN: 700 mL / OUT: 2600 mL / NET: -1900 mL      PHYSICAL EXAM :Daily   cushinoid female in no acute distress   Daily Weight in k.6 (2023 09:20)  HEENT:     + NCAT  + EOMI  - Conjuctival edema   - Icterus   - Thrush   - ETT  - NGT/OGT  Neck:         + FROM    + JVD     - Nodes     - Masses    + Mid-line trachea   - Tracheostomy  Chest: normal findings  Lungs:          + CTA   + Rhonchi    - Rales    - Wheezing     - Decreased BS   - Dullness R L  Cardiac:       + S1 + S2    + RRR   - Irregular   - S3  - S4  + Murmurs   - Rub   - Hamman’s sign   Abdomen:    + BS     + Soft    + Non-tender     - Distended    - Organomegaly  - PEG  Extremities:   - Cyanosis U/L   - Clubbing  U/L  - LE/UE Edema   + Capillary refill    + Pulses   Neuro:        + Awake   +  Alert   - Confused   - Lethargic   - Sedated   - Generalized Weakness  Skin:        - Rashes    - Erythema   + Normal incisions   + IV sites intact        HOSPITAL MEDICATIONS: All mediciations reviewed and analyzed  MEDICATIONS  (STANDING):  acetaminophen   IVPB .. 1000 milliGRAM(s) IV Intermittent once  cefepime   IVPB      cefepime   IVPB 2000 milliGRAM(s) IV Intermittent every 8 hours  chlorhexidine 2% Cloths 1 Application(s) Topical <User Schedule>  chlorhexidine 4% Liquid 1 Application(s) Topical <User Schedule>  digoxin     Tablet 125 MICROGram(s) Oral daily  enoxaparin Injectable 70 milliGRAM(s) SubCutaneous every 12 hours  midodrine. 10 milliGRAM(s) Oral three times a day  voriconazole 200 milliGRAM(s) Oral every 12 hours    MEDICATIONS  (PRN):  ondansetron Injectable 4 milliGRAM(s) IV Push every 8 hours PRN Nausea and/or Vomiting    LABS: All Lab data reviewed and analyzed                        8.4    8.97  )-----------( 93       ( 2023 01:15 )             28.0    -    139  |  105  |  17  ----------------------------<  87  3.9   |  19<L>  |  0.91    Ca    8.9      2023 01:15  Phos  4.2     -  Mg     2.0         TPro  5.4<L>  /  Alb  3.4  /  TBili  0.8  /  DBili  x   /  AST  59<H>  /  ALT  82<H>  /  AlkPhos  72         LIVER FUNCTIONS - ( 2023 01:21 )  Alb: 3.2 g/dL / Pro: 5.1 g/dL / ALK PHOS: 64 U/L / ALT: 36 U/L / AST: 43 U/L / GGT: x           RADIOLOGY: - Reviewed and analyzed

## 2023-02-19 NOTE — PROGRESS NOTE ADULT - ASSESSMENT
48F NHL 7/2022, completed R-CHOP 1/23/23.   Here 2/14 for nonlocalizing fevers.   Innumerable nonspecific lung nodules, infectious? (fungus) or neoplasm/known lymphoma. Indeterminate low-attenuation lesion in the liver.   Mild neutropenia - recovered.   MICU 2/17 for lactic acidosis and hypotension.     * blood cx, urine cx and MRSA PCR, fungitell negative  * check serum crypt and histo Ag, f/u galactomannan and aspergillus Ab  * sputum cx   * pt was started on vori for possible fungal pneumonia  * zosyn was switched to cefepime, will continue for now  * no definite diagnosis, for now will c/w antifungal and bacterial but pt will need bronc and biopsy when stable    LFTs going up a little   VOri level tomorrow     Brett Mckinney MD   Infectious Disease   Available on TEAMS. After 5PM and on weekends please page fellow on call or call [phone] 48F NHL 7/2022.   Here 2/14 for nonlocalizing fevers after R-CHOP 1/23 which is typical for her but they did not resolve this time.   Innumerable nonspecific lung nodules, infectious or neoplasm.   Indeterminate low-attenuation lesion in the liver.   Mild neutropenia - recovered.   MICU 2/17 for lactic acidosis and hypotension.   Not sure what we're treating.   Infectious workup negative - cultures (blood, urine, sputum), MRSA PCR, RVP, cryptococcal antigen, beta-D glucan, GI PCR.   Looks well but continued fevers despite antibiotics and antifungals.   I think she'll need a biopsy for diagnosis.     Suggest  -favor biopsy - lung or liver for diagnosis   -monitor cultures   -f/u fungal markers   -can continue Voriconazole - monitor LFTs (rising) and check a trough tomorrow   -Zosyn was switched to Cefepime 2/17 which is not wrong     Brett Mckinney MD   Infectious Disease   Available on TEAMS. After 5PM and on weekends please page fellow on call or call [phone] 48F NHL 7/2022.   Here 2/14 for nonlocalizing fevers after R-CHOP 1/23 which is typical for her but they did not resolve this time.   Innumerable nonspecific lung nodules, infectious or neoplasm.   Indeterminate low-attenuation lesion in the liver.   Mild neutropenia - recovered.   MICU 2/17 for lactic acidosis and hypotension.   Not sure what we're treating.   Infectious workup negative - cultures (blood, urine, sputum), MRSA PCR, RVP, cryptococcal antigen, beta-D glucan, GI PCR.   Looks well but continued fevers despite antibiotics and antifungals.   I think she'll need a biopsy for diagnosis.     Suggest  -favor biopsy - lung or liver for diagnosis, include bacterial, fungal and AFB cultures   -monitor cultures   -f/u fungal markers   -can continue Voriconazole - monitor LFTs (rising) and check a trough tomorrow   -Zosyn was switched to Cefepime 2/17 which is not wrong     Discussed with MICU NP    Brett Mckinney MD   Infectious Disease   Available on TEAMS. After 5PM and on weekends please page fellow on call or call [phone]

## 2023-02-19 NOTE — PROGRESS NOTE ADULT - SUBJECTIVE AND OBJECTIVE BOX
INTERVAL HPI/OVERNIGHT EVENTS:    SUBJECTIVE: Patient seen and examined at bedside. Complains of fever from overnight and continued night sweats. Feels FREGOSO when going to the commode.      VITAL SIGNS:  ICU Vital Signs Last 24 Hrs  T(C): 36.5 (2023 08:00), Max: 38.2 (2023 23:00)  T(F): 97.7 (2023 08:00), Max: 100.7 (2023 23:00)  HR: 92 (2023 08:00) (92 - 134)  BP: 113/74 (2023 08:00) (90/63 - 117/71)  BP(mean): 88 (2023 08:00) (67 - 89)  ABP: --  ABP(mean): --  RR: 20 (2023 08:00) (12 - 33)  SpO2: 98% (2023 08:00) (95% - 99%)    O2 Parameters below as of 2023 08:00  Patient On (Oxygen Delivery Method): room air            Plateau pressure:   P/F ratio:     02-18 @ 07:01  -  02-19 @ 07:00  --------------------------------------------------------  IN: 1200 mL / OUT: 1850 mL / NET: -650 mL      CAPILLARY BLOOD GLUCOSE        ECG:    PHYSICAL EXAM:    General: NAD  HEENT: NC/AT; PERRL, clear conjunctiva  Neck: supple  Respiratory: CTA b/l  Cardiovascular: Tachycardic, regular rhythm. Mediport in R. upper chest wall  Abdomen: soft, NT/ND; +BS x4  Extremities: WWP, 2+ peripheral pulses b/l; no LE edema  Skin: normal color and turgor; no rash  Neurological: A&Ox4    MEDICATIONS:  MEDICATIONS  (STANDING):  cefepime   IVPB      cefepime   IVPB 2000 milliGRAM(s) IV Intermittent every 8 hours  chlorhexidine 2% Cloths 1 Application(s) Topical <User Schedule>  chlorhexidine 4% Liquid 1 Application(s) Topical <User Schedule>  digoxin     Tablet 125 MICROGram(s) Oral daily  enoxaparin Injectable 70 milliGRAM(s) SubCutaneous every 12 hours  famotidine Injectable 20 milliGRAM(s) IV Push every 12 hours  midodrine. 10 milliGRAM(s) Oral three times a day  voriconazole 200 milliGRAM(s) Oral every 12 hours    MEDICATIONS  (PRN):  ondansetron Injectable 4 milliGRAM(s) IV Push every 8 hours PRN Nausea and/or Vomiting      ALLERGIES:  Allergies    No Known Allergies    Intolerances        LABS:                        8.4    8.97  )-----------( 93       ( 2023 01:15 )             28.0     02-19    139  |  105  |  17  ----------------------------<  87  3.9   |  19<L>  |  0.91    Ca    8.9      2023 01:15  Phos  4.2     02-  Mg     2.0     -    TPro  5.4<L>  /  Alb  3.4  /  TBili  0.8  /  DBili  x   /  AST  59<H>  /  ALT  82<H>  /  AlkPhos  72  02-19      Urinalysis Basic - ( 2023 10:00 )    Color: Yellow / Appearance: Clear / S.022 / pH: x  Gluc: x / Ketone: Negative  / Bili: Negative / Urobili: Negative   Blood: x / Protein: Trace / Nitrite: Negative   Leuk Esterase: Negative / RBC: 3 /hpf / WBC 4 /HPF   Sq Epi: x / Non Sq Epi: 4 /hpf / Bacteria: Negative        RADIOLOGY & ADDITIONAL TESTS: Reviewed.

## 2023-02-19 NOTE — PROGRESS NOTE ADULT - SUBJECTIVE AND OBJECTIVE BOX
CARDIOLOGY FOLLOW UP NOTE - DR. FULTON    Patient Name: OZ BARBOSA  Date of Service: 23 @ 14:47    events noted    Subjective:    cv: denies chest pain, +dyspnea, palpitations, dizziness  pulmonary: denies cough  GI: denies abdominal pain, nausea, vomiting  vascular/legs: no edema   skin: no rash  ROS: otherwise negative   overnight events:      PHYSICAL EXAM:  T(C): 36.4 (23 @ 12:00), Max: 38.2 (23 @ 23:00)  HR: 104 (23 @ 14:00) (91 - 134)  BP: 111/74 (23 @ 14:00) (90/63 - 121/75)  RR: 23 (23 @ 14:00) (12 - 33)  SpO2: 99% (23 @ 14:00) (95% - 100%)  Wt(kg): --  I&O's Summary    2023 07:  -  2023 07:00  --------------------------------------------------------  IN: 1200 mL / OUT: 1850 mL / NET: -650 mL    2023 07:01  -  2023 14:47  --------------------------------------------------------  IN: 50 mL / OUT: 1400 mL / NET: -1350 mL      Daily     Daily Weight in k.9 (2023 04:00)    Appearance: Normal	  Cardiovascular: Normal S1 S2,RRR, No JVD, No murmurs  Respiratory: Lungs clear to auscultation	  Gastrointestinal:  Soft, Non-tender, + BS	  Extremities: Normal range of motion, No clubbing, cyanosis or edema      Home Medications:  digoxin 125 mcg (0.125 mg) oral tablet: 1 tab(s) orally once a day (15 Feb 2023 02:23)  Eliquis 5 mg oral tablet: 1 tab(s) orally 2 times a day (15 Feb 2023 02:23)  metoprolol succinate 25 mg oral tablet, extended release: 1 tab(s) orally once a day (15 Feb 2023 02:23)  selexipag 800 mcg oral tablet: 1 tab(s) orally 2 times a day (15 Feb 2023 02:23)      MEDICATIONS  (STANDING):  cefepime   IVPB      cefepime   IVPB 2000 milliGRAM(s) IV Intermittent every 8 hours  chlorhexidine 2% Cloths 1 Application(s) Topical <User Schedule>  chlorhexidine 4% Liquid 1 Application(s) Topical <User Schedule>  digoxin     Tablet 125 MICROGram(s) Oral daily  enoxaparin Injectable 70 milliGRAM(s) SubCutaneous every 12 hours  famotidine Injectable 20 milliGRAM(s) IV Push every 12 hours  midodrine. 10 milliGRAM(s) Oral three times a day  voriconazole 200 milliGRAM(s) Oral every 12 hours      TELEMETRY: 	    ECG:  	  RADIOLOGY:   DIAGNOSTIC TESTING:  [ ] Echocardiogram:  [ ] Catheterization:  [ ] Stress Test:    OTHER: 	    LABS:	 	    CARDIAC MARKERS:        Troponin T, High Sensitivity Result: 34 ng/L ( @ 01:21)  Troponin T, High Sensitivity Result: 16 ng/L ( @ 23:51)  Troponin T, High Sensitivity Result: 43 ng/L ( @ 03:01)                                8.4    8.97  )-----------( 93       ( 2023 01:15 )             28.0         139  |  105  |  17  ----------------------------<  87  3.9   |  19<L>  |  0.91    Ca    8.9      2023 01:15  Phos  4.2       Mg     2.0         TPro  5.4<L>  /  Alb  3.4  /  TBili  0.8  /  DBili  x   /  AST  59<H>  /  ALT  82<H>  /  AlkPhos  72      proBNP:     Lipid Profile:   HgA1c:     Creatinine, Serum: 0.91 mg/dL (23 @ 01:15)  Creatinine, Serum: 0.93 mg/dL (23 @ 00:32)  Creatinine, Serum: 0.89 mg/dL (23 @ 17:21)  Creatinine, Serum: 0.95 mg/dL (23 @ 01:21)  Creatinine, Serum: 0.39 mg/dL (23 @ 23:51)

## 2023-02-20 LAB
ALBUMIN SERPL ELPH-MCNC: 3.3 G/DL — SIGNIFICANT CHANGE UP (ref 3.3–5)
ALP SERPL-CCNC: 72 U/L — SIGNIFICANT CHANGE UP (ref 40–120)
ALT FLD-CCNC: 65 U/L — HIGH (ref 10–45)
ANION GAP SERPL CALC-SCNC: 16 MMOL/L — SIGNIFICANT CHANGE UP (ref 5–17)
APTT BLD: 33.9 SEC — SIGNIFICANT CHANGE UP (ref 27.5–35.5)
AST SERPL-CCNC: 39 U/L — SIGNIFICANT CHANGE UP (ref 10–40)
BILIRUB SERPL-MCNC: 0.9 MG/DL — SIGNIFICANT CHANGE UP (ref 0.2–1.2)
BUN SERPL-MCNC: 16 MG/DL — SIGNIFICANT CHANGE UP (ref 7–23)
CALCIUM SERPL-MCNC: 8.8 MG/DL — SIGNIFICANT CHANGE UP (ref 8.4–10.5)
CHLORIDE SERPL-SCNC: 104 MMOL/L — SIGNIFICANT CHANGE UP (ref 96–108)
CO2 SERPL-SCNC: 17 MMOL/L — LOW (ref 22–31)
CREAT SERPL-MCNC: 0.9 MG/DL — SIGNIFICANT CHANGE UP (ref 0.5–1.3)
CULTURE RESULTS: SIGNIFICANT CHANGE UP
CULTURE RESULTS: SIGNIFICANT CHANGE UP
EGFR: 80 ML/MIN/1.73M2 — SIGNIFICANT CHANGE UP
GLUCOSE SERPL-MCNC: 82 MG/DL — SIGNIFICANT CHANGE UP (ref 70–99)
HCT VFR BLD CALC: 30.3 % — LOW (ref 34.5–45)
HGB BLD-MCNC: 9.1 G/DL — LOW (ref 11.5–15.5)
INR BLD: 1.13 RATIO — SIGNIFICANT CHANGE UP (ref 0.88–1.16)
MAGNESIUM SERPL-MCNC: 1.9 MG/DL — SIGNIFICANT CHANGE UP (ref 1.6–2.6)
MCHC RBC-ENTMCNC: 27.2 PG — SIGNIFICANT CHANGE UP (ref 27–34)
MCHC RBC-ENTMCNC: 30 GM/DL — LOW (ref 32–36)
MCV RBC AUTO: 90.7 FL — SIGNIFICANT CHANGE UP (ref 80–100)
NRBC # BLD: 0 /100 WBCS — SIGNIFICANT CHANGE UP (ref 0–0)
PHOSPHATE SERPL-MCNC: 3.9 MG/DL — SIGNIFICANT CHANGE UP (ref 2.5–4.5)
PLATELET # BLD AUTO: 129 K/UL — LOW (ref 150–400)
POTASSIUM SERPL-MCNC: 3.6 MMOL/L — SIGNIFICANT CHANGE UP (ref 3.5–5.3)
POTASSIUM SERPL-SCNC: 3.6 MMOL/L — SIGNIFICANT CHANGE UP (ref 3.5–5.3)
PROT SERPL-MCNC: 5.3 G/DL — LOW (ref 6–8.3)
PROTHROM AB SERPL-ACNC: 13.1 SEC — SIGNIFICANT CHANGE UP (ref 10.5–13.4)
RBC # BLD: 3.34 M/UL — LOW (ref 3.8–5.2)
RBC # FLD: 19.3 % — HIGH (ref 10.3–14.5)
SODIUM SERPL-SCNC: 137 MMOL/L — SIGNIFICANT CHANGE UP (ref 135–145)
SPECIMEN SOURCE: SIGNIFICANT CHANGE UP
SPECIMEN SOURCE: SIGNIFICANT CHANGE UP
WBC # BLD: 7.2 K/UL — SIGNIFICANT CHANGE UP (ref 3.8–10.5)
WBC # FLD AUTO: 7.2 K/UL — SIGNIFICANT CHANGE UP (ref 3.8–10.5)

## 2023-02-20 PROCEDURE — 99291 CRITICAL CARE FIRST HOUR: CPT | Mod: GC

## 2023-02-20 RX ORDER — FUROSEMIDE 40 MG
20 TABLET ORAL DAILY
Refills: 0 | Status: DISCONTINUED | OUTPATIENT
Start: 2023-02-21 | End: 2023-02-28

## 2023-02-20 RX ORDER — POTASSIUM CHLORIDE 20 MEQ
40 PACKET (EA) ORAL ONCE
Refills: 0 | Status: COMPLETED | OUTPATIENT
Start: 2023-02-20 | End: 2023-02-20

## 2023-02-20 RX ORDER — FUROSEMIDE 40 MG
20 TABLET ORAL ONCE
Refills: 0 | Status: COMPLETED | OUTPATIENT
Start: 2023-02-20 | End: 2023-02-20

## 2023-02-20 RX ADMIN — MIDODRINE HYDROCHLORIDE 10 MILLIGRAM(S): 2.5 TABLET ORAL at 15:06

## 2023-02-20 RX ADMIN — ENOXAPARIN SODIUM 70 MILLIGRAM(S): 100 INJECTION SUBCUTANEOUS at 06:20

## 2023-02-20 RX ADMIN — Medication 40 MILLIEQUIVALENT(S): at 06:22

## 2023-02-20 RX ADMIN — CEFEPIME 100 MILLIGRAM(S): 1 INJECTION, POWDER, FOR SOLUTION INTRAMUSCULAR; INTRAVENOUS at 15:06

## 2023-02-20 RX ADMIN — FAMOTIDINE 20 MILLIGRAM(S): 10 INJECTION INTRAVENOUS at 17:54

## 2023-02-20 RX ADMIN — CHLORHEXIDINE GLUCONATE 1 APPLICATION(S): 213 SOLUTION TOPICAL at 06:20

## 2023-02-20 RX ADMIN — Medication 20 MILLIGRAM(S): at 09:31

## 2023-02-20 RX ADMIN — FAMOTIDINE 20 MILLIGRAM(S): 10 INJECTION INTRAVENOUS at 06:19

## 2023-02-20 RX ADMIN — MIDODRINE HYDROCHLORIDE 10 MILLIGRAM(S): 2.5 TABLET ORAL at 22:49

## 2023-02-20 RX ADMIN — Medication 125 MICROGRAM(S): at 06:19

## 2023-02-20 RX ADMIN — MIDODRINE HYDROCHLORIDE 10 MILLIGRAM(S): 2.5 TABLET ORAL at 06:19

## 2023-02-20 RX ADMIN — CEFEPIME 100 MILLIGRAM(S): 1 INJECTION, POWDER, FOR SOLUTION INTRAMUSCULAR; INTRAVENOUS at 06:20

## 2023-02-20 RX ADMIN — VORICONAZOLE 200 MILLIGRAM(S): 10 INJECTION, POWDER, LYOPHILIZED, FOR SOLUTION INTRAVENOUS at 06:19

## 2023-02-20 RX ADMIN — VORICONAZOLE 200 MILLIGRAM(S): 10 INJECTION, POWDER, LYOPHILIZED, FOR SOLUTION INTRAVENOUS at 17:54

## 2023-02-20 RX ADMIN — ENOXAPARIN SODIUM 70 MILLIGRAM(S): 100 INJECTION SUBCUTANEOUS at 17:54

## 2023-02-20 RX ADMIN — CEFEPIME 100 MILLIGRAM(S): 1 INJECTION, POWDER, FOR SOLUTION INTRAMUSCULAR; INTRAVENOUS at 22:49

## 2023-02-20 NOTE — PROGRESS NOTE ADULT - NUTRITIONAL ASSESSMENT
MEDICATIONS  (STANDING):  cefepime   IVPB      cefepime   IVPB 2000 milliGRAM(s) IV Intermittent every 8 hours  chlorhexidine 2% Cloths 1 Application(s) Topical <User Schedule>  chlorhexidine 4% Liquid 1 Application(s) Topical <User Schedule>  digoxin     Tablet 125 MICROGram(s) Oral daily  enoxaparin Injectable 70 milliGRAM(s) SubCutaneous every 12 hours  famotidine Injectable 20 milliGRAM(s) IV Push every 12 hours  midodrine. 10 milliGRAM(s) Oral three times a day  voriconazole 200 milliGRAM(s) Oral every 12 hours

## 2023-02-20 NOTE — PROGRESS NOTE ADULT - ASSESSMENT
47 F PMH non-Hodgkin lymphoma s/p chemoport (?in remission), PE, pHTN, presenting with recurrent fevers with pancytopenia in setting of recent chemo treatment and prednisone concerning for severe sepsis. Found to be requiring intermittent fluids and continues to be febrile while on broad-spectrum bacterial antibiotic coverage. CT chest imaging concerning for fungal infection vs metastasis. MICU consulted for further management.      PLAN    =====Neurologic=====  - No active issues, AOx4    =====Pulmonary=====  # NHL with pulmonary nodules  - as in heme-onc section below    # workup for pna  - as in ID section below    # pulmonary arterial HTN  > RV systolic pressure =51mmHg on TTE  - diurese    =====Cardiovascular=====  # HFrEF (EF ~42) with RV dysfunction based on previous echo (2/15/23)  - Bedside POCUS noted to have dilated RV  > Caution with fluids  > DC-ed coreg   > Diurese    # SVT  > digoxin 125 mcg po qD (home medication)  > follow up digoxin level    # hypotension  - Stable  - possibly multifactorial, with component of RV overload and possible sepsis  - s/p IV albumin x1  > midodrine 10 mg po tid    =====GI=====  #Diet - regular, 1500cc fluid restriction    =====Renal/=====  - PMH STARR while on antibiotics  > appreciate renal recs    =====Endocrine=====  - No active issues.     =====Infectious Disease=====  # severe sepsis with unclear etiology in setting of known malginancy and recent chemotx for cancer   - iso immunocompromise, hx and CT chest imaging c/f infection (e.g. fungal pna) vs progression of known cancer  > switched Zosyn to cefepime (2/17-)  > MRSA swab negative, vanc dc'd  > started voriconazole 200mg po bid  > appreciate ID recs re: voriconazole; hold off antiviral given low suspicion for viral infection (RVP negative)  > supportive care: Tylenol and cooling measures (if patient agrees as she reported that cooling measures made her uncomfortable)  > follow up BCx, UA, UCx  > Aspergillus galactomannan Ag, Aspergillus Ab, crypt, histo  > appreciate ID recs    =====Heme/Onc=====  # DLBCL  > appreciate heme-onc recs    #leukopenia on filgastrim  #anemia  #thrombocytopenia  - possibly from recent chemo and cancer  > Hb goal >7  > S/p 3d of zarxio  > Trend Plt    #DVT PPX  > Given hx of PE and large RV will continue with therapeutic lovenox    =====Ethics=====  FULL CODE. 47 F PMH non-Hodgkin lymphoma s/p chemoport (?in remission), PE, pHTN, presenting with recurrent fevers with pancytopenia in setting of recent chemo treatment and prednisone concerning for severe sepsis. Found to be requiring intermittent fluids and continues to be febrile while on broad-spectrum bacterial antibiotic coverage. CT chest imaging concerning for fungal infection vs metastasis. MICU consulted for further management.      PLAN    =====Neurologic=====  - No active issues, AOx4    =====Pulmonary=====  # NHL with pulmonary nodules  - as in heme-onc section below    # workup for pna  - as in ID section below    # pulmonary arterial HTN  > RV systolic pressure =51mmHg on TTE  - diurese (IV in MICU), will transition to PO lasix starting 2/21/23    =====Cardiovascular=====  # HFrEF (EF ~42) with RV dysfunction based on previous echo (2/15/23)  - Bedside POCUS noted to have dilated RV  > Caution with fluids  > DC-ed coreg   > Diurese daily    # SVT  > digoxin 125 mcg po qD (home medication)  > follow up digoxin level    # hypotension  - Stable  - possibly multifactorial, with component of RV overload and possible sepsis  - s/p IV albumin x1  > midodrine 10 mg po tid    =====GI=====  #Diet - regular, 1500cc fluid restriction    =====Renal/=====  - PMH STARR while on antibiotics  > appreciate renal recs    =====Endocrine=====  - No active issues.     =====Infectious Disease=====  # severe sepsis with unclear etiology in setting of known malginancy and recent chemotx for cancer   - iso immunocompromise, hx and CT chest imaging c/f infection (e.g. fungal pna) vs progression of known cancer  > switched Zosyn to cefepime (2/17-)  > MRSA swab negative, vanc dc'd  > started voriconazole 200mg po bid  > appreciate ID recs re: voriconazole; hold off antiviral given low suspicion for viral infection (RVP negative)  > supportive care: Tylenol and cooling measures (if patient agrees as she reported that cooling measures made her uncomfortable)  > follow up BCx, UA, UCx  > Aspergillus galactomannan Ag, Aspergillus Ab, crypt, histo  > appreciate ID recs  > IR consulted for evaluation of pulmonary nodules    =====Heme/Onc=====  # DLBCL  > appreciate heme-onc recs    #leukopenia on filgastrim  #anemia  #thrombocytopenia  - possibly from recent chemo and cancer  > Hb goal >7  > S/p 3d of zarxio  > Trend Plt    #DVT PPX  > Given hx of PE and large RV will continue with therapeutic lovenox    =====Ethics=====  FULL CODE.

## 2023-02-20 NOTE — PHYSICAL THERAPY INITIAL EVALUATION ADULT - PERTINENT HX OF CURRENT PROBLEM, REHAB EVAL
PMHx: NHL s/p chemo (?in remission), pe, ?pah, covid. p/w fevers for the last few days, a/w generalized weakness over the last couple of weeks along with poor po intake. Pt does endorse n+v, dysuria, arriaza. (last session of chemo reportedly on 1/23/2023). Pt grew concerned so went to oncologist yesterday where she got cultures, blood work, & xray. as she did not feel improved, & presents to [facility]. +febrile, tachycardic, hypotensive. chest/abd CT: No PE, Innumerable lung nodules which are nonspecific, In this clinical setting, differential includes infection (in which case, consider fungus) or neoplasm/known lymphoma, Indeterminate low-attenuation lesion in the liver. s/p RRT 2/16 for hypotension. concern for sepsis. s/p RRT 2/17 for SVT to 170s, fever to 102.9F, rigors, lactate ~7, concerning for severe sepsis, transferred to MICU.

## 2023-02-20 NOTE — PROGRESS NOTE ADULT - ASSESSMENT
48 yo f w pmh nhl s/p chemo (?in remission), pe, ?pah, covid, p/w fevers for the last few days, a/w generalized weakness over the last couple of weeks along with poor po intake. denies chest pain, palpitations, lightheadedness, cough, wheeze, abdominal discomfort. patient does endorse n+v, dysuria, arriaza. of note, last session of chemo reportedly on 1/23/2023. Pt grew concerned so went to oncologist yesterday where she got cultures, blood work, and xray. as she did not feel improved, patient presents to [facility] er for further evaluation.  (15 Feb 2023 01:39)  BP monitoring,continue current antihypertensive meds, low salt diet,followup with PMD in 1-2 weeks      pt had letitia and pt was on hemodialysis for w eeks and s/p cvvh at [facility] now gfr is controlled   Serum creatinine is stable at 0.8, approximating a GFR of is controlled  ml/min.   There is no progression.  No uremic symptoms. pos  evidence of  worsening  Anemia. Fluid status stable.   Will continue to avoid nephrotoxic drugs.  Patient remains asymptomatic.  Continue current therapy.      BP monitoring,continue current , low salt diet,followup with PMD in 1-2 weeks    Admit for septic workup and ID evaluation,send blood and urine cx,serial lactate levels,monitor vitals closley,ivfs hydration,monitor urine output and renal profile,iv abx as per id cons\  cefepime   IVPB      cefepime   IVPB 2000 milliGRAM(s) IV Intermittent every 8 hours  voriconazole 200 milliGRAM(s) Oral every 12 hours    dvt enoxaparin Injectable 70 milliGRAM(s) SubCutaneous every 12 hours

## 2023-02-20 NOTE — PROGRESS NOTE ADULT - SUBJECTIVE AND OBJECTIVE BOX
Ender Francois, PGY3  Internal Medicine  Pager [phone] ([facility])    OVERNIGHT EVENTS / SUBJECTIVE: Patient seen and examined at bedside. Extubated yesterday. Complaining of some R. shoulder pain.    OBJECTIVE:    VITAL SIGNS:  ICU Vital Signs Last 24 Hrs  T(C): 36.5 (20 Feb 2023 04:00), Max: 36.7 (19 Feb 2023 20:00)  T(F): 97.7 (20 Feb 2023 04:00), Max: 98.1 (19 Feb 2023 20:00)  HR: 93 (20 Feb 2023 06:00) (91 - 121)  BP: 105/68 (20 Feb 2023 06:00) (94/61 - 121/75)  BP(mean): 83 (20 Feb 2023 06:00) (73 - 96)  ABP: --  ABP(mean): --  RR: 11 (20 Feb 2023 06:00) (11 - 33)  SpO2: 98% (20 Feb 2023 06:00) (96% - 100%)    O2 Parameters below as of 19 Feb 2023 20:00  Patient On (Oxygen Delivery Method): room air    02-18 @ 07:01  -  02-19 @ 07:00  --------------------------------------------------------  IN: 1200 mL / OUT: 1850 mL / NET: -650 mL    02-19 @ 07:01  -  02-20 @ 06:51  --------------------------------------------------------  IN: 1040 mL / OUT: 2150 mL / NET: -1110 mL      CAPILLARY BLOOD GLUCOSE    PHYSICAL EXAM:    General: NAD  HEENT: NC/AT; PERRL, clear conjunctiva  Neck: supple  Respiratory: CTA b/l  Cardiovascular: +S1/S2; RRR  Abdomen: soft, mildly distended,  Extremities: WWP, 2+ peripheral pulses b/l; no LE edema  Skin: normal color and turgor; no rash  Neurological: A&Ox4    MEDICATIONS:  MEDICATIONS  (STANDING):  cefepime   IVPB      cefepime   IVPB 2000 milliGRAM(s) IV Intermittent every 8 hours  chlorhexidine 2% Cloths 1 Application(s) Topical <User Schedule>  chlorhexidine 4% Liquid 1 Application(s) Topical <User Schedule>  digoxin     Tablet 125 MICROGram(s) Oral daily  enoxaparin Injectable 70 milliGRAM(s) SubCutaneous every 12 hours  famotidine Injectable 20 milliGRAM(s) IV Push every 12 hours  midodrine. 10 milliGRAM(s) Oral three times a day  voriconazole 200 milliGRAM(s) Oral every 12 hours    MEDICATIONS  (PRN):  ondansetron Injectable 4 milliGRAM(s) IV Push every 8 hours PRN Nausea and/or Vomiting    ALLERGIES:  Allergies    No Known Allergies    Intolerances    LABS:                        9.1    7.20  )-----------( 129      ( 20 Feb 2023 00:42 )             30.3     Hemoglobin: 9.1 g/dL (02-20 @ 00:42)  Hemoglobin: 8.4 g/dL (02-19 @ 01:15)  Hemoglobin: 8.3 g/dL (02-18 @ 00:33)  Hemoglobin: 8.4 g/dL (02-17 @ 01:21)  Hemoglobin: 6.5 g/dL (02-16 @ 23:54)    CBC Full  -  ( 20 Feb 2023 00:42 )  WBC Count : 7.20 K/uL  RBC Count : 3.34 M/uL  Hemoglobin : 9.1 g/dL  Hematocrit : 30.3 %  Platelet Count - Automated : 129 K/uL  Mean Cell Volume : 90.7 fl  Mean Cell Hemoglobin : 27.2 pg  Mean Cell Hemoglobin Concentration : 30.0 gm/dL  Auto Neutrophil # : x  Auto Lymphocyte # : x  Auto Monocyte # : x  Auto Eosinophil # : x  Auto Basophil # : x  Auto Neutrophil % : x  Auto Lymphocyte % : x  Auto Monocyte % : x  Auto Eosinophil % : x  Auto Basophil % : x    02-20    137  |  104  |  16  ----------------------------<  82  3.6   |  17<L>  |  0.90    Ca    8.8      20 Feb 2023 00:41  Phos  3.9     02-20  Mg     1.9     02-20    TPro  5.3<L>  /  Alb  3.3  /  TBili  0.9  /  DBili  x   /  AST  39  /  ALT  65<H>  /  AlkPhos  72  02-20    Creatinine Trend: 0.90<--, 0.91<--, 0.93<--, 0.89<--, 0.95<--, 0.39<--  LIVER FUNCTIONS - ( 20 Feb 2023 00:41 )  Alb: 3.3 g/dL / Pro: 5.3 g/dL / ALK PHOS: 72 U/L / ALT: 65 U/L / AST: 39 U/L / GGT: x           PT/INR - ( 20 Feb 2023 00:43 )   PT: 13.1 sec;   INR: 1.13 ratio         PTT - ( 20 Feb 2023 00:43 )  PTT:33.9 sec    hs Troponin    CSF:    EKG:   MICROBIOLOGY:    Culture - Sputum (collected 17 Feb 2023 10:00)  Source: .Sputum Sputum  Gram Stain (17 Feb 2023 21:52):    Rare polymorphonuclear leukocytes per low power field    Rare Squamous epithelial cells per low power field    Rare Gram Negative Rods per oil power field  Final Report (19 Feb 2023 16:48):    Normal Respiratory Hortencia present    Culture - Urine (collected 17 Feb 2023 10:00)  Source: Clean Catch Clean Catch (Midstream)  Final Report (18 Feb 2023 10:41):    No growth    IMAGING:    Labs, imaging, EKG personally reviewed    RADIOLOGY & ADDITIONAL TESTS: Reviewed. Ender Francois, PGY3  Internal Medicine  Pager [phone] ([facility])    OVERNIGHT EVENTS / SUBJECTIVE: Patient seen and examined at bedside. No acute events overnight.    OBJECTIVE:    VITAL SIGNS:  ICU Vital Signs Last 24 Hrs  T(C): 36.5 (20 Feb 2023 04:00), Max: 36.7 (19 Feb 2023 20:00)  T(F): 97.7 (20 Feb 2023 04:00), Max: 98.1 (19 Feb 2023 20:00)  HR: 93 (20 Feb 2023 06:00) (91 - 121)  BP: 105/68 (20 Feb 2023 06:00) (94/61 - 121/75)  BP(mean): 83 (20 Feb 2023 06:00) (73 - 96)  ABP: --  ABP(mean): --  RR: 11 (20 Feb 2023 06:00) (11 - 33)  SpO2: 98% (20 Feb 2023 06:00) (96% - 100%)    O2 Parameters below as of 19 Feb 2023 20:00  Patient On (Oxygen Delivery Method): room air    02-18 @ 07:01  -  02-19 @ 07:00  --------------------------------------------------------  IN: 1200 mL / OUT: 1850 mL / NET: -650 mL    02-19 @ 07:01  - 02-20 @ 06:51  --------------------------------------------------------  IN: 1040 mL / OUT: 2150 mL / NET: -1110 mL      CAPILLARY BLOOD GLUCOSE    PHYSICAL EXAM:    General: NAD  HEENT: NC/AT; PERRL, clear conjunctiva  Neck: supple  Respiratory: CTA b/l  Cardiovascular: +S1/S2; RRR  Abdomen: soft, mildly distended,  Extremities: WWP, 2+ peripheral pulses b/l; no LE edema  Skin: normal color and turgor; no rash  Neurological: A&Ox4    MEDICATIONS:  MEDICATIONS  (STANDING):  cefepime   IVPB      cefepime   IVPB 2000 milliGRAM(s) IV Intermittent every 8 hours  chlorhexidine 2% Cloths 1 Application(s) Topical <User Schedule>  chlorhexidine 4% Liquid 1 Application(s) Topical <User Schedule>  digoxin     Tablet 125 MICROGram(s) Oral daily  enoxaparin Injectable 70 milliGRAM(s) SubCutaneous every 12 hours  famotidine Injectable 20 milliGRAM(s) IV Push every 12 hours  midodrine. 10 milliGRAM(s) Oral three times a day  voriconazole 200 milliGRAM(s) Oral every 12 hours    MEDICATIONS  (PRN):  ondansetron Injectable 4 milliGRAM(s) IV Push every 8 hours PRN Nausea and/or Vomiting    ALLERGIES:  Allergies    No Known Allergies    Intolerances    LABS:                        9.1    7.20  )-----------( 129      ( 20 Feb 2023 00:42 )             30.3     Hemoglobin: 9.1 g/dL (02-20 @ 00:42)  Hemoglobin: 8.4 g/dL (02-19 @ 01:15)  Hemoglobin: 8.3 g/dL (02-18 @ 00:33)  Hemoglobin: 8.4 g/dL (02-17 @ 01:21)  Hemoglobin: 6.5 g/dL (02-16 @ 23:54)    CBC Full  -  ( 20 Feb 2023 00:42 )  WBC Count : 7.20 K/uL  RBC Count : 3.34 M/uL  Hemoglobin : 9.1 g/dL  Hematocrit : 30.3 %  Platelet Count - Automated : 129 K/uL  Mean Cell Volume : 90.7 fl  Mean Cell Hemoglobin : 27.2 pg  Mean Cell Hemoglobin Concentration : 30.0 gm/dL  Auto Neutrophil # : x  Auto Lymphocyte # : x  Auto Monocyte # : x  Auto Eosinophil # : x  Auto Basophil # : x  Auto Neutrophil % : x  Auto Lymphocyte % : x  Auto Monocyte % : x  Auto Eosinophil % : x  Auto Basophil % : x    02-20    137  |  104  |  16  ----------------------------<  82  3.6   |  17<L>  |  0.90    Ca    8.8      20 Feb 2023 00:41  Phos  3.9     02-20  Mg     1.9     02-20    TPro  5.3<L>  /  Alb  3.3  /  TBili  0.9  /  DBili  x   /  AST  39  /  ALT  65<H>  /  AlkPhos  72  02-20    Creatinine Trend: 0.90<--, 0.91<--, 0.93<--, 0.89<--, 0.95<--, 0.39<--  LIVER FUNCTIONS - ( 20 Feb 2023 00:41 )  Alb: 3.3 g/dL / Pro: 5.3 g/dL / ALK PHOS: 72 U/L / ALT: 65 U/L / AST: 39 U/L / GGT: x           PT/INR - ( 20 Feb 2023 00:43 )   PT: 13.1 sec;   INR: 1.13 ratio         PTT - ( 20 Feb 2023 00:43 )  PTT:33.9 sec    hs Troponin    CSF:    EKG:   MICROBIOLOGY:    Culture - Sputum (collected 17 Feb 2023 10:00)  Source: .Sputum Sputum  Gram Stain (17 Feb 2023 21:52):    Rare polymorphonuclear leukocytes per low power field    Rare Squamous epithelial cells per low power field    Rare Gram Negative Rods per oil power field  Final Report (19 Feb 2023 16:48):    Normal Respiratory Hortencia present    Culture - Urine (collected 17 Feb 2023 10:00)  Source: Clean Catch Clean Catch (Midstream)  Final Report (18 Feb 2023 10:41):    No growth    IMAGING:    Labs, imaging, EKG personally reviewed    RADIOLOGY & ADDITIONAL TESTS: Reviewed. Ender Francois, PGY3  Internal Medicine  Pager [phone] ([facility])    OVERNIGHT EVENTS / SUBJECTIVE: Patient seen and examined at bedside. No acute events overnight. Feels tired.    OBJECTIVE:    VITAL SIGNS:  ICU Vital Signs Last 24 Hrs  T(C): 36.5 (20 Feb 2023 04:00), Max: 36.7 (19 Feb 2023 20:00)  T(F): 97.7 (20 Feb 2023 04:00), Max: 98.1 (19 Feb 2023 20:00)  HR: 93 (20 Feb 2023 06:00) (91 - 121)  BP: 105/68 (20 Feb 2023 06:00) (94/61 - 121/75)  BP(mean): 83 (20 Feb 2023 06:00) (73 - 96)  ABP: --  ABP(mean): --  RR: 11 (20 Feb 2023 06:00) (11 - 33)  SpO2: 98% (20 Feb 2023 06:00) (96% - 100%)    O2 Parameters below as of 19 Feb 2023 20:00  Patient On (Oxygen Delivery Method): room air    02-18 @ 07:01  -  02-19 @ 07:00  --------------------------------------------------------  IN: 1200 mL / OUT: 1850 mL / NET: -650 mL    02-19 @ 07:01  -  02-20 @ 06:51  --------------------------------------------------------  IN: 1040 mL / OUT: 2150 mL / NET: -1110 mL      CAPILLARY BLOOD GLUCOSE    PHYSICAL EXAM:    General: NAD  HEENT: NC/AT; PERRL, clear conjunctiva  Neck: supple  Respiratory: CTA b/l  Cardiovascular: +S1/S2; RRR  Abdomen: soft, mildly distended,  Extremities: WWP, 2+ peripheral pulses b/l; no LE edema  Skin: normal color and turgor; no rash  Neurological: A&Ox4    MEDICATIONS:  MEDICATIONS  (STANDING):  cefepime   IVPB      cefepime   IVPB 2000 milliGRAM(s) IV Intermittent every 8 hours  chlorhexidine 2% Cloths 1 Application(s) Topical <User Schedule>  chlorhexidine 4% Liquid 1 Application(s) Topical <User Schedule>  digoxin     Tablet 125 MICROGram(s) Oral daily  enoxaparin Injectable 70 milliGRAM(s) SubCutaneous every 12 hours  famotidine Injectable 20 milliGRAM(s) IV Push every 12 hours  midodrine. 10 milliGRAM(s) Oral three times a day  voriconazole 200 milliGRAM(s) Oral every 12 hours    MEDICATIONS  (PRN):  ondansetron Injectable 4 milliGRAM(s) IV Push every 8 hours PRN Nausea and/or Vomiting    ALLERGIES:  Allergies    No Known Allergies    Intolerances    LABS:                        9.1    7.20  )-----------( 129      ( 20 Feb 2023 00:42 )             30.3     Hemoglobin: 9.1 g/dL (02-20 @ 00:42)  Hemoglobin: 8.4 g/dL (02-19 @ 01:15)  Hemoglobin: 8.3 g/dL (02-18 @ 00:33)  Hemoglobin: 8.4 g/dL (02-17 @ 01:21)  Hemoglobin: 6.5 g/dL (02-16 @ 23:54)    CBC Full  -  ( 20 Feb 2023 00:42 )  WBC Count : 7.20 K/uL  RBC Count : 3.34 M/uL  Hemoglobin : 9.1 g/dL  Hematocrit : 30.3 %  Platelet Count - Automated : 129 K/uL  Mean Cell Volume : 90.7 fl  Mean Cell Hemoglobin : 27.2 pg  Mean Cell Hemoglobin Concentration : 30.0 gm/dL  Auto Neutrophil # : x  Auto Lymphocyte # : x  Auto Monocyte # : x  Auto Eosinophil # : x  Auto Basophil # : x  Auto Neutrophil % : x  Auto Lymphocyte % : x  Auto Monocyte % : x  Auto Eosinophil % : x  Auto Basophil % : x    02-20    137  |  104  |  16  ----------------------------<  82  3.6   |  17<L>  |  0.90    Ca    8.8      20 Feb 2023 00:41  Phos  3.9     02-20  Mg     1.9     02-20    TPro  5.3<L>  /  Alb  3.3  /  TBili  0.9  /  DBili  x   /  AST  39  /  ALT  65<H>  /  AlkPhos  72  02-20    Creatinine Trend: 0.90<--, 0.91<--, 0.93<--, 0.89<--, 0.95<--, 0.39<--  LIVER FUNCTIONS - ( 20 Feb 2023 00:41 )  Alb: 3.3 g/dL / Pro: 5.3 g/dL / ALK PHOS: 72 U/L / ALT: 65 U/L / AST: 39 U/L / GGT: x           PT/INR - ( 20 Feb 2023 00:43 )   PT: 13.1 sec;   INR: 1.13 ratio         PTT - ( 20 Feb 2023 00:43 )  PTT:33.9 sec    hs Troponin    CSF:    EKG:   MICROBIOLOGY:    Culture - Sputum (collected 17 Feb 2023 10:00)  Source: .Sputum Sputum  Gram Stain (17 Feb 2023 21:52):    Rare polymorphonuclear leukocytes per low power field    Rare Squamous epithelial cells per low power field    Rare Gram Negative Rods per oil power field  Final Report (19 Feb 2023 16:48):    Normal Respiratory Hortencia present    Culture - Urine (collected 17 Feb 2023 10:00)  Source: Clean Catch Clean Catch (Midstream)  Final Report (18 Feb 2023 10:41):    No growth    IMAGING:    Labs, imaging, EKG personally reviewed    RADIOLOGY & ADDITIONAL TESTS: Reviewed.

## 2023-02-20 NOTE — PROGRESS NOTE ADULT - ASSESSMENT
Echo 2/15/23: Global lv dysfxn EF 42%, septal flattening, RV overload, mod pulm htn    A/P  48 yo f w pmh nhl s/p chemo (?in remission), pe, ?pah, covid, p/w fevers for the last few days, a/w generalized weakness, found to be in severe sepsis and w/ episode of SVT to 170s.    #SVT  -Pt with known hx of SVT  -Has been evaluated in past, was not candidate for ablation d/t chemo  -Continue digoxin  -Hypotension precludes bb use  -event toprol if bp improved  -If persists and bp remains low can consider amio gtt and oral load    #HFrEF, RV failure  -Echo with global lv dysfxn  -likely nonischemic in etiology  -diuretics per icu   -hypotension precludes lv dysfxn meds  -pocus noted    #Sepsis, r/o fungemia   -Lung nodules noted on CT, c/f fungal infection  -W/u & abx per micu, ID    #Hypotension  -I/S/O sepsis, HF  -Midodrine, pressors per micu    #Leukopenia  -Hx NHL  -Heme/onc following

## 2023-02-20 NOTE — PROGRESS NOTE ADULT - SUBJECTIVE AND OBJECTIVE BOX
OZ BARBOSA  MRN#: [id number]  Subjective:  CHIEF COMPLAINT: fever . pulmonary HTN , non hodgkin's lymphoma   48 yo f a private patient from my office report to the ER   w pmh nhl s/p chemo at [facility] [place]  (?in remission), PE and DVT ,S/P  covid pneumonia ,severe pulmonary HTN on Adempas , p/w fevers for the last few days, a/w generalized weakness over the last couple of weeks along with poor po intake. denies chest pain, palpitations, lightheadedness, cough, wheeze, abdominal discomfort. patient does endorse n+v, dysuria, arriaza. of note, last session of chemo reportedly on 2023. Pt grew concerned so went to oncologist yesterday where she got cultures, blood work, and xray. as she did not feel improved, patient presents to [facility] er for further evaluation.  (15 Feb 2023 01:39) CT scan bilateral lung nodule , no H/O OF exposure to TB , no significant travel history to the [facility] out of the country or the [facility] area  transfer to the MICU for SVT patient is on Digoxin and B-Blockers , hypotension rebound back feeling better on cefepime , and voriconazole for presumptive fungal pneumonia , continue to have  fever at night  up to 101 ,  sweat appetite is suppressed   fever curve is better discuss with MICU team to consider for lung biopsy , lymphoma oncology will be called in .      PAST MEDICAL & SURGICAL HISTORY:  Non-Hodgkin&#x27;s lymphoma      Pulmonary embolism      History of appendectomy      History of cholecystectomy                OBJECTIVE:  ICU Vital Signs Last 24 Hrs  T(C): 37.7 (2023 18:00), Max: 39.6 (2023 01:36)  T(F): 99.9 (2023 18:00), Max: 103.2 (2023 01:36)  HR: 122 (2023 18:00) (114 - 167)  BP: 91/63 (2023 18:00) (80/55 - 108/66)  BP(mean): 73 (2023 18:00) (60 - 81)  ABP: --  ABP(mean): --  RR: 29 (2023 18:00) (16 - 33)  SpO2: 97% (2023 18:00) (94% - 100%)    O2 Parameters below as of 2023 02:40  Patient On (Oxygen Delivery Method): room air             @ 07:01  -   @ 07:00  --------------------------------------------------------  IN: 1100 mL / OUT: 0 mL / NET: 1100 mL     @ 07:01  -   @ 19:19  --------------------------------------------------------  IN: 700 mL / OUT: 2600 mL / NET: -1900 mL      PHYSICAL EXAM :Daily   cushinoid female in no acute distress   Daily Weight in k.6 (2023 09:20)  HEENT:     + NCAT  + EOMI  - Conjuctival edema   - Icterus   - Thrush   - ETT  - NGT/OGT  Neck:         + FROM    + JVD     - Nodes     - Masses    + Mid-line trachea   - Tracheostomy  Chest: normal findings  Lungs:          + CTA   + Rhonchi    - Rales    - Wheezing     - Decreased BS   - Dullness R L  Cardiac:       + S1 + S2    + RRR   - Irregular   - S3  - S4  + Murmurs   - Rub   - Hamman’s sign   Abdomen:    + BS     + Soft    + Non-tender     - Distended    - Organomegaly  - PEG  Extremities:   - Cyanosis U/L   - Clubbing  U/L  - LE/UE Edema   + Capillary refill    + Pulses   Neuro:        + Awake   +  Alert   - Confused   - Lethargic   - Sedated   - Generalized Weakness  Skin:        - Rashes    - Erythema   + Normal incisions   + IV sites intact        HOSPITAL MEDICATIONS: All mediciations reviewed and analyzed  MEDICATIONS  (STANDING):  acetaminophen   IVPB .. 1000 milliGRAM(s) IV Intermittent once  cefepime   IVPB      cefepime   IVPB 2000 milliGRAM(s) IV Intermittent every 8 hours  chlorhexidine 2% Cloths 1 Application(s) Topical <User Schedule>  chlorhexidine 4% Liquid 1 Application(s) Topical <User Schedule>  digoxin     Tablet 125 MICROGram(s) Oral daily  enoxaparin Injectable 70 milliGRAM(s) SubCutaneous every 12 hours  midodrine. 10 milliGRAM(s) Oral three times a day  voriconazole 200 milliGRAM(s) Oral every 12 hours    MEDICATIONS  (PRN):  ondansetron Injectable 4 milliGRAM(s) IV Push every 8 hours PRN Nausea and/or Vomiting    LABS: All Lab data reviewed and analyzed                        8.4    8.97  )-----------( 93       ( 2023 01:15 )             28.0    02-19    139  |  105  |  17  ----------------------------<  87  3.9   |  19<L>  |  0.91    Ca    8.9      2023 01:15  Phos  4.2     02-19  Mg     2.0     02-19    TPro  5.4<L>  /  Alb  3.4  /  TBili  0.8  /  DBili  x   /  AST  59<H>  /  ALT  82<H>  /  AlkPhos  72  02-19       LIVER FUNCTIONS - ( 2023 01:21 )  Alb: 3.2 g/dL / Pro: 5.1 g/dL / ALK PHOS: 64 U/L / ALT: 36 U/L / AST: 43 U/L / GGT: x           RADIOLOGY: - Reviewed and analyzed

## 2023-02-20 NOTE — PROGRESS NOTE ADULT - SUBJECTIVE AND OBJECTIVE BOX
Patient is a 47y Female whom presented to the hospital with abnormal electrolyte     PAST MEDICAL & SURGICAL HISTORY:  Non-Hodgkin&#x27;s lymphoma      Pulmonary embolism      History of appendectomy      History of cholecystectomy          MEDICATIONS  (STANDING):  apixaban 5 milliGRAM(s) Oral every 12 hours  chlorhexidine 2% Cloths 1 Application(s) Topical <User Schedule>  digoxin     Tablet 125 MICROGram(s) Oral daily  filgrastim-sndz (ZARXIO) Injectable 480 MICROGram(s) SubCutaneous daily  midodrine. 10 milliGRAM(s) Oral three times a day  piperacillin/tazobactam IVPB.. 3.375 Gram(s) IV Intermittent every 8 hours  potassium chloride    Tablet ER 40 milliEquivalent(s) Oral once  sodium bicarbonate  Infusion 0.086 mEq/kG/Hr (75 mL/Hr) IV Continuous <Continuous>  vancomycin  IVPB 1000 milliGRAM(s) IV Intermittent every 12 hours      Allergies    No Known Allergies    Intolerances        SOCIAL HISTORY:  Denies ETOh,Smoking,     FAMILY HISTORY:      REVIEW OF SYSTEMS:    CONSTITUTIONAL: No weakness, fevers or chills  RESPIRATORY: No cough, wheezing, hemoptysis; No shortness of breath  CARDIOVASCULAR: No chest pain or palpitations  GASTROINTESTINAL: No abdominal or epigastric pain. No nausea, vomiting,     No diarrhea or constipation. No melena   SKIN: dry    MEDICATIONS  (STANDING):  cefepime   IVPB      cefepime   IVPB 2000 milliGRAM(s) IV Intermittent every 8 hours  chlorhexidine 2% Cloths 1 Application(s) Topical <User Schedule>  chlorhexidine 4% Liquid 1 Application(s) Topical <User Schedule>  digoxin     Tablet 125 MICROGram(s) Oral daily  enoxaparin Injectable 70 milliGRAM(s) SubCutaneous every 12 hours  midodrine. 10 milliGRAM(s) Oral three times a day  voriconazole 200 milliGRAM(s) Oral every 12 hours                                              9.1    7.20  )-----------( 129      ( 20 Feb 2023 00:42 )             30.3       CBC Full  -  ( 20 Feb 2023 00:42 )  WBC Count : 7.20 K/uL  RBC Count : 3.34 M/uL  Hemoglobin : 9.1 g/dL  Hematocrit : 30.3 %  Platelet Count - Automated : 129 K/uL  Mean Cell Volume : 90.7 fl  Mean Cell Hemoglobin : 27.2 pg  Mean Cell Hemoglobin Concentration : 30.0 gm/dL  Auto Neutrophil # : x  Auto Lymphocyte # : x  Auto Monocyte # : x  Auto Eosinophil # : x  Auto Basophil # : x  Auto Neutrophil % : x  Auto Lymphocyte % : x  Auto Monocyte % : x  Auto Eosinophil % : x  Auto Basophil % : x      02-20    137  |  104  |  16  ----------------------------<  82  3.6   |  17<L>  |  0.90    Ca    8.8      20 Feb 2023 00:41  Phos  3.9     02-20  Mg     1.9     02-20    TPro  5.3<L>  /  Alb  3.3  /  TBili  0.9  /  DBili  x   /  AST  39  /  ALT  65<H>  /  AlkPhos  72  02-20      CAPILLARY BLOOD GLUCOSE          Vital Signs Last 24 Hrs  T(C): 36.7 (20 Feb 2023 12:00), Max: 36.7 (19 Feb 2023 20:00)  T(F): 98.1 (20 Feb 2023 12:00), Max: 98.1 (19 Feb 2023 20:00)  HR: 111 (20 Feb 2023 16:00) (93 - 132)  BP: 115/77 (20 Feb 2023 16:00) (94/61 - 128/85)  BP(mean): 98 (20 Feb 2023 16:00) (72 - 98)  RR: 24 (20 Feb 2023 16:00) (11 - 25)  SpO2: 96% (20 Feb 2023 16:00) (96% - 98%)    Parameters below as of 20 Feb 2023 14:57  Patient On (Oxygen Delivery Method): room air            PT/INR - ( 20 Feb 2023 00:43 )   PT: 13.1 sec;   INR: 1.13 ratio         PTT - ( 20 Feb 2023 00:43 )  PTT:33.9 sec            PHYSICAL EXAM:    Constitutional: NAD  HEENT: conjunctive   clear   Neck:  No JVD  Respiratory: CTAB  Cardiovascular: S1 and S2  Gastrointestinal: BS+, soft, NT/ND  Extremities: No peripheral edema

## 2023-02-20 NOTE — CONSULT NOTE ADULT - ASSESSMENT
Interventional Radiology    Evaluate for Procedure: possible pulmonary nodule biopsy    HPI: 48y Female with recent CT chest imaging concerning for fungal infection. IR consulted for possible pulmonary nodule biopsy.     Allergies:   Medications (Abx/Cardiac/Anticoagulation/Blood Products)    cefepime   IVPB: 100 mL/Hr IV Intermittent (02-20 @ 06:20)  digoxin     Tablet: 125 MICROGram(s) Oral (02-20 @ 06:19)  enoxaparin Injectable: 70 milliGRAM(s) SubCutaneous (02-20 @ 06:20)  furosemide   Injectable: 20 milliGRAM(s) IV Push (02-20 @ 09:31)  furosemide   Injectable: 20 milliGRAM(s) IV Push (02-19 @ 11:53)  midodrine.: 10 milliGRAM(s) Oral (02-20 @ 06:19)  voriconazole: 200 milliGRAM(s) Oral (02-20 @ 06:19)    Data:    T(C): 36.7  HR: 102  BP: 95/61  RR: 22  SpO2: 97%    -WBC 7.20 / HgB 9.1 / Hct 30.3 / Plt 129  -Na 137 / Cl 104 / BUN 16 / Glucose 82  -K 3.6 / CO2 17 / Cr 0.90  -ALT 65 / Alk Phos 72 / T.Bili 0.9  -INR 1.13 / PTT 33.9      Radiology: CT chest reviewed    Assessment/Plan:   48y Female with recent CT chest imaging concerning for fungal infection. IR consulted for possible pulmonary nodule biopsy.   -- imaging reviewed with chest radiologist; imaging is concerning for fungal infection and there is no role for percutaneous biopsy at this time.    d/w Dr. Patterson    --  Rayo Velasco DO/JARED, PGY-4  Vascular and Interventional Radiology   Available on Microsoft Teams    - Non-emergent consults: Place IR consult order in [place]  - Emergent issues (pager): [facility] [phone]; [facility] [phone]; [phone]  - Scheduling questions: [facility] [phone]; [facility] [phone]  - Clinic/outpatient booking: [facility] [phone]; [facility] [phone]

## 2023-02-20 NOTE — CHART NOTE - NSCHARTNOTEFT_GEN_A_CORE
: Radha Mtaamoros    INDICATION: critical illness    PROCEDURE:  [x] LIMITED ECHO  [x] LIMITED CHEST  [ ] LIMITED RETROPERITONEAL  [ ] LIMITED ABDOMINAL  [ ] LIMITED DVT  [ ] NEEDLE GUIDANCE VASCULAR  [ ] NEEDLE GUIDANCE THORACENTESIS  [ ] NEEDLE GUIDANCE PARACENTESIS  [ ] NEEDLE GUIDANCE PERICARDIOCENTESIS  [ ] OTHER    FINDINGS:  A line predominant pattern bilateral anterior lung fields  No pleural effusion, no consolidation  LVSF normal  RV enlarged, RV>LV with septal bowing   IVC 1.4 cm      INTERPRETATION:  RV enlargement with septal bowing (improved), continue diuresis     Images uploaded on [employer]      Attending Addendum:     I was present during the entire procedure and agree with the above findings and interpretation. TIme spent on the procedure was separate from the critical care time spent caring for the patient.     Ted King MD

## 2023-02-20 NOTE — PROGRESS NOTE ADULT - SUBJECTIVE AND OBJECTIVE BOX
CARDIOLOGY FOLLOW UP - Dr. Webber  DATE OF SERVICE: 2/20/23    CC  C/o fatigue.  No CP, palpitations or dizziness.    REVIEW OF SYSTEMS:  CONSTITUTIONAL: No fever, weight loss, or fatigue  RESPIRATORY: No cough, wheezing, chills or hemoptysis; No Shortness of Breath  CARDIOVASCULAR: No chest pain, palpitations, passing out, dizziness, or leg swelling  GASTROINTESTINAL: No abdominal or epigastric pain. No nausea, vomiting, or hematemesis; No diarrhea or constipation. No melena or hematochezia.  VASCULAR: No edema     PHYSICAL EXAM:  T(C): 36.7 (02-20-23 @ 08:00), Max: 36.7 (02-19-23 @ 20:00)  HR: 102 (02-20-23 @ 12:00) (93 - 121)  BP: 95/61 (02-20-23 @ 09:00) (94/61 - 128/85)  RR: 22 (02-20-23 @ 12:00) (11 - 29)  SpO2: 97% (02-20-23 @ 12:00) (96% - 99%)  Wt(kg): --  I&O's Summary    19 Feb 2023 07:01  -  20 Feb 2023 07:00  --------------------------------------------------------  IN: 1040 mL / OUT: 2250 mL / NET: -1210 mL    20 Feb 2023 07:01  -  20 Feb 2023 12:45  --------------------------------------------------------  IN: 0 mL / OUT: 300 mL / NET: -300 mL        Appearance: Normal	  Cardiovascular: Normal S1 S2,Tachycardia, No JVD, No murmurs  Respiratory: Lungs clear to auscultation b/l  Gastrointestinal:  Soft, Non-tender, + BS	  Extremities: Normal range of motion, No clubbing, cyanosis or edema      Home Medications:  digoxin 125 mcg (0.125 mg) oral tablet: 1 tab(s) orally once a day (15 Feb 2023 02:23)  Eliquis 5 mg oral tablet: 1 tab(s) orally 2 times a day (15 Feb 2023 02:23)  metoprolol succinate 25 mg oral tablet, extended release: 1 tab(s) orally once a day (15 Feb 2023 02:23)  selexipag 800 mcg oral tablet: 1 tab(s) orally 2 times a day (15 Feb 2023 02:23)      MEDICATIONS  (STANDING):  cefepime   IVPB      cefepime   IVPB 2000 milliGRAM(s) IV Intermittent every 8 hours  chlorhexidine 2% Cloths 1 Application(s) Topical <User Schedule>  chlorhexidine 4% Liquid 1 Application(s) Topical <User Schedule>  digoxin     Tablet 125 MICROGram(s) Oral daily  enoxaparin Injectable 70 milliGRAM(s) SubCutaneous every 12 hours  famotidine Injectable 20 milliGRAM(s) IV Push every 12 hours  midodrine. 10 milliGRAM(s) Oral three times a day  voriconazole 200 milliGRAM(s) Oral every 12 hours      TELEMETRY: Sinus tachy 112    ECG:  	  RADIOLOGY:   DIAGNOSTIC TESTING:  [ ] Echocardiogram:  [ ]  Catheterization:  [ ] Stress Test:    OTHER: 	    LABS:	 	    Creatine Kinase, Serum: 24 U/L [25 - 170] (02-17 @ 01:21)  CKMB Units: 1.9 ng/mL [0.0 - 3.8] (02-17 @ 01:21)  Troponin T, High Sensitivity Result: 34 ng/L [0 - 51] (02-17 @ 01:21)  Creatine Kinase, Serum: 17 U/L [25 - 170] (02-16 @ 23:51)  CKMB Units: 1.4 ng/mL [0.0 - 3.8] (02-16 @ 23:51)  Troponin T, High Sensitivity Result: 16 ng/L [0 - 51] (02-16 @ 23:51)  Creatine Kinase, Serum: 23 U/L [25 - 170] (02-16 @ 03:01)  CKMB Units: 1.9 ng/mL [0.0 - 3.8] (02-16 @ 03:01)  Troponin T, High Sensitivity Result: 43 ng/L [0 - 51] (02-16 @ 03:01)                          9.1    7.20  )-----------( 129      ( 20 Feb 2023 00:42 )             30.3     02-20    137  |  104  |  16  ----------------------------<  82  3.6   |  17<L>  |  0.90    Ca    8.8      20 Feb 2023 00:41  Phos  3.9     02-20  Mg     1.9     02-20    TPro  5.3<L>  /  Alb  3.3  /  TBili  0.9  /  DBili  x   /  AST  39  /  ALT  65<H>  /  AlkPhos  72  02-20    PT/INR - ( 20 Feb 2023 00:43 )   PT: 13.1 sec;   INR: 1.13 ratio         PTT - ( 20 Feb 2023 00:43 )  PTT:33.9 sec

## 2023-02-21 LAB
ALBUMIN SERPL ELPH-MCNC: 3.4 G/DL — SIGNIFICANT CHANGE UP (ref 3.3–5)
ALP SERPL-CCNC: 81 U/L — SIGNIFICANT CHANGE UP (ref 40–120)
ALT FLD-CCNC: 40 U/L — SIGNIFICANT CHANGE UP (ref 10–45)
ANION GAP SERPL CALC-SCNC: 17 MMOL/L — SIGNIFICANT CHANGE UP (ref 5–17)
AST SERPL-CCNC: 38 U/L — SIGNIFICANT CHANGE UP (ref 10–40)
BILIRUB SERPL-MCNC: 0.8 MG/DL — SIGNIFICANT CHANGE UP (ref 0.2–1.2)
BUN SERPL-MCNC: 15 MG/DL — SIGNIFICANT CHANGE UP (ref 7–23)
CALCIUM SERPL-MCNC: 8.8 MG/DL — SIGNIFICANT CHANGE UP (ref 8.4–10.5)
CHLORIDE SERPL-SCNC: 103 MMOL/L — SIGNIFICANT CHANGE UP (ref 96–108)
CO2 SERPL-SCNC: 16 MMOL/L — LOW (ref 22–31)
CREAT SERPL-MCNC: 0.92 MG/DL — SIGNIFICANT CHANGE UP (ref 0.5–1.3)
CULTURE RESULTS: SIGNIFICANT CHANGE UP
CULTURE RESULTS: SIGNIFICANT CHANGE UP
EGFR: 78 ML/MIN/1.73M2 — SIGNIFICANT CHANGE UP
GLUCOSE SERPL-MCNC: 85 MG/DL — SIGNIFICANT CHANGE UP (ref 70–99)
H CAPSUL AG SPEC-ACNC: SIGNIFICANT CHANGE UP
H CAPSUL AG UR IA-ACNC: SIGNIFICANT CHANGE UP NG/ML
H CAPSUL AG UR QL IA: SIGNIFICANT CHANGE UP
MAGNESIUM SERPL-MCNC: 2.1 MG/DL — SIGNIFICANT CHANGE UP (ref 1.6–2.6)
PHOSPHATE SERPL-MCNC: 3.7 MG/DL — SIGNIFICANT CHANGE UP (ref 2.5–4.5)
POTASSIUM SERPL-MCNC: 4.5 MMOL/L — SIGNIFICANT CHANGE UP (ref 3.5–5.3)
POTASSIUM SERPL-SCNC: 4.5 MMOL/L — SIGNIFICANT CHANGE UP (ref 3.5–5.3)
PROT SERPL-MCNC: 5.4 G/DL — LOW (ref 6–8.3)
SODIUM SERPL-SCNC: 136 MMOL/L — SIGNIFICANT CHANGE UP (ref 135–145)
SPECIMEN SOURCE: SIGNIFICANT CHANGE UP
SPECIMEN SOURCE: SIGNIFICANT CHANGE UP

## 2023-02-21 PROCEDURE — 93308 TTE F-UP OR LMTD: CPT | Mod: 26,GC

## 2023-02-21 PROCEDURE — 76604 US EXAM CHEST: CPT | Mod: 26,GC

## 2023-02-21 PROCEDURE — 76705 ECHO EXAM OF ABDOMEN: CPT | Mod: 26,GC

## 2023-02-21 PROCEDURE — 99291 CRITICAL CARE FIRST HOUR: CPT | Mod: GC,25

## 2023-02-21 RX ORDER — RIOCIGUAT 1.5 MG/1
0.5 TABLET, FILM COATED ORAL EVERY 8 HOURS
Refills: 0 | Status: DISCONTINUED | OUTPATIENT
Start: 2023-02-21 | End: 2023-02-28

## 2023-02-21 RX ORDER — FUROSEMIDE 40 MG
20 TABLET ORAL ONCE
Refills: 0 | Status: COMPLETED | OUTPATIENT
Start: 2023-02-21 | End: 2023-02-21

## 2023-02-21 RX ADMIN — RIOCIGUAT 0.5 MILLIGRAM(S): 1.5 TABLET, FILM COATED ORAL at 14:38

## 2023-02-21 RX ADMIN — ONDANSETRON 4 MILLIGRAM(S): 8 TABLET, FILM COATED ORAL at 11:38

## 2023-02-21 RX ADMIN — FAMOTIDINE 20 MILLIGRAM(S): 10 INJECTION INTRAVENOUS at 17:37

## 2023-02-21 RX ADMIN — MIDODRINE HYDROCHLORIDE 10 MILLIGRAM(S): 2.5 TABLET ORAL at 22:05

## 2023-02-21 RX ADMIN — Medication 20 MILLIGRAM(S): at 10:07

## 2023-02-21 RX ADMIN — ENOXAPARIN SODIUM 70 MILLIGRAM(S): 100 INJECTION SUBCUTANEOUS at 17:36

## 2023-02-21 RX ADMIN — MIDODRINE HYDROCHLORIDE 10 MILLIGRAM(S): 2.5 TABLET ORAL at 06:41

## 2023-02-21 RX ADMIN — ENOXAPARIN SODIUM 70 MILLIGRAM(S): 100 INJECTION SUBCUTANEOUS at 06:40

## 2023-02-21 RX ADMIN — FAMOTIDINE 20 MILLIGRAM(S): 10 INJECTION INTRAVENOUS at 06:40

## 2023-02-21 RX ADMIN — VORICONAZOLE 200 MILLIGRAM(S): 10 INJECTION, POWDER, LYOPHILIZED, FOR SOLUTION INTRAVENOUS at 17:37

## 2023-02-21 RX ADMIN — CHLORHEXIDINE GLUCONATE 1 APPLICATION(S): 213 SOLUTION TOPICAL at 06:41

## 2023-02-21 RX ADMIN — CEFEPIME 100 MILLIGRAM(S): 1 INJECTION, POWDER, FOR SOLUTION INTRAMUSCULAR; INTRAVENOUS at 06:41

## 2023-02-21 RX ADMIN — VORICONAZOLE 200 MILLIGRAM(S): 10 INJECTION, POWDER, LYOPHILIZED, FOR SOLUTION INTRAVENOUS at 06:41

## 2023-02-21 RX ADMIN — CEFEPIME 100 MILLIGRAM(S): 1 INJECTION, POWDER, FOR SOLUTION INTRAMUSCULAR; INTRAVENOUS at 13:09

## 2023-02-21 RX ADMIN — Medication 125 MICROGRAM(S): at 06:40

## 2023-02-21 RX ADMIN — CEFEPIME 100 MILLIGRAM(S): 1 INJECTION, POWDER, FOR SOLUTION INTRAMUSCULAR; INTRAVENOUS at 22:05

## 2023-02-21 RX ADMIN — Medication 30 MILLILITER(S): at 13:09

## 2023-02-21 RX ADMIN — RIOCIGUAT 0.5 MILLIGRAM(S): 1.5 TABLET, FILM COATED ORAL at 22:05

## 2023-02-21 RX ADMIN — MIDODRINE HYDROCHLORIDE 10 MILLIGRAM(S): 2.5 TABLET ORAL at 13:10

## 2023-02-21 RX ADMIN — Medication 20 MILLIGRAM(S): at 06:40

## 2023-02-21 NOTE — PROGRESS NOTE ADULT - SUBJECTIVE AND OBJECTIVE BOX
Ender Francois, PGY3  Internal Medicine  Pager [phone] ([facility])    OVERNIGHT EVENTS / SUBJECTIVE: Patient seen and examined at bedside. RANULFO.    OBJECTIVE:    VITAL SIGNS:  ICU Vital Signs Last 24 Hrs  T(C): 36.9 (21 Feb 2023 04:00), Max: 37.1 (21 Feb 2023 00:00)  T(F): 98.4 (21 Feb 2023 04:00), Max: 98.7 (21 Feb 2023 00:00)  HR: 121 (21 Feb 2023 06:55) (98 - 132)  BP: 103/74 (21 Feb 2023 04:00) (95/61 - 115/77)  BP(mean): 82 (21 Feb 2023 04:00) (72 - 98)  ABP: --  ABP(mean): --  RR: 20 (21 Feb 2023 04:00) (16 - 25)  SpO2: 97% (21 Feb 2023 06:55) (96% - 97%)    O2 Parameters below as of 20 Feb 2023 20:00  Patient On (Oxygen Delivery Method): room air              02-20 @ 07:01  -  02-21 @ 07:00  --------------------------------------------------------  IN: 1200 mL / OUT: 600 mL / NET: 600 mL      CAPILLARY BLOOD GLUCOSE          PHYSICAL EXAM:    General: NAD  HEENT: NC/AT; PERRL, clear conjunctiva  Neck: supple  Respiratory: CTA b/l  Cardiovascular: +S1/S2; RRR  Abdomen: soft, NT/ND; +BS x4  Extremities: WWP, 2+ peripheral pulses b/l; no LE edema  Skin: normal color and turgor; no rash  Neurological: A&Ox4    MEDICATIONS:  MEDICATIONS  (STANDING):  cefepime   IVPB 2000 milliGRAM(s) IV Intermittent every 8 hours  cefepime   IVPB      chlorhexidine 2% Cloths 1 Application(s) Topical <User Schedule>  chlorhexidine 4% Liquid 1 Application(s) Topical <User Schedule>  digoxin     Tablet 125 MICROGram(s) Oral daily  enoxaparin Injectable 70 milliGRAM(s) SubCutaneous every 12 hours  famotidine Injectable 20 milliGRAM(s) IV Push every 12 hours  furosemide    Tablet 20 milliGRAM(s) Oral daily  midodrine. 10 milliGRAM(s) Oral three times a day  voriconazole 200 milliGRAM(s) Oral every 12 hours    MEDICATIONS  (PRN):  ondansetron Injectable 4 milliGRAM(s) IV Push every 8 hours PRN Nausea and/or Vomiting      ALLERGIES:  Allergies    No Known Allergies    Intolerances        LABS:                        9.1    7.20  )-----------( 129      ( 20 Feb 2023 00:42 )             30.3     Hemoglobin: 9.1 g/dL (02-20 @ 00:42)  Hemoglobin: 8.4 g/dL (02-19 @ 01:15)  Hemoglobin: 8.3 g/dL (02-18 @ 00:33)  Hemoglobin: 8.4 g/dL (02-17 @ 01:21)  Hemoglobin: 6.5 g/dL (02-16 @ 23:54)    CBC Full  -  ( 20 Feb 2023 00:42 )  WBC Count : 7.20 K/uL  RBC Count : 3.34 M/uL  Hemoglobin : 9.1 g/dL  Hematocrit : 30.3 %  Platelet Count - Automated : 129 K/uL  Mean Cell Volume : 90.7 fl  Mean Cell Hemoglobin : 27.2 pg  Mean Cell Hemoglobin Concentration : 30.0 gm/dL  Auto Neutrophil # : x  Auto Lymphocyte # : x  Auto Monocyte # : x  Auto Eosinophil # : x  Auto Basophil # : x  Auto Neutrophil % : x  Auto Lymphocyte % : x  Auto Monocyte % : x  Auto Eosinophil % : x  Auto Basophil % : x    02-20    137  |  104  |  16  ----------------------------<  82  3.6   |  17<L>  |  0.90    Ca    8.8      20 Feb 2023 00:41  Phos  3.9     02-20  Mg     1.9     02-20    TPro  5.3<L>  /  Alb  3.3  /  TBili  0.9  /  DBili  x   /  AST  39  /  ALT  65<H>  /  AlkPhos  72  02-20    Creatinine Trend: 0.90<--, 0.91<--, 0.93<--, 0.89<--, 0.95<--, 0.39<--  LIVER FUNCTIONS - ( 20 Feb 2023 00:41 )  Alb: 3.3 g/dL / Pro: 5.3 g/dL / ALK PHOS: 72 U/L / ALT: 65 U/L / AST: 39 U/L / GGT: x           PT/INR - ( 20 Feb 2023 00:43 )   PT: 13.1 sec;   INR: 1.13 ratio         PTT - ( 20 Feb 2023 00:43 )  PTT:33.9 sec    hs Troponin:              CSF:                      EKG:   MICROBIOLOGY:    IMAGING:      Labs, imaging, EKG personally reviewed    RADIOLOGY & ADDITIONAL TESTS: Reviewed.

## 2023-02-21 NOTE — PROGRESS NOTE ADULT - SUBJECTIVE AND OBJECTIVE BOX
OZ BARBOSA  MRN#: [id number]  Subjective:  CHIEF COMPLAINT: fever . pulmonary HTN , non hodgkin's lymphoma   46 yo f a private patient from my office report to the ER   w pmh nhl s/p chemo at [facility] [place]  (?in remission), PE and DVT ,S/P  covid pneumonia ,severe pulmonary HTN on Adempas , p/w fevers for the last few days, a/w generalized weakness over the last couple of weeks along with poor po intake. denies chest pain, palpitations, lightheadedness, cough, wheeze, abdominal discomfort. patient does endorse n+v, dysuria, arriaza. of note, last session of chemo reportedly on 2023. Pt grew concerned so went to oncologist yesterday where she got cultures, blood work, and xray. as she did not feel improved, patient presents to [facility] er for further evaluation.  (15 Feb 2023 01:39) CT scan bilateral lung nodule , no H/O OF exposure to TB , no significant travel history to the [facility] out of the country or the [facility] area  transfer to the MICU for SVT patient is on Digoxin and B-Blockers , hypotension rebound back feeling better on cefepime , and voriconazole for presumptive fungal pneumonia , continue to have  fever at night  up to 101 ,  sweat appetite is suppressed   fever curve is better discuss with MICU team to consider for lung biopsy , lymphoma oncology will be called in .fever subsides no definitive DX is made tired and sleepy if recurrent fever happened patient may need BAL and possible navigation bronchoscopy with Biopsy of large left lung mass       PAST MEDICAL & SURGICAL HISTORY:  Non-Hodgkin&#x27;s lymphoma      Pulmonary embolism      History of appendectomy      History of cholecystectomy                OBJECTIVE:  ICU Vital Signs Last 24 Hrs  T(C): 37.7 (2023 18:00), Max: 39.6 (2023 01:36)  T(F): 99.9 (2023 18:00), Max: 103.2 (2023 01:36)  HR: 122 (2023 18:00) (114 - 167)  BP: 91/63 (2023 18:00) (80/55 - 108/66)  BP(mean): 73 (2023 18:00) (60 - 81)  ABP: --  ABP(mean): --  RR: 29 (2023 18:00) (16 - 33)  SpO2: 97% (2023 18:00) (94% - 100%)    O2 Parameters below as of 2023 02:40  Patient On (Oxygen Delivery Method): room air            -16 @ 07:01  -   @ 07:00  --------------------------------------------------------  IN: 1100 mL / OUT: 0 mL / NET: 1100 mL     @ 07:01  -   @ 19:19  --------------------------------------------------------  IN: 700 mL / OUT: 2600 mL / NET: -1900 mL      PHYSICAL EXAM :Daily   cushinoid female in no acute distress   Daily Weight in k.6 (2023 09:20)  HEENT:     + NCAT  + EOMI  - Conjuctival edema   - Icterus   - Thrush   - ETT  - NGT/OGT  Neck:         + FROM    + JVD     - Nodes     - Masses    + Mid-line trachea   - Tracheostomy  Chest: normal findings  Lungs:          + CTA   + Rhonchi    - Rales    - Wheezing     - Decreased BS   - Dullness R L  Cardiac:       + S1 + S2    + RRR   - Irregular   - S3  - S4  + Murmurs   - Rub   - Hamman’s sign   Abdomen:    + BS     + Soft    + Non-tender     - Distended    - Organomegaly  - PEG  Extremities:   - Cyanosis U/L   - Clubbing  U/L  - LE/UE Edema   + Capillary refill    + Pulses   Neuro:        + Awake   +  Alert   - Confused   - Lethargic   - Sedated   - Generalized Weakness  Skin:        - Rashes    - Erythema   + Normal incisions   + IV sites intact        HOSPITAL MEDICATIONS: All mediciations reviewed and analyzed  MEDICATIONS  (STANDING):  acetaminophen   IVPB .. 1000 milliGRAM(s) IV Intermittent once  cefepime   IVPB      cefepime   IVPB 2000 milliGRAM(s) IV Intermittent every 8 hours  chlorhexidine 2% Cloths 1 Application(s) Topical <User Schedule>  chlorhexidine 4% Liquid 1 Application(s) Topical <User Schedule>  digoxin     Tablet 125 MICROGram(s) Oral daily  enoxaparin Injectable 70 milliGRAM(s) SubCutaneous every 12 hours  midodrine. 10 milliGRAM(s) Oral three times a day  voriconazole 200 milliGRAM(s) Oral every 12 hours    MEDICATIONS  (PRN):  ondansetron Injectable 4 milliGRAM(s) IV Push every 8 hours PRN Nausea and/or Vomiting    LABS: All Lab data reviewed and analyzed                         9.1    7.20  )-----------( 129      ( 2023 00:42 )             30.3    02-    136  |  103  |  15  ----------------------------<  85  4.5   |  16<L>  |  0.92    Ca    8.8      2023 06:42  Phos  3.7     02-21  Mg     2.1     -    TPro  5.4<L>  /  Alb  3.4  /  TBili  0.8  /  DBili  x   /  AST  38  /  ALT  40  /  AlkPhos  81  -    Ca    8.9      2023 01:15  Phos  4.2     -  Mg     2.0     -    TPro  5.4<L>  /  Alb  3.4  /  TBili  0.8  /  DBili  x   /  AST  59<H>  /  ALT  82<H>  /  AlkPhos  72  -       LIVER FUNCTIONS - ( 2023 01:21 )  Alb: 3.2 g/dL / Pro: 5.1 g/dL / ALK PHOS: 64 U/L / ALT: 36 U/L / AST: 43 U/L / GGT: x           RADIOLOGY: - Reviewed and analyzed

## 2023-02-21 NOTE — PROGRESS NOTE ADULT - SUBJECTIVE AND OBJECTIVE BOX
CARDIOLOGY FOLLOW UP - Dr. Webber  DATE OF SERVICE: 2/21/23    CC  No CV complaints    REVIEW OF SYSTEMS:  CONSTITUTIONAL: No fever, weight loss, or fatigue  RESPIRATORY: No cough, wheezing, chills or hemoptysis; No Shortness of Breath  CARDIOVASCULAR: No chest pain, palpitations, passing out, dizziness, or leg swelling  GASTROINTESTINAL: No abdominal or epigastric pain. No nausea, vomiting, or hematemesis; No diarrhea or constipation. No melena or hematochezia.  VASCULAR: No edema     PHYSICAL EXAM:  T(C): 36.9 (02-21-23 @ 12:00), Max: 37.1 (02-21-23 @ 00:00)  HR: 104 (02-21-23 @ 12:00) (104 - 132)  BP: 104/71 (02-21-23 @ 12:00) (87/52 - 115/77)  RR: 18 (02-21-23 @ 12:00) (18 - 25)  SpO2: 97% (02-21-23 @ 12:00) (94% - 97%)  Wt(kg): --  I&O's Summary    20 Feb 2023 07:01  -  21 Feb 2023 07:00  --------------------------------------------------------  IN: 1400 mL / OUT: 800 mL / NET: 600 mL    21 Feb 2023 07:01  -  21 Feb 2023 13:34  --------------------------------------------------------  IN: 0 mL / OUT: 1450 mL / NET: -1450 mL        Appearance: Normal	  Cardiovascular: Normal S1 S2,RRR, No JVD, No murmurs  Respiratory: Lungs clear to auscultation b/l	  Gastrointestinal:  Soft, Non-tender, + BS	  Extremities: Normal range of motion, No clubbing, cyanosis or edema      Home Medications:  digoxin 125 mcg (0.125 mg) oral tablet: 1 tab(s) orally once a day (15 Feb 2023 02:23)  Eliquis 5 mg oral tablet: 1 tab(s) orally 2 times a day (15 Feb 2023 02:23)  metoprolol succinate 25 mg oral tablet, extended release: 1 tab(s) orally once a day (15 Feb 2023 02:23)  selexipag 800 mcg oral tablet: 1 tab(s) orally 2 times a day (15 Feb 2023 02:23)      MEDICATIONS  (STANDING):  cefepime   IVPB 2000 milliGRAM(s) IV Intermittent every 8 hours  cefepime   IVPB      chlorhexidine 2% Cloths 1 Application(s) Topical <User Schedule>  chlorhexidine 4% Liquid 1 Application(s) Topical <User Schedule>  digoxin     Tablet 125 MICROGram(s) Oral daily  enoxaparin Injectable 70 milliGRAM(s) SubCutaneous every 12 hours  famotidine Injectable 20 milliGRAM(s) IV Push every 12 hours  furosemide    Tablet 20 milliGRAM(s) Oral daily  midodrine. 10 milliGRAM(s) Oral three times a day  voriconazole 200 milliGRAM(s) Oral every 12 hours      TELEMETRY: SR 90s	    ECG:  	  RADIOLOGY:   DIAGNOSTIC TESTING:  [ ] Echocardiogram:  [ ]  Catheterization:  [ ] Stress Test:    OTHER: 	    LABS:	 	    Creatine Kinase, Serum: 24 U/L [25 - 170] (02-17 @ 01:21)  CKMB Units: 1.9 ng/mL [0.0 - 3.8] (02-17 @ 01:21)  Troponin T, High Sensitivity Result: 34 ng/L [0 - 51] (02-17 @ 01:21)  Creatine Kinase, Serum: 17 U/L [25 - 170] (02-16 @ 23:51)  CKMB Units: 1.4 ng/mL [0.0 - 3.8] (02-16 @ 23:51)  Troponin T, High Sensitivity Result: 16 ng/L [0 - 51] (02-16 @ 23:51)  Creatine Kinase, Serum: 23 U/L [25 - 170] (02-16 @ 03:01)  CKMB Units: 1.9 ng/mL [0.0 - 3.8] (02-16 @ 03:01)  Troponin T, High Sensitivity Result: 43 ng/L [0 - 51] (02-16 @ 03:01)                          9.1    7.20  )-----------( 129      ( 20 Feb 2023 00:42 )             30.3     02-21    136  |  103  |  15  ----------------------------<  85  4.5   |  16<L>  |  0.92    Ca    8.8      21 Feb 2023 06:42  Phos  3.7     02-21  Mg     2.1     02-21    TPro  5.4<L>  /  Alb  3.4  /  TBili  0.8  /  DBili  x   /  AST  38  /  ALT  40  /  AlkPhos  81  02-21    PT/INR - ( 20 Feb 2023 00:43 )   PT: 13.1 sec;   INR: 1.13 ratio         PTT - ( 20 Feb 2023 00:43 )  PTT:33.9 sec

## 2023-02-21 NOTE — PROGRESS NOTE ADULT - ASSESSMENT
47 F PMH non-Hodgkin lymphoma s/p chemoport (?in remission), PE, pHTN, presenting with recurrent fevers with pancytopenia in setting of recent chemo treatment and prednisone concerning for severe sepsis. Found to be requiring intermittent fluids and continues to be febrile while on broad-spectrum bacterial antibiotic coverage. CT chest imaging concerning for fungal infection vs metastasis. MICU consulted for further management.      PLAN    =====Neurologic=====  - No active issues, AOx4    =====Pulmonary=====  # NHL with pulmonary nodules  - as in heme-onc section below    # workup for pna  - as in ID section below    # pulmonary arterial HTN  > RV systolic pressure =51mmHg on TTE  - diurese (IV in MICU), will transition to PO lasix starting 2/21/23    =====Cardiovascular=====  # HFrEF (EF ~42) with RV dysfunction based on previous echo (2/15/23)  - Bedside POCUS noted to have dilated RV  > Caution with fluids  > DC-ed coreg   > Diurese daily    # SVT  > digoxin 125 mcg po qD (home medication)  > follow up digoxin level    # hypotension  - Stable  - possibly multifactorial, with component of RV overload and possible sepsis  - s/p IV albumin x1  > midodrine 10 mg po tid    =====GI=====  #Diet - regular, 1500cc fluid restriction    =====Renal/=====  - PMH STARR while on antibiotics  > appreciate renal recs    =====Endocrine=====  - No active issues.     =====Infectious Disease=====  # severe sepsis with unclear etiology in setting of known malginancy and recent chemotx for cancer   - iso immunocompromise, hx and CT chest imaging c/f infection (e.g. fungal pna) vs progression of known cancer  > switched Zosyn to cefepime (2/17-)  > MRSA swab negative, vanc dc'd  > started voriconazole 200mg po bid  > appreciate ID recs re: voriconazole; hold off antiviral given low suspicion for viral infection (RVP negative)  > supportive care: Tylenol and cooling measures (if patient agrees as she reported that cooling measures made her uncomfortable)  > follow up BCx, UA, UCx  > Aspergillus galactomannan Ag, Aspergillus Ab, crypt, histo  > appreciate ID recs  > IR consulted for evaluation of pulmonary nodules    =====Heme/Onc=====  # DLBCL  > appreciate heme-onc recs    #leukopenia on filgastrim  #anemia  #thrombocytopenia  - possibly from recent chemo and cancer  > Hb goal >7  > S/p 3d of zarxio  > Trend Plt    #DVT PPX  > Given hx of PE and large RV will continue with therapeutic lovenox    =====Ethics=====  FULL CODE.

## 2023-02-21 NOTE — PROGRESS NOTE ADULT - NUTRITIONAL ASSESSMENT
MEDICATIONS  (STANDING):  cefepime   IVPB      cefepime   IVPB 2000 milliGRAM(s) IV Intermittent every 8 hours  chlorhexidine 2% Cloths 1 Application(s) Topical <User Schedule>  chlorhexidine 4% Liquid 1 Application(s) Topical <User Schedule>  digoxin     Tablet 125 MICROGram(s) Oral daily  enoxaparin Injectable 70 milliGRAM(s) SubCutaneous every 12 hours  famotidine Injectable 20 milliGRAM(s) IV Push every 12 hours  midodrine. 10 milliGRAM(s) Oral three times a day  voriconazole 200 milliGRAM(s) Oral every 12 hours MEDICATIONS  (STANDING):  cefepime   IVPB      cefepime   IVPB 2000 milliGRAM(s) IV Intermittent every 8 hours  chlorhexidine 2% Cloths 1 Application(s) Topical <User Schedule>  chlorhexidine 4% Liquid 1 Application(s) Topical <User Schedule>  digoxin     Tablet 125 MICROGram(s) Oral daily  enoxaparin Injectable 70 milliGRAM(s) SubCutaneous every 12 hours  famotidine Injectable 20 milliGRAM(s) IV Push every 12 hours  furosemide    Tablet 20 milliGRAM(s) Oral daily  midodrine. 10 milliGRAM(s) Oral three times a day  riociguat 0.5 milliGRAM(s) Oral every 8 hours  voriconazole 200 milliGRAM(s) Oral every 12 hours

## 2023-02-21 NOTE — CHART NOTE - NSCHARTNOTEFT_GEN_A_CORE
: Talita Lees    INDICATION: RV failure    PROCEDURE:  [X ] LIMITED ECHO  [X ] LIMITED CHEST  [ ] LIMITED RETROPERITONEAL  [ ] LIMITED ABDOMINAL  [ ] LIMITED DVT  [ ] NEEDLE GUIDANCE VASCULAR  [ ] NEEDLE GUIDANCE THORACENTESIS  [ ] NEEDLE GUIDANCE PARACENTESIS  [ ] NEEDLE GUIDANCE PERICARDIOCENTESIS  [ ] OTHER    FINDINGS:  A line predominant anteriorly bilaterally with few scattered areas of focal B lines. Anterior left chest rib space 2-3, small subcentimeter collection subpleural.  Grossly normal LVSF. RV is dilated with septal bowing noted in systole and diastole (systole > diastole). RV appears larger than LV. IVC ~1.8 cm. No pericardial effusion noted. Unable to obtain TR envelope.     INTERPRETATION:  Focal areas of B lines with one noted subcentimeter subpleural collection. RV dilation with septal bowing consistent with pressure and volume overload; IVC ~ 1.8 cm.   Images stored on [facility].

## 2023-02-21 NOTE — PROGRESS NOTE ADULT - ASSESSMENT
Echo 2/15/23: Global lv dysfxn EF 42%, septal flattening, RV overload, mod pulm htn    A/P  46 yo f w pmh nhl s/p chemo (?in remission), pe, ?pah, covid, p/w fevers for the last few days, a/w generalized weakness, found to be in severe sepsis and w/ episode of SVT to 170s.    #SVT  -Pt with known hx of SVT  -Has been evaluated in past, was not candidate for ablation d/t chemo  -Continue digoxin  -Hypotension precludes bb use  -event toprol if bp improved  -If persists and bp remains low can consider amio gtt and oral load    #HFrEF, RV failure  -Echo with global lv dysfxn  -likely nonischemic in etiology  -diuretics per icu   -hypotension precludes lv dysfxn meds  -pocus noted    #Sepsis, r/o fungemia   -Lung nodules noted on CT, c/f fungal infection  -W/u & abx per micu, ID    #Hypotension  -I/S/O sepsis, HF  -Midodrine, pressors per micu    #Leukopenia  -Hx NHL  -Heme/onc following

## 2023-02-21 NOTE — PROGRESS NOTE ADULT - SUBJECTIVE AND OBJECTIVE BOX
Patient is a 47y Female whom presented to the hospital with abnormal electrolyte     PAST MEDICAL & SURGICAL HISTORY:  Non-Hodgkin&#x27;s lymphoma      Pulmonary embolism      History of appendectomy      History of cholecystectomy          MEDICATIONS  (STANDING):  apixaban 5 milliGRAM(s) Oral every 12 hours  chlorhexidine 2% Cloths 1 Application(s) Topical <User Schedule>  digoxin     Tablet 125 MICROGram(s) Oral daily  filgrastim-sndz (ZARXIO) Injectable 480 MICROGram(s) SubCutaneous daily  midodrine. 10 milliGRAM(s) Oral three times a day  piperacillin/tazobactam IVPB.. 3.375 Gram(s) IV Intermittent every 8 hours  potassium chloride    Tablet ER 40 milliEquivalent(s) Oral once  sodium bicarbonate  Infusion 0.086 mEq/kG/Hr (75 mL/Hr) IV Continuous <Continuous>  vancomycin  IVPB 1000 milliGRAM(s) IV Intermittent every 12 hours      Allergies    No Known Allergies    Intolerances        SOCIAL HISTORY:  Denies ETOh,Smoking,     FAMILY HISTORY:      REVIEW OF SYSTEMS:    CONSTITUTIONAL: No weakness, fevers or chills  RESPIRATORY: No cough, wheezing, hemoptysis; No shortness of breath  CARDIOVASCULAR: No chest pain or palpitations  GASTROINTESTINAL: No abdominal or epigastric pain. No nausea, vomiting,     No diarrhea or constipation. No melena   SKIN: dry    MEDICATIONS  (STANDING):  cefepime   IVPB      cefepime   IVPB 2000 milliGRAM(s) IV Intermittent every 8 hours  chlorhexidine 2% Cloths 1 Application(s) Topical <User Schedule>  chlorhexidine 4% Liquid 1 Application(s) Topical <User Schedule>  digoxin     Tablet 125 MICROGram(s) Oral daily  enoxaparin Injectable 70 milliGRAM(s) SubCutaneous every 12 hours  midodrine. 10 milliGRAM(s) Oral three times a day  voriconazole 200 milliGRAM(s) Oral every 12 hours                          9.1    7.20  )-----------( 129      ( 20 Feb 2023 00:42 )             30.3       CBC Full  -  ( 20 Feb 2023 00:42 )  WBC Count : 7.20 K/uL  RBC Count : 3.34 M/uL  Hemoglobin : 9.1 g/dL  Hematocrit : 30.3 %  Platelet Count - Automated : 129 K/uL  Mean Cell Volume : 90.7 fl  Mean Cell Hemoglobin : 27.2 pg  Mean Cell Hemoglobin Concentration : 30.0 gm/dL  Auto Neutrophil # : x  Auto Lymphocyte # : x  Auto Monocyte # : x  Auto Eosinophil # : x  Auto Basophil # : x  Auto Neutrophil % : x  Auto Lymphocyte % : x  Auto Monocyte % : x  Auto Eosinophil % : x  Auto Basophil % : x      02-21    136  |  103  |  15  ----------------------------<  85  4.5   |  16<L>  |  0.92    Ca    8.8      21 Feb 2023 06:42  Phos  3.7     02-21  Mg     2.1     02-21    TPro  5.4<L>  /  Alb  3.4  /  TBili  0.8  /  DBili  x   /  AST  38  /  ALT  40  /  AlkPhos  81  02-21      CAPILLARY BLOOD GLUCOSE          Vital Signs Last 24 Hrs  T(C): 36.8 (21 Feb 2023 20:00), Max: 37.1 (21 Feb 2023 00:00)  T(F): 98.2 (21 Feb 2023 20:00), Max: 98.7 (21 Feb 2023 00:00)  HR: 102 (21 Feb 2023 20:00) (102 - 121)  BP: 110/74 (21 Feb 2023 20:00) (87/52 - 110/74)  BP(mean): 86 (21 Feb 2023 20:00) (65 - 89)  RR: 27 (21 Feb 2023 20:00) (18 - 27)  SpO2: 96% (21 Feb 2023 20:00) (94% - 97%)    Parameters below as of 21 Feb 2023 20:00  Patient On (Oxygen Delivery Method): room air            PT/INR - ( 20 Feb 2023 00:43 )   PT: 13.1 sec;   INR: 1.13 ratio         PTT - ( 20 Feb 2023 00:43 )  PTT:33.9 sec      PHYSICAL EXAM:    Constitutional: NAD  HEENT: conjunctive   clear   Neck:  No JVD  Respiratory: CTAB  Cardiovascular: S1 and S2  Gastrointestinal: BS+, soft, NT/ND  Extremities: No peripheral edema     Patient is a 47y Female whom presented to the hospital with abnormal electrolyte     PAST MEDICAL & SURGICAL HISTORY:  Non-Hodgkin&#x27;s lymphoma      Pulmonary embolism      History of appendectomy      History of cholecystectomy          MEDICATIONS  (STANDING):  apixaban 5 milliGRAM(s) Oral every 12 hours  chlorhexidine 2% Cloths 1 Application(s) Topical <User Schedule>  digoxin     Tablet 125 MICROGram(s) Oral daily  filgrastim-sndz (ZARXIO) Injectable 480 MICROGram(s) SubCutaneous daily  midodrine. 10 milliGRAM(s) Oral three times a day  piperacillin/tazobactam IVPB.. 3.375 Gram(s) IV Intermittent every 8 hours  potassium chloride    Tablet ER 40 milliEquivalent(s) Oral once  sodium bicarbonate  Infusion 0.086 mEq/kG/Hr (75 mL/Hr) IV Continuous <Continuous>  vancomycin  IVPB 1000 milliGRAM(s) IV Intermittent every 12 hours      Allergies    No Known Allergies    Intolerances        SOCIAL HISTORY:  Denies ETOh,Smoking,     FAMILY HISTORY:      REVIEW OF SYSTEMS:    CONSTITUTIONAL: No weakness, fevers or chills  RESPIRATORY: No cough, wheezing, hemoptysis; No shortness of breath  CARDIOVASCULAR: No chest pain or palpitations  GASTROINTESTINAL: No abdominal or epigastric pain. No nausea, vomiting,     No diarrhea or constipation. No melena   SKIN: dry    MEDICATIONS  (STANDING):  cefepime   IVPB      cefepime   IVPB 2000 milliGRAM(s) IV Intermittent every 8 hours  chlorhexidine 2% Cloths 1 Application(s) Topical <User Schedule>  chlorhexidine 4% Liquid 1 Application(s) Topical <User Schedule>  digoxin     Tablet 125 MICROGram(s) Oral daily  enoxaparin Injectable 70 milliGRAM(s) SubCutaneous every 12 hours  midodrine. 10 milliGRAM(s) Oral three times a day  voriconazole 200 milliGRAM(s) Oral every 12 hours                      MEDICATIONS  (STANDING):  cefepime   IVPB      cefepime   IVPB 2000 milliGRAM(s) IV Intermittent every 8 hours  chlorhexidine 2% Cloths 1 Application(s) Topical <User Schedule>  chlorhexidine 4% Liquid 1 Application(s) Topical <User Schedule>  digoxin     Tablet 125 MICROGram(s) Oral daily  enoxaparin Injectable 70 milliGRAM(s) SubCutaneous every 12 hours  famotidine Injectable 20 milliGRAM(s) IV Push every 12 hours  furosemide    Tablet 20 milliGRAM(s) Oral daily  midodrine. 10 milliGRAM(s) Oral three times a day  riociguat 0.5 milliGRAM(s) Oral every 8 hours  voriconazole 200 milliGRAM(s) Oral every 12 hours                            9.1    7.20  )-----------( 129      ( 20 Feb 2023 00:42 )             30.3       CBC Full  -  ( 20 Feb 2023 00:42 )  WBC Count : 7.20 K/uL  RBC Count : 3.34 M/uL  Hemoglobin : 9.1 g/dL  Hematocrit : 30.3 %  Platelet Count - Automated : 129 K/uL  Mean Cell Volume : 90.7 fl  Mean Cell Hemoglobin : 27.2 pg  Mean Cell Hemoglobin Concentration : 30.0 gm/dL  Auto Neutrophil # : x  Auto Lymphocyte # : x  Auto Monocyte # : x  Auto Eosinophil # : x  Auto Basophil # : x  Auto Neutrophil % : x  Auto Lymphocyte % : x  Auto Monocyte % : x  Auto Eosinophil % : x  Auto Basophil % : x      02-21    136  |  103  |  15  ----------------------------<  85  4.5   |  16<L>  |  0.92    Ca    8.8      21 Feb 2023 06:42  Phos  3.7     02-21  Mg     2.1     02-21    TPro  5.4<L>  /  Alb  3.4  /  TBili  0.8  /  DBili  x   /  AST  38  /  ALT  40  /  AlkPhos  81  02-21      CAPILLARY BLOOD GLUCOSE          Vital Signs Last 24 Hrs  T(C): 36.8 (21 Feb 2023 20:00), Max: 37.1 (21 Feb 2023 00:00)  T(F): 98.2 (21 Feb 2023 20:00), Max: 98.7 (21 Feb 2023 00:00)  HR: 102 (21 Feb 2023 20:00) (102 - 121)  BP: 110/74 (21 Feb 2023 20:00) (87/52 - 110/74)  BP(mean): 86 (21 Feb 2023 20:00) (65 - 89)  RR: 27 (21 Feb 2023 20:00) (18 - 27)  SpO2: 96% (21 Feb 2023 20:00) (94% - 97%)    Parameters below as of 21 Feb 2023 20:00  Patient On (Oxygen Delivery Method): room air            PT/INR - ( 20 Feb 2023 00:43 )   PT: 13.1 sec;   INR: 1.13 ratio         PTT - ( 20 Feb 2023 00:43 )  PTT:33.9 sec    PHYSICAL EXAM:    Constitutional: NAD  HEENT: conjunctive   clear   Neck:  No JVD  Respiratory: CTAB  Cardiovascular: S1 and S2  Gastrointestinal: BS+, soft, NT/ND  Extremities: No peripheral edema

## 2023-02-22 DIAGNOSIS — R11.2 NAUSEA WITH VOMITING, UNSPECIFIED: ICD-10-CM

## 2023-02-22 DIAGNOSIS — Z51.5 ENCOUNTER FOR PALLIATIVE CARE: ICD-10-CM

## 2023-02-22 DIAGNOSIS — R53.81 OTHER MALAISE: ICD-10-CM

## 2023-02-22 LAB
ANION GAP SERPL CALC-SCNC: 16 MMOL/L — SIGNIFICANT CHANGE UP (ref 5–17)
APTT BLD: 31 SEC — SIGNIFICANT CHANGE UP (ref 27.5–35.5)
BASOPHILS # BLD AUTO: 0.07 K/UL — SIGNIFICANT CHANGE UP (ref 0–0.2)
BASOPHILS NFR BLD AUTO: 1.5 % — SIGNIFICANT CHANGE UP (ref 0–2)
BUN SERPL-MCNC: 18 MG/DL — SIGNIFICANT CHANGE UP (ref 7–23)
CALCIUM SERPL-MCNC: 9.1 MG/DL — SIGNIFICANT CHANGE UP (ref 8.4–10.5)
CHLORIDE SERPL-SCNC: 100 MMOL/L — SIGNIFICANT CHANGE UP (ref 96–108)
CO2 SERPL-SCNC: 20 MMOL/L — LOW (ref 22–31)
CREAT SERPL-MCNC: 0.97 MG/DL — SIGNIFICANT CHANGE UP (ref 0.5–1.3)
CULTURE RESULTS: SIGNIFICANT CHANGE UP
CULTURE RESULTS: SIGNIFICANT CHANGE UP
EGFR: 73 ML/MIN/1.73M2 — SIGNIFICANT CHANGE UP
EOSINOPHIL # BLD AUTO: 0.34 K/UL — SIGNIFICANT CHANGE UP (ref 0–0.5)
EOSINOPHIL NFR BLD AUTO: 7.1 % — HIGH (ref 0–6)
GALACTOMANNAN AG SERPL-ACNC: 0.05 INDEX — SIGNIFICANT CHANGE UP (ref 0–0.49)
GALACTOMANNAN AG SERPL-ACNC: 0.05 INDEX — SIGNIFICANT CHANGE UP (ref 0–0.49)
GLUCOSE SERPL-MCNC: 95 MG/DL — SIGNIFICANT CHANGE UP (ref 70–99)
H CAPSUL AG SER IA-MCNC: SIGNIFICANT CHANGE UP
HCT VFR BLD CALC: 37.5 % — SIGNIFICANT CHANGE UP (ref 34.5–45)
HGB BLD-MCNC: 11.6 G/DL — SIGNIFICANT CHANGE UP (ref 11.5–15.5)
IMM GRANULOCYTES NFR BLD AUTO: 4.4 % — HIGH (ref 0–0.9)
INR BLD: 1.03 RATIO — SIGNIFICANT CHANGE UP (ref 0.88–1.16)
LYMPHOCYTES # BLD AUTO: 0.76 K/UL — LOW (ref 1–3.3)
LYMPHOCYTES # BLD AUTO: 15.8 % — SIGNIFICANT CHANGE UP (ref 13–44)
MAGNESIUM SERPL-MCNC: 2 MG/DL — SIGNIFICANT CHANGE UP (ref 1.6–2.6)
MCHC RBC-ENTMCNC: 27.5 PG — SIGNIFICANT CHANGE UP (ref 27–34)
MCHC RBC-ENTMCNC: 30.9 GM/DL — LOW (ref 32–36)
MCV RBC AUTO: 88.9 FL — SIGNIFICANT CHANGE UP (ref 80–100)
MONOCYTES # BLD AUTO: 0.62 K/UL — SIGNIFICANT CHANGE UP (ref 0–0.9)
MONOCYTES NFR BLD AUTO: 12.9 % — SIGNIFICANT CHANGE UP (ref 2–14)
NEUTROPHILS # BLD AUTO: 2.8 K/UL — SIGNIFICANT CHANGE UP (ref 1.8–7.4)
NEUTROPHILS NFR BLD AUTO: 58.3 % — SIGNIFICANT CHANGE UP (ref 43–77)
NRBC # BLD: 0 /100 WBCS — SIGNIFICANT CHANGE UP (ref 0–0)
PHOSPHATE SERPL-MCNC: 3.7 MG/DL — SIGNIFICANT CHANGE UP (ref 2.5–4.5)
PLATELET # BLD AUTO: 125 K/UL — LOW (ref 150–400)
POTASSIUM SERPL-MCNC: 4 MMOL/L — SIGNIFICANT CHANGE UP (ref 3.5–5.3)
POTASSIUM SERPL-SCNC: 4 MMOL/L — SIGNIFICANT CHANGE UP (ref 3.5–5.3)
PROTHROM AB SERPL-ACNC: 11.9 SEC — SIGNIFICANT CHANGE UP (ref 10.5–13.4)
RBC # BLD: 4.22 M/UL — SIGNIFICANT CHANGE UP (ref 3.8–5.2)
RBC # FLD: 19.7 % — HIGH (ref 10.3–14.5)
SODIUM SERPL-SCNC: 136 MMOL/L — SIGNIFICANT CHANGE UP (ref 135–145)
SPECIMEN SOURCE: SIGNIFICANT CHANGE UP
SPECIMEN SOURCE: SIGNIFICANT CHANGE UP
VORICONAZOLE SERPL-MCNC: 4.1 MCG/ML — SIGNIFICANT CHANGE UP (ref 1–5.5)
WBC # BLD: 4.8 K/UL — SIGNIFICANT CHANGE UP (ref 3.8–10.5)
WBC # FLD AUTO: 4.8 K/UL — SIGNIFICANT CHANGE UP (ref 3.8–10.5)

## 2023-02-22 PROCEDURE — 99232 SBSQ HOSP IP/OBS MODERATE 35: CPT

## 2023-02-22 PROCEDURE — 99223 1ST HOSP IP/OBS HIGH 75: CPT

## 2023-02-22 PROCEDURE — 99233 SBSQ HOSP IP/OBS HIGH 50: CPT | Mod: GC

## 2023-02-22 PROCEDURE — 93010 ELECTROCARDIOGRAM REPORT: CPT

## 2023-02-22 RX ORDER — ACETAMINOPHEN 500 MG
650 TABLET ORAL EVERY 6 HOURS
Refills: 0 | Status: DISCONTINUED | OUTPATIENT
Start: 2023-02-22 | End: 2023-02-28

## 2023-02-22 RX ADMIN — MIDODRINE HYDROCHLORIDE 10 MILLIGRAM(S): 2.5 TABLET ORAL at 06:10

## 2023-02-22 RX ADMIN — FAMOTIDINE 20 MILLIGRAM(S): 10 INJECTION INTRAVENOUS at 06:11

## 2023-02-22 RX ADMIN — RIOCIGUAT 0.5 MILLIGRAM(S): 1.5 TABLET, FILM COATED ORAL at 22:45

## 2023-02-22 RX ADMIN — FAMOTIDINE 20 MILLIGRAM(S): 10 INJECTION INTRAVENOUS at 22:46

## 2023-02-22 RX ADMIN — MIDODRINE HYDROCHLORIDE 10 MILLIGRAM(S): 2.5 TABLET ORAL at 14:44

## 2023-02-22 RX ADMIN — VORICONAZOLE 200 MILLIGRAM(S): 10 INJECTION, POWDER, LYOPHILIZED, FOR SOLUTION INTRAVENOUS at 22:46

## 2023-02-22 RX ADMIN — VORICONAZOLE 200 MILLIGRAM(S): 10 INJECTION, POWDER, LYOPHILIZED, FOR SOLUTION INTRAVENOUS at 06:10

## 2023-02-22 RX ADMIN — ENOXAPARIN SODIUM 70 MILLIGRAM(S): 100 INJECTION SUBCUTANEOUS at 06:11

## 2023-02-22 RX ADMIN — Medication 20 MILLIGRAM(S): at 06:10

## 2023-02-22 RX ADMIN — RIOCIGUAT 0.5 MILLIGRAM(S): 1.5 TABLET, FILM COATED ORAL at 06:11

## 2023-02-22 RX ADMIN — ENOXAPARIN SODIUM 70 MILLIGRAM(S): 100 INJECTION SUBCUTANEOUS at 22:46

## 2023-02-22 RX ADMIN — Medication 30 MILLILITER(S): at 06:40

## 2023-02-22 RX ADMIN — MIDODRINE HYDROCHLORIDE 10 MILLIGRAM(S): 2.5 TABLET ORAL at 22:45

## 2023-02-22 RX ADMIN — CHLORHEXIDINE GLUCONATE 1 APPLICATION(S): 213 SOLUTION TOPICAL at 06:11

## 2023-02-22 RX ADMIN — Medication 125 MICROGRAM(S): at 06:10

## 2023-02-22 RX ADMIN — RIOCIGUAT 0.5 MILLIGRAM(S): 1.5 TABLET, FILM COATED ORAL at 14:44

## 2023-02-22 NOTE — CONSULT NOTE ADULT - CONSULT REQUESTED BY NAME
Bharati
Cara Calabrese
Dr. Calabrese
Dr. Wagner
brittany joseph MD
medicine
dr mitchell
MICU
Primary team

## 2023-02-22 NOTE — DIETITIAN INITIAL EVALUATION ADULT - EDUCATION DIETARY MODIFICATIONS
Discussed need for nutrition supplement and increased protein-energy needs with pt. RD provided education on ways to optimize protein-energy intake. RD suggested pt drink nutrition supplement between meals. Pt amenable to receiving nutrition supplement. Pt made aware RD to remain available./(2) meets goals/outcomes/verbalization

## 2023-02-22 NOTE — DIETITIAN INITIAL EVALUATION ADULT - NS FNS DIET ORDER
Diet, Regular:   1500mL Fluid Restriction ([id number])  Supplement Feeding Modality:  Oral  Ensure Enlive Cans or Servings Per Day:  1       Frequency:  Two Times a day (02-21-23 @ 08:26)

## 2023-02-22 NOTE — PROGRESS NOTE ADULT - ASSESSMENT
48F NHL diagnosed 7/2022.   Completed R-[facility]OP 1/23/23.   Here 2/14 for nonlocalizing fevers.  Typically has fevers after chemotherapy but they didn't stop this time.   Innumerable nonspecific lung nodules. Indeterminate low-attenuation lesion in the liver as well.   Will treat as invasive fungal pneumonia given imaging and clinical response although unclear if lymphoma recurrence is still considered.   Fungal markers negative but they usually aren't very helpful.   No other clear pathogen on cultures.   Completed 7 days antibiotics 2/21.   Improved, fevers eventually stopped and mild neutropenia resolved.     Suggest  -will defer biopsy to respective providers (IR deferred)   -monitor cultures   -continue Voriconazole, trough at goal 4.1, monitor LFTs, I discussed common adverse effects, anticipate 6-12 week course     I will be rotating to [facility]. ID service to follow.     Brett Mckinney MD   Infectious Disease   Available on TEAMS. After 5PM and on weekends please page fellow on call or call [phone]

## 2023-02-22 NOTE — CONSULT NOTE ADULT - PROBLEM SELECTOR RECOMMENDATION 9
- pt endorsing severe nausea and vomiting. symptoms initially present constantly throughout day for first three days of admission, now nausea/vomiting is only with meals. pt states she craves food, thinks about food, but when food is presented to her the smell makes her vomit, she also endorses chewing making her nauseas-> pt endorsing BM and flatulence    - verbalized concern about weight loss related to vomiting   - initially managed with zofran, receiving mild relief, ECG repeated today 2/22 showing  primary team aware and zofran discontinued  - started with 0.2mg Ativan IVP three times a day 30 minutes prior to meals for N/V, provider to RN placed for proper administration instructions   - will continue to monitor for symptoms

## 2023-02-22 NOTE — PROGRESS NOTE ADULT - SUBJECTIVE AND OBJECTIVE BOX
Follow Up: Fevers    Interval History/ROS: Feels well, fever stopped, still tired and weak but better. No diarrhea or dysuria. Breathing comfortably.     Allergies  No Known Allergies        ANTIMICROBIALS:  voriconazole 200 every 12 hours      OTHER MEDS:  acetaminophen     Tablet .. 650 milliGRAM(s) Oral every 6 hours PRN  chlorhexidine 2% Cloths 1 Application(s) Topical <User Schedule>  digoxin     Tablet 125 MICROGram(s) Oral daily  enoxaparin Injectable 70 milliGRAM(s) SubCutaneous every 12 hours  famotidine Injectable 20 milliGRAM(s) IV Push every 12 hours  furosemide    Tablet 20 milliGRAM(s) Oral daily  LORazepam   Injectable 0.2 milliGRAM(s) IV Push <User Schedule>  midodrine. 10 milliGRAM(s) Oral three times a day  riociguat 0.5 milliGRAM(s) Oral every 8 hours      Vital Signs Last 24 Hrs  T(C): 36.7 (22 Feb 2023 14:49), Max: 37.3 (22 Feb 2023 04:00)  T(F): 98 (22 Feb 2023 14:49), Max: 99.2 (22 Feb 2023 04:00)  HR: 99 (22 Feb 2023 14:49) (98 - 115)  BP: 111/70 (22 Feb 2023 14:49) (97/66 - 112/73)  BP(mean): 78 (22 Feb 2023 08:00) (75 - 89)  RR: 19 (22 Feb 2023 14:49) (19 - 27)  SpO2: 95% (22 Feb 2023 14:49) (94% - 97%)    Parameters below as of 22 Feb 2023 14:49  Patient On (Oxygen Delivery Method): room air        Physical Exam:  General: non toxic  Cardio: regular rate   Respiratory: nonlabored on room air, grossly clear  abd: nondistended, soft nontender   Musculoskeletal: no focal joint swelling, no edema  vascular: no phlebitis   Skin: no rash                          11.6   4.80  )-----------( 125      ( 22 Feb 2023 06:28 )             37.5       02-22    136  |  100  |  18  ----------------------------<  95  4.0   |  20<L>  |  0.97    Ca    9.1      22 Feb 2023 06:28  Phos  3.7     02-22  Mg     2.0     02-22    TPro  5.4<L>  /  Alb  3.4  /  TBili  0.8  /  DBili  x   /  AST  38  /  ALT  40  /  AlkPhos  81  02-21          MICROBIOLOGY:  Culture - Sputum (collected 02-17-23 @ 10:00)  Source: .Sputum Sputum  Gram Stain (02-17-23 @ 21:52):    Rare polymorphonuclear leukocytes per low power field    Rare Squamous epithelial cells per low power field    Rare Gram Negative Rods per oil power field  Final Report (02-19-23 @ 16:48):    Normal Respiratory Hortencia present    Culture - Urine (collected 02-17-23 @ 10:00)  Source: Clean Catch Clean Catch (Midstream)  Final Report (02-18-23 @ 10:41):    No growth    Culture - Blood (collected 02-17-23 @ 04:54)  Source: .Blood Blood-Peripheral  Final Report (02-22-23 @ 09:52):    No Growth Final    Culture - Blood (collected 02-16-23 @ 23:35)  Source: .Blood Blood-Peripheral  Final Report (02-22-23 @ 04:00):    No Growth Final    Culture - Blood (collected 02-16-23 @ 04:17)  Source: .Blood Blood-Peripheral  Final Report (02-21-23 @ 12:01):    No Growth Final    Culture - Blood (collected 02-16-23 @ 04:17)  Source: .Blood Blood-Peripheral  Final Report (02-21-23 @ 07:00):    No Growth Final      RADIOLOGY:  Images below reviewed personally  CT Chest Abdomen and Pelvis w/ IV Cont (02.15.23 @ 02:17)   No pulmonary embolism.  Innumerable lung nodules which are nonspecific. In this clinical setting,   differential includes infection (in which case, consider fungus) or   neoplasm/known lymphoma.  Indeterminate low-attenuation lesion in the liver.

## 2023-02-22 NOTE — CONSULT NOTE ADULT - SUBJECTIVE AND OBJECTIVE BOX
HPI: 47 year old PMH non-Hodgkin lymphoma s/p chemoport (in remission?), PE, pHTN, presenting with recurrent fevers with pancytopenia in the setting of chemo and prednisone. CT scan showing b/l lung nodules concerning for fungemic sepsis vs malignancy. Course c/b SVT transferred to MICU for management and monitoring. Palliative care consulted for symptom management.     PERTINENT PM/SXH:   Non-Hodgkin&#x27;s lymphoma    Pulmonary embolism      History of appendectomy    History of cholecystectomy      FAMILY HISTORY:    Family Hx substance abuse [ ]yes [ ]no  ITEMS NOT CHECKED ARE NOT PRESENT    SOCIAL HISTORY:   Significant other/partner[ ]  Children[ ]  Catholic/Spirituality:  Substance hx:  [ ]   Tobacco hx:  [ ]   Alcohol hx: [ ]   Home Opioid hx:  [ x] I-Stop Reference No: [id number]  Living Situation: [ x]Home  [ ]Long term care  [ ]Rehab [ ]Other    ADVANCE DIRECTIVES:    DNR/MOLST  [ ]  Living Will  [ ]   DECISION MAKER(s):  [ ] Health Care Proxy(s)  [ ] Surrogate(s)  [ ] Guardian           Name(s): Phone Number(s): Caregiver: Samia Masterson [phone]      BASELINE (I)ADL(s) (prior to admission):  [place]unn: [x ]Total  [ ] Moderate [ ]Dependent    Allergies    No Known Allergies    Intolerances    MEDICATIONS  (STANDING):  chlorhexidine 2% Cloths 1 Application(s) Topical <User Schedule>  digoxin     Tablet 125 MICROGram(s) Oral daily  enoxaparin Injectable 70 milliGRAM(s) SubCutaneous every 12 hours  famotidine Injectable 20 milliGRAM(s) IV Push every 12 hours  furosemide    Tablet 20 milliGRAM(s) Oral daily  LORazepam   Injectable 0.2 milliGRAM(s) IV Push three times a day  midodrine. 10 milliGRAM(s) Oral three times a day  riociguat 0.5 milliGRAM(s) Oral every 8 hours  voriconazole 200 milliGRAM(s) Oral every 12 hours    MEDICATIONS  (PRN):  acetaminophen     Tablet .. 650 milliGRAM(s) Oral every 6 hours PRN Temp greater or equal to 38C (100.4F), Mild Pain (1 - 3)    PRESENT SYMPTOMS: [ ]Unable to self-report  [ ] CPOT [ ] PAINADs [ ] RDOS  Source if other than patient:  [ ]Family   [ ]Team     Pain: [ ]yes [x ]no  QOL impact -   Location -                    Aggravating factors -  Quality -  Radiation -  Timing-  Severity (0-10 scale):  Minimal acceptable level (0-10 scale):     CPOT:    https://www.[facility].org/getattachment/vun10b29-8r2a-[email or web address]-8p4c-8402c4006w4x/Critical-Care-Pain-Observation-Tool-(CPOT)    PAINAD Score: See PAINAD tool and score below     Dyspnea:                           [ ]Mild [ ]Moderate [ ]Severe    RDOS: See RDOS tool and score below   0 to 2  minimal or no respiratory distress   3  mild distress  4 to 6 moderate distress  >7 severe distress      Anxiety:                             [ ]Mild [ ]Moderate [ ]Severe  Fatigue:                             [ ]Mild [x ]Moderate [ ]Severe  Nausea:                             [ ]Mild [ ]Moderate [x ]Severe: with presentation of meals  Loss of appetite:              [ ]Mild [ ]Moderate [x ]Severe: with presentation of meals   Constipation:                    [ ]Mild [ ]Moderate [ ]Severe    PCSSQ[Palliative Care Spiritual Screening Question]   Severity (0-10):  Score of 4 or > indicate consideration of Chaplaincy referral.  Chaplaincy Referral: [x ] yes [ ] refused [x ] following [ ] Deferred     Caregiver [place]? : [ ] yes [ ] no [ x] Deferred [ ] Declined             Social work referral [ ] Patient & Family Centered Care Referral [ ]     Anticipatory Grief present?:  [ ] yes [ ] no  [x ] Deferred                  Social work referral [ ] Chaplaincy Referral [ ]    		  Other Symptoms:  [ x]All other review of systems negative     Palliative Performance Status Version 2:   See PPSv2 tool and score below          PHYSICAL EXAM:  Vital Signs Last 24 Hrs  T(C): 36.9 (22 Feb 2023 08:00), Max: 37.3 (22 Feb 2023 04:00)  T(F): 98.5 (22 Feb 2023 08:00), Max: 99.2 (22 Feb 2023 04:00)  HR: 115 (22 Feb 2023 08:00) (98 - 115)  BP: 98/68 (22 Feb 2023 08:00) (97/66 - 112/73)  BP(mean): 78 (22 Feb 2023 08:00) (75 - 89)  RR: 22 (22 Feb 2023 08:00) (20 - 27)  SpO2: 96% (22 Feb 2023 08:00) (94% - 97%)    Parameters below as of 22 Feb 2023 08:00  Patient On (Oxygen Delivery Method): room air     I&O's Summary    21 Feb 2023 07:01  -  22 Feb 2023 07:00  --------------------------------------------------------  IN: 900 mL / OUT: 2000 mL / NET: -1100 mL    22 Feb 2023 07:01  -  22 Feb 2023 12:45  --------------------------------------------------------  IN: 200 mL / OUT: 200 mL / NET: 0 mL      GENERAL: [ ]Cachexia    [x ]Alert  [x ]Oriented x 4  [ ]Lethargic  [ ]Unarousable  [ ]Verbal  [ ]Non-Verbal  Behavioral:   [ ] Anxiety  [ ] Delirium [ ] Agitation [ ] Other  HEENT:  [x ]Normal   [ ]Dry mouth   [ ]ET Tube/Trach  [ ]Oral lesions  PULMONARY:   [ ]Clear [ ]Tachypnea  [ ]Audible excessive secretions   [ ]Rhonchi        [ ]Right [ ]Left [ ]Bilateral  [ ]Crackles        [ ]Right [ ]Left [ ]Bilateral  [ ]Wheezing     [ ]Right [ ]Left [ ]Bilateral  [x ]Diminished breath sounds [ ]right [ ]left [x ]bilateral  CARDIOVASCULAR:    [ ]Regular [ ]Irregular [ x]Tachy  [ ]Ryley [ ]Murmur [ ]Other  GASTROINTESTINAL:  [x ]Soft  [ ]Distended   [x ]+BS  [x ]Non tender [ ]Tender  [ ]Other [ ]PEG [ ]OGT/ NGT  Last BM: 2/22/2023  GENITOURINARY:  [x ]Normal [ ] Incontinent   [ ]Oliguria/Anuria   [ ]Roman  MUSCULOSKELETAL:   [ ]Normal   [x ]Weakness  [ x]Bed/Wheelchair bound [ ]Edema  NEUROLOGIC:   [x ]No focal deficits  [ ]Cognitive impairment  [ ]Dysphagia [ ]Dysarthria [ ]Paresis [ ]Other   SKIN:   [x ]Normal  [ ]Rash  [ ]Other  [ ]Pressure ulcer(s)       Present on admission [ ]y [ ]n    CRITICAL CARE:  [ ] Shock Present  [ ]Septic [ ]Cardiogenic [ ]Neurologic [ ]Hypovolemic  [ ]  Vasopressors [ ]  Inotropes   [ ]Respiratory failure present [ ]Mechanical ventilation [ ]Non-invasive ventilatory support [ ]High flow    [ ]Acute  [ ]Chronic [ ]Hypoxic  [ ]Hypercarbic [ ]Other  [ ]Other organ failure     LABS:                        11.6   4.80  )-----------( 125      ( 22 Feb 2023 06:28 )             37.5   02-22    136  |  100  |  18  ----------------------------<  95  4.0   |  20<L>  |  0.97    Ca    9.1      22 Feb 2023 06:28  Phos  3.7     02-22  Mg     2.0     02-22    TPro  5.4<L>  /  Alb  3.4  /  TBili  0.8  /  DBili  x   /  AST  38  /  ALT  40  /  AlkPhos  81  02-21  PT/INR - ( 22 Feb 2023 06:28 )   PT: 11.9 sec;   INR: 1.03 ratio         PTT - ( 22 Feb 2023 06:28 )  PTT:31.0 sec      RADIOLOGY & ADDITIONAL STUDIES:  < from: CT Abdomen and Pelvis w/ IV Cont (02.15.23 @ 02:17) >  ACC: [id number] EXAM:  CT ABDOMEN AND PELVIS IC   ORDERED BY: PHAM AVILA     ACC: [id number] EXAM:  CT ANGIO CHEST PULM ART [facility]   ORDERED BY: PHAM AVILA     PROCEDURE DATE:  02/15/2023          INTERPRETATION:  CTA CHEST AND CT ABDOMEN AND PELVIS    INDICATION: Sepsis, history of non-Hodgkin's lymphoma, presenting with   fever and chest tightness    TECHNIQUE: Volumetric images of the chest, abdomen, and pelvis were   obtained before and after the administration of 90 mL of Omnipaque 350.   Maximum intensity projection images were generated.    COMPARISON: None.    FINDINGS:    CT CHEST:    PULMONARY ANGIOGRAM:  No pulmonary embolism. Dilated pulmonary artery,   larger than the ascending aorta.    LUNGS/AIRWAYS/PLEURA: Patent trachea and bronchi. Numerous randomly   distributed nodules in all lobes, largest including 1.8 cm in the left   lower lobe (2-94) and 1.2 cm in the left upper lobe (2-53). No pleural   effusion.    LYMPH NODES/MEDIASTINUM: No enlarged lymph nodes.    HEART/VASCULATURE: Normal heart size. Unremarkable pericardium. Normal   caliber aorta. Right chest port catheter tip in the superior vena cava.    BONES/SOFT TISSUES: Unremarkable.        CT ABDOMEN/PELVIS:    LIVER: Ill-defined region linear region of low attenuation in the liver   adjacent to the gallbladder fossa (3:37-43).    BILIARY SYSTEM: Cholecystectomy.    SPLEEN:Unremarkable.    PANCREAS: Unremarkable.    ADRENALS: Unremarkable.    KIDNEYS/URINARY TRACT: Unremarkable.    GASTROINTESTINAL TRACT:Unremarkable.    REPRODUCTIVE ORGANS: Unremarkable.    LYMPH NODES/PERITONEUM: Unremarkable.    VASCULATURE: Unremarkable.    BONES/SOFT TISSUES: Unremarkable.      IMPRESSION:    No pulmonary embolism.    Innumerable lung nodules which are nonspecific. In this clinical setting,   differential includes infection (in which case, consider fungus) or   neoplasm/known lymphoma.    Indeterminate low-attenuation lesion in the liver.    Close follow-up and/or comparison with outside imaging is recommended.    --- End of Report ---            POLINA CAM M.D., ATTENDING RADIOLOGIST  This document has been electronically signed. Feb 15 2023  8:54AM    < end of copied text >    PROTEIN CALORIE MALNUTRITION PRESENT: [ ]mild [ ]moderate [ ]severe [ ]underweight [ ]morbid obesity  https://www.andeal.org/vault/2440/web/files/ONC/Table_Clinical%20Characteristics%20to%20Document%20Malnutrition-White%20JV%20et%20al%202012.pdf    Height (cm): 165.1 (02-17-23 @ 03:38)  Weight (kg): 73.1 (02-17-23 @ 03:38)  BMI (kg/m2): 26.8 (02-17-23 @ 03:38)    [ ]PPSV2 < or = to 30% [ ]significant weight loss  [ ]poor nutritional intake  [ ]anasarca[ ]Artificial Nutrition      Other REFERRALS:  [ ]Hospice  [ ]Child Life  [ ]Social Work  [ ]Case management [ ]Holistic Therapy     Goals of Care Document:

## 2023-02-22 NOTE — PROGRESS NOTE ADULT - ASSESSMENT
48 yo f w pmh nhl s/p chemo (?in remission), pe, ?pah, covid, p/w fevers for the last few days, a/w generalized weakness over the last couple of weeks along with poor po intake. denies chest pain, palpitations, lightheadedness, cough, wheeze, abdominal discomfort. patient does endorse n+v, dysuria, arriaza. of note, last session of chemo reportedly on 1/23/2023. Pt grew concerned so went to oncologist yesterday where she got cultures, blood work, and xray. as she did not feel improved, patient presents to [facility] er for further evaluation.  (15 Feb 2023 01:39)  BP monitoring,continue current antihypertensive meds, low salt diet,followup with PMD in 1-2 weeks      pt had letitia and pt was on hemodialysis for w eeks and s/p cvvh at [facility] now gfr is controlled furosemide    Tablet 20 milliGRAM(s) Oral daily  Serum creatinine is stable at 0.8, approximating a GFR of is controlled  ml/min.   There is no progression.  No uremic symptoms. pos  evidence of  worsening  Anemia. Fluid status stable.   Will continue to avoid nephrotoxic drugs.  Patient remains asymptomatic.  Continue current therapy.      BP monitoring,followup with PMD in 1-2 weeks    Admit for septic workup and ID evaluation,send blood and urine cx,serial lactate levels,monitor vitals closley,ivfs hydration,monitor urine output and renal profile,iv abx as per id cons\  cefepime   IVPB      cefepime   IVPB 2000 milliGRAM(s) IV Intermittent every 8 hours  voriconazole 200 milliGRAM(s) Oral every 12 hours    dvt enoxaparin Injectable 70 milliGRAM(s) SubCutaneous every 12 hours    hypotension midodrine. 10 milliGRAM(s) Oral three times a day

## 2023-02-22 NOTE — DIETITIAN NUTRITION RISK NOTIFICATION - TREATMENT: THE FOLLOWING DIET HAS BEEN RECOMMENDED
Diet, Regular:   1500mL Fluid Restriction ([id number])  Supplement Feeding Modality:  Oral  Ensure Enlive Cans or Servings Per Day:  1       Frequency:  Two Times a day (02-21-23 @ 08:26) [Active]

## 2023-02-22 NOTE — PROGRESS NOTE ADULT - SUBJECTIVE AND OBJECTIVE BOX
Ender Francois, PGY3  Internal Medicine  Pager [phone] ([facility])    OVERNIGHT EVENTS / SUBJECTIVE: Patient seen and examined at bedside. RANULFO.    OBJECTIVE:    VITAL SIGNS:  ICU Vital Signs Last 24 Hrs  T(C): 37.3 (22 Feb 2023 04:00), Max: 37.3 (22 Feb 2023 04:00)  T(F): 99.2 (22 Feb 2023 04:00), Max: 99.2 (22 Feb 2023 04:00)  HR: 111 (22 Feb 2023 04:00) (98 - 116)  BP: 97/66 (22 Feb 2023 04:00) (87/52 - 112/73)  BP(mean): 75 (22 Feb 2023 04:00) (65 - 89)  ABP: --  ABP(mean): --  RR: 20 (22 Feb 2023 04:00) (18 - 27)  SpO2: 94% (22 Feb 2023 04:00) (94% - 97%)    O2 Parameters below as of 21 Feb 2023 20:00  Patient On (Oxygen Delivery Method): room air              02-21 @ 07:01  -  02-22 @ 07:00  --------------------------------------------------------  IN: 900 mL / OUT: 2000 mL / NET: -1100 mL      CAPILLARY BLOOD GLUCOSE          PHYSICAL EXAM:    General: NAD  HEENT: NC/AT; PERRL, clear conjunctiva  Neck: supple  Respiratory: CTA b/l  Cardiovascular: +S1/S2; RRR  Abdomen: soft, NT/ND; +BS x4  Extremities: WWP, 2+ peripheral pulses b/l; no LE edema  Skin: normal color and turgor; no rash  Neurological: A&Ox4    MEDICATIONS:  MEDICATIONS  (STANDING):  chlorhexidine 2% Cloths 1 Application(s) Topical <User Schedule>  chlorhexidine 4% Liquid 1 Application(s) Topical <User Schedule>  digoxin     Tablet 125 MICROGram(s) Oral daily  enoxaparin Injectable 70 milliGRAM(s) SubCutaneous every 12 hours  famotidine Injectable 20 milliGRAM(s) IV Push every 12 hours  furosemide    Tablet 20 milliGRAM(s) Oral daily  midodrine. 10 milliGRAM(s) Oral three times a day  riociguat 0.5 milliGRAM(s) Oral every 8 hours  voriconazole 200 milliGRAM(s) Oral every 12 hours    MEDICATIONS  (PRN):  ondansetron Injectable 4 milliGRAM(s) IV Push every 8 hours PRN Nausea and/or Vomiting      ALLERGIES:  Allergies    No Known Allergies    Intolerances        LABS:                        11.6   4.80  )-----------( 125      ( 22 Feb 2023 06:28 )             37.5     Hemoglobin: 11.6 g/dL (02-22 @ 06:28)  Hemoglobin: 9.1 g/dL (02-20 @ 00:42)  Hemoglobin: 8.4 g/dL (02-19 @ 01:15)  Hemoglobin: 8.3 g/dL (02-18 @ 00:33)    CBC Full  -  ( 22 Feb 2023 06:28 )  WBC Count : 4.80 K/uL  RBC Count : 4.22 M/uL  Hemoglobin : 11.6 g/dL  Hematocrit : 37.5 %  Platelet Count - Automated : 125 K/uL  Mean Cell Volume : 88.9 fl  Mean Cell Hemoglobin : 27.5 pg  Mean Cell Hemoglobin Concentration : 30.9 gm/dL  Auto Neutrophil # : x  Auto Lymphocyte # : x  Auto Monocyte # : x  Auto Eosinophil # : x  Auto Basophil # : x  Auto Neutrophil % : x  Auto Lymphocyte % : x  Auto Monocyte % : x  Auto Eosinophil % : x  Auto Basophil % : x    02-21    136  |  103  |  15  ----------------------------<  85  4.5   |  16<L>  |  0.92    Ca    8.8      21 Feb 2023 06:42  Phos  3.7     02-21  Mg     2.1     02-21    TPro  5.4<L>  /  Alb  3.4  /  TBili  0.8  /  DBili  x   /  AST  38  /  ALT  40  /  AlkPhos  81  02-21    Creatinine Trend: 0.92<--, 0.90<--, 0.91<--, 0.93<--, 0.89<--, 0.95<--  LIVER FUNCTIONS - ( 21 Feb 2023 06:42 )  Alb: 3.4 g/dL / Pro: 5.4 g/dL / ALK PHOS: 81 U/L / ALT: 40 U/L / AST: 38 U/L / GGT: x           PT/INR - ( 22 Feb 2023 06:28 )   PT: 11.9 sec;   INR: 1.03 ratio         PTT - ( 22 Feb 2023 06:28 )  PTT:31.0 sec    hs Troponin:              CSF:                      EKG:   MICROBIOLOGY:    IMAGING:      Labs, imaging, EKG personally reviewed    RADIOLOGY & ADDITIONAL TESTS: Reviewed.

## 2023-02-22 NOTE — DIETITIAN INITIAL EVALUATION ADULT - PERTINENT MEDS FT
MEDICATIONS  (STANDING):  chlorhexidine 2% Cloths 1 Application(s) Topical <User Schedule>  chlorhexidine 4% Liquid 1 Application(s) Topical <User Schedule>  digoxin     Tablet 125 MICROGram(s) Oral daily  enoxaparin Injectable 70 milliGRAM(s) SubCutaneous every 12 hours  famotidine Injectable 20 milliGRAM(s) IV Push every 12 hours  furosemide    Tablet 20 milliGRAM(s) Oral daily  midodrine. 10 milliGRAM(s) Oral three times a day  riociguat 0.5 milliGRAM(s) Oral every 8 hours  voriconazole 200 milliGRAM(s) Oral every 12 hours    MEDICATIONS  (PRN):  ondansetron Injectable 4 milliGRAM(s) IV Push every 8 hours PRN Nausea and/or Vomiting

## 2023-02-22 NOTE — CONSULT NOTE ADULT - ASSESSMENT
47 year old PMH non-Hodgkin lymphoma s/p chemoport (in remission?), PE, pHTN, presenting with recurrent fevers with pancytopenia in the setting of chemo and prednisone. CT scan showing b/l lung nodules concerning for fungemic sepsis vs malignancy. Course c/b SVT transferred to MICU for management and monitoring. Palliative care consulted for symptom management.

## 2023-02-22 NOTE — PROGRESS NOTE ADULT - NUTRITIONAL ASSESSMENT
MEDICATIONS  (STANDING):  cefepime   IVPB      cefepime   IVPB 2000 milliGRAM(s) IV Intermittent every 8 hours  chlorhexidine 2% Cloths 1 Application(s) Topical <User Schedule>  chlorhexidine 4% Liquid 1 Application(s) Topical <User Schedule>  digoxin     Tablet 125 MICROGram(s) Oral daily  enoxaparin Injectable 70 milliGRAM(s) SubCutaneous every 12 hours  famotidine Injectable 20 milliGRAM(s) IV Push every 12 hours  furosemide    Tablet 20 milliGRAM(s) Oral daily  midodrine. 10 milliGRAM(s) Oral three times a day  riociguat 0.5 milliGRAM(s) Oral every 8 hours  voriconazole 200 milliGRAM(s) Oral every 12 hours

## 2023-02-22 NOTE — CONSULT NOTE ADULT - SUBJECTIVE AND OBJECTIVE BOX
Interventional Radiology    Evaluate for Procedure: pulmonary nodule biopsy    HPI: 47 F PMH non-Hodgkin lymphoma s/p chemoport (in remission), PE, pHTN, presenting with recurrent fevers with pancytopenia in setting of recent chemo treatment and prednisone concerning for severe sepsis. Found to be requiring intermittent fluids and continues to be febrile while on broad-spectrum bacterial antibiotic coverage. CT chest imaging concerning for fungal infection vs metastasis. IR consulted for transthoracic pulmonary nodule biopsy.       Medications (Abx/Cardiac/Anticoagulation/Blood Products)  cefepime   IVPB: 100 mL/Hr IV Intermittent (02-21 @ 22:05)  digoxin     Tablet: 125 MICROGram(s) Oral (02-22 @ 06:10)  enoxaparin Injectable: 70 milliGRAM(s) SubCutaneous (02-22 @ 06:11)  furosemide    Tablet: 20 milliGRAM(s) Oral (02-22 @ 06:10)  furosemide   Injectable: 20 milliGRAM(s) IV Push (02-21 @ 10:07)  midodrine.: 10 milliGRAM(s) Oral (02-22 @ 14:44)  riociguat: 0.5 milliGRAM(s) Oral (02-22 @ 14:44)  voriconazole: 200 milliGRAM(s) Oral (02-22 @ 06:10)    Data:  T(C): 36.7  HR: 97  BP: 110/76  RR: 19  SpO2: 97%    -WBC 4.80 / HgB 11.6 / Hct 37.5 / Plt 125  -Na 136 / Cl 100 / BUN 18 / Glucose 95  -K 4.0 / CO2 20 / Cr 0.97  -ALT -- / Alk Phos -- / T.Bili --  -INR 1.03 / PTT 31.0    Radiology: Reviewed    Assessment/Plan:   47 F PMH non-Hodgkin lymphoma s/p chemoport (in remission), PE, pHTN, presenting with recurrent fevers with pancytopenia in setting of recent chemo treatment and prednisone concerning for severe sepsis. Found to be requiring intermittent fluids and continues to be febrile while on broad-spectrum bacterial antibiotic coverage. CT chest imaging concerning for fungal infection vs metastasis. IR consulted for transthoracic pulmonary nodule biopsy.       - Case reviewed with Dr Patterson and approved transthoracic pulmonary nodule biopsy for Friday 2/24  - please place IR procedure order under Dr. Patterson  - hold AC x 24 hrs pre procedure, and 48 hours post procedure  - STAT labs in AM (cbc,coags, bmp, T&S)  - NPO on 2/23 at 11pm  - needs COVID PCR test within 5 days of planned procedure  - d/w primary team      Sindi Glover PA-C  Interventional Radiology  Available on Teams

## 2023-02-22 NOTE — PROGRESS NOTE ADULT - SUBJECTIVE AND OBJECTIVE BOX
Patient is a 47y Female whom presented to the hospital with abnormal electrolyte     PAST MEDICAL & SURGICAL HISTORY:  Non-Hodgkin&#x27;s lymphoma      Pulmonary embolism      History of appendectomy      History of cholecystectomy          MEDICATIONS  (STANDING):  apixaban 5 milliGRAM(s) Oral every 12 hours  chlorhexidine 2% Cloths 1 Application(s) Topical <User Schedule>  digoxin     Tablet 125 MICROGram(s) Oral daily  filgrastim-sndz (ZARXIO) Injectable 480 MICROGram(s) SubCutaneous daily  midodrine. 10 milliGRAM(s) Oral three times a day  piperacillin/tazobactam IVPB.. 3.375 Gram(s) IV Intermittent every 8 hours  potassium chloride    Tablet ER 40 milliEquivalent(s) Oral once  sodium bicarbonate  Infusion 0.086 mEq/kG/Hr (75 mL/Hr) IV Continuous <Continuous>  vancomycin  IVPB 1000 milliGRAM(s) IV Intermittent every 12 hours      Allergies    No Known Allergies    Intolerances        SOCIAL HISTORY:  Denies ETOh,Smoking,     FAMILY HISTORY:      REVIEW OF SYSTEMS:    CONSTITUTIONAL: No weakness, fevers or chills  RESPIRATORY: No cough, wheezing, hemoptysis; No shortness of breath  CARDIOVASCULAR: No chest pain or palpitations  GASTROINTESTINAL: No abdominal or epigastric pain. No nausea, vomiting,     No diarrhea or constipation. No melena   SKIN: dry    MEDICATIONS  (STANDING):  cefepime   IVPB      cefepime   IVPB 2000 milliGRAM(s) IV Intermittent every 8 hours  chlorhexidine 2% Cloths 1 Application(s) Topical <User Schedule>  chlorhexidine 4% Liquid 1 Application(s) Topical <User Schedule>  digoxin     Tablet 125 MICROGram(s) Oral daily  enoxaparin Injectable 70 milliGRAM(s) SubCutaneous every 12 hours  midodrine. 10 milliGRAM(s) Oral three times a day  voriconazole 200 milliGRAM(s) Oral every 12 hours                      MEDICATIONS  (STANDING):  cefepime   IVPB      cefepime   IVPB 2000 milliGRAM(s) IV Intermittent every 8 hours  chlorhexidine 2% Cloths 1 Application(s) Topical <User Schedule>  chlorhexidine 4% Liquid 1 Application(s) Topical <User Schedule>  digoxin     Tablet 125 MICROGram(s) Oral daily  enoxaparin Injectable 70 milliGRAM(s) SubCutaneous every 12 hours  famotidine Injectable 20 milliGRAM(s) IV Push every 12 hours  furosemide    Tablet 20 milliGRAM(s) Oral daily  midodrine. 10 milliGRAM(s) Oral three times a day  riociguat 0.5 milliGRAM(s) Oral every 8 hours  voriconazole 200 milliGRAM(s) Oral every 12 hours                                     11.6   4.80  )-----------( 125      ( 22 Feb 2023 06:28 )             37.5       CBC Full  -  ( 22 Feb 2023 06:28 )  WBC Count : 4.80 K/uL  RBC Count : 4.22 M/uL  Hemoglobin : 11.6 g/dL  Hematocrit : 37.5 %  Platelet Count - Automated : 125 K/uL  Mean Cell Volume : 88.9 fl  Mean Cell Hemoglobin : 27.5 pg  Mean Cell Hemoglobin Concentration : 30.9 gm/dL  Auto Neutrophil # : 2.80 K/uL  Auto Lymphocyte # : 0.76 K/uL  Auto Monocyte # : 0.62 K/uL  Auto Eosinophil # : 0.34 K/uL  Auto Basophil # : 0.07 K/uL  Auto Neutrophil % : 58.3 %  Auto Lymphocyte % : 15.8 %  Auto Monocyte % : 12.9 %  Auto Eosinophil % : 7.1 %  Auto Basophil % : 1.5 %      02-22    136  |  100  |  18  ----------------------------<  95  4.0   |  20<L>  |  0.97    Ca    9.1      22 Feb 2023 06:28  Phos  3.7     02-22  Mg     2.0     02-22    TPro  5.4<L>  /  Alb  3.4  /  TBili  0.8  /  DBili  x   /  AST  38  /  ALT  40  /  AlkPhos  81  02-21      CAPILLARY BLOOD GLUCOSE          Vital Signs Last 24 Hrs  T(C): 36.7 (22 Feb 2023 18:36), Max: 37.3 (22 Feb 2023 04:00)  T(F): 98.1 (22 Feb 2023 18:36), Max: 99.2 (22 Feb 2023 04:00)  HR: 92 (22 Feb 2023 18:36) (92 - 115)  BP: 111/74 (22 Feb 2023 18:36) (97/66 - 112/73)  BP(mean): 78 (22 Feb 2023 08:00) (75 - 86)  RR: 18 (22 Feb 2023 18:36) (18 - 26)  SpO2: 97% (22 Feb 2023 18:36) (94% - 97%)    Parameters below as of 22 Feb 2023 18:36  Patient On (Oxygen Delivery Method): room air            PT/INR - ( 22 Feb 2023 06:28 )   PT: 11.9 sec;   INR: 1.03 ratio         PTT - ( 22 Feb 2023 06:28 )  PTT:31.0 sec                    PHYSICAL EXAM:    Constitutional: NAD  HEENT: conjunctive   clear   Neck:  No JVD  Respiratory: CTAB  Cardiovascular: S1 and S2  Gastrointestinal: BS+, soft, NT/ND  Extremities: No peripheral edema

## 2023-02-22 NOTE — PROGRESS NOTE ADULT - SUBJECTIVE AND OBJECTIVE BOX
CARDIOLOGY FOLLOW UP - Dr. Webber  DATE OF SERVICE: 2/22/23    CC  No CV complaints, feeling better  Occasional palpitations    REVIEW OF SYSTEMS:  CONSTITUTIONAL: No fever, weight loss, or fatigue  RESPIRATORY: No cough, wheezing, chills or hemoptysis; No Shortness of Breath  CARDIOVASCULAR: No chest pain, palpitations, passing out, dizziness, or leg swelling  GASTROINTESTINAL: No abdominal or epigastric pain. No nausea, vomiting, or hematemesis; No diarrhea or constipation. No melena or hematochezia.  VASCULAR: No edema     PHYSICAL EXAM:  T(C): 36.7 (02-22-23 @ 14:49), Max: 37.3 (02-22-23 @ 04:00)  HR: 99 (02-22-23 @ 14:49) (98 - 115)  BP: 111/70 (02-22-23 @ 14:49) (97/66 - 112/73)  RR: 19 (02-22-23 @ 14:49) (19 - 27)  SpO2: 95% (02-22-23 @ 14:49) (94% - 97%)  Wt(kg): --  I&O's Summary    21 Feb 2023 07:01  -  22 Feb 2023 07:00  --------------------------------------------------------  IN: 900 mL / OUT: 2000 mL / NET: -1100 mL    22 Feb 2023 07:01  -  22 Feb 2023 15:01  --------------------------------------------------------  IN: 200 mL / OUT: 200 mL / NET: 0 mL        Appearance: Normal	  Cardiovascular: Normal S1 S2,Tachycardia, No JVD, No murmurs  Respiratory: Lungs clear to auscultation b/l	  Gastrointestinal:  Soft, Non-tender, + BS	  Extremities: Normal range of motion, No clubbing, cyanosis or edema      Home Medications:  digoxin 125 mcg (0.125 mg) oral tablet: 1 tab(s) orally once a day (15 Feb 2023 02:23)  Eliquis 5 mg oral tablet: 1 tab(s) orally 2 times a day (15 Feb 2023 02:23)  metoprolol succinate 25 mg oral tablet, extended release: 1 tab(s) orally once a day (15 Feb 2023 02:23)  selexipag 800 mcg oral tablet: 1 tab(s) orally 2 times a day (15 Feb 2023 02:23)      MEDICATIONS  (STANDING):  chlorhexidine 2% Cloths 1 Application(s) Topical <User Schedule>  digoxin     Tablet 125 MICROGram(s) Oral daily  enoxaparin Injectable 70 milliGRAM(s) SubCutaneous every 12 hours  famotidine Injectable 20 milliGRAM(s) IV Push every 12 hours  furosemide    Tablet 20 milliGRAM(s) Oral daily  LORazepam   Injectable 0.2 milliGRAM(s) IV Push <User Schedule>  midodrine. 10 milliGRAM(s) Oral three times a day  riociguat 0.5 milliGRAM(s) Oral every 8 hours  voriconazole 200 milliGRAM(s) Oral every 12 hours      TELEMETRY: 	    ECG:  	  RADIOLOGY:   DIAGNOSTIC TESTING:  [ ] Echocardiogram:  [ ]  Catheterization:  [ ] Stress Test:    OTHER: 	    LABS:	 	    Creatine Kinase, Serum: 24 U/L [25 - 170] (02-17 @ 01:21)  CKMB Units: 1.9 ng/mL [0.0 - 3.8] (02-17 @ 01:21)  Troponin T, High Sensitivity Result: 34 ng/L [0 - 51] (02-17 @ 01:21)  Creatine Kinase, Serum: 17 U/L [25 - 170] (02-16 @ 23:51)  CKMB Units: 1.4 ng/mL [0.0 - 3.8] (02-16 @ 23:51)  Troponin T, High Sensitivity Result: 16 ng/L [0 - 51] (02-16 @ 23:51)  Creatine Kinase, Serum: 23 U/L [25 - 170] (02-16 @ 03:01)  CKMB Units: 1.9 ng/mL [0.0 - 3.8] (02-16 @ 03:01)  Troponin T, High Sensitivity Result: 43 ng/L [0 - 51] (02-16 @ 03:01)                          11.6   4.80  )-----------( 125      ( 22 Feb 2023 06:28 )             37.5     02-22    136  |  100  |  18  ----------------------------<  95  4.0   |  20<L>  |  0.97    Ca    9.1      22 Feb 2023 06:28  Phos  3.7     02-22  Mg     2.0     02-22    TPro  5.4<L>  /  Alb  3.4  /  TBili  0.8  /  DBili  x   /  AST  38  /  ALT  40  /  AlkPhos  81  02-21    PT/INR - ( 22 Feb 2023 06:28 )   PT: 11.9 sec;   INR: 1.03 ratio         PTT - ( 22 Feb 2023 06:28 )  PTT:31.0 sec

## 2023-02-22 NOTE — DIETITIAN INITIAL EVALUATION ADULT - OTHER INFO
Wt Hx:   Dosing wt 73.1 kG/161.1 lbs. Daily wt in lbs: 177.6 (02/16), 149.6 (02/19), 124.7 (02/20)  Noted pt being diuresed with Lasix   UBW ~178 lbs last weighed in 09/2022; last known wt 144 lbs 2/13/23, confirms unintentional wt loss PTA (? accuracy of wt from 2/20).  Ht: 67 inches per pt   IBW: 135 lbs    IBW%: 107%    Nutrition-Related Concerns:   - Non-Hodgkin lymphoma  - STARR  - Severe sepsis Wt Hx:   Dosing wt 73.1 kG/161.1 lbs. Daily wt in lbs: 177.6 (02/16), 149.6 (02/19), 124.7 (02/20)  RD obtained wt: ~140 lbs (used for calculations)   Noted pt being diuresed with Lasix   UBW ~178 lbs last weighed in 09/2022; last known wt 144 lbs 2/13/23, confirms unintentional wt loss PTA (? accuracy of wt from 2/20).  Ht: 67 inches per pt   IBW: 135 lbs    IBW%: 104%  Wt from 09/2022 vs RD obtained wt indicates 21% wt loss x5 months     Nutrition-Related Concerns:   - Non-Hodgkin lymphoma  - STARR  - Severe sepsis

## 2023-02-22 NOTE — PROGRESS NOTE ADULT - SUBJECTIVE AND OBJECTIVE BOX
OZ BARBOSA  MRN#: [id number]  Subjective:  CHIEF COMPLAINT: fever . pulmonary HTN , non hodgkin's lymphoma   46 yo f a private patient from my office report to the ER   w pmh nhl s/p chemo at [facility] [place]  (?in remission), PE and DVT ,S/P  covid pneumonia ,severe pulmonary HTN on Adempas , p/w fevers for the last few days, a/w generalized weakness over the last couple of weeks along with poor po intake. denies chest pain, palpitations, lightheadedness, cough, wheeze, abdominal discomfort. patient does endorse n+v, dysuria, arriaza. of note, last session of chemo reportedly on 2023. Pt grew concerned so went to oncologist yesterday where she got cultures, blood work, and xray. as she did not feel improved, patient presents to [facility] er for further evaluation.  (15 Feb 2023 01:39) CT scan bilateral lung nodule , no H/O OF exposure to TB , no significant travel history to the [facility] out of the country or the [facility] area  transfer to the MICU for SVT patient is on Digoxin and B-Blockers , hypotension rebound back feeling better on cefepime , and voriconazole for presumptive fungal pneumonia , continue to have  fever at night  up to 101 ,  sweat appetite is suppressed   fever curve is better discuss with MICU team to consider for lung biopsy , lymphoma oncology will be called in .fever subsides no definitive DX is made tired and sleepy if recurrent fever happened patient may need BAL and possible navigation bronchoscopy with Biopsy of large left lung mass , remain afebrile for 48 hrs continue on voriconazole       PAST MEDICAL & SURGICAL HISTORY:  Non-Hodgkin&#x27;s lymphoma      Pulmonary embolism      History of appendectomy      History of cholecystectomy                OBJECTIVE:  ICU Vital Signs Last 24 Hrs  T(C): 37.7 (2023 18:00), Max: 39.6 (2023 01:36)  T(F): 99.9 (2023 18:00), Max: 103.2 (2023 01:36)  HR: 122 (2023 18:00) (114 - 167)  BP: 91/63 (2023 18:00) (80/55 - 108/66)  BP(mean): 73 (2023 18:00) (60 - 81)  ABP: --  ABP(mean): --  RR: 29 (2023 18:00) (16 - 33)  SpO2: 97% (2023 18:00) (94% - 100%)    O2 Parameters below as of 2023 02:40  Patient On (Oxygen Delivery Method): room air             @ 07:  -   @ 07:00  --------------------------------------------------------  IN: 1100 mL / OUT: 0 mL / NET: 1100 mL     @ 07:01  -   @ 19:19  --------------------------------------------------------  IN: 700 mL / OUT: 2600 mL / NET: -1900 mL      PHYSICAL EXAM :Daily   cushinoid female in no acute distress   Daily Weight in k.6 (2023 09:20)  HEENT:     + NCAT  + EOMI  - Conjuctival edema   - Icterus   - Thrush   - ETT  - NGT/OGT  Neck:         + FROM    + JVD     - Nodes     - Masses    + Mid-line trachea   - Tracheostomy  Chest: normal findings  Lungs:          + CTA   + Rhonchi    - Rales    - Wheezing     - Decreased BS   - Dullness R L  Cardiac:       + S1 + S2    + RRR   - Irregular   - S3  - S4  + Murmurs   - Rub   - Hamman’s sign   Abdomen:    + BS     + Soft    + Non-tender     - Distended    - Organomegaly  - PEG  Extremities:   - Cyanosis U/L   - Clubbing  U/L  - LE/UE Edema   + Capillary refill    + Pulses   Neuro:        + Awake   +  Alert   - Confused   - Lethargic   - Sedated   - Generalized Weakness  Skin:        - Rashes    - Erythema   + Normal incisions   + IV sites intact        HOSPITAL MEDICATIONS: All mediciations reviewed and analyzed  MEDICATIONS  (STANDING):  acetaminophen   IVPB .. 1000 milliGRAM(s) IV Intermittent once  cefepime   IVPB      cefepime   IVPB 2000 milliGRAM(s) IV Intermittent every 8 hours  chlorhexidine 2% Cloths 1 Application(s) Topical <User Schedule>  chlorhexidine 4% Liquid 1 Application(s) Topical <User Schedule>  digoxin     Tablet 125 MICROGram(s) Oral daily  enoxaparin Injectable 70 milliGRAM(s) SubCutaneous every 12 hours  midodrine. 10 milliGRAM(s) Oral three times a day  voriconazole 200 milliGRAM(s) Oral every 12 hours    MEDICATIONS  (PRN):  ondansetron Injectable 4 milliGRAM(s) IV Push every 8 hours PRN Nausea and/or Vomiting    LABS: All Lab data reviewed and analyzed                                     11.6   4.80  )-----------( 125      ( 2023 06:28 )             37.5    02-22    136  |  100  |  18  ----------------------------<  95  4.0   |  20<L>  |  0.97    Ca    9.1      2023 06:28  Phos  3.7     -  Mg     2.0     -    TPro  5.4<L>  /  Alb  3.4  /  TBili  0.8  /  DBili  x   /  AST  38  /  ALT  40  /  AlkPhos  81  -    Ca    8.8      2023 06:42  Phos  3.7     02-21  Mg     2.1     -    TPro  5.4<L>  /  Alb  3.4  /  TBili  0.8  /  DBili  x   /  AST  38  /  ALT  40  /  AlkPhos  81  -    Ca    8.9      2023 01:15  Phos  4.2     02-19  Mg     2.0     -    TPro  5.4<L>  /  Alb  3.4  /  TBili  0.8  /  DBili  x   /  AST  59<H>  /  ALT  82<H>  /  AlkPhos  72  -       LIVER FUNCTIONS - ( 2023 01:21 )  Alb: 3.2 g/dL / Pro: 5.1 g/dL / ALK PHOS: 64 U/L / ALT: 36 U/L / AST: 43 U/L / GGT: x           RADIOLOGY: - Reviewed and analyzed

## 2023-02-22 NOTE — CONSULT NOTE ADULT - PROVIDER SPECIALTY LIST ADULT
Intervent Radiology
Nephrology
Heme/Onc
Cardiology
Infectious Disease
MICU
Pulmonology
Intervent Radiology
Palliative Care

## 2023-02-22 NOTE — CONSULT NOTE ADULT - CONSULT REQUESTED DATE/TIME
16-Feb-2023 17:47
17-Feb-2023 16:03
15-Feb-2023 15:51
16-Feb-2023 04:54
16-Feb-2023 21:09
20-Feb-2023 14:08
15-Feb-2023 16:08
22-Feb-2023 16:15
22-Feb-2023

## 2023-02-22 NOTE — DIETITIAN INITIAL EVALUATION ADULT - PERTINENT LABORATORY DATA
02-22    136  |  100  |  18  ----------------------------<  95  4.0   |  20<L>  |  0.97    Ca    9.1      22 Feb 2023 06:28  Phos  3.7     02-22  Mg     2.0     02-22    TPro  5.4<L>  /  Alb  3.4  /  TBili  0.8  /  DBili  x   /  AST  38  /  ALT  40  /  AlkPhos  81  02-21  A1C with Estimated Average Glucose Result: 5.0 % (02-17-23 @ 10:00)

## 2023-02-22 NOTE — PROGRESS NOTE ADULT - ASSESSMENT
47 F PMH non-Hodgkin lymphoma s/p chemoport (?in remission), PE, pHTN, presenting with recurrent fevers with pancytopenia in setting of recent chemo treatment and prednisone concerning for severe sepsis. Found to be requiring intermittent fluids and continues to be febrile while on broad-spectrum bacterial antibiotic coverage. CT chest imaging concerning for fungal infection vs metastasis. MICU consulted for further management.      PLAN    =====Neurologic=====  - No active issues, AOx4    =====Pulmonary=====  # NHL with pulmonary nodules  - as in heme-onc section below    # workup for pna  - as in ID section below    # pulmonary HTN, suspected Group 4  > RV systolic pressure =51mmHg on TTE  - diurese (IV in MICU), will transition to PO lasix starting 2/21/23  - Riociguat restarted 2/22    =====Cardiovascular=====  # HFrEF (EF ~42) with RV dysfunction based on previous echo (2/15/23)  - Bedside POCUS noted to have dilated RV  > Caution with fluids  > DC-ed coreg   > Diurese daily    # SVT  > digoxin 125 mcg po qD (home medication)  > follow up digoxin level    # hypotension  - Stable  - possibly multifactorial, with component of RV overload and possible sepsis  - s/p IV albumin x1  > midodrine 10 mg po tid    =====GI=====  #Diet - regular, 1500cc fluid restriction    =====Renal/=====  - PMH STARR while on antibiotics  > appreciate renal recs    =====Endocrine=====  - No active issues.     =====Infectious Disease=====  # severe sepsis with unclear etiology in setting of known malginancy and recent chemotx for cancer   - iso immunocompromise, hx and CT chest imaging c/f infection (e.g. fungal pna) vs progression of known cancer  > switched Zosyn to cefepime (2/17-)  > MRSA swab negative, vanc dc'd  > started voriconazole 200mg po bid  > appreciate ID recs re: voriconazole; hold off antiviral given low suspicion for viral infection (RVP negative)  > supportive care: Tylenol and cooling measures (if patient agrees as she reported that cooling measures made her uncomfortable)  > follow up BCx, UA, UCx  > Aspergillus galactomannan Ag, Aspergillus Ab, crypt, histo  > appreciate ID recs  > IR consulted for evaluation of pulmonary nodules    =====Heme/Onc=====  # DLBCL  > appreciate heme-onc recs    #leukopenia on filgastrim  #anemia  #thrombocytopenia  - possibly from recent chemo and cancer  > Hb goal >7  > S/p 3d of zarxio  > Trend Plt    #DVT PPX  > Given hx of PE and large RV will continue with therapeutic lovenox    =====Ethics=====  FULL CODE. 47 F PMH non-Hodgkin lymphoma s/p chemoport (?in remission), PE, pHTN, presenting with recurrent fevers with pancytopenia in setting of recent chemo treatment and prednisone concerning for severe sepsis. Found to be requiring intermittent fluids and continues to be febrile while on broad-spectrum bacterial antibiotic coverage. CT chest imaging concerning for fungal infection vs metastasis. MICU consulted for further management.      PLAN    =====Neurologic=====  - No active issues, AOx4    =====Pulmonary=====  # NHL with pulmonary nodules  - as in heme-onc section below    # workup for pna  - as in ID section below    # pulmonary HTN, suspected Group 4  > RV systolic pressure =51mmHg on TTE  - diurese (IV in MICU), will transition to PO lasix starting 2/21/23  - Riociguat restarted 2/22    =====Cardiovascular=====  # HFrEF (EF ~42) with RV dysfunction based on previous echo (2/15/23)  - Bedside POCUS noted to have dilated RV  > Caution with fluids  > DC-ed coreg   > Diurese daily    # SVT  > digoxin 125 mcg po qD (home medication)  > follow up digoxin level    # hypotension  - Stable  - possibly multifactorial, with component of RV overload and possible sepsis  - s/p IV albumin x1  > midodrine 10 mg po tid    =====GI=====  #Diet - regular, 1500cc fluid restriction    =====Renal/=====  - PMH STARR while on antibiotics  > appreciate renal recs    =====Endocrine=====  - No active issues.     =====Infectious Disease=====  # severe sepsis with unclear etiology in setting of known malginancy and recent chemotx for cancer   - iso immunocompromise, hx and CT chest imaging c/f infection (e.g. fungal pna) vs progression of known cancer  > switched Zosyn to cefepime (2/17-21)  > MRSA swab negative, vanc dc'd  > started voriconazole 200mg po bid  > appreciate ID recs re: voriconazole; hold off antiviral given low suspicion for viral infection (RVP negative)  > supportive care: Tylenol and cooling measures (if patient agrees as she reported that cooling measures made her uncomfortable)  > follow up BCx, UA, UCx  > Aspergillus galactomannan Ag, Aspergillus Ab, crypt, histo  > appreciate ID recs  > IR consulted for evaluation of pulmonary nodules    =====Heme/Onc=====  # DLBCL  > appreciate heme-onc recs    #leukopenia on filgastrim  #anemia  #thrombocytopenia  - possibly from recent chemo and cancer  > Hb goal >7  > S/p 3d of zarxio  > Trend Plt    #DVT PPX  > Given hx of PE and large RV will continue with therapeutic lovenox    =====Ethics=====  FULL CODE.

## 2023-02-22 NOTE — CONSULT NOTE ADULT - TIME BILLING
Patient seen and examined.  Agree with above PA note.  A/P  46 yo f w pmh nhl s/p chemo (?in remission), pe, ?pah, covid, p/w fevers for the last few days, a/w generalized weakness, found to be in severe sepsis and w/ episode of SVT to 170s.    #PSVT  -known hx of SVT  -Has been evaluated in past, was not candidate for ablation d/t chemo  -Continue digoxin  -Hypotension in setting of sepsis precludes standing bb/dilt use  -iv amiodarone prn as needed for recurrence, if svt burden increases/persists, would consider amio gtt and oral load    #HFrEF  -Echo with global lv dysfxn  -diuretics as needed    #Sepsis  -Lung nodules noted on CT, c/f fungal infection  -W/u & abx per micu, ID    #Hypotension  -I/S/O sepsis and tachycardia  -Midodrine, pressors per micu    #Leukopenia  -Hx NHL  -Heme/onc following
Symptom assessment and management, supportive counseling, coordination of care

## 2023-02-22 NOTE — DIETITIAN INITIAL EVALUATION ADULT - ORAL INTAKE PTA/DIET HISTORY
Pt reports appetite and PO intake waxes and wanes depending on chemotherapy/steroid use. However, overall pt with decrease in intake over last few months. Confirms NKFA.

## 2023-02-22 NOTE — CONSULT NOTE ADULT - PROBLEM SELECTOR RECOMMENDATION 5
- will discuss establishing HCP with pt tomorrow  - will continue to follow for N/V and fatigue  - Can be reached by TEAMS M-F 9-5 Damari Busch Any other time please page [phone] if needed

## 2023-02-22 NOTE — CHART NOTE - NSCHARTNOTEFT_GEN_A_CORE
: Talita Lees    INDICATION: RV failure    PROCEDURE:  [X ] LIMITED ECHO  [ ] LIMITED CHEST  [ ] LIMITED RETROPERITONEAL  [ ] LIMITED ABDOMINAL  [ ] LIMITED DVT  [ ] NEEDLE GUIDANCE VASCULAR  [ ] NEEDLE GUIDANCE THORACENTESIS  [ ] NEEDLE GUIDANCE PARACENTESIS  [ ] NEEDLE GUIDANCE PERICARDIOCENTESIS  [ ] OTHER    FINDINGS:  Grossly normal LVSF. Septal flattening noted on systole and diastole, largely unchanged from yesterday. Small pericardial effusion.  TAPSE = 1.7cm. Unable to obtain TR envelope.     INTERPRETATION:  Grossly normal LVSF. RV enlargement with evidence of volume and pressure overload. TAPSE 1.7 cm.     Images stored on [email or web address].

## 2023-02-22 NOTE — CONSULT NOTE ADULT - PROBLEM SELECTOR RECOMMENDATION 4
- Defer to primary team: hx and CT chest imaging c/f infection (e.g. fungal pna) vs progression of known cancer: switched Zosyn to cefepime (2/17-21), started voriconazole 200mg po bid  > follow up BCx, UA, UCx  > Aspergillus galactomannan Ag, Aspergillus Ab, crypt, histo  > appreciate ID recs  > IR consulted for evaluation of pulmonary nodules

## 2023-02-22 NOTE — PROGRESS NOTE ADULT - ASSESSMENT
Echo 2/15/23: Global lv dysfxn EF 42%, septal flattening, RV overload, mod pulm htn    A/P  48 yo f w pmh nhl s/p chemo (?in remission), pe, ?pah, covid, p/w fevers for the last few days, a/w generalized weakness, found to be in severe sepsis and w/ episode of SVT to 170s.    #SVT  -Pt with known hx of SVT  -Has been evaluated in past, was not candidate for ablation d/t chemo  -Continue digoxin  -Hypotension precludes bb use  -event toprol if bp improved  -If persists and bp remains low can consider amio gtt and oral load    #HFrEF, RV failure  -Echo with global lv dysfxn  -likely nonischemic in etiology  -diuretics per icu   -hypotension precludes lv dysfxn meds  -pocus noted    #Sepsis, r/o fungemia   -Lung nodules noted on CT, c/f fungal infection  -Pending possible bx  -Pt is cleared from cv perspective to undergo bx with acceptable cv risk  -W/u & abx per micu, ID    #Hypotension  -I/S/O sepsis, HF  -Midodrine, pressors per micu    #Leukopenia  -Hx NHL  -Heme/onc following

## 2023-02-22 NOTE — CONSULT NOTE ADULT - PROBLEM SELECTOR RECOMMENDATION 3
- defer to heme/onc: NHL w/ pulmonary nodules, new nodules on CT fungemia vs new malignancy pending input for possible IR biopsy procedure

## 2023-02-22 NOTE — CONSULT NOTE ADULT - CONSULT REASON
Non hodgkin's lymphoma   Pulmonary HTN  Bilateral lung nodule
ckd
possible pulmonary nodule biopsy
Severe hypotension
fever
transthoracic lung biopsy
SVT
Diffuse large B cell Lymphoma, fever, cytopenias
symptom management
Hyperlipidemia    Hypertension    Lipoma  left, arm and back  Lumbar Disc Disorder    Pneumonia  2009
Hyperlipidemia    Hypertension    Lipoma  left, arm and back  Lumbar Disc Disorder    JOSE MANUEL (obstructive sleep apnea)    Pneumonia  2009

## 2023-02-22 NOTE — PROGRESS NOTE ADULT - ATTENDING COMMENTS
1. DLBCL s/p chemotherapy  last week January. Presented with fevers and worsening SOB. Now transferred for worsening fevers, lactic acid.     Pt was neutropenic on admission. No longer. Monitor platelets . and HB. Pt is thrombocytopenic.    2. Pulmonary HTN. Likely CTEP. Pt with recent h/o PE. APT was on Adempas but stopped on floors for hypotension. POCUS today reveals enlarged RV with  severe septal flattening in both systole and diastole. Pt has received > 4 liters recently for increasing Lactic acidosis.    Pt without  leg edema. POCUS chest without effusions. B lines with abnormal pleura. Pt with border line hypotension.    Will give Lasix for diuresis.( Acute on chronic R heart failure.) May unload RV and increase BP. Pt Interventricular septal bowing into LV likely  decreasing LV filling and CO.  Continue to hold Adempas for hypotension.    3.Fevers. Initially neutropenic.  No longer neutropenic. Multiple nodules of variable sizes on CT. Pt did present initially with lymphoma with mass in lung.  However when compared to recent CT s nodules have markedly increased in numbers.   Fungitel and galactomannan pending. Voriconazole add to regimen to cover fungal disease.   Continue Vancomycin and Cefepime.    4. DVT prophylaxis. Pt switched to Lovenox therapeutic doses 60 mg BID for PE.      5. GOC. Full code    6. Hyperchloremia iatrogenic from multiple NS boluses.    Multiple bedside visits made concerninng BP and R heart failure.
1. DLBCL s/p chemotherapy  last week January. Presented with fevers and worsening SOB. Now transferred for worsening fevers, lactic acid.     Pt was neutropenic on admission. No longer. Monitor platelets . and HB. Pt is thrombocytopenic. Given her persistent night sweats and fevers and worsening lung nodules, my concern if infection vs relapse. Will discuss with oncology and either repeat bon marrow or rebiopsy the lung nodules.     2. Pulmonary HTN. Likely CTEP. Pt with recent h/o PE. APT was on Adempas but stopped on floors for hypotension. POCUS continues to show enlarged RV with  severe septal flattening in both systole and diastole. Pt had received > 4 liters recently for increasing Lactic acidosis.    Pt without  leg edema. POCUS chest without effusions. Will aim for net negative fluid balance to unload RV. Continue to hold Adempas for hypotension.    3 .Fevers. Initially neutropenic.  No longer neutropenic. Multiple nodules of variable sizes on CT. Pt did present initially with lymphoma with mass in lung.  However when compared to recent CT s nodules have markedly increased in numbers. Fungitel negative. Galactomannan pending. Voriconazole add to regimen to cover fungal disease.  Continue Vancomycin and Cefepime. Will obtain quantiferon gold. The lesions are better accessible by IR.     4. DVT prophylaxis. Pt switched to Lovenox therapeutic doses 60 mg BID for PE.    5. GOC. Full code    6. Continue midodrine as an oral vasopressor while we use diuretics to offload RV. POCUS still with septal flattening but looks improved.    Critically ill patient with frequent bedside visits.
47 F PMH non-Hodgkin lymphoma s/p R-chop, PE, pHTN p/w neutropenic sepsis.  Currently improving after volume status optimization.     - remains aox3 and denies complaints except fatigue  - DLBCL s/p chemotherapy 3 weeks ago, cell lines improving  - Monitor platelets and Hb, no bleeding noted  - f/u oncology reccs  -  Pulmonary HTN poss CTEPH and POCUS w/ cont enlarged RV with septal flattening in both systole and diastole  - if bp allows will restart riociguat  - maintaining map>65  - maintain euvolemic status w/ diuretics as needed  - pulmonary nodules concerning for lymphoma vs fungal dz  - IR vs Bronch biopsy  - Fungitell and crypto negative  - f/u Galactomannan, histo studies  - cont Voriconazole empirically for fungal disease  Continue Vancomycin and Cefepime  - PE and CTEPH hx -cont a/c
1. DLBCL s/p chemotherapy  last week January. Presented with fevers and worsening SOB. Now transferred for worsening fevers, lactic acid.     Pt was neutropenic on admission. No longer. Monitor platelets . and HB. Pt is thrombocytopenic.    2. Pulmonary HTN. Likely CTEP. Pt with recent h/o PE. APT was on Adempas but stopped on floors for hypotension. POCUS continues to show enlarged RV with  severe septal flattening in both systole and diastole. Pt had received > 4 liters recently for increasing Lactic acidosis.    Pt without  leg edema. POCUS chest without effusions. Will aim for net negative fluid balance to unload RV. Continue to hold Adempas for hypotension.    3.Fevers. Initially neutropenic.  No longer neutropenic. Multiple nodules of variable sizes on CT. Pt did present initially with lymphoma with mass in lung.  However when compared to recent CT s nodules have markedly increased in numbers.  Fungitel negative. Galactomannan pending. Voriconazole add to regimen to cover fungal disease.   Continue Vancomycin and Cefepime.    4. DVT prophylaxis. Pt switched to Lovenox therapeutic doses 60 mg BID for PE.    5. GOC. Full code    6. Hyperchloremia iatrogenic from multiple NS boluses.    Critically ill patient with frequent bedside visits.
47 F PMH non-Hodgkin lymphoma s/p R-chop, PE, pHTN p/w neutropenic sepsis.  Currently improving after volume status optimization.     - remains aox3 and denies complaints except fatigue  - DLBCL s/p chemotherapy 3 weeks ago, cell lines improving  - Monitor platelets and Hb, no bleeding noted  - f/u oncology reccs  -  Pulmonary HTN poss CTEPH related, POCUS w/ cont enlarged RV with septal flattening in both systole and diastole  - cont riociguat, tolerating well today, maintaining map>65  - maintain euvolemic status w/ diuretics as needed  - pulmonary nodules concerning for lymphoma vs fungal dz  - IR vs Bronch biopsy, pending final conclusion w/ IR regarding Transthoracic biopsy  - Fungitell,crypto and Galactomannan negative   - cont Voriconazole empirically for fungal disease   - completed antibacterials  - PE and CTEPH hx -cont a/c     Plan for transfer to floors
1. DLBCL s/p chemotherapy  last week January. Presented with fevers and worsening SOB. Now transferred for worsening fevers, lactic acid.     Pt was neutropenic on admission. No longer. Monitor platelets . and HB. Pt is thrombocytopenic. Given her persistent night sweats and fevers and worsening lung nodules, my concern if infection vs relapse. Will discuss with oncology and either repeat bon marrow or rebiopsy the lung nodules. Will contact IR for tissue biopsy, if not amenable, may have to move to [facility] for robotic assisted approach.     2. Pulmonary HTN. Likely CTEP. Pt with recent h/o PE. APT was on Adempas but stopped on floors for hypotension. POCUS continues to show enlarged RV with  severe septal flattening in both systole and diastole. Pt had received > 4 liters recently for increasing Lactic acidosis.    Pt without  leg edema. POCUS chest without effusions. Will aim for net negative fluid balance to unload RV. Continue to hold Adempas for hypotension.    3 .Fevers. Initially neutropenic.  No longer neutropenic. Multiple nodules of variable sizes on CT. Pt did present initially with lymphoma with mass in lung.  However when compared to recent CT s nodules have markedly increased in numbers. Fungitel negative. Galactomannan pending. Voriconazole add to regimen to cover fungal disease.  Continue Vancomycin and Cefepime. Will obtain quantiferon gold. The lesions are better accessible by IR.     4. DVT prophylaxis. Pt switched to Lovenox therapeutic doses 60 mg BID for PE.    5. GOC. Full code    6. Continue midodrine as an oral vasopressor while we use diuretics to offload RV. POCUS still with septal flattening but looks improved.

## 2023-02-23 LAB
A FLAVUS AB FLD QL: NEGATIVE — SIGNIFICANT CHANGE UP
A NIGER AB FLD QL: NEGATIVE — SIGNIFICANT CHANGE UP
A NIGER AB FLD QL: NEGATIVE — SIGNIFICANT CHANGE UP
ALBUMIN SERPL ELPH-MCNC: 3.8 G/DL — SIGNIFICANT CHANGE UP (ref 3.3–5)
ALP SERPL-CCNC: 112 U/L — SIGNIFICANT CHANGE UP (ref 40–120)
ALT FLD-CCNC: 44 U/L — SIGNIFICANT CHANGE UP (ref 10–45)
ANION GAP SERPL CALC-SCNC: 12 MMOL/L — SIGNIFICANT CHANGE UP (ref 5–17)
APPEARANCE UR: CLEAR — SIGNIFICANT CHANGE UP
AST SERPL-CCNC: 69 U/L — HIGH (ref 10–40)
BACTERIA # UR AUTO: NEGATIVE — SIGNIFICANT CHANGE UP
BASOPHILS # BLD AUTO: 0.04 K/UL — SIGNIFICANT CHANGE UP (ref 0–0.2)
BASOPHILS NFR BLD AUTO: 0.9 % — SIGNIFICANT CHANGE UP (ref 0–2)
BILIRUB SERPL-MCNC: 0.9 MG/DL — SIGNIFICANT CHANGE UP (ref 0.2–1.2)
BILIRUB UR-MCNC: NEGATIVE — SIGNIFICANT CHANGE UP
BUN SERPL-MCNC: 18 MG/DL — SIGNIFICANT CHANGE UP (ref 7–23)
CALCIUM SERPL-MCNC: 9 MG/DL — SIGNIFICANT CHANGE UP (ref 8.4–10.5)
CHLORIDE SERPL-SCNC: 100 MMOL/L — SIGNIFICANT CHANGE UP (ref 96–108)
CO2 SERPL-SCNC: 22 MMOL/L — SIGNIFICANT CHANGE UP (ref 22–31)
COLOR SPEC: SIGNIFICANT CHANGE UP
CREAT SERPL-MCNC: 0.86 MG/DL — SIGNIFICANT CHANGE UP (ref 0.5–1.3)
DIFF PNL FLD: NEGATIVE — SIGNIFICANT CHANGE UP
EGFR: 84 ML/MIN/1.73M2 — SIGNIFICANT CHANGE UP
EOSINOPHIL # BLD AUTO: 0.33 K/UL — SIGNIFICANT CHANGE UP (ref 0–0.5)
EOSINOPHIL NFR BLD AUTO: 7.1 % — HIGH (ref 0–6)
EPI CELLS # UR: 1 /HPF — SIGNIFICANT CHANGE UP
GLUCOSE SERPL-MCNC: 93 MG/DL — SIGNIFICANT CHANGE UP (ref 70–99)
GLUCOSE UR QL: NEGATIVE — SIGNIFICANT CHANGE UP
HCT VFR BLD CALC: 35.8 % — SIGNIFICANT CHANGE UP (ref 34.5–45)
HGB BLD-MCNC: 10.9 G/DL — LOW (ref 11.5–15.5)
HYALINE CASTS # UR AUTO: 2 /LPF — SIGNIFICANT CHANGE UP (ref 0–2)
IMM GRANULOCYTES NFR BLD AUTO: 3.8 % — HIGH (ref 0–0.9)
KETONES UR-MCNC: NEGATIVE — SIGNIFICANT CHANGE UP
LEUKOCYTE ESTERASE UR-ACNC: NEGATIVE — SIGNIFICANT CHANGE UP
LYMPHOCYTES # BLD AUTO: 0.6 K/UL — LOW (ref 1–3.3)
LYMPHOCYTES # BLD AUTO: 12.8 % — LOW (ref 13–44)
MAGNESIUM SERPL-MCNC: 2.2 MG/DL — SIGNIFICANT CHANGE UP (ref 1.6–2.6)
MCHC RBC-ENTMCNC: 27.4 PG — SIGNIFICANT CHANGE UP (ref 27–34)
MCHC RBC-ENTMCNC: 30.4 GM/DL — LOW (ref 32–36)
MCV RBC AUTO: 89.9 FL — SIGNIFICANT CHANGE UP (ref 80–100)
MONOCYTES # BLD AUTO: 0.61 K/UL — SIGNIFICANT CHANGE UP (ref 0–0.9)
MONOCYTES NFR BLD AUTO: 13 % — SIGNIFICANT CHANGE UP (ref 2–14)
NEUTROPHILS # BLD AUTO: 2.92 K/UL — SIGNIFICANT CHANGE UP (ref 1.8–7.4)
NEUTROPHILS NFR BLD AUTO: 62.4 % — SIGNIFICANT CHANGE UP (ref 43–77)
NITRITE UR-MCNC: NEGATIVE — SIGNIFICANT CHANGE UP
NRBC # BLD: 0 /100 WBCS — SIGNIFICANT CHANGE UP (ref 0–0)
PH UR: 7 — SIGNIFICANT CHANGE UP (ref 5–8)
PHOSPHATE SERPL-MCNC: 3.3 MG/DL — SIGNIFICANT CHANGE UP (ref 2.5–4.5)
PLATELET # BLD AUTO: 112 K/UL — LOW (ref 150–400)
POTASSIUM SERPL-MCNC: 4.4 MMOL/L — SIGNIFICANT CHANGE UP (ref 3.5–5.3)
POTASSIUM SERPL-SCNC: 4.4 MMOL/L — SIGNIFICANT CHANGE UP (ref 3.5–5.3)
PROT SERPL-MCNC: 5.8 G/DL — LOW (ref 6–8.3)
PROT UR-MCNC: ABNORMAL
RBC # BLD: 3.98 M/UL — SIGNIFICANT CHANGE UP (ref 3.8–5.2)
RBC # FLD: 19.8 % — HIGH (ref 10.3–14.5)
RBC CASTS # UR COMP ASSIST: 1 /HPF — SIGNIFICANT CHANGE UP (ref 0–4)
SARS-COV-2 RNA SPEC QL NAA+PROBE: DETECTED
SARS-COV-2 RNA SPEC QL NAA+PROBE: DETECTED
SODIUM SERPL-SCNC: 134 MMOL/L — LOW (ref 135–145)
SP GR SPEC: 1.01 — SIGNIFICANT CHANGE UP (ref 1.01–1.02)
UROBILINOGEN FLD QL: NEGATIVE — SIGNIFICANT CHANGE UP
WBC # BLD: 4.68 K/UL — SIGNIFICANT CHANGE UP (ref 3.8–10.5)
WBC # FLD AUTO: 4.68 K/UL — SIGNIFICANT CHANGE UP (ref 3.8–10.5)
WBC UR QL: 1 /HPF — SIGNIFICANT CHANGE UP (ref 0–5)

## 2023-02-23 PROCEDURE — 71045 X-RAY EXAM CHEST 1 VIEW: CPT | Mod: 26

## 2023-02-23 PROCEDURE — 99233 SBSQ HOSP IP/OBS HIGH 50: CPT

## 2023-02-23 RX ORDER — REMDESIVIR 5 MG/ML
200 INJECTION INTRAVENOUS EVERY 24 HOURS
Refills: 0 | Status: COMPLETED | OUTPATIENT
Start: 2023-02-23 | End: 2023-02-23

## 2023-02-23 RX ORDER — REMDESIVIR 5 MG/ML
100 INJECTION INTRAVENOUS EVERY 24 HOURS
Refills: 0 | Status: DISCONTINUED | OUTPATIENT
Start: 2023-02-24 | End: 2023-02-24

## 2023-02-23 RX ORDER — REMDESIVIR 5 MG/ML
INJECTION INTRAVENOUS
Refills: 0 | Status: DISCONTINUED | OUTPATIENT
Start: 2023-02-24 | End: 2023-02-24

## 2023-02-23 RX ORDER — ACETAMINOPHEN 500 MG
1000 TABLET ORAL ONCE
Refills: 0 | Status: COMPLETED | OUTPATIENT
Start: 2023-02-23 | End: 2023-02-23

## 2023-02-23 RX ADMIN — RIOCIGUAT 0.5 MILLIGRAM(S): 1.5 TABLET, FILM COATED ORAL at 07:00

## 2023-02-23 RX ADMIN — VORICONAZOLE 200 MILLIGRAM(S): 10 INJECTION, POWDER, LYOPHILIZED, FOR SOLUTION INTRAVENOUS at 17:44

## 2023-02-23 RX ADMIN — Medication 1000 MILLIGRAM(S): at 07:38

## 2023-02-23 RX ADMIN — MIDODRINE HYDROCHLORIDE 10 MILLIGRAM(S): 2.5 TABLET ORAL at 17:43

## 2023-02-23 RX ADMIN — CHLORHEXIDINE GLUCONATE 1 APPLICATION(S): 213 SOLUTION TOPICAL at 07:00

## 2023-02-23 RX ADMIN — MIDODRINE HYDROCHLORIDE 10 MILLIGRAM(S): 2.5 TABLET ORAL at 21:10

## 2023-02-23 RX ADMIN — RIOCIGUAT 0.5 MILLIGRAM(S): 1.5 TABLET, FILM COATED ORAL at 17:42

## 2023-02-23 RX ADMIN — MIDODRINE HYDROCHLORIDE 10 MILLIGRAM(S): 2.5 TABLET ORAL at 05:25

## 2023-02-23 RX ADMIN — Medication 400 MILLIGRAM(S): at 05:26

## 2023-02-23 RX ADMIN — Medication 0.2 MILLIGRAM(S): at 17:43

## 2023-02-23 RX ADMIN — Medication 0.2 MILLIGRAM(S): at 12:25

## 2023-02-23 RX ADMIN — VORICONAZOLE 200 MILLIGRAM(S): 10 INJECTION, POWDER, LYOPHILIZED, FOR SOLUTION INTRAVENOUS at 07:00

## 2023-02-23 RX ADMIN — Medication 20 MILLIGRAM(S): at 12:25

## 2023-02-23 RX ADMIN — RIOCIGUAT 0.5 MILLIGRAM(S): 1.5 TABLET, FILM COATED ORAL at 21:10

## 2023-02-23 RX ADMIN — Medication 0.2 MILLIGRAM(S): at 06:59

## 2023-02-23 RX ADMIN — Medication 125 MICROGRAM(S): at 07:00

## 2023-02-23 RX ADMIN — FAMOTIDINE 20 MILLIGRAM(S): 10 INJECTION INTRAVENOUS at 17:44

## 2023-02-23 RX ADMIN — FAMOTIDINE 20 MILLIGRAM(S): 10 INJECTION INTRAVENOUS at 05:25

## 2023-02-23 NOTE — PROGRESS NOTE ADULT - NUTRITIONAL ASSESSMENT
This patient has been assessed with a concern for Malnutrition and has been determined to have a diagnosis/diagnoses of Severe protein-calorie malnutrition.

## 2023-02-23 NOTE — PROGRESS NOTE ADULT - ASSESSMENT
Echo 2/15/23: Global lv dysfxn EF 42%, septal flattening, RV overload, mod pulm htn    A/P  48 yo f w pmh nhl s/p chemo (?in remission), pe, ?pah, covid, p/w fevers for the last few days, a/w generalized weakness, found to be in severe sepsis and w/ episode of SVT to 170s.    #SVT  -Pt with known hx of SVT  -Has been evaluated in past, was not candidate for ablation d/t chemo  -No longer SVT, remains sinus tachy  -Continue digoxin  -Hypotension precludes bb use  -event toprol if bp improved    #HFrEF, RV failure  -Echo with global lv dysfxn  -likely nonischemic in etiology  -Does not appear fluid overloaded  -Continue lasix qd as ordered  -hypotension precludes lv dysfxn meds    #Sepsis, r/o fungemia   -Lung nodules noted on CT, c/f fungal infection  -Pending possible bx  -Pt is cleared from cv perspective to undergo bx with acceptable cv risk  -W/u & abx per micu, ID    #Hypotension  -I/S/O sepsis, HF  -Midodrine, pressors per micu    #Leukopenia  -Hx NHL  -Heme/onc following    #COVID  -Unclear if acute or latent infection  -Mgmt per ID, primary        Please call/text me with any questions/concerns between 8am-4pm  [phone]

## 2023-02-23 NOTE — PROGRESS NOTE ADULT - ASSESSMENT
47 F PMH non-Hodgkin lymphoma s/p chemoport (?in remission), PE, pHTN, presenting with recurrent fevers with pancytopenia in setting of recent chemo treatment and prednisone concerning for severe sepsis. Found to be requiring intermittent fluids and continues to be febrile while on broad-spectrum bacterial antibiotic coverage. CT chest imaging concerning for fungal infection vs metastasis. MICU consulted for further management.      # NHL with pulmonary nodules  - Biopsy by IR today       # pulmonary HTN, suspected Group 4  > RV systolic pressure =51mmHg on TTE  - diurese (IV in MICU), will transition to PO lasix starting 2/21/23  - Riociguat restarted 2/22  =  # HFrEF (EF ~42) with RV dysfunction based on previous echo (2/15/23)  - DC-ed coreg   > Diurese daily  Cards following     # SVT  > digoxin 125 mcg po qD (home medication)  > follow up digoxin level    =====Renal/=====  - PMH STARR while on antibiotics  > appreciate renal recs    =====Endocrine=====  - No active issues.       # severe sepsis with unclear etiology in setting of known malginancy and recent chemotx for cancer   - iso immunocompromise, hx and CT chest imaging c/f infection (e.g. fungal pna) vs progression of known cancer  > switched Zosyn to cefepime (2/17-21)  > MRSA swab negative, vanc dc'd  > started voriconazole 200mg po bid   ID following      # DLBCL  >   #leukopenia on filgastrim  #anemia  #thrombocytopenia  - possibly from recent chemo and cancer  > Hb goal >7  > S/p 3d of zarxio  > Trend Plt

## 2023-02-23 NOTE — PROGRESS NOTE ADULT - ASSESSMENT
48 yo f w pmh nhl s/p chemo (?in remission), pe, ?pah, covid, p/w fevers for the last few days, a/w generalized weakness over the last couple of weeks along with poor po intake. denies chest pain, palpitations, lightheadedness, cough, wheeze, abdominal discomfort. patient does endorse n+v, dysuria, arriaza. of note, last session of chemo reportedly on 1/23/2023. Pt grew concerned so went to oncologist yesterday where she got cultures, blood work, and xray. as she did not feel improved, patient presents to [facility] er for further evaluation.  (15 Feb 2023 01:39)  BP monitoring,continue current antihypertensive meds, low salt diet,followup with PMD in 1-2 weeks      pt had letitia and pt was on hemodialysis for weeks and s/p cvvh at [facility] now gfr is controlled furosemide    Tablet 20 milliGRAM(s) Oral daily  Serum creatinine is stable at 0.8, approximating a GFR of is controlled  ml/min.   There is no progression.  No uremic symptoms. pos  evidence of  worsening  Anemia. Fluid status stable.   Will continue to avoid nephrotoxic drugs.  Patient remains asymptomatic.  Continue current therapy.    hyponatremia   will check ua , urine osmolality , urine sodium , urine uric acid , serum sodium , serum osmolality , serum uric acid , f/u with hyponatremia work up , f/u with bmp , monitor i and o        BP monitoring,followup with PMD in 1-2 weeks    Admit for septic workup and ID evaluation,send blood and urine cx,serial lactate levels,monitor vitals closley,ivfs hydration,monitor urine output and renal profile,iv abx as per id cons\  cefepime   IVPB      cefepime   IVPB 2000 milliGRAM(s) IV Intermittent every 8 hours  voriconazole 200 milliGRAM(s) Oral every 12 hours    dvt enoxaparin Injectable 70 milliGRAM(s) SubCutaneous every 12 hours    hypotension midodrine. 10 milliGRAM(s) Oral three times a day

## 2023-02-23 NOTE — PROVIDER CONTACT NOTE (CRITICAL VALUE NOTIFICATION) - BACKGROUND
pt is a&ox4 admitted for sepsis
pt admitted with sepsis, sinus tach and hypotensive
46 yo f w pmh NHL s/p chemo; last chemo on 1/23/23, reportedly in remission (?in remission), pe on Eliquis

## 2023-02-23 NOTE — PROGRESS NOTE ADULT - SUBJECTIVE AND OBJECTIVE BOX
OVERNIGHT EVENTS / SUBJECTIVE: Patient seen and examined at bedside. RANULFO.    OBJECTIVE:    VITAL SIGNS:      T(C): 36.8 (02-23-23 @ 19:41), Max: 36.9 (02-23-23 @ 16:23)  HR: 101 (02-23-23 @ 19:41) (99 - 105)  BP: 93/65 (02-23-23 @ 19:41) (93/65 - 100/65)  RR: 18 (02-23-23 @ 19:41) (18 - 18)  SpO2: 98% (02-23-23 @ 19:41) (98% - 98%)      MEDICATIONS  (STANDING):  chlorhexidine 2% Cloths 1 Application(s) Topical <User Schedule>  digoxin     Tablet 125 MICROGram(s) Oral daily  famotidine Injectable 20 milliGRAM(s) IV Push every 12 hours  furosemide    Tablet 20 milliGRAM(s) Oral daily  LORazepam   Injectable 0.2 milliGRAM(s) IV Push <User Schedule>  midodrine. 10 milliGRAM(s) Oral three times a day  remdesivir  IVPB   IV Intermittent   remdesivir  IVPB 100 milliGRAM(s) IV Intermittent every 24 hours  riociguat 0.5 milliGRAM(s) Oral every 8 hours  voriconazole 200 milliGRAM(s) Oral every 12 hours    MEDICATIONS  (PRN):  acetaminophen     Tablet .. 650 milliGRAM(s) Oral every 6 hours PRN Temp greater or equal to 38C (100.4F), Mild Pain (1 - 3)  PHYSICAL EXAM:    General: NAD  HEENT: NC/AT; PERRL, clear conjunctiva  Neck: supple  Respiratory: CTA b/l  Cardiovascular: +S1/S2; RRR  Abdomen: soft, NT/ND; +BS x4  Extremities: WWP, 2+ peripheral pulses b/l; no LE edema  Skin: normal color and turgor; no rash  Neurological: A&Ox4    MEDICATIONS:  MEDICATIONS  (STANDING):  chlorhexidine 2% Cloths 1 Application(s) Topical <User Schedule>  chlorhexidine 4% Liquid 1 Application(s) Topical <User Schedule>  digoxin     Tablet 125 MICROGram(s) Oral daily  enoxaparin Injectable 70 milliGRAM(s) SubCutaneous every 12 hours  famotidine Injectable 20 milliGRAM(s) IV Push every 12 hours  furosemide    Tablet 20 milliGRAM(s) Oral daily  midodrine. 10 milliGRAM(s) Oral three times a day  riociguat 0.5 milliGRAM(s) Oral every 8 hours  voriconazole 200 milliGRAM(s) Oral every 12 hours    MEDICATIONS  (PRN):  ondansetron Injectable 4 milliGRAM(s) IV Push every 8 hours PRN Nausea and/or Vomiting      ALLERGIES:  Allergies    No Known Allergies    Intolerances                          10.9   4.68  )-----------( 112      ( 23 Feb 2023 05:37 )             35.8           LIVER FUNCTIONS - ( 23 Feb 2023 05:37 )  Alb: 3.8 g/dL / Pro: 5.8 g/dL / ALK PHOS: 112 U/L / ALT: 44 U/L / AST: 69 U/L / GGT: x           PT/INR - ( 22 Feb 2023 06:28 )   PT: 11.9 sec;   INR: 1.03 ratio         PTT - ( 22 Feb 2023 06:28 )  PTT:31.0 sec  134|100|18<93  4.4|22|0.86  9.0,2.2,3.3  02-23 @ 05:37      CSF:                      EKG:   MICROBIOLOGY:    IMAGING:      Labs, imaging, EKG personally reviewed    RADIOLOGY & ADDITIONAL TESTS: Reviewed.

## 2023-02-23 NOTE — PROGRESS NOTE ADULT - ASSESSMENT
47 f, PE, pulm HTN, diagnosed with NHL 7/2022 and started on R-CHOP, last dose 1/23/23 and had a PET and was told she is in remission, usually has 5 days of prednisone after chemo and after that completed started to have fever and chills whish she has been getting since, no significant cough, abd pain, diarrhea or dysuria  here febrile to 102.5, tachy, no hypoxia  WBC: 2  chest/abd CT: No PE, Innumerable lung nodules which are nonspecific. In this clinical setting, differential includes infection (in which case, consider fungus) or neoplasm/known lymphoma. Indeterminate low-attenuation lesion in the liver.    NHL on chemo until 1/23 and was told she is in remission based on a PET now with fever for over 2 weeks and no focal symptoms, CT with innumerable lung nodules which could be neoplasm or infection including fungal, I reviewed with radiology and it can be a fungal pneumonia in the right clinical setting  leukopenia but no profound neutropenia and now resolved  fever, tachycardia, sepsis, s/p RRT for fever, rigors, tachycardia, hypotension, transferred to MICU, but still on RA, now on the floor   blood cx, urine cx, sputum cx, MRSA PCR, fungitell, galactomannan, histo Ag and  negative  now new fever to 102.6 and tested positive for COVID (negative on admission) had COVID in September and now complete asymptomatic, more likely to be new but prolong shedding is also a possibility  * s/p 7 days of (zosyn => cefepime)  * asked for CT value but will start remdesivir as pt is immunocompromised  * f/u the repeat blood cx  * c/w vori   * f/u with pulmonary for bronc and biopsy      The above assessment and plan was discussed with the primary team    Melanie Stubbs MD  contact on teams  After 5pm and on weekends call [phone]

## 2023-02-23 NOTE — CHART NOTE - NSCHARTNOTEFT_GEN_A_CORE
Medicine PA Hermelinda     OZ BARBOSA  MRN-[id number]  Allergies    No Known Allergies    Intolerances        Notified by RN for fever of 102.6 F. Pt's last fever was 5 days ago on 2/18. Pt seen and examined at bedside, laying in bed in NAD. Pt denies headache, chest pain, sob, n/v/d, weakness, dizziness, HA, changes in vision, nasal congestion, sore throat.     Vital Signs Last 24 Hrs  T(C): 39.2 (02-23-23 @ 04:47), Max: 39.2 (02-23-23 @ 04:47)  T(F): 102.6 (02-23-23 @ 04:47), Max: 102.6 (02-23-23 @ 04:47)  HR: 130 (02-23-23 @ 04:47) (92 - 130)  BP: 93/62 (02-23-23 @ 04:47) (93/62 - 111/74)  BP(mean): 78 (02-22-23 @ 08:00) (78 - 78)  RR: 18 (02-23-23 @ 04:47) (18 - 22)  SpO2: 96% (02-23-23 @ 04:47) (95% - 97%)                        10.9   4.68  )-----------( 112      ( 23 Feb 2023 05:37 )             35.8     02-23    134<L>  |  100  |  18  ----------------------------<  93  4.4   |  22  |  0.86    Ca    9.0      23 Feb 2023 05:37  Phos  3.3     02-23  Mg     2.2     02-23    TPro  5.8<L>  /  Alb  3.8  /  TBili  0.9  /  DBili  x   /  AST  69<H>  /  ALT  44  /  AlkPhos  112  02-23    PT/INR - ( 22 Feb 2023 06:28 )   PT: 11.9 sec;   INR: 1.03 ratio         PTT - ( 22 Feb 2023 06:28 )  PTT:31.0 sec      PHYSICAL EXAM:  GENERAL: NAD, well-developed, A&Ox4  CHEST/LUNG: Clear to auscultation bilaterally; No wheezes, rhonchi or rales appreciated  HEART: Sinus tachycardia; No murmurs, rubs, or gallops  ABDOMEN: Soft, Nontender, Nondistended; Bowel sounds present. No rebound, or guarding noted.  EXTREMITIES:  2+ Peripheral Pulses, No clubbing, cyanosis, or edema      Assessment/Plan: HPI:  46 yo f w pmh nhl s/p chemo (?in remission), pe, ?pah, covid, p/w fevers for the last few days, a/w generalized weakness over the last couple of weeks along with poor po intake. Pt admitted to MICU for severe sepsis w/ DLBCL requiring intermittent fluids for hypotension (currently on midodrine) and fevers despite broad-spectrum bacterial abx coverage. CT chest 2/15 concerning for fungal infection vs metastasis. Pt finished cefepime 2/21, currently on voriconazole for fungal coverage. BCX and UA negative thus far. Newly covid positive 2/22, pending repeat PCR.    Now p/w fever.     #?Covid infection vs fungal infection vs malignancy  > VS hemodynamically stable aside from hypotension and sinus tachycardia  > BCX, UA, urine cx and CXR ordered - results pending   > IV Tylenol given for fever    > Midodrine AM dose given early, lasix rescheduled for 9 AM for hypotension    > Covid positive 2/22 - repeat PCR pending  > C/w Midrodrine TID for hypotension  > C/w Voriconazole for fungal coverage   > C/w Digoxin for SVT    > F/u with ID in AM for further recommendations    > Will continue to monitor and reassess pt  > Will endorse to AM team, attending to follow    Jonny Fernando PA-C,   Department of Medicine

## 2023-02-23 NOTE — PROGRESS NOTE ADULT - SUBJECTIVE AND OBJECTIVE BOX
Follow Up: fever    Interval History/ROS: pt is doing well, no fever, SOB, abd pain, diarrhea now COVID is positive          Allergies  No Known Allergies        ANTIMICROBIALS:  voriconazole 200 every 12 hours      OTHER MEDS:  acetaminophen     Tablet .. 650 milliGRAM(s) Oral every 6 hours PRN  chlorhexidine 2% Cloths 1 Application(s) Topical <User Schedule>  digoxin     Tablet 125 MICROGram(s) Oral daily  famotidine Injectable 20 milliGRAM(s) IV Push every 12 hours  furosemide    Tablet 20 milliGRAM(s) Oral daily  LORazepam   Injectable 0.2 milliGRAM(s) IV Push <User Schedule>  midodrine. 10 milliGRAM(s) Oral three times a day  riociguat 0.5 milliGRAM(s) Oral every 8 hours      Vital Signs Last 24 Hrs  T(C): 36.9 (2023 16:23), Max: 39.2 (2023 04:47)  T(F): 98.5 (2023 16:23), Max: 102.6 (2023 04:47)  HR: 99 (2023 16:23) (92 - 130)  BP: 100/65 (2023 16:23) (92/62 - 111/74)  BP(mean): --  RR: 18 (2023 16:23) (18 - 18)  SpO2: 98% (2023 16:23) (96% - 98%)    Parameters below as of 2023 16:23  Patient On (Oxygen Delivery Method): room air        Physical Exam:  General:    NAD  Respiratory:   clear b/l, no wheezing  abd:   soft, BS +, not tender  :     no CVAT, no suprapubic tenderness, no gallagher  Musculoskeletal : no joint swelling, no edema  Skin:    no rash  vascular: no phlebitis, R chest port with no erythema or tenderness                          10.9   4.68  )-----------( 112      ( 2023 05:37 )             35.8       02-    134<L>  |  100  |  18  ----------------------------<  93  4.4   |  22  |  0.86    Ca    9.0      2023 05:37  Phos  3.3       Mg     2.2         TPro  5.8<L>  /  Alb  3.8  /  TBili  0.9  /  DBili  x   /  AST  69<H>  /  ALT  44  /  AlkPhos  112        Urinalysis Basic - ( 2023 16:29 )    Color: Light Yellow / Appearance: Clear / S.011 / pH: x  Gluc: x / Ketone: Negative  / Bili: Negative / Urobili: Negative   Blood: x / Protein: Trace / Nitrite: Negative   Leuk Esterase: Negative / RBC: 1 /hpf / WBC 1 /HPF   Sq Epi: x / Non Sq Epi: 1 /hpf / Bacteria: Negative        MICROBIOLOGY:  v  Clean Catch Clean Catch (Midstream)  23   No growth  --    Rare polymorphonuclear leukocytes per low power field  Rare Squamous epithelial cells per low power field  Rare Gram Negative Rods per oil power field      .Blood Blood-Peripheral  23   No Growth Final  --  --      .Blood Blood-Peripheral  23   No Growth Final  --  --      .Blood Blood-Peripheral  23   No Growth Final  --  --      Clean Catch Clean Catch (Midstream)  23   No growth  --  --      .Blood Blood-Peripheral  23   No Growth Final  --  --      .Blood Blood-Peripheral  23   No Growth Final  --  --                RADIOLOGY:  Images independently visualized and reviewed personally, findings as below  < from: Xray Chest 1 View- PORTABLE-Routine (Xray Chest 1 View- PORTABLE-Routine .) (23 @ 09:08) >    IMPRESSION:  Faint subcentimeter right upper lobe and left upper lobe   nodular opacities and bilateral clustered lower lung nodular opacities,   more extensive on the left, likely corresponding to some of the findings   on the CTA of the chest. Please see that report for additional detail.    < end of copied text >  < from: CT Abdomen and Pelvis w/ IV Cont (02.15.23 @ 02:17) >  IMPRESSION:    No pulmonary embolism.    Innumerable lung nodules which are nonspecific. In this clinical setting,   differential includes infection (in which case, consider fungus) or   neoplasm/known lymphoma.    Indeterminate low-attenuation lesion in the liver.    Close follow-up and/or comparison with outside imaging is recommended.      < end of copied text >

## 2023-02-23 NOTE — CHART NOTE - NSCHARTNOTEFT_GEN_A_CORE
Friday, 2/24 Planned IR Procedure: Lung Biopsy   Assessment/Plan:   - Pt found to be covid +  - Case discussed with anesthesia team regarding risks associated with procedure and new found covid status; however as per primary team, biopsy necessary for further treatment planing therefore benefits outweighs risks and will proceed with biopsy  - STAT labs in AM (cbc,coags, bmp, T&S)  - hold AC  - NPO tonight at 11pm  - d/w primary team    Mildred Ndiaye NP  Available on Teams

## 2023-02-23 NOTE — PROGRESS NOTE ADULT - SUBJECTIVE AND OBJECTIVE BOX
OZ BARBOSA  MRN#: [id number]  Subjective:  CHIEF COMPLAINT: fever . pulmonary HTN , non hodgkin's lymphoma   48 yo f a private patient from my office report to the ER   w pmh nhl s/p chemo at [facility] [place]  (?in remission), PE and DVT ,S/P  covid pneumonia ,severe pulmonary HTN on Adempas , p/w fevers for the last few days, a/w generalized weakness over the last couple of weeks along with poor po intake. denies chest pain, palpitations, lightheadedness, cough, wheeze, abdominal discomfort. patient does endorse n+v, dysuria, arriaza. of note, last session of chemo reportedly on 2023. Pt grew concerned so went to oncologist yesterday where she got cultures, blood work, and xray. as she did not feel improved, patient presents to [facility] er for further evaluation.  (15 Feb 2023 01:39) CT scan bilateral lung nodule , no H/O OF exposure to TB , no significant travel history to the [facility] out of the country or the [facility] area  transfer to the MICU for SVT patient is on Digoxin and B-Blockers , hypotension rebound back feeling better on cefepime , and voriconazole for presumptive fungal pneumonia , continue to have  fever at night  up to 101 ,  sweat appetite is suppressed   fever curve is better discuss with MICU team to consider for lung biopsy , lymphoma oncology will be called in .fever subsides no definitive DX is made tired and sleepy if recurrent fever happened patient may need BAL and possible navigation bronchoscopy with Biopsy of large left lung mass , remain afebrile for 48 hrs continue on voriconazole , patient spike to 103 and tested positive for covid 19 placed on isolations , 02% is 97% on RA       PAST MEDICAL & SURGICAL HISTORY:  Non-Hodgkin&#x27;s lymphoma      Pulmonary embolism      History of appendectomy      History of cholecystectomy                OBJECTIVE:  ICU Vital Signs Last 24 Hrs  T(C): 37.7 (2023 18:00), Max: 39.6 (2023 01:36)  T(F): 99.9 (2023 18:00), Max: 103.2 (2023 01:36)  HR: 122 (2023 18:00) (114 - 167)  BP: 91/63 (2023 18:00) (80/55 - 108/66)  BP(mean): 73 (2023 18:00) (60 - 81)  ABP: --  ABP(mean): --  RR: 29 (2023 18:00) (16 - 33)  SpO2: 97% (2023 18:00) (94% - 100%)    O2 Parameters below as of 2023 02:40  Patient On (Oxygen Delivery Method): room air            -16 @ 07:01  -  - @ 07:00  --------------------------------------------------------  IN: 1100 mL / OUT: 0 mL / NET: 1100 mL     @ 07:01  -   @ 19:19  --------------------------------------------------------  IN: 700 mL / OUT: 2600 mL / NET: -1900 mL      PHYSICAL EXAM :Daily   cushinoid female in no acute distress   Daily Weight in k.6 (2023 09:20)  HEENT:     + NCAT  + EOMI  - Conjuctival edema   - Icterus   - Thrush   - ETT  - NGT/OGT  Neck:         + FROM    + JVD     - Nodes     - Masses    + Mid-line trachea   - Tracheostomy  Chest: normal findings  Lungs:          + CTA   + Rhonchi    - Rales    - Wheezing     - Decreased BS   - Dullness R L  Cardiac:       + S1 + S2    + RRR   - Irregular   - S3  - S4  + Murmurs   - Rub   - Hamman’s sign   Abdomen:    + BS     + Soft    + Non-tender     - Distended    - Organomegaly  - PEG  Extremities:   - Cyanosis U/L   - Clubbing  U/L  - LE/UE Edema   + Capillary refill    + Pulses   Neuro:        + Awake   +  Alert   - Confused   - Lethargic   - Sedated   - Generalized Weakness  Skin:        - Rashes    - Erythema   + Normal incisions   + IV sites intact        HOSPITAL MEDICATIONS: All mediciations reviewed and analyzed  MEDICATIONS  (STANDING):  acetaminophen   IVPB .. 1000 milliGRAM(s) IV Intermittent once  cefepime   IVPB      cefepime   IVPB 2000 milliGRAM(s) IV Intermittent every 8 hours  chlorhexidine 2% Cloths 1 Application(s) Topical <User Schedule>  chlorhexidine 4% Liquid 1 Application(s) Topical <User Schedule>  digoxin     Tablet 125 MICROGram(s) Oral daily  enoxaparin Injectable 70 milliGRAM(s) SubCutaneous every 12 hours  midodrine. 10 milliGRAM(s) Oral three times a day  voriconazole 200 milliGRAM(s) Oral every 12 hours    MEDICATIONS  (PRN):  ondansetron Injectable 4 milliGRAM(s) IV Push every 8 hours PRN Nausea and/or Vomiting    LABS: All Lab data reviewed and analyzed                                     11.6   4.80  )-----------( 125      ( 2023 06:28 )             37.5    02-22    136  |  100  |  18  ----------------------------<  95  4.0   |  20<L>  |  0.97    Ca    9.1      2023 06:28  Phos  3.7     02-22  Mg     2.0     02-22    TPro  5.4<L>  /  Alb  3.4  /  TBili  0.8  /  DBili  x   /  AST  38  /  ALT  40  /  AlkPhos  81  02-21    Ca    8.8      2023 06:42  Phos  3.7     02-21  Mg     2.1     02-    TPro  5.4<L>  /  Alb  3.4  /  TBili  0.8  /  DBili  x   /  AST  38  /  ALT  40  /  AlkPhos  81  02-21    Ca    8.9      2023 01:15  Phos  4.2     02-19  Mg     2.0     02-19    TPro  5.4<L>  /  Alb  3.4  /  TBili  0.8  /  DBili  x   /  AST  59<H>  /  ALT  82<H>  /  AlkPhos  72  02-19       LIVER FUNCTIONS - ( 2023 01:21 )  Alb: 3.2 g/dL / Pro: 5.1 g/dL / ALK PHOS: 64 U/L / ALT: 36 U/L / AST: 43 U/L / GGT: x           RADIOLOGY: - Reviewed and analyzed

## 2023-02-23 NOTE — PROVIDER CONTACT NOTE (CRITICAL VALUE NOTIFICATION) - ASSESSMENT
pt is a&ox4 admitted for sepsis
AOx4
s/p RRT.  Provider aware.  safety and comfort measures maintained.

## 2023-02-23 NOTE — PROGRESS NOTE ADULT - SUBJECTIVE AND OBJECTIVE BOX
CARDIOLOGY FOLLOW UP - Dr. Webber  DATE OF SERVICE:2/23/23    CC  Denies CP, sob, palpitations    REVIEW OF SYSTEMS:  CONSTITUTIONAL: No fever, weight loss, or fatigue  RESPIRATORY: No cough, wheezing, chills or hemoptysis; No Shortness of Breath  CARDIOVASCULAR: No chest pain, palpitations, passing out, dizziness, or leg swelling  GASTROINTESTINAL: No abdominal or epigastric pain. No nausea, vomiting, or hematemesis; No diarrhea or constipation. No melena or hematochezia.  VASCULAR: No edema     PHYSICAL EXAM:  T(C): 36.9 (02-23-23 @ 08:14), Max: 39.2 (02-23-23 @ 04:47)  HR: 108 (02-23-23 @ 08:14) (92 - 130)  BP: 92/62 (02-23-23 @ 08:14) (92/62 - 111/74)  RR: 18 (02-23-23 @ 08:14) (18 - 20)  SpO2: 98% (02-23-23 @ 08:14) (95% - 98%)  Wt(kg): --  I&O's Summary    22 Feb 2023 07:01  -  23 Feb 2023 07:00  --------------------------------------------------------  IN: 1040 mL / OUT: 200 mL / NET: 840 mL        Appearance: Normal	  Cardiovascular: Normal S1 S2,Tachycardia, No JVD, No murmurs  Respiratory: Lungs clear to auscultation b/l  Gastrointestinal:  Soft, Non-tender, + BS	  Extremities: Normal range of motion, No clubbing, cyanosis or edema      Home Medications:  digoxin 125 mcg (0.125 mg) oral tablet: 1 tab(s) orally once a day (15 Feb 2023 02:23)  Eliquis 5 mg oral tablet: 1 tab(s) orally 2 times a day (15 Feb 2023 02:23)  metoprolol succinate 25 mg oral tablet, extended release: 1 tab(s) orally once a day (15 Feb 2023 02:23)  selexipag 800 mcg oral tablet: 1 tab(s) orally 2 times a day (15 Feb 2023 02:23)      MEDICATIONS  (STANDING):  chlorhexidine 2% Cloths 1 Application(s) Topical <User Schedule>  digoxin     Tablet 125 MICROGram(s) Oral daily  famotidine Injectable 20 milliGRAM(s) IV Push every 12 hours  furosemide    Tablet 20 milliGRAM(s) Oral daily  LORazepam   Injectable 0.2 milliGRAM(s) IV Push <User Schedule>  midodrine. 10 milliGRAM(s) Oral three times a day  riociguat 0.5 milliGRAM(s) Oral every 8 hours  voriconazole 200 milliGRAM(s) Oral every 12 hours      TELEMETRY: 	    ECG:  	  RADIOLOGY:   DIAGNOSTIC TESTING:  [ ] Echocardiogram:  [ ]  Catheterization:  [ ] Stress Test:    OTHER: 	    LABS:	 	    Creatine Kinase, Serum: 24 U/L [25 - 170] (02-17 @ 01:21)  CKMB Units: 1.9 ng/mL [0.0 - 3.8] (02-17 @ 01:21)  Troponin T, High Sensitivity Result: 34 ng/L [0 - 51] (02-17 @ 01:21)  Creatine Kinase, Serum: 17 U/L [25 - 170] (02-16 @ 23:51)  CKMB Units: 1.4 ng/mL [0.0 - 3.8] (02-16 @ 23:51)  Troponin T, High Sensitivity Result: 16 ng/L [0 - 51] (02-16 @ 23:51)                          10.9   4.68  )-----------( 112      ( 23 Feb 2023 05:37 )             35.8     02-23    134<L>  |  100  |  18  ----------------------------<  93  4.4   |  22  |  0.86    Ca    9.0      23 Feb 2023 05:37  Phos  3.3     02-23  Mg     2.2     02-23    TPro  5.8<L>  /  Alb  3.8  /  TBili  0.9  /  DBili  x   /  AST  69<H>  /  ALT  44  /  AlkPhos  112  02-23    PT/INR - ( 22 Feb 2023 06:28 )   PT: 11.9 sec;   INR: 1.03 ratio         PTT - ( 22 Feb 2023 06:28 )  PTT:31.0 sec

## 2023-02-23 NOTE — PROGRESS NOTE ADULT - SUBJECTIVE AND OBJECTIVE BOX
Indication for Geriatrics and Palliative Care Services/INTERVAL HPI: N/V   SUBJECTIVE AND OBJECTIVE: Pt seen and examined at bedside. Pt visibly distraught regarding covid + results. Distressed due to delay of procedure/roommate issue. Emotional support provided   OVERNIGHT EVENTS: Pt febrile o/n. Primary team aware     DNR on chart:  Allergies    No Known Allergies    Intolerances    MEDICATIONS  (STANDING):  chlorhexidine 2% Cloths 1 Application(s) Topical <User Schedule>  digoxin     Tablet 125 MICROGram(s) Oral daily  famotidine Injectable 20 milliGRAM(s) IV Push every 12 hours  furosemide    Tablet 20 milliGRAM(s) Oral daily  LORazepam   Injectable 0.2 milliGRAM(s) IV Push <User Schedule>  midodrine. 10 milliGRAM(s) Oral three times a day  riociguat 0.5 milliGRAM(s) Oral every 8 hours  voriconazole 200 milliGRAM(s) Oral every 12 hours    MEDICATIONS  (PRN):  acetaminophen     Tablet .. 650 milliGRAM(s) Oral every 6 hours PRN Temp greater or equal to 38C (100.4F), Mild Pain (1 - 3)      ITEMS UNCHECKED ARE NOT PRESENT    PRESENT SYMPTOMS: [ ]Unable to self-report - see [ ] CPOT [ ] PAINADS [ ] RDOS  Source if other than patient:  [ ]Family   [ ]Team     Pain:  [ ]yes [ x]no  QOL impact -   Location -                    Aggravating factors -  Quality -  Radiation -  Timing-  Severity (0-10 scale):  Minimal acceptable level (0-10 scale):     CPOT:    https://www.[facility].org/getattachment/[email or web address]/Critical-Care-Pain-Observation-Tool-(CPOT)    PAINAD Score: See PAINAD tool and score below       Dyspnea:                           [ ]Mild [ ]Moderate [ ]Severe    RDOS: See RDOS tool and score below   0 to 2  minimal or no respiratory distress   3  mild distress  4 to 6 moderate distress  >7 severe distress      Anxiety:                             [x ]Mild [ ]Moderate [ ]Severe  Fatigue:                             [ ]Mild [ ]Moderate [ ]Severe  Nausea:                             [ ]Mild [x ]Moderate [ ]Severe  Loss of appetite:              [ ]Mild [ ]Moderate [x ]Severe  Constipation:                    [ ]Mild [ ]Moderate [ ]Severe    PCSSQ[Palliative Care Spiritual Screening Question]   Severity (0-10):  Score of 4 or > indicate consideration of Chaplaincy referral.  Chaplaincy Referral: [x ] yes [ ] refused [ x] following [ ] Deferred     Caregiver [place]? : [ ] yes [x ] no [ ] Deferred [ ] Declined             Social work referral [ ] Patient & Family Centered Care Referral [ ]     Anticipatory Grief present?:  [ ] yes [ ] no  [x ] Deferred                  Social work referral [ ] Chaplaincy Referral [ ]    		  Other Symptoms:  [ x]All other review of systems negative     Palliative Performance Status Version 2:   See PPSv2 tool and score below         PHYSICAL EXAM:  Vital Signs Last 24 Hrs  T(C): 36.8 (23 Feb 2023 12:56), Max: 39.2 (23 Feb 2023 04:47)  T(F): 98.3 (23 Feb 2023 12:56), Max: 102.6 (23 Feb 2023 04:47)  HR: 105 (23 Feb 2023 12:56) (92 - 130)  BP: 98/65 (23 Feb 2023 12:56) (92/62 - 111/74)  BP(mean): --  RR: 18 (23 Feb 2023 12:56) (18 - 19)  SpO2: 98% (23 Feb 2023 12:56) (95% - 98%)    Parameters below as of 23 Feb 2023 12:56  Patient On (Oxygen Delivery Method): room air     I&O's Summary    22 Feb 2023 07:01  -  23 Feb 2023 07:00  --------------------------------------------------------  IN: 1040 mL / OUT: 200 mL / NET: 840 mL       GENERAL: [ ]Cachexia    [x ]Alert  [x ]Oriented x  4 [ ]Lethargic  [ ]Unarousable  [ ]Verbal  [ ]Non-Verbal  Behavioral:   [ ]Anxiety  [ ]Delirium [ ]Agitation [ ]Other  HEENT:  [x ]Normal   [ ]Dry mouth   [ ]ET Tube/Trach  [ ]Oral lesions  PULMONARY:   [ x]Clear [ ]Tachypnea  [ ]Audible excessive secretions   [ ]Rhonchi        [ ]Right [ ]Left [ ]Bilateral  [ ]Crackles        [ ]Right [ ]Left [ ]Bilateral  [ ]Wheezing     [ ]Right [ ]Left [ ]Bilateral  [ ]Diminished BS [ ] Right [ ]Left [ ]Bilateral  CARDIOVASCULAR:    [ ]Regular [ ]Irregular [x ]Tachy  [ ]Ryley [ ]Murmur [ ]Other  GASTROINTESTINAL:  [x ]Soft  [ ]Distended   [ x]+BS  [ x]Non tender [ ]Tender  [ ]Other [ ]PEG [ ]OGT/ NGT   Last BM: 2/22/2024  GENITOURINARY:  [ x]Normal [ ]Incontinent   [ ]Oliguria/Anuria   [ ]Roman  MUSCULOSKELETAL:   [ ]Normal   [x ]Weakness  [ ]Bed/Wheelchair bound [ ]Edema  NEUROLOGIC:   [ x]No focal deficits  [ ] Cognitive impairment  [ ] Dysphagia [ ]Dysarthria [ ] Paresis [ ]Other   SKIN:   [x ]Normal  [ ]Rash  [ ]Other  [ ]Pressure ulcer(s) [ ]y [ ]n present on admission    CRITICAL CARE:  [ ]Shock Present  [ ]Septic [ ]Cardiogenic [ ]Neurologic [ ]Hypovolemic  [ ]Vasopressors [ ]Inotropes  [ ]Respiratory failure present [ ]Mechanical Ventilation [ ]Non-invasive ventilatory support [ ]High-Flow   [ ]Acute  [ ]Chronic [ ]Hypoxic  [ ]Hypercarbic [ ]Other  [ ]Other organ failure     LABS:                        10.9   4.68  )-----------( 112      ( 23 Feb 2023 05:37 )             35.8   02-23    134<L>  |  100  |  18  ----------------------------<  93  4.4   |  22  |  0.86    Ca    9.0      23 Feb 2023 05:37  Phos  3.3     02-23  Mg     2.2     02-23    TPro  5.8<L>  /  Alb  3.8  /  TBili  0.9  /  DBili  x   /  AST  69<H>  /  ALT  44  /  AlkPhos  112  02-23  PT/INR - ( 22 Feb 2023 06:28 )   PT: 11.9 sec;   INR: 1.03 ratio         PTT - ( 22 Feb 2023 06:28 )  PTT:31.0 sec      RADIOLOGY & ADDITIONAL STUDIES:  < from: Xray Chest 1 View- PORTABLE-Routine (Xray Chest 1 View- PORTABLE-Routine .) (02.23.23 @ 09:08) >    ACC: [id number] EXAM:  XR CHEST PORTABLE ROUTINE 1V   ORDERED BY: RONNIE OGLESBY     PROCEDURE DATE:  02/23/2023          INTERPRETATION:  TIME OF EXAM: February 23, 2023 at 8:39 AM.    CLINICAL INFORMATION: New fever. Covid. Concern for infection on previous   imaging.    COMPARISON:  CTA of the chest from December 15, 2023.    TECHNIQUE:   AP Portable chest x-ray.    INTERPRETATION:    The heart is not enlarged. The mediastinum and jessie are unremarkable.  There is a CT injectable right IJ approach port with tip in the SVC.  There were faint subcentimeter right upper and left upper lobe nodular   opacities. There are bilateral clustered lower lung nodular opacities,   more extensive on the left.  No pleural effusion or pneumothorax is seen.  No acute bony abnormality is seen.        IMPRESSION:  Faint subcentimeter right upper lobe and left upper lobe   nodular opacities and bilateral clustered lower lung nodular opacities,   more extensive on the left, likely corresponding to some of the findings   on the CTA of the chest. Please see that report for additional detail.    --- End of Report ---            ESTEPHANIE DE LEÓN MD; Attending Radiologist  This document has been electronically signed. Feb 23 2023  1:00PM    < end of copied text >      Protein Calorie Malnutrition Present: [ ]mild [ ]moderate [ ]severe [ ]underweight [ ]morbid obesity  https://www.andeal.org/vault/2440/web/files/ONC/Table_Clinical%20Characteristics%20to%20Document%20Malnutrition-White%20JV%20et%20al%202012.pdf    Height (cm): 165.1 (02-17-23 @ 03:38)  Weight (kg): 73.1 (02-17-23 @ 03:38)  BMI (kg/m2): 26.8 (02-17-23 @ 03:38)    [ ]PPSV2 < or = 30%  [ ]significant weight loss [ ]poor nutritional intake [ ]anasarca[ ]Artificial Nutrition    Other REFERRALS:  [ ]Hospice  [ ]Child Life  [ ]Social Work  [ ]Case management [ ]Holistic Therapy     Goals of Care Document: none

## 2023-02-23 NOTE — PROGRESS NOTE ADULT - ASSESSMENT
Assessment and Recommendation:   · Assessment	  Assessment and recommendation :  Non hodgkin's lymphoma ? in remission   NEW fever positive covid 19 awaiting ID input   Fever etiology infection V/S lymphoma  ? 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

## 2023-02-23 NOTE — PROGRESS NOTE ADULT - SUBJECTIVE AND OBJECTIVE BOX
Patient is a 47y Female whom presented to the hospital with abnormal electrolyte     PAST MEDICAL & SURGICAL HISTORY:  Non-Hodgkin&#x27;s lymphoma      Pulmonary embolism      History of appendectomy      History of cholecystectomy          MEDICATIONS  (STANDING):  apixaban 5 milliGRAM(s) Oral every 12 hours  chlorhexidine 2% Cloths 1 Application(s) Topical <User Schedule>  digoxin     Tablet 125 MICROGram(s) Oral daily  filgrastim-sndz (ZARXIO) Injectable 480 MICROGram(s) SubCutaneous daily  midodrine. 10 milliGRAM(s) Oral three times a day  piperacillin/tazobactam IVPB.. 3.375 Gram(s) IV Intermittent every 8 hours  potassium chloride    Tablet ER 40 milliEquivalent(s) Oral once  sodium bicarbonate  Infusion 0.086 mEq/kG/Hr (75 mL/Hr) IV Continuous <Continuous>  vancomycin  IVPB 1000 milliGRAM(s) IV Intermittent every 12 hours      Allergies    No Known Allergies    Intolerances        SOCIAL HISTORY:  Denies ETOh,Smoking,     FAMILY HISTORY:      REVIEW OF SYSTEMS:    CONSTITUTIONAL: No weakness, fevers or chills  RESPIRATORY: No cough, wheezing, hemoptysis; No shortness of breath  CARDIOVASCULAR: No chest pain or palpitations  GASTROINTESTINAL: No abdominal or epigastric pain. No nausea, vomiting,     No diarrhea or constipation. No melena   SKIN: dry    MEDICATIONS  (STANDING):  cefepime   IVPB      cefepime   IVPB 2000 milliGRAM(s) IV Intermittent every 8 hours  chlorhexidine 2% Cloths 1 Application(s) Topical <User Schedule>  chlorhexidine 4% Liquid 1 Application(s) Topical <User Schedule>  digoxin     Tablet 125 MICROGram(s) Oral daily  enoxaparin Injectable 70 milliGRAM(s) SubCutaneous every 12 hours  midodrine. 10 milliGRAM(s) Oral three times a day  voriconazole 200 milliGRAM(s) Oral every 12 hours                      MEDICATIONS  (STANDING):  cefepime   IVPB      cefepime   IVPB 2000 milliGRAM(s) IV Intermittent every 8 hours  chlorhexidine 2% Cloths 1 Application(s) Topical <User Schedule>  chlorhexidine 4% Liquid 1 Application(s) Topical <User Schedule>  digoxin     Tablet 125 MICROGram(s) Oral daily  enoxaparin Injectable 70 milliGRAM(s) SubCutaneous every 12 hours  famotidine Injectable 20 milliGRAM(s) IV Push every 12 hours  furosemide    Tablet 20 milliGRAM(s) Oral daily  midodrine. 10 milliGRAM(s) Oral three times a day  riociguat 0.5 milliGRAM(s) Oral every 8 hours  voriconazole 200 milliGRAM(s) Oral every 12 hours                                                           10.9   4.68  )-----------( 112      ( 2023 05:37 )             35.8       CBC Full  -  ( 2023 05:37 )  WBC Count : 4.68 K/uL  RBC Count : 3.98 M/uL  Hemoglobin : 10.9 g/dL  Hematocrit : 35.8 %  Platelet Count - Automated : 112 K/uL  Mean Cell Volume : 89.9 fl  Mean Cell Hemoglobin : 27.4 pg  Mean Cell Hemoglobin Concentration : 30.4 gm/dL  Auto Neutrophil # : 2.92 K/uL  Auto Lymphocyte # : 0.60 K/uL  Auto Monocyte # : 0.61 K/uL  Auto Eosinophil # : 0.33 K/uL  Auto Basophil # : 0.04 K/uL  Auto Neutrophil % : 62.4 %  Auto Lymphocyte % : 12.8 %  Auto Monocyte % : 13.0 %  Auto Eosinophil % : 7.1 %  Auto Basophil % : 0.9 %      02    134<L>  |  100  |  18  ----------------------------<  93  4.4   |  22  |  0.86    Ca    9.0      2023 05:37  Phos  3.3     02-  Mg     2.2         TPro  5.8<L>  /  Alb  3.8  /  TBili  0.9  /  DBili  x   /  AST  69<H>  /  ALT  44  /  AlkPhos  112  02-      CAPILLARY BLOOD GLUCOSE          Vital Signs Last 24 Hrs  T(C): 36.8 (2023 19:41), Max: 39.2 (2023 04:47)  T(F): 98.3 (2023 19:41), Max: 102.6 (2023 04:47)  HR: 101 (2023 19:41) (99 - 130)  BP: 93/65 (2023 19:41) (92/62 - 101/65)  BP(mean): --  RR: 18 (2023 19:41) (18 - 18)  SpO2: 98% (2023 19:41) (96% - 98%)    Parameters below as of 2023 19:41  Patient On (Oxygen Delivery Method): room air        Urinalysis Basic - ( 2023 16:29 )    Color: Light Yellow / Appearance: Clear / S.011 / pH: x  Gluc: x / Ketone: Negative  / Bili: Negative / Urobili: Negative   Blood: x / Protein: Trace / Nitrite: Negative   Leuk Esterase: Negative / RBC: 1 /hpf / WBC 1 /HPF   Sq Epi: x / Non Sq Epi: 1 /hpf / Bacteria: Negative        PT/INR - ( 2023 06:28 )   PT: 11.9 sec;   INR: 1.03 ratio         PTT - ( 2023 06:28 )  PTT:31.0 sec      PHYSICAL EXAM:    Constitutional: NAD  HEENT: conjunctive   clear   Neck:  No JVD  Respiratory: CTAB  Cardiovascular: S1 and S2  Gastrointestinal: BS+, soft, NT/ND  Extremities: No peripheral edema

## 2023-02-24 LAB
ALBUMIN SERPL ELPH-MCNC: 3.7 G/DL — SIGNIFICANT CHANGE UP (ref 3.3–5)
ALP SERPL-CCNC: 190 U/L — HIGH (ref 40–120)
ALT FLD-CCNC: 86 U/L — HIGH (ref 10–45)
ANION GAP SERPL CALC-SCNC: 10 MMOL/L — SIGNIFICANT CHANGE UP (ref 5–17)
AST SERPL-CCNC: 124 U/L — HIGH (ref 10–40)
BILIRUB DIRECT SERPL-MCNC: 0.3 MG/DL — SIGNIFICANT CHANGE UP (ref 0–0.3)
BILIRUB INDIRECT FLD-MCNC: 0.7 MG/DL — SIGNIFICANT CHANGE UP (ref 0.2–1)
BILIRUB SERPL-MCNC: 1 MG/DL — SIGNIFICANT CHANGE UP (ref 0.2–1.2)
BLD GP AB SCN SERPL QL: NEGATIVE — SIGNIFICANT CHANGE UP
BUN SERPL-MCNC: 18 MG/DL — SIGNIFICANT CHANGE UP (ref 7–23)
CALCIUM SERPL-MCNC: 9.1 MG/DL — SIGNIFICANT CHANGE UP (ref 8.4–10.5)
CHLORIDE SERPL-SCNC: 100 MMOL/L — SIGNIFICANT CHANGE UP (ref 96–108)
CO2 SERPL-SCNC: 26 MMOL/L — SIGNIFICANT CHANGE UP (ref 22–31)
CREAT SERPL-MCNC: 0.81 MG/DL — SIGNIFICANT CHANGE UP (ref 0.5–1.3)
CREAT SERPL-MCNC: 0.81 MG/DL — SIGNIFICANT CHANGE UP (ref 0.5–1.3)
CULTURE RESULTS: NO GROWTH — SIGNIFICANT CHANGE UP
EGFR: 91 ML/MIN/1.73M2 — SIGNIFICANT CHANGE UP
EGFR: 91 ML/MIN/1.73M2 — SIGNIFICANT CHANGE UP
GAMMA INTERFERON BACKGROUND BLD IA-ACNC: 0.64 IU/ML — SIGNIFICANT CHANGE UP
GLUCOSE SERPL-MCNC: 77 MG/DL — SIGNIFICANT CHANGE UP (ref 70–99)
HCT VFR BLD CALC: 34.5 % — SIGNIFICANT CHANGE UP (ref 34.5–45)
HGB BLD-MCNC: 10.5 G/DL — LOW (ref 11.5–15.5)
INR BLD: 0.97 RATIO — SIGNIFICANT CHANGE UP (ref 0.88–1.16)
M TB IFN-G BLD-IMP: ABNORMAL
M TB IFN-G CD4+ BCKGRND COR BLD-ACNC: -0.36 IU/ML — SIGNIFICANT CHANGE UP
M TB IFN-G CD4+CD8+ BCKGRND COR BLD-ACNC: -0.4 IU/ML — SIGNIFICANT CHANGE UP
MCHC RBC-ENTMCNC: 27.2 PG — SIGNIFICANT CHANGE UP (ref 27–34)
MCHC RBC-ENTMCNC: 30.4 GM/DL — LOW (ref 32–36)
MCV RBC AUTO: 89.4 FL — SIGNIFICANT CHANGE UP (ref 80–100)
NRBC # BLD: 0 /100 WBCS — SIGNIFICANT CHANGE UP (ref 0–0)
PLATELET # BLD AUTO: 114 K/UL — LOW (ref 150–400)
POTASSIUM SERPL-MCNC: 4.3 MMOL/L — SIGNIFICANT CHANGE UP (ref 3.5–5.3)
POTASSIUM SERPL-SCNC: 4.3 MMOL/L — SIGNIFICANT CHANGE UP (ref 3.5–5.3)
PROT SERPL-MCNC: 5.8 G/DL — LOW (ref 6–8.3)
PROTHROM AB SERPL-ACNC: 11.2 SEC — SIGNIFICANT CHANGE UP (ref 10.5–13.4)
QUANT TB PLUS MITOGEN MINUS NIL: 0.04 IU/ML — SIGNIFICANT CHANGE UP
RBC # BLD: 3.86 M/UL — SIGNIFICANT CHANGE UP (ref 3.8–5.2)
RBC # FLD: 19.6 % — HIGH (ref 10.3–14.5)
RH IG SCN BLD-IMP: POSITIVE — SIGNIFICANT CHANGE UP
SODIUM SERPL-SCNC: 136 MMOL/L — SIGNIFICANT CHANGE UP (ref 135–145)
SPECIMEN SOURCE: SIGNIFICANT CHANGE UP
WBC # BLD: 4.06 K/UL — SIGNIFICANT CHANGE UP (ref 3.8–10.5)
WBC # FLD AUTO: 4.06 K/UL — SIGNIFICANT CHANGE UP (ref 3.8–10.5)

## 2023-02-24 PROCEDURE — 99233 SBSQ HOSP IP/OBS HIGH 50: CPT

## 2023-02-24 PROCEDURE — 99232 SBSQ HOSP IP/OBS MODERATE 35: CPT

## 2023-02-24 RX ORDER — ALPRAZOLAM 0.25 MG
0.25 TABLET ORAL ONCE
Refills: 0 | Status: DISCONTINUED | OUTPATIENT
Start: 2023-02-24 | End: 2023-02-24

## 2023-02-24 RX ORDER — REMDESIVIR 5 MG/ML
200 INJECTION INTRAVENOUS ONCE
Refills: 0 | Status: COMPLETED | OUTPATIENT
Start: 2023-02-24 | End: 2023-02-24

## 2023-02-24 RX ORDER — APIXABAN 2.5 MG/1
5 TABLET, FILM COATED ORAL EVERY 12 HOURS
Refills: 0 | Status: DISCONTINUED | OUTPATIENT
Start: 2023-02-24 | End: 2023-02-28

## 2023-02-24 RX ORDER — SODIUM CHLORIDE 9 MG/ML
100 INJECTION INTRAMUSCULAR; INTRAVENOUS; SUBCUTANEOUS
Refills: 0 | Status: COMPLETED | OUTPATIENT
Start: 2023-02-24 | End: 2023-02-24

## 2023-02-24 RX ORDER — REMDESIVIR 5 MG/ML
100 INJECTION INTRAVENOUS EVERY 24 HOURS
Refills: 0 | Status: COMPLETED | OUTPATIENT
Start: 2023-02-25 | End: 2023-02-28

## 2023-02-24 RX ADMIN — MIDODRINE HYDROCHLORIDE 10 MILLIGRAM(S): 2.5 TABLET ORAL at 21:36

## 2023-02-24 RX ADMIN — MIDODRINE HYDROCHLORIDE 10 MILLIGRAM(S): 2.5 TABLET ORAL at 06:06

## 2023-02-24 RX ADMIN — SODIUM CHLORIDE 310 MILLILITER(S): 9 INJECTION INTRAMUSCULAR; INTRAVENOUS; SUBCUTANEOUS at 11:15

## 2023-02-24 RX ADMIN — RIOCIGUAT 0.5 MILLIGRAM(S): 1.5 TABLET, FILM COATED ORAL at 13:40

## 2023-02-24 RX ADMIN — CHLORHEXIDINE GLUCONATE 1 APPLICATION(S): 213 SOLUTION TOPICAL at 06:12

## 2023-02-24 RX ADMIN — Medication 650 MILLIGRAM(S): at 10:13

## 2023-02-24 RX ADMIN — MIDODRINE HYDROCHLORIDE 10 MILLIGRAM(S): 2.5 TABLET ORAL at 13:40

## 2023-02-24 RX ADMIN — RIOCIGUAT 0.5 MILLIGRAM(S): 1.5 TABLET, FILM COATED ORAL at 21:36

## 2023-02-24 RX ADMIN — Medication 0.5 MILLIGRAM(S): at 17:41

## 2023-02-24 RX ADMIN — RIOCIGUAT 0.5 MILLIGRAM(S): 1.5 TABLET, FILM COATED ORAL at 06:07

## 2023-02-24 RX ADMIN — VORICONAZOLE 200 MILLIGRAM(S): 10 INJECTION, POWDER, LYOPHILIZED, FOR SOLUTION INTRAVENOUS at 06:06

## 2023-02-24 RX ADMIN — FAMOTIDINE 20 MILLIGRAM(S): 10 INJECTION INTRAVENOUS at 06:07

## 2023-02-24 RX ADMIN — Medication 650 MILLIGRAM(S): at 09:13

## 2023-02-24 RX ADMIN — Medication 0.5 MILLIGRAM(S): at 13:41

## 2023-02-24 RX ADMIN — REMDESIVIR 200 MILLIGRAM(S): 5 INJECTION INTRAVENOUS at 10:06

## 2023-02-24 RX ADMIN — Medication 125 MICROGRAM(S): at 06:06

## 2023-02-24 RX ADMIN — VORICONAZOLE 200 MILLIGRAM(S): 10 INJECTION, POWDER, LYOPHILIZED, FOR SOLUTION INTRAVENOUS at 17:43

## 2023-02-24 RX ADMIN — FAMOTIDINE 20 MILLIGRAM(S): 10 INJECTION INTRAVENOUS at 17:37

## 2023-02-24 NOTE — PROGRESS NOTE ADULT - ASSESSMENT
Assessment and Recommendation:   · Assessment	  Assessment and recommendation :  Non hodgkin's lymphoma ? in remission   NEW fever positive covid 19 awaiting ID start Remdesivir   Fever etiology infection V/S lymphoma  ? 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

## 2023-02-24 NOTE — PROGRESS NOTE ADULT - SUBJECTIVE AND OBJECTIVE BOX
OZ BARBOSA  MRN#: [id number]  Subjective:  CHIEF COMPLAINT: fever . Pulmonary HTN , non hodgkin's lymphoma   48 yo f a private patient from my office report to the ER   w PMH non hodgkin's lymphoma  s/p chemo at [facility] [place]  (?in [place]), PE and DVT ,S/P  covid pneumonia ,severe pulmonary HTN on Adempas , p/w fevers for the last few days, a/w generalized weakness over the last couple of weeks along with poor po intake. she denies chest pain, palpitations, lightheadedness, cough, wheeze, abdominal discomfort. the  patient does endorse n+v, dysuria, arriaza. of note, last session of chemo reportedly on 2023. Pt grew concerned so went to oncologist yesterday where she got cultures, blood work, and xray. as she did not feel improved, patient presents to [facility] er for further evaluation.  (15 Feb 2023 01:39) CT scan bilateral lung nodule , no H/O OF exposure to TB , no significant travel history to the [facility] out of the country or the [facility] area  transfer to the MICU for SVT patient is on Digoxin and B-Blockers , hypotension rebound back feeling better on cefepime , and voriconazole for presumptive fungal pneumonia , continue to have  fever at night  up to 101 ,  sweat appetite is suppressed   fever curve is better discuss with MICU team to consider for lung biopsy , lymphoma oncology will be called in .fever subsides no definitive DX is made tired and sleepy if recurrent fever happened patient may need BAL and possible navigation bronchoscopy with Biopsy of large left lung mass , remain afebrile for 48 hrs continue on voriconazole , patient spike to 103 and tested positive for covid 19 placed on isolations , 02% is 97% on RA   continue to have fever , placed on Remdesivir by ID       PAST MEDICAL & SURGICAL HISTORY:  Non-Hodgkin&#x27;s lymphoma      Pulmonary embolism      History of appendectomy      History of cholecystectomy                OBJECTIVE:  ICU Vital Signs Last 24 Hrs  T(C): 37.7 (2023 18:00), Max: 39.6 (2023 01:36)  T(F): 99.9 (2023 18:00), Max: 103.2 (2023 01:36)  HR: 122 (2023 18:00) (114 - 167)  BP: 91/63 (2023 18:00) (80/55 - 108/66)  BP(mean): 73 (2023 18:00) (60 - 81)  ABP: --  ABP(mean): --  RR: 29 (2023 18:00) (16 - 33)  SpO2: 97% (2023 18:00) (94% - 100%)    O2 Parameters below as of 2023 02:40  Patient On (Oxygen Delivery Method): room air            -16 @ 07:01  -  -17 @ 07:00  --------------------------------------------------------  IN: 1100 mL / OUT: 0 mL / NET: 1100 mL     @ 07:01  -  - @ 19:19  --------------------------------------------------------  IN: 700 mL / OUT: 2600 mL / NET: -1900 mL      PHYSICAL EXAM :Daily   cushinoid female in no acute distress   Daily Weight in k.6 (2023 09:20)  HEENT:     + NCAT  + EOMI  - Conjuctival edema   - Icterus   - Thrush   - ETT  - NGT/OGT  Neck:         + FROM    + JVD     - Nodes     - Masses    + Mid-line trachea   - Tracheostomy  Chest: normal findings  Lungs:          + CTA   + Rhonchi    - Rales    - Wheezing     - Decreased BS   - Dullness R L  Cardiac:       + S1 + S2    + RRR   - Irregular   - S3  - S4  + Murmurs   - Rub   - Hamman’s sign   Abdomen:    + BS     + Soft    + Non-tender     - Distended    - Organomegaly  - PEG  Extremities:   - Cyanosis U/L   - Clubbing  U/L  - LE/UE Edema   + Capillary refill    + Pulses   Neuro:        + Awake   +  Alert   - Confused   - Lethargic   - Sedated   - Generalized Weakness  Skin:        - Rashes    - Erythema   + Normal incisions   + IV sites intact        HOSPITAL MEDICATIONS: All mediciations reviewed and analyzed  MEDICATIONS  (STANDING):  acetaminophen   IVPB .. 1000 milliGRAM(s) IV Intermittent once  cefepime   IVPB      cefepime   IVPB 2000 milliGRAM(s) IV Intermittent every 8 hours  chlorhexidine 2% Cloths 1 Application(s) Topical <User Schedule>  chlorhexidine 4% Liquid 1 Application(s) Topical <User Schedule>  digoxin     Tablet 125 MICROGram(s) Oral daily  enoxaparin Injectable 70 milliGRAM(s) SubCutaneous every 12 hours  midodrine. 10 milliGRAM(s) Oral three times a day  voriconazole 200 milliGRAM(s) Oral every 12 hours    MEDICATIONS  (PRN):  ondansetron Injectable 4 milliGRAM(s) IV Push every 8 hours PRN Nausea and/or Vomiting    LABS: All Lab data reviewed and analyzed                                     11.6   4.80  )-----------( 125      ( 2023 06:28 )             37.5    02-22    136  |  100  |  18  ----------------------------<  95  4.0   |  20<L>  |  0.97    Ca    9.1      2023 06:28  Phos  3.7     02-22  Mg     2.0     -    TPro  5.4<L>  /  Alb  3.4  /  TBili  0.8  /  DBili  x   /  AST  38  /  ALT  40  /  AlkPhos  81  02-    Ca    8.8      2023 06:42  Phos  3.7     02-21  Mg     2.1     -    TPro  5.4<L>  /  Alb  3.4  /  TBili  0.8  /  DBili  x   /  AST  38  /  ALT  40  /  AlkPhos  81  02-    Ca    8.9      2023 01:15  Phos  4.2     02-19  Mg     2.0     02-    TPro  5.4<L>  /  Alb  3.4  /  TBili  0.8  /  DBili  x   /  AST  59<H>  /  ALT  82<H>  /  AlkPhos  72  02-       LIVER FUNCTIONS - ( 2023 01:21 )  Alb: 3.2 g/dL / Pro: 5.1 g/dL / ALK PHOS: 64 U/L / ALT: 36 U/L / AST: 43 U/L / GGT: x           RADIOLOGY: - Reviewed and analyzed

## 2023-02-24 NOTE — PROGRESS NOTE ADULT - NUTRITIONAL ASSESSMENT
This patient has been assessed with a concern for Malnutrition and has been determined to have a diagnosis/diagnoses of Severe protein-calorie malnutrition.    This patient is being managed with:   Diet Regular-  1500mL Fluid Restriction ([id number])  Supplement Feeding Modality:  Oral  Ensure Enlive Cans or Servings Per Day:  1       Frequency:  Two Times a day  Entered: Feb 21 2023  8:25AM

## 2023-02-24 NOTE — PROGRESS NOTE ADULT - ASSESSMENT
46 yo f w pmh nhl s/p chemo (?in remission), pe, ?pah, covid, p/w fevers for the last few days, a/w generalized weakness over the last couple of weeks along with poor po intake. denies chest pain, palpitations, lightheadedness, cough, wheeze, abdominal discomfort. patient does endorse n+v, dysuria, arriaza. of note, last session of chemo reportedly on 1/23/2023. Pt grew concerned so went to oncologist yesterday where she got cultures, blood work, and xray. as she did not feel improved, patient presents to [facility] er for further evaluation.  (15 Feb 2023 01:39)  BP monitoring,continue current antihypertensive meds, low salt diet,followup with PMD in 1-2 weeks      pt had letitia and pt was on hemodialysis for weeks and s/p cvvh at [facility] now gfr is controlled furosemide    Tablet 20 milliGRAM(s) Oral daily  Serum creatinine is stable at 0.8, approximating a GFR of is controlled  ml/min.   There is no progression.  No uremic symptoms. pos  evidence of  worsening  Anemia. Fluid status stable.   Will continue to avoid nephrotoxic drugs.  Patient remains asymptomatic.  Continue current therapy.    hyponatremia   improved        BP monitoring,followup with PMD in 1-2 weeks    Admit for septic workup and ID evaluation,send blood and urine cx,serial lactate levels,monitor vitals closley,ivfs hydration,monitor urine output and renal profile,iv abx as per id cons\  cefepime   IVPB      cefepime   IVPB 2000 milliGRAM(s) IV Intermittent every 8 hours  voriconazole 200 milliGRAM(s) Oral every 12 hours    dvt enoxaparin Injectable 70 milliGRAM(s) SubCutaneous every 12 hours    hypotension midodrine. 10 milliGRAM(s) Oral three times a day

## 2023-02-24 NOTE — PROGRESS NOTE ADULT - ASSESSMENT
47 f, PE, pulm HTN, diagnosed with NHL 7/2022 and started on R-CHOP, last dose 1/23/23 and had a PET and was told she is in remission, usually has 5 days of prednisone after chemo and after that completed started to have fever and chills whish she has been getting since, no significant cough, abd pain, diarrhea or dysuria  here febrile to 102.5, tachy, no hypoxia - on room air.    CT Chest/abd with no PE, Innumerable lung nodules which are nonspecific. In this clinical setting, differential includes infection (in which case, consider fungus) or neoplasm/known lymphoma. Indeterminate low-attenuation lesion in the liver.    s/p RRT for fever, rigors, tachycardia, hypotension, transferred to MICU, but still on RA, now on the floor   blood cx, urine cx, sputum cx, MRSA PCR, fungitell, galactomannan, histo Ag and  negative    Now febrile to 102.6 and tested positive for COVID (negative on admission) had COVID in September and now complete asymptomatic, possibly new infection vs prolonged shedding. Team discussed with lab and the COVID tests x 2 were weakly positive and CT value 0 on different platforms.     Recommend:  * f/u the repeat blood cx  * c/w vori   * f/u with pulmonary and IR regarding biopsy  * repeat COVID test tomorrow with CT value  * Repeat cxr to see if any developing GGO  * Continue with remdesivir  * Monitor fever curve    Chon Humphrey MD  Available through MS Teams  If no response, or after 5pm/weekends, call [phone]    Plan discussed with primary team.

## 2023-02-24 NOTE — PROGRESS NOTE ADULT - ASSESSMENT
Echo 2/15/23: Global lv dysfxn EF 42%, septal flattening, RV overload, mod pulm htn    A/P  46 yo f w pmh n[facility] s/p chemo (?in remission), pe, ?pah, covid, p/w fevers for the last few days, a/w generalized weakness, found to be in severe sepsis and w/ episode of SVT to 170s.    #SVT  -Pt with known hx of SVT  -Has been evaluated in past, was not candidate for ablation d/t chemo  -No longer SVT, remains sinus tachy  -Continue digoxin  -Hypotension precludes bb use  -event toprol if bp improved    #HFrEF, RV failure  -Echo with global lv dysfxn  -likely nonischemic in etiology  -Does not appear fluid overloaded  -Continue lasix qd as ordered  -hypotension precludes lv dysfxn meds    #Sepsis, r/o fungemia   -Lung nodules noted on CT, c/f fungal infection  -Pending possible bx  -Pt is cleared from cv perspective to undergo bx with acceptable cv risk  -W/u & abx per micu, ID    #Hypotension  -I/S/O sepsis, HF  -Cont midodrine    #Leukopenia  -Hx NHL  -Heme/onc following    #COVID  -Unclear if acute or latent infection  -Mgmt per ID, primary        Please call/text me with any questions/concerns between 8am-4pm  [phone]

## 2023-02-24 NOTE — PROGRESS NOTE ADULT - SUBJECTIVE AND OBJECTIVE BOX
CC: Patient is a 48y old  Female who presents with a chief complaint of fever (2023 11:09)    ID following for fever, lung nodules    Interval History/ROS: Patient remains on room air. No respiratory complaints. Remains with fevers.    Rest of ROS negative.    Allergies  No Known Allergies    ANTIMICROBIALS:  remdesivir  IVPB 200 once  remdesivir  IVPB 100 every 24 hours  voriconazole 200 every 12 hours    OTHER MEDS:  acetaminophen     Tablet .. 650 milliGRAM(s) Oral every 6 hours PRN  chlorhexidine 2% Cloths 1 Application(s) Topical <User Schedule>  digoxin     Tablet 125 MICROGram(s) Oral daily  famotidine Injectable 20 milliGRAM(s) IV Push every 12 hours  furosemide    Tablet 20 milliGRAM(s) Oral daily  LORazepam     Tablet 0.5 milliGRAM(s) Oral <User Schedule>  LORazepam   Injectable 0.2 milliGRAM(s) IV Push three times a day PRN  midodrine. 10 milliGRAM(s) Oral three times a day  riociguat 0.5 milliGRAM(s) Oral every 8 hours      PE:    Vital Signs Last 24 Hrs  T(C): 37.2 (2023 10:13), Max: 38.3 (2023 09:38)  T(F): 98.9 (2023 10:13), Max: 101 (2023 09:38)  HR: 108 (2023 10:13) (99 - 130)  BP: 99/66 (2023 10:13) (93/65 - 100/65)  BP(mean): --  RR: 18 (2023 10:13) (18 - 18)  SpO2: 97% (2023 10:13) (96% - 98%)    Parameters below as of 2023 10:13  Patient On (Oxygen Delivery Method): room air    Gen: AOx3, NAD  CV: S1+S2 normal, no murmurs  Resp: Clear bilat, no resp distress  Abd: Soft, nontender, +BS  Ext: No LE edema, no wounds  : No Roman  IV/Skin: No thrombophlebitis  Neuro: no focal deficits    LABS:                          10.5   4.06  )-----------( 77       ( 2023 07:05 )             34.5           136  |  100  |  18  ----------------------------<  77  4.3   |  26  |  0.81    Ca    9.1      2023 07:03  Phos  3.3       Mg     2.2         TPro  5.8<L>  /  Alb  3.7  /  TBili  1.0  /  DBili  0.3  /  AST  124<H>  /  ALT  86<H>  /  AlkPhos  190<H>        Urinalysis Basic - ( 2023 16:29 )    Color: Light Yellow / Appearance: Clear / S.011 / pH: x  Gluc: x / Ketone: Negative  / Bili: Negative / Urobili: Negative   Blood: x / Protein: Trace / Nitrite: Negative   Leuk Esterase: Negative / RBC: 1 /hpf / WBC 1 /HPF   Sq Epi: x / Non Sq Epi: 1 /hpf / Bacteria: Negative        MICROBIOLOGY:  v  .Blood Blood-Peripheral  23   No growth to date.  --  --      Clean Catch Clean Catch (Midstream)  23   No growth  --    Rare polymorphonuclear leukocytes per low power field  Rare Squamous epithelial cells per low power field  Rare Gram Negative Rods per oil power field      .Blood Blood-Peripheral  23   No Growth Final  --  --      .Blood Blood-Peripheral  23   No Growth Final  --  --      .Blood Blood-Peripheral  23   No Growth Final  --  --      Clean Catch Clean Catch (Midstream)  23   No growth  --  --      .Blood Blood-Peripheral  23   No Growth Final  --  --      .Blood Blood-Peripheral  23   No Growth Final  --  --    RADIOLOGY:    < from: Xray Chest 1 View- PORTABLE-Routine (Xray Chest 1 View- PORTABLE-Routine .) (23 @ 09:08) >  IMPRESSION:  Faint subcentimeter right upper lobe and left upper lobe   nodular opacities and bilateral clustered lower lung nodular opacities,   more extensive on the left, likely corresponding to some of the findings   on the CTA of the chest. Please see that report for additional detail.    < end of copied text >

## 2023-02-24 NOTE — PROGRESS NOTE ADULT - SUBJECTIVE AND OBJECTIVE BOX
Patient is a 47y Female whom presented to the hospital with abnormal electrolyte     PAST MEDICAL & SURGICAL HISTORY:  Non-Hodgkin&#x27;s lymphoma      Pulmonary embolism      History of appendectomy      History of cholecystectomy          MEDICATIONS  (STANDING):  apixaban 5 milliGRAM(s) Oral every 12 hours  chlorhexidine 2% Cloths 1 Application(s) Topical <User Schedule>  digoxin     Tablet 125 MICROGram(s) Oral daily  filgrastim-sndz (ZARXIO) Injectable 480 MICROGram(s) SubCutaneous daily  midodrine. 10 milliGRAM(s) Oral three times a day  piperacillin/tazobactam IVPB.. 3.375 Gram(s) IV Intermittent every 8 hours  potassium chloride    Tablet ER 40 milliEquivalent(s) Oral once  sodium bicarbonate  Infusion 0.086 mEq/kG/Hr (75 mL/Hr) IV Continuous <Continuous>  vancomycin  IVPB 1000 milliGRAM(s) IV Intermittent every 12 hours      Allergies    No Known Allergies    Intolerances        SOCIAL HISTORY:  Denies ETOh,Smoking,     FAMILY HISTORY:      REVIEW OF SYSTEMS:    CONSTITUTIONAL: No weakness, fevers or chills  RESPIRATORY: No cough, wheezing, hemoptysis; No shortness of breath  CARDIOVASCULAR: No chest pain or palpitations  GASTROINTESTINAL: No abdominal or epigastric pain. No nausea, vomiting,     No diarrhea or constipation. No melena   SKIN: dry                                                                            10.5   4.06  )-----------( 77       ( 2023 07:05 )             34.5       CBC Full  -  ( 2023 07:05 )  WBC Count : 4.06 K/uL  RBC Count : 3.86 M/uL  Hemoglobin : 10.5 g/dL  Hematocrit : 34.5 %  Platelet Count - Automated : 77 K/uL  Mean Cell Volume : 89.4 fl  Mean Cell Hemoglobin : 27.2 pg  Mean Cell Hemoglobin Concentration : 30.4 gm/dL  Auto Neutrophil # : x  Auto Lymphocyte # : x  Auto Monocyte # : x  Auto Eosinophil # : x  Auto Basophil # : x  Auto Neutrophil % : x  Auto Lymphocyte % : x  Auto Monocyte % : x  Auto Eosinophil % : x  Auto Basophil % : x          136  |  100  |  18  ----------------------------<  77  4.3   |  26  |  0.81    Ca    9.1      2023 07:03  Phos  3.3     02-23  Mg     2.2     02-23    TPro  5.8<L>  /  Alb  3.7  /  TBili  1.0  /  DBili  0.3  /  AST  124<H>  /  ALT  86<H>  /  AlkPhos  190<H>  02-24      CAPILLARY BLOOD GLUCOSE          Vital Signs Last 24 Hrs  T(C): 36.8 (2023 20:02), Max: 38.3 (2023 09:38)  T(F): 98.2 (2023 20:02), Max: 101 (2023 09:38)  HR: 104 (2023 20:02) (101 - 130)  BP: 93/60 (2023 20:02) (86/48 - 99/66)  BP(mean): --  RR: 20 (2023 20:02) (17 - 20)  SpO2: 99% (2023 20:02) (96% - 99%)    Parameters below as of 2023 20:02  Patient On (Oxygen Delivery Method): room air        Urinalysis Basic - ( 2023 16:29 )    Color: Light Yellow / Appearance: Clear / S.011 / pH: x  Gluc: x / Ketone: Negative  / Bili: Negative / Urobili: Negative   Blood: x / Protein: Trace / Nitrite: Negative   Leuk Esterase: Negative / RBC: 1 /hpf / WBC 1 /HPF   Sq Epi: x / Non Sq Epi: 1 /hpf / Bacteria: Negative        PT/INR - ( 2023 07:07 )   PT: 11.2 sec;   INR: 0.97 ratio           PHYSICAL EXAM:    Constitutional: NAD  HEENT: conjunctive   clear   Neck:  No JVD  Respiratory: CTAB  Cardiovascular: S1 and S2  Gastrointestinal: BS+, soft, NT/ND  Extremities: No peripheral edema

## 2023-02-24 NOTE — PROGRESS NOTE ADULT - SUBJECTIVE AND OBJECTIVE BOX
Indication for Geriatrics and Palliative Care Services/INTERVAL HPI: N/V   SUBJECTIVE AND OBJECTIVE: Pt seen and examined at bedside. Pt in good spirits, experiencing decreased nausea with current reigmen.   OVERNIGHT EVENTS: Pt febrile o/n. Primary team aware.     DNR on chart:  Allergies    No Known Allergies    Intolerances    MEDICATIONS  (STANDING):  chlorhexidine 2% Cloths 1 Application(s) Topical <User Schedule>  digoxin     Tablet 125 MICROGram(s) Oral daily  famotidine Injectable 20 milliGRAM(s) IV Push every 12 hours  furosemide    Tablet 20 milliGRAM(s) Oral daily  LORazepam   Injectable 0.2 milliGRAM(s) IV Push <User Schedule>  midodrine. 10 milliGRAM(s) Oral three times a day  remdesivir  IVPB 200 milliGRAM(s) IV Intermittent once  remdesivir  IVPB 100 milliGRAM(s) IV Intermittent every 24 hours  riociguat 0.5 milliGRAM(s) Oral every 8 hours  sodium chloride 0.9%. 100 milliLiter(s) (310 mL/Hr) IV Continuous <Continuous>  voriconazole 200 milliGRAM(s) Oral every 12 hours    MEDICATIONS  (PRN):  acetaminophen     Tablet .. 650 milliGRAM(s) Oral every 6 hours PRN Temp greater or equal to 38C (100.4F), Mild Pain (1 - 3)      ITEMS UNCHECKED ARE NOT PRESENT    PRESENT SYMPTOMS: [ ]Unable to self-report - see [ ] CPOT [ ] PAINADS [ ] RDOS  Source if other than patient:  [ ]Family   [ ]Team     Pain:  [ ]yes [x ]no  QOL impact -   Location -                    Aggravating factors -  Quality -  Radiation -  Timing-  Severity (0-10 scale):  Minimal acceptable level (0-10 scale):     CPOT:    https://www.sccm.org/getattachment/[email or web address]-9m2t-0n6h-1384f7913z9c/Critical-Care-Pain-Observation-Tool-(CPOT)    PAINAD Score: See PAINAD tool and score below       Dyspnea:                           [ ]Mild [ ]Moderate [ ]Severe    RDOS: See RDOS tool and score below   0 to 2  minimal or no respiratory distress   3  mild distress  4 to 6 moderate distress  >7 severe distress      Anxiety:                             [ ]Mild [ ]Moderate [ ]Severe  Fatigue:                             [ ]Mild [ ]Moderate [ ]Severe  Nausea:                             [x ]Mild [ ]Moderate [ ]Severe  Loss of appetite:              [ ]Mild [ ]Moderate [x ]Severe  Constipation:                    [ ]Mild [ ]Moderate [ ]Severe    PCSSQ[Palliative Care Spiritual Screening Question]   Severity (0-10):  Score of 4 or > indicate consideration of Chaplaincy referral.  Chaplaincy Referral: [ x] yes [ ] refused [ x] following [ ] Deferred     Caregiver [place]? : [ ] yes [ ] no [x ] Deferred [ ] Declined             Social work referral [ ] Patient & Family Centered Care Referral [ ]     Anticipatory Grief present?:  [ ] yes [ ] no  [ x] Deferred                  Social work referral [ ] Chaplaincy Referral [ ]    		  Other Symptoms:  [x ]All other review of systems negative     Palliative Performance Status Version 2:   See PPSv2 tool and score below         PHYSICAL EXAM:  Vital Signs Last 24 Hrs  T(C): 37.2 (2023 10:13), Max: 38.3 (2023 09:38)  T(F): 98.9 (2023 10:13), Max: 101 (2023 09:38)  HR: 108 (2023 10:13) (99 - 130)  BP: 99/66 (2023 10:13) (93/65 - 100/65)  BP(mean): --  RR: 18 (2023 10:13) (18 - 18)  SpO2: 97% (2023 10:13) (96% - 98%)    Parameters below as of 2023 10:13  Patient On (Oxygen Delivery Method): room air     I&O's Summary    2023 07:01  -  2023 07:00  --------------------------------------------------------  IN: 300 mL / OUT: 0 mL / NET: 300 mL       GENERAL: [ ]Cachexia    [x ]Alert  [x ]Oriented x 4  [ ]Lethargic  [ ]Unarousable  [ ]Verbal  [ ]Non-Verbal  Behavioral:   [ ]Anxiety  [ ]Delirium [ ]Agitation [ ]Other  HEENT:  [x ]Normal   [ ]Dry mouth   [ ]ET Tube/Trach  [ ]Oral lesions  PULMONARY:   [x ]Clear [ ]Tachypnea  [ ]Audible excessive secretions   [ ]Rhonchi        [ ]Right [ ]Left [ ]Bilateral  [ ]Crackles        [ ]Right [ ]Left [ ]Bilateral  [ ]Wheezing     [ ]Right [ ]Left [ ]Bilateral  [ ]Diminished BS [ ] Right [ ]Left [ ]Bilateral  CARDIOVASCULAR:    [ x]Regular [ ]Irregular [ ]Tachy  [ ]Ryley [ ]Murmur [ ]Other  GASTROINTESTINAL:  [x ]Soft  [ ]Distended   [ x]+BS  [ x]Non tender [ ]Tender  [ ]Other [ ]PEG [ ]OGT/ NGT   Last BM: 2023  GENITOURINARY:  [x ]Normal [ ]Incontinent   [ ]Oliguria/Anuria   [ ]Roman  MUSCULOSKELETAL:   [ ]Normal   [x ]Weakness  [ ]Bed/Wheelchair bound [ ]Edema  NEUROLOGIC:   [x ]No focal deficits  [ ] Cognitive impairment  [ ] Dysphagia [ ]Dysarthria [ ] Paresis [ ]Other   SKIN:   [ x]Normal  [ ]Rash  [ ]Other  [ ]Pressure ulcer(s) [ ]y [ ]n present on admission    CRITICAL CARE:  [ ]Shock Present  [ ]Septic [ ]Cardiogenic [ ]Neurologic [ ]Hypovolemic  [ ]Vasopressors [ ]Inotropes  [ ]Respiratory failure present [ ]Mechanical Ventilation [ ]Non-invasive ventilatory support [ ]High-Flow   [ ]Acute  [ ]Chronic [ ]Hypoxic  [ ]Hypercarbic [ ]Other  [ ]Other organ failure     LABS:                        10.5   4.06  )-----------( 77       ( 2023 07:05 )             34.5   02-    136  |  100  |  18  ----------------------------<  77  4.3   |  26  |  0.81    Ca    9.1      2023 07:03  Phos  3.3     -  Mg     2.2     -    TPro  5.8<L>  /  Alb  3.7  /  TBili  1.0  /  DBili  0.3  /  AST  124<H>  /  ALT  86<H>  /  AlkPhos  190<H>    PT/INR - ( 2023 07:07 )   PT: 11.2 sec;   INR: 0.97 ratio             Urinalysis Basic - ( 2023 16:29 )    Color: Light Yellow / Appearance: Clear / S.011 / pH: x  Gluc: x / Ketone: Negative  / Bili: Negative / Urobili: Negative   Blood: x / Protein: Trace / Nitrite: Negative   Leuk Esterase: Negative / RBC: 1 /hpf / WBC 1 /HPF   Sq Epi: x / Non Sq Epi: 1 /hpf / Bacteria: Negative      RADIOLOGY & ADDITIONAL STUDIES:  < from: Xray Chest 1 View- PORTABLE-Routine (Xray Chest 1 View- PORTABLE-Routine .) (23 @ 09:08) >        INTERPRETATION:  TIME OF EXAM: 2023 at 8:39 AM.    CLINICAL INFORMATION: New fever. Covid. Concern for infection on previous   imaging.    COMPARISON:  CTA of the chest from December 15, 2023.    TECHNIQUE:   AP Portable chest x-ray.    INTERPRETATION:    The heart is not enlarged. The mediastinum and jessie are unremarkable.  There is a CT injectable right IJ approach port with tip in the SVC.  There were faint subcentimeter right upper and left upper lobe nodular   opacities. There are bilateral clustered lower lung nodular opacities,   more extensive on the left.  No pleural effusion or pneumothorax is seen.  No acute bony abnormality is seen.        IMPRESSION:  Faint subcentimeter right upper lobe and left upper lobe   nodular opacities and bilateral clustered lower lung nodular opacities,   more extensive on the left, likely corresponding to some of the findings   on the CTA of the chest. Please see that report for additional detail.    --- End of Report ---            ESTEPHANIE DE LEÓN MD; Attending Radiologist  This document has been electronically signed. 2023  1:00PM    < end of copied text >    Protein Calorie Malnutrition Present: [ ]mild [ ]moderate [ ]severe [ ]underweight [ ]morbid obesity  https://www.andeal.org/vault/2440/web/files/ONC/Table_Clinical%20Characteristics%20to%20Document%20Malnutrition-White%20JV%20et%20al%2020.pdf    Height (cm): 165.1 (23 @ 03:38)  Weight (kg): 73.1 (02-17-23 @ 03:38)  BMI (kg/m2): 26.8 (23 @ 03:38)    [ ]PPSV2 < or = 30%  [ ]significant weight loss [ ]poor nutritional intake [ ]anasarca[ ]Artificial Nutrition    Other REFERRALS:  [ ]Hospice  [ ]Child Life  [ ]Social Work  [ ]Case management [ ]Holistic Therapy     Goals of Care Document: none

## 2023-02-24 NOTE — PROGRESS NOTE ADULT - SUBJECTIVE AND OBJECTIVE BOX
CARDIOLOGY FOLLOW UP - Dr. Webber  DATE OF SERVICE: 2/24/23    CC  Denies CP, SOB, palpitations, lightheadedness and dizziness.    REVIEW OF SYSTEMS:  CONSTITUTIONAL: No fever, weight loss, or fatigue  RESPIRATORY: No cough, wheezing, chills or hemoptysis; No Shortness of Breath  CARDIOVASCULAR: No chest pain, palpitations, passing out, dizziness, or leg swelling  GASTROINTESTINAL: No abdominal or epigastric pain. No nausea, vomiting, or hematemesis; No diarrhea or constipation. No melena or hematochezia.  VASCULAR: No edema     PHYSICAL EXAM:  T(C): 37.2 (02-24-23 @ 10:13), Max: 38.3 (02-24-23 @ 09:38)  HR: 108 (02-24-23 @ 10:13) (99 - 130)  BP: 99/66 (02-24-23 @ 10:13) (93/65 - 100/65)  RR: 18 (02-24-23 @ 10:13) (18 - 18)  SpO2: 97% (02-24-23 @ 10:13) (96% - 98%)  Wt(kg): --  I&O's Summary    23 Feb 2023 07:01  -  24 Feb 2023 07:00  --------------------------------------------------------  IN: 300 mL / OUT: 0 mL / NET: 300 mL        Appearance: Normal	  Cardiovascular: Normal S1 S2,Tachycardia, No JVD, No murmurs  Respiratory: Lungs clear to auscultation b/l	  Gastrointestinal:  Soft, Non-tender, + BS	  Extremities: Normal range of motion, No clubbing, cyanosis or edema      Home Medications:  digoxin 125 mcg (0.125 mg) oral tablet: 1 tab(s) orally once a day (15 Feb 2023 02:23)  Eliquis 5 mg oral tablet: 1 tab(s) orally 2 times a day (15 Feb 2023 02:23)  metoprolol succinate 25 mg oral tablet, extended release: 1 tab(s) orally once a day (15 Feb 2023 02:23)  selexipag 800 mcg oral tablet: 1 tab(s) orally 2 times a day (15 Feb 2023 02:23)      MEDICATIONS  (STANDING):  chlorhexidine 2% Cloths 1 Application(s) Topical <User Schedule>  digoxin     Tablet 125 MICROGram(s) Oral daily  famotidine Injectable 20 milliGRAM(s) IV Push every 12 hours  furosemide    Tablet 20 milliGRAM(s) Oral daily  LORazepam   Injectable 0.2 milliGRAM(s) IV Push <User Schedule>  midodrine. 10 milliGRAM(s) Oral three times a day  remdesivir  IVPB 200 milliGRAM(s) IV Intermittent once  remdesivir  IVPB 100 milliGRAM(s) IV Intermittent every 24 hours  riociguat 0.5 milliGRAM(s) Oral every 8 hours  sodium chloride 0.9%. 100 milliLiter(s) (310 mL/Hr) IV Continuous <Continuous>  voriconazole 200 milliGRAM(s) Oral every 12 hours      TELEMETRY: 	    ECG:  	  RADIOLOGY:   DIAGNOSTIC TESTING:  [ ] Echocardiogram:  [ ]  Catheterization:  [ ] Stress Test:    OTHER: 	    LABS:	 	                            10.5   4.06  )-----------( 77       ( 24 Feb 2023 07:05 )             34.5     02-24    136  |  100  |  18  ----------------------------<  77  4.3   |  26  |  0.81    Ca    9.1      24 Feb 2023 07:03  Phos  3.3     02-23  Mg     2.2     02-23    TPro  5.8<L>  /  Alb  3.7  /  TBili  1.0  /  DBili  0.3  /  AST  124<H>  /  ALT  86<H>  /  AlkPhos  190<H>  02-24    PT/INR - ( 24 Feb 2023 07:07 )   PT: 11.2 sec;   INR: 0.97 ratio

## 2023-02-25 LAB
ALBUMIN SERPL ELPH-MCNC: 3.8 G/DL — SIGNIFICANT CHANGE UP (ref 3.3–5)
ALP SERPL-CCNC: 245 U/L — HIGH (ref 40–120)
ALT FLD-CCNC: 103 U/L — HIGH (ref 10–45)
ANION GAP SERPL CALC-SCNC: 14 MMOL/L — SIGNIFICANT CHANGE UP (ref 5–17)
AST SERPL-CCNC: 118 U/L — HIGH (ref 10–40)
BILIRUB DIRECT SERPL-MCNC: 0.1 MG/DL — SIGNIFICANT CHANGE UP (ref 0–0.3)
BILIRUB INDIRECT FLD-MCNC: 0.5 MG/DL — SIGNIFICANT CHANGE UP (ref 0.2–1)
BILIRUB SERPL-MCNC: 0.6 MG/DL — SIGNIFICANT CHANGE UP (ref 0.2–1.2)
BUN SERPL-MCNC: 17 MG/DL — SIGNIFICANT CHANGE UP (ref 7–23)
CALCIUM SERPL-MCNC: 9 MG/DL — SIGNIFICANT CHANGE UP (ref 8.4–10.5)
CHLORIDE SERPL-SCNC: 100 MMOL/L — SIGNIFICANT CHANGE UP (ref 96–108)
CO2 SERPL-SCNC: 22 MMOL/L — SIGNIFICANT CHANGE UP (ref 22–31)
CREAT SERPL-MCNC: 0.78 MG/DL — SIGNIFICANT CHANGE UP (ref 0.5–1.3)
CREAT SERPL-MCNC: 0.81 MG/DL — SIGNIFICANT CHANGE UP (ref 0.5–1.3)
EGFR: 91 ML/MIN/1.73M2 — SIGNIFICANT CHANGE UP
EGFR: 95 ML/MIN/1.73M2 — SIGNIFICANT CHANGE UP
GLUCOSE SERPL-MCNC: 91 MG/DL — SIGNIFICANT CHANGE UP (ref 70–99)
HCT VFR BLD CALC: 34.2 % — LOW (ref 34.5–45)
HGB BLD-MCNC: 10.3 G/DL — LOW (ref 11.5–15.5)
INR BLD: 1.05 RATIO — SIGNIFICANT CHANGE UP (ref 0.88–1.16)
MCHC RBC-ENTMCNC: 26.9 PG — LOW (ref 27–34)
MCHC RBC-ENTMCNC: 30.1 GM/DL — LOW (ref 32–36)
MCV RBC AUTO: 89.3 FL — SIGNIFICANT CHANGE UP (ref 80–100)
NRBC # BLD: 0 /100 WBCS — SIGNIFICANT CHANGE UP (ref 0–0)
PLATELET # BLD AUTO: 78 K/UL — LOW (ref 150–400)
POTASSIUM SERPL-MCNC: 4.3 MMOL/L — SIGNIFICANT CHANGE UP (ref 3.5–5.3)
POTASSIUM SERPL-SCNC: 4.3 MMOL/L — SIGNIFICANT CHANGE UP (ref 3.5–5.3)
PROT SERPL-MCNC: 5.9 G/DL — LOW (ref 6–8.3)
PROTHROM AB SERPL-ACNC: 12.2 SEC — SIGNIFICANT CHANGE UP (ref 10.5–13.4)
RBC # BLD: 3.83 M/UL — SIGNIFICANT CHANGE UP (ref 3.8–5.2)
RBC # FLD: 19.7 % — HIGH (ref 10.3–14.5)
SARS-COV-2 RNA SPEC QL NAA+PROBE: DETECTED
SODIUM SERPL-SCNC: 136 MMOL/L — SIGNIFICANT CHANGE UP (ref 135–145)
WBC # BLD: 4.53 K/UL — SIGNIFICANT CHANGE UP (ref 3.8–10.5)
WBC # FLD AUTO: 4.53 K/UL — SIGNIFICANT CHANGE UP (ref 3.8–10.5)

## 2023-02-25 PROCEDURE — 99232 SBSQ HOSP IP/OBS MODERATE 35: CPT

## 2023-02-25 PROCEDURE — 71045 X-RAY EXAM CHEST 1 VIEW: CPT | Mod: 26

## 2023-02-25 RX ORDER — ALPRAZOLAM 0.25 MG
0.25 TABLET ORAL ONCE
Refills: 0 | Status: DISCONTINUED | OUTPATIENT
Start: 2023-02-25 | End: 2023-02-25

## 2023-02-25 RX ADMIN — FAMOTIDINE 20 MILLIGRAM(S): 10 INJECTION INTRAVENOUS at 18:52

## 2023-02-25 RX ADMIN — Medication 0.25 MILLIGRAM(S): at 21:42

## 2023-02-25 RX ADMIN — Medication 650 MILLIGRAM(S): at 20:17

## 2023-02-25 RX ADMIN — RIOCIGUAT 0.5 MILLIGRAM(S): 1.5 TABLET, FILM COATED ORAL at 21:42

## 2023-02-25 RX ADMIN — Medication 650 MILLIGRAM(S): at 20:47

## 2023-02-25 RX ADMIN — Medication 650 MILLIGRAM(S): at 00:48

## 2023-02-25 RX ADMIN — REMDESIVIR 200 MILLIGRAM(S): 5 INJECTION INTRAVENOUS at 17:34

## 2023-02-25 RX ADMIN — MIDODRINE HYDROCHLORIDE 10 MILLIGRAM(S): 2.5 TABLET ORAL at 05:50

## 2023-02-25 RX ADMIN — VORICONAZOLE 200 MILLIGRAM(S): 10 INJECTION, POWDER, LYOPHILIZED, FOR SOLUTION INTRAVENOUS at 05:51

## 2023-02-25 RX ADMIN — Medication 0.25 MILLIGRAM(S): at 00:18

## 2023-02-25 RX ADMIN — Medication 0.5 MILLIGRAM(S): at 11:26

## 2023-02-25 RX ADMIN — APIXABAN 5 MILLIGRAM(S): 2.5 TABLET, FILM COATED ORAL at 17:34

## 2023-02-25 RX ADMIN — Medication 0.5 MILLIGRAM(S): at 17:33

## 2023-02-25 RX ADMIN — Medication 20 MILLIGRAM(S): at 05:51

## 2023-02-25 RX ADMIN — Medication 650 MILLIGRAM(S): at 00:18

## 2023-02-25 RX ADMIN — Medication 0.5 MILLIGRAM(S): at 07:55

## 2023-02-25 RX ADMIN — VORICONAZOLE 200 MILLIGRAM(S): 10 INJECTION, POWDER, LYOPHILIZED, FOR SOLUTION INTRAVENOUS at 17:33

## 2023-02-25 RX ADMIN — CHLORHEXIDINE GLUCONATE 1 APPLICATION(S): 213 SOLUTION TOPICAL at 05:52

## 2023-02-25 RX ADMIN — RIOCIGUAT 0.5 MILLIGRAM(S): 1.5 TABLET, FILM COATED ORAL at 15:28

## 2023-02-25 RX ADMIN — MIDODRINE HYDROCHLORIDE 10 MILLIGRAM(S): 2.5 TABLET ORAL at 21:42

## 2023-02-25 RX ADMIN — RIOCIGUAT 0.5 MILLIGRAM(S): 1.5 TABLET, FILM COATED ORAL at 05:51

## 2023-02-25 RX ADMIN — Medication 125 MICROGRAM(S): at 06:12

## 2023-02-25 RX ADMIN — FAMOTIDINE 20 MILLIGRAM(S): 10 INJECTION INTRAVENOUS at 05:51

## 2023-02-25 RX ADMIN — MIDODRINE HYDROCHLORIDE 10 MILLIGRAM(S): 2.5 TABLET ORAL at 15:29

## 2023-02-25 RX ADMIN — APIXABAN 5 MILLIGRAM(S): 2.5 TABLET, FILM COATED ORAL at 05:51

## 2023-02-25 NOTE — PROGRESS NOTE ADULT - ASSESSMENT
47 F PMH non-Hodgkin lymphoma s/p chemoport (?in remission), PE, pHTN, presenting with recurrent fevers with pancytopenia in setting of recent chemo treatment and prednisone concerning for severe sepsis. Found to be requiring intermittent fluids and continues to be febrile while on broad-spectrum bacterial antibiotic coverage. CT chest imaging concerning for fungal infection vs metastasis. MICU consulted for further management.    COVID positive   Started patient on remdesivir   ID following       # NHL with pulmonary nodules  -Biopsy Out patient     # pulmonary HTN, suspected Group 4  > RV systolic pressure =51mmHg on TTE  - diurese (IV in MICU), will transition to PO lasix starting 2/21/23  - Riociguat restarted 2/22  =  # HFrEF (EF ~42) with RV dysfunction based on previous echo (2/15/23)  - DC-ed coreg   > Diurese daily  Cards following     # SVT  > digoxin 125 mcg po qD (home medication)  > follow up digoxin level    =====Renal/=====  - PMH STARR while on antibiotics  > appreciate renal recs    =====Endocrine=====  - No active issues.       # severe sepsis with unclear etiology in setting of known malginancy and recent chemotx for cancer   - iso immunocompromise, hx and CT chest imaging c/f infection (e.g. fungal pna) vs progression of known cancer  > switched Zosyn to cefepime (2/17-21)  > MRSA swab negative, vanc dc'd  > started voriconazole 200mg po bid   ID following      # DLBCL  >   #leukopenia on filgastrim  #anemia  #thrombocytopenia  - possibly from recent chemo and cancer  > Hb goal >7  > S/p 3d of zarxio  > Trend Plt

## 2023-02-25 NOTE — PROGRESS NOTE ADULT - NUTRITIONAL ASSESSMENT
This patient has been assessed with a concern for Malnutrition and has been determined to have a diagnosis/diagnoses of Severe protein-calorie malnutrition.    This patient is being managed with:   Diet Regular-  1500mL Fluid Restriction ([id number])  Supplement Feeding Modality:  Oral  Ensure Enlive Cans or Servings Per Day:  1       Frequency:  Two Times a day  Entered: Feb 21 2023  8:25AM

## 2023-02-25 NOTE — PROGRESS NOTE ADULT - ASSESSMENT
Echo 2/15/23: Global lv dysfxn EF 42%, septal flattening, RV overload, mod pulm htn    A/P  46 yo f w pmh nhl s/p chemo (?in remission), pe, ?pah, covid, p/w fevers for the last few days, a/w generalized weakness, found to be in severe sepsis and w/ episode of SVT to 170s.    #SVT  -Pt with known hx of SVT  -Has been evaluated in past, was not candidate for ablation d/t chemo  -No longer SVT, remains sinus tachy  -Continue digoxin  -Hypotension precludes bb use  -event toprol if bp improved    #HFrEF, RV failure  -Echo with global lv dysfxn  -likely nonischemic in etiology  -Does not appear fluid overloaded  -Continue lasix qd as ordered  -hypotension precludes lv dysfxn meds    #Sepsis, r/o fungemia   -Lung nodules noted on CT, c/f fungal infection  -Pending possible bx  -Pt is cleared from cv perspective to undergo bx with acceptable cv risk  -W/u & abx per micu, ID    #Hypotension  -I/S/O sepsis, HF  -Cont midodrine    #Leukopenia  -Hx NHL  -Heme/onc following    #COVID  -Unclear if acute or latent infection  -Mgmt per ID, primary        35 minutes spent on total encounter; more than 50% of the visit was spent counseling and/or coordinating care by the attending physician.

## 2023-02-25 NOTE — PROGRESS NOTE ADULT - SUBJECTIVE AND OBJECTIVE BOX
OVERNIGHT EVENTS / SUBJECTIVE: Patient seen and examined at bedside. RANULFO.    OBJECTIVE:    VITAL SIGNS:        T(C): 37.5 (02-25-23 @ 23:43), Max: 38.4 (02-25-23 @ 20:06)  HR: 113 (02-25-23 @ 23:43) (111 - 114)  BP: 96/65 (02-25-23 @ 23:43) (90/57 - 99/66)  RR: 18 (02-25-23 @ 23:43) (18 - 20)  SpO2: 97% (02-25-23 @ 23:43) (97% - 99%)      MEDICATIONS  (STANDING):  apixaban 5 milliGRAM(s) Oral every 12 hours  chlorhexidine 2% Cloths 1 Application(s) Topical <User Schedule>  digoxin     Tablet 125 MICROGram(s) Oral daily  famotidine Injectable 20 milliGRAM(s) IV Push every 12 hours  furosemide    Tablet 20 milliGRAM(s) Oral daily  LORazepam     Tablet 0.5 milliGRAM(s) Oral <User Schedule>  midodrine. 10 milliGRAM(s) Oral three times a day  remdesivir  IVPB 100 milliGRAM(s) IV Intermittent every 24 hours  riociguat 0.5 milliGRAM(s) Oral every 8 hours  voriconazole 200 milliGRAM(s) Oral every 12 hours    MEDICATIONS  (PRN):  acetaminophen     Tablet .. 650 milliGRAM(s) Oral every 6 hours PRN Temp greater or equal to 38C (100.4F), Mild Pain (1 - 3)  LORazepam   Injectable 0.2 milliGRAM(s) IV Push three times a day PRN breakthrough nausea/refractory to PO nausea tx  General: NAD  HEENT: NC/AT; PERRL, clear conjunctiva  Neck: supple  Respiratory: CTA b/l  Cardiovascular: +S1/S2; RRR  Abdomen: soft, NT/ND; +BS x4  Extremities: WWP, 2+ peripheral pulses b/l; no LE edema  Skin: normal color and turgor; no rash  Neurological: A&Ox4    ondansetron Injectable 4 milliGRAM(s) IV Push every 8 hours PRN Nausea and/or Vomiting      ALLERGIES:  Allergies    No Known Allergies    Intolerances                                   10.3   4.53  )-----------( 78       ( 25 Feb 2023 07:05 )             34.2           LIVER FUNCTIONS - ( 25 Feb 2023 07:03 )  Alb: 3.8 g/dL / Pro: 5.9 g/dL / ALK PHOS: 245 U/L / ALT: 103 U/L / AST: 118 U/L / GGT: x           PT/INR - ( 25 Feb 2023 07:09 )   PT: 12.2 sec;   INR: 1.05 ratio           136|100|17<91  4.3|22|0.78  9.0,--,--  02-25 @ 07:03                    EKG:   MICROBIOLOGY:    IMAGING:      Labs, imaging, EKG personally reviewed    RADIOLOGY & ADDITIONAL TESTS: Reviewed.

## 2023-02-25 NOTE — PROGRESS NOTE ADULT - ASSESSMENT
47 F, PE, pulm HTN, diagnosed with NHL 7/2022 and started on R-CHOP, last dose 1/23/23 and had a PET and was told she is in remission, usually has 5 days of prednisone after chemo and after that completed started to have fever and chills whish she has been getting since, no significant cough, abd pain, diarrhea or dysuria  here febrile to 102.5, tachy, no hypoxia - on room air.    CT Chest/abd with no PE, Innumerable lung nodules which are nonspecific. In this clinical setting, differential includes infection (in which case, consider fungus) or neoplasm/known lymphoma. Indeterminate low-attenuation lesion in the liver.    s/p RRT for fever, rigors, tachycardia, hypotension, transferred to MICU, but still on RA, now on the floor   blood cx, urine cx, sputum cx, MRSA PCR, fungitell, galactomannan, histo Ag and  negative    Now febrile to 102.6 and tested positive for COVID (negative on admission) had COVID in September and now complete asymptomatic, possibly new infection vs prolonged shedding. Team discussed with lab and the COVID tests x 2 were weakly positive and CT value 0 on different platforms.     Recommend:  * f/u the repeat blood cx  * c/w vori   * f/u with pulmonary and IR regarding biopsy  * repeat COVID test today with CT value  * Repeat cxr to see if any developing GGO  * Continue with remdesivir for now  * Monitor fever curve    Chon Humphrey MD  Available through MS Teams  If no response, or after 5pm/weekends, call [phone]

## 2023-02-25 NOTE — PROGRESS NOTE ADULT - ASSESSMENT
Assessment and Recommendation:   · Assessment	  Assessment and recommendation :  Non hodgkin's lymphoma ? in remission   NEW fever positive covid 19 awaiting ID start Remdesivir   Fever etiology infection V/S lymphoma  ? 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

## 2023-02-25 NOTE — PROGRESS NOTE ADULT - SUBJECTIVE AND OBJECTIVE BOX
CC: Patient is a 48y old  Female who presents with a chief complaint of fever (2023 09:45)    ID following for fever    Interval History/ROS: Patient has no new complaints. Tm 100.3F overnight.    Rest of ROS negative.    Allergies  No Known Allergies    ANTIMICROBIALS:  remdesivir  IVPB 100 every 24 hours  voriconazole 200 every 12 hours    OTHER MEDS:  acetaminophen     Tablet .. 650 milliGRAM(s) Oral every 6 hours PRN  apixaban 5 milliGRAM(s) Oral every 12 hours  chlorhexidine 2% Cloths 1 Application(s) Topical <User Schedule>  digoxin     Tablet 125 MICROGram(s) Oral daily  famotidine Injectable 20 milliGRAM(s) IV Push every 12 hours  furosemide    Tablet 20 milliGRAM(s) Oral daily  LORazepam     Tablet 0.5 milliGRAM(s) Oral <User Schedule>  LORazepam   Injectable 0.2 milliGRAM(s) IV Push three times a day PRN  midodrine. 10 milliGRAM(s) Oral three times a day  riociguat 0.5 milliGRAM(s) Oral every 8 hours      PE:    Vital Signs Last 24 Hrs  T(C): 36.8 (2023 08:47), Max: 37.9 (2023 00:25)  T(F): 98.2 (2023 08:47), Max: 100.3 (2023 00:25)  HR: 107 (2023 08:47) (101 - 118)  BP: 93/67 (2023 08:47) (86/48 - 111/62)  BP(mean): --  RR: 18 (2023 08:47) (17 - 20)  SpO2: 98% (2023 08:47) (97% - 99%)    Parameters below as of 2023 08:47  Patient On (Oxygen Delivery Method): room air    Gen: AOx3, NAD  CV: S1+S2 normal, no murmurs  Resp: Clear bilat, no resp distress  Abd: Soft, nontender, +BS  Ext: No LE edema, no wounds  : No Roman  IV/Skin: No thrombophlebitis  Neuro: no focal deficits    LABS:                          10.3   4.53  )-----------( 78       ( 2023 07:05 )             34.2           136  |  100  |  17  ----------------------------<  91  4.3   |  22  |  0.78    Ca    9.0      2023 07:03    TPro  5.9<L>  /  Alb  3.8  /  TBili  0.6  /  DBili  0.1  /  AST  118<H>  /  ALT  103<H>  /  AlkPhos  245<H>        Urinalysis Basic - ( 2023 16:29 )    Color: Light Yellow / Appearance: Clear / S.011 / pH: x  Gluc: x / Ketone: Negative  / Bili: Negative / Urobili: Negative   Blood: x / Protein: Trace / Nitrite: Negative   Leuk Esterase: Negative / RBC: 1 /hpf / WBC 1 /HPF   Sq Epi: x / Non Sq Epi: 1 /hpf / Bacteria: Negative        MICROBIOLOGY:  v  Clean Catch Clean Catch (Midstream)  23   No growth  --  --      .Blood Blood-Peripheral  23   No growth to date.  --  --      Clean Catch Clean Catch (Midstream)  23   No growth  --    Rare polymorphonuclear leukocytes per low power field  Rare Squamous epithelial cells per low power field  Rare Gram Negative Rods per oil power field      .Blood Blood-Peripheral  23   No Growth Final  --  --      .Blood Blood-Peripheral  23   No Growth Final  --  --      .Blood Blood-Peripheral  23   No Growth Final  --  --      Clean Catch Clean Catch (Midstream)  23   No growth  --  --      .Blood Blood-Peripheral  23   No Growth Final  --  --      .Blood Blood-Peripheral  23   No Growth Final  --  --    RADIOLOGY:    < from: Xray Chest 1 View- PORTABLE-Routine (Xray Chest 1 View- PORTABLE-Routine .) (23 @ 09:08) >  IMPRESSION:  Faint subcentimeter right upper lobe and left upper lobe   nodular opacities and bilateral clustered lower lung nodular opacities,   more extensive on the left, likely corresponding to some of the findings   on the CTA of the chest. Please see that report for additional detail.    < end of copied text >

## 2023-02-25 NOTE — PROGRESS NOTE ADULT - SUBJECTIVE AND OBJECTIVE BOX
OZ BARBOSA  MRN#: [id number]  Subjective:  CHIEF COMPLAINT: fever . Pulmonary HTN , non hodgkin's lymphoma   46 yo f a private patient from my office report to the ER   w PMH non hodgkin's lymphoma  s/p chemo at [facility] [place]  (?in [place]), PE and DVT ,S/P  covid pneumonia ,severe pulmonary HTN on Adempas , p/w fevers for the last few days, a/w generalized weakness over the last couple of weeks along with poor po intake. she denies chest pain, palpitations, lightheadedness, cough, wheeze, abdominal discomfort. the  patient does endorse n+v, dysuria, arriaza. of note, last session of chemo reportedly on 2023. Pt grew concerned so went to oncologist yesterday where she got cultures, blood work, and xray. as she did not feel improved, patient presents to [facility] er for further evaluation.  (15 Feb 2023 01:39) CT scan bilateral lung nodule , no H/O OF exposure to TB , no significant travel history to the [facility] out of the country or the [facility] area  transfer to the MICU for SVT patient is on Digoxin and B-Blockers , hypotension rebound back feeling better on cefepime , and voriconazole for presumptive fungal pneumonia , continue to have  fever at night  up to 101 ,  sweat appetite is suppressed   fever curve is better discuss with MICU team to consider for lung biopsy , lymphoma oncology will be called in .fever subsides no definitive DX is made tired and sleepy if recurrent fever happened patient may need BAL and possible navigation bronchoscopy with Biopsy of large left lung mass , remain afebrile for 48 hrs continue on voriconazole , patient spike to 103 and tested positive for covid 19 placed on isolations , 02% is 97% on RA   continue to have fever , placed on Remdesivir by ID , low grade temp 100.3 over night still very upset       PAST MEDICAL & SURGICAL HISTORY:  Non-Hodgkin&#x27;s lymphoma      Pulmonary embolism      History of appendectomy      History of cholecystectomy                OBJECTIVE:  ICU Vital Signs Last 24 Hrs  T(C): 37.7 (2023 18:00), Max: 39.6 (2023 01:36)  T(F): 99.9 (2023 18:00), Max: 103.2 (2023 01:36)  HR: 122 (2023 18:00) (114 - 167)  BP: 91/63 (2023 18:00) (80/55 - 108/66)  BP(mean): 73 (2023 18:00) (60 - 81)  ABP: --  ABP(mean): --  RR: 29 (2023 18:00) (16 - 33)  SpO2: 97% (2023 18:00) (94% - 100%)    O2 Parameters below as of 2023 02:40  Patient On (Oxygen Delivery Method): room air             @ 07:01  -  - @ 07:00  --------------------------------------------------------  IN: 1100 mL / OUT: 0 mL / NET: 1100 mL     @ 07:01  -  - @ 19:19  --------------------------------------------------------  IN: 700 mL / OUT: 2600 mL / NET: -1900 mL      PHYSICAL EXAM :Daily   cushinoid female in no acute distress   Daily Weight in k.6 (2023 09:20)  HEENT:     + NCAT  + EOMI  - Conjuctival edema   - Icterus   - Thrush   - ETT  - NGT/OGT  Neck:         + FROM    + JVD     - Nodes     - Masses    + Mid-line trachea   - Tracheostomy  Chest: normal findings  Lungs:          + CTA   + Rhonchi    - Rales    - Wheezing     - Decreased BS   - Dullness R L  Cardiac:       + S1 + S2    + RRR   - Irregular   - S3  - S4  + Murmurs   - Rub   - Hamman’s sign   Abdomen:    + BS     + Soft    + Non-tender     - Distended    - Organomegaly  - PEG  Extremities:   - Cyanosis U/L   - Clubbing  U/L  - LE/UE Edema   + Capillary refill    + Pulses   Neuro:        + Awake   +  Alert   - Confused   - Lethargic   - Sedated   - Generalized Weakness  Skin:        - Rashes    - Erythema   + Normal incisions   + IV sites intact        HOSPITAL MEDICATIONS: All mediciations reviewed and analyzed  MEDICATIONS  (STANDING):  acetaminophen   IVPB .. 1000 milliGRAM(s) IV Intermittent once  cefepime   IVPB      cefepime   IVPB 2000 milliGRAM(s) IV Intermittent every 8 hours  chlorhexidine 2% Cloths 1 Application(s) Topical <User Schedule>  chlorhexidine 4% Liquid 1 Application(s) Topical <User Schedule>  digoxin     Tablet 125 MICROGram(s) Oral daily  enoxaparin Injectable 70 milliGRAM(s) SubCutaneous every 12 hours  midodrine. 10 milliGRAM(s) Oral three times a day  voriconazole 200 milliGRAM(s) Oral every 12 hours    MEDICATIONS  (PRN):  ondansetron Injectable 4 milliGRAM(s) IV Push every 8 hours PRN Nausea and/or Vomiting    LABS: All Lab data reviewed and analyzed                                     10.3   4.53  )-----------( 78       ( 2023 07:05 )             34.2        136  |  100  |  17  ----------------------------<  91  4.3   |  22  |  0.78    Ca    9.0      2023 07:03    TPro  5.9<L>  /  Alb  3.8  /  TBili  0.6  /  DBili  0.1  /  AST  118<H>  /  ALT  103<H>  /  AlkPhos  245<H>      Ca    9.1      2023 06:28  Phos  3.7       Mg     2.0            LIVER FUNCTIONS - ( 2023 01:21 )  Alb: 3.2 g/dL / Pro: 5.1 g/dL / ALK PHOS: 64 U/L / ALT: 36 U/L / AST: 43 U/L / GGT: x           RADIOLOGY: - Reviewed and analyzed

## 2023-02-25 NOTE — PROGRESS NOTE ADULT - ASSESSMENT
46 yo f w pmh nhl s/p chemo (?in remission), pe, ?pah, covid, p/w fevers for the last few days, a/w generalized weakness over the last couple of weeks along with poor po intake. denies chest pain, palpitations, lightheadedness, cough, wheeze, abdominal discomfort. patient does endorse n+v, dysuria, arriaza. of note, last session of chemo reportedly on 1/23/2023. Pt grew concerned so went to oncologist yesterday where she got cultures, blood work, and xray. as she did not feel improved, patient presents to [facility] er for further evaluation.  (15 Feb 2023 01:39)  BP monitoring,continue current antihypertensive meds, low salt diet,followup with PMD in 1-2 weeks      pt had letitia and pt was on hemodialysis for weeks and s/p cvvh at [facility] now gfr is controlled furosemide    Tablet 20 milliGRAM(s) Oral daily  Serum creatinine is stable at 0.8, approximating a GFR of is controlled  ml/min.   There is no progression.  No uremic symptoms. pos  evidence of  worsening  Anemia. Fluid status stable.   Will continue to avoid nephrotoxic drugs.  Patient remains asymptomatic.  Continue current therapy.    hyponatremia   improved  Non hodgkin's lymphoma may be  in remission   NEW fever positive covid 19 ID start Remdesivir   Fever etiology infection V/S lymphoma   pan culture   on Antibiotics sever pulmonary HTN an Adempas 0.5 mg TID       BP monitoring,followup with PMD in 1-2 weeks    Admit for septic workup and ID evaluation,send blood and urine cx,serial lactate levels,monitor vitals closley,ivfs hydration,monitor urine output and renal profile,iv abx as per id cons\  remdesivir  IVPB 100 milliGRAM(s) IV Intermittent every 24 hours  voriconazole 200 milliGRAM(s) Oral every 12 hours    dvt enoxaparin Injectable 70 milliGRAM(s) SubCutaneous every 12 hours    hypotension midodrine. 10 milliGRAM(s) Oral three times a day

## 2023-02-25 NOTE — CHART NOTE - NSCHARTNOTEFT_GEN_A_CORE
Patient seen this AM sitting up in bed. She is tolerating diet and states standing Ativan is helping with her nausea. She does not eat the hospital food but is able to order food from the outside and drink the Ensure supplement drinks. Per chart review, patient has not required PRN ativan.    Plan:   - Continue PO Ativan 0.5mg three times a day 1 hour prior to meals for n/v  - Continue 0.2mg Ativan IVP three times a day 30 minutes prior to meals for n/v to prn for breakthrough nausea refractory to oral Ativan or if unable to tolerate oral Ativan.    Thank you for allowing us to participate in your patient's care. Please page [phone] if you have questions or concerns.     Dianna Mcnally D.O.   Palliative Medicine

## 2023-02-25 NOTE — PROGRESS NOTE ADULT - NUTRITIONAL ASSESSMENT
MEDICATIONS  (STANDING):  apixaban 5 milliGRAM(s) Oral every 12 hours  chlorhexidine 2% Cloths 1 Application(s) Topical <User Schedule>  digoxin     Tablet 125 MICROGram(s) Oral daily  famotidine Injectable 20 milliGRAM(s) IV Push every 12 hours  furosemide    Tablet 20 milliGRAM(s) Oral daily  LORazepam     Tablet 0.5 milliGRAM(s) Oral <User Schedule>  midodrine. 10 milliGRAM(s) Oral three times a day  remdesivir  IVPB 100 milliGRAM(s) IV Intermittent every 24 hours  riociguat 0.5 milliGRAM(s) Oral every 8 hours  voriconazole 200 milliGRAM(s) Oral every 12 hours

## 2023-02-25 NOTE — PROGRESS NOTE ADULT - SUBJECTIVE AND OBJECTIVE BOX
Patient is a 47y Female whom presented to the hospital with abnormal electrolyte     PAST MEDICAL & SURGICAL HISTORY:  Non-Hodgkin&#x27;s lymphoma      Pulmonary embolism      History of appendectomy      History of cholecystectomy          MEDICATIONS  (STANDING):  apixaban 5 milliGRAM(s) Oral every 12 hours  chlorhexidine 2% Cloths 1 Application(s) Topical <User Schedule>  digoxin     Tablet 125 MICROGram(s) Oral daily  filgrastim-sndz (ZARXIO) Injectable 480 MICROGram(s) SubCutaneous daily  midodrine. 10 milliGRAM(s) Oral three times a day  piperacillin/tazobactam IVPB.. 3.375 Gram(s) IV Intermittent every 8 hours  potassium chloride    Tablet ER 40 milliEquivalent(s) Oral once  sodium bicarbonate  Infusion 0.086 mEq/kG/Hr (75 mL/Hr) IV Continuous <Continuous>  vancomycin  IVPB 1000 milliGRAM(s) IV Intermittent every 12 hours      Allergies    No Known Allergies    Intolerances        SOCIAL HISTORY:  Denies ETOh,Smoking,     FAMILY HISTORY:      REVIEW OF SYSTEMS:    CONSTITUTIONAL: pos  weakness, fevers or chills  CARDIOVASCULAR: No chest pain or palpitations  GASTROINTESTINAL: No abdominal or epigastric pain. No nausea, vomiting,                             10.3   4.53  )-----------( 78       ( 25 Feb 2023 07:05 )             34.2       CBC Full  -  ( 25 Feb 2023 07:05 )  WBC Count : 4.53 K/uL  RBC Count : 3.83 M/uL  Hemoglobin : 10.3 g/dL  Hematocrit : 34.2 %  Platelet Count - Automated : 78 K/uL  Mean Cell Volume : 89.3 fl  Mean Cell Hemoglobin : 26.9 pg  Mean Cell Hemoglobin Concentration : 30.1 gm/dL  Auto Neutrophil # : x  Auto Lymphocyte # : x  Auto Monocyte # : x  Auto Eosinophil # : x  Auto Basophil # : x  Auto Neutrophil % : x  Auto Lymphocyte % : x  Auto Monocyte % : x  Auto Eosinophil % : x  Auto Basophil % : x      02-25    136  |  100  |  17  ----------------------------<  91  4.3   |  22  |  0.78    Ca    9.0      25 Feb 2023 07:03    TPro  5.9<L>  /  Alb  3.8  /  TBili  0.6  /  DBili  0.1  /  AST  118<H>  /  ALT  103<H>  /  AlkPhos  245<H>  02-25      CAPILLARY BLOOD GLUCOSE          Vital Signs Last 24 Hrs  T(C): 37.3 (25 Feb 2023 15:59), Max: 37.9 (25 Feb 2023 00:25)  T(F): 99.1 (25 Feb 2023 15:59), Max: 100.3 (25 Feb 2023 00:25)  HR: 112 (25 Feb 2023 15:59) (104 - 118)  BP: 90/57 (25 Feb 2023 15:59) (90/57 - 111/62)  BP(mean): --  RR: 20 (25 Feb 2023 15:59) (18 - 20)  SpO2: 98% (25 Feb 2023 15:59) (98% - 99%)    Parameters below as of 25 Feb 2023 15:59  Patient On (Oxygen Delivery Method): room air            PT/INR - ( 25 Feb 2023 07:09 )   PT: 12.2 sec;   INR: 1.05 ratio                                                            PHYSICAL EXAM:    Constitutional: NAD  HEENT: conjunctive   clear   Neck:  No JVD  Respiratory: decrease bs b/l   Cardiovascular: S1 and S2  Gastrointestinal: BS+, soft, NT/ND  Extremities: No peripheral edema

## 2023-02-25 NOTE — PROGRESS NOTE ADULT - SUBJECTIVE AND OBJECTIVE BOX
CARDIOLOGY FOLLOW UP - Dr. Webber  Date of Service: 2/25/2023  CC: no events    Review of Systems:  Constitutional: No fever, weight loss, or fatigue  Respiratory: No cough, wheezing, or hemoptysis, no shortness of breath  Cardiovascular: No chest pain, palpitations, passing out, dizziness, or leg swelling  Gastrointestinal: No abd or epigastric pain. No nausea, vomiting, or hematemesis; no diarrhea or consiptaiton, no melena or hematochezia  Vascular: No edema     TELEMETRY:    PHYSICAL EXAM:  T(C): 36.8 (02-25-23 @ 08:47), Max: 37.9 (02-25-23 @ 00:25)  HR: 107 (02-25-23 @ 08:47) (101 - 118)  BP: 93/67 (02-25-23 @ 08:47) (86/48 - 111/62)  RR: 18 (02-25-23 @ 08:47) (17 - 20)  SpO2: 98% (02-25-23 @ 08:47) (97% - 99%)  Wt(kg): --  I&O's Summary    24 Feb 2023 07:01  -  25 Feb 2023 07:00  --------------------------------------------------------  IN: 1210 mL / OUT: 0 mL / NET: 1210 mL        Appearance: Normal	  Cardiovascular: Normal S1 S2,RRR, No JVD, No murmurs  Respiratory: Lungs clear to auscultation	  Gastrointestinal:  Soft, Non-tender, + BS	  Extremities: Normal range of motion, No clubbing, cyanosis or edema  Vascular: Peripheral pulses palpable 2+ bilaterally       Home Medications:  digoxin 125 mcg (0.125 mg) oral tablet: 1 tab(s) orally once a day (15 Feb 2023 02:23)  Eliquis 5 mg oral tablet: 1 tab(s) orally 2 times a day (15 Feb 2023 02:23)  metoprolol succinate 25 mg oral tablet, extended release: 1 tab(s) orally once a day (15 Feb 2023 02:23)  selexipag 800 mcg oral tablet: 1 tab(s) orally 2 times a day (15 Feb 2023 02:23)        MEDICATIONS  (STANDING):  apixaban 5 milliGRAM(s) Oral every 12 hours  chlorhexidine 2% Cloths 1 Application(s) Topical <User Schedule>  digoxin     Tablet 125 MICROGram(s) Oral daily  famotidine Injectable 20 milliGRAM(s) IV Push every 12 hours  furosemide    Tablet 20 milliGRAM(s) Oral daily  LORazepam     Tablet 0.5 milliGRAM(s) Oral <User Schedule>  midodrine. 10 milliGRAM(s) Oral three times a day  remdesivir  IVPB 100 milliGRAM(s) IV Intermittent every 24 hours  riociguat 0.5 milliGRAM(s) Oral every 8 hours  voriconazole 200 milliGRAM(s) Oral every 12 hours        EKG:  RADIOLOGY:  DIAGNOSTIC TESTING:  [ ] Echocardiogram:  [ ] Catherterization:  [ ] Stress Test:  OTHER:     LABS:	 	                          10.3   4.53  )-----------( 78       ( 25 Feb 2023 07:05 )             34.2     02-25    136  |  100  |  17  ----------------------------<  91  4.3   |  22  |  0.78    Ca    9.0      25 Feb 2023 07:03    TPro  5.9<L>  /  Alb  3.8  /  TBili  0.6  /  DBili  0.1  /  AST  118<H>  /  ALT  103<H>  /  AlkPhos  245<H>  02-25      PT/INR - ( 25 Feb 2023 07:09 )   PT: 12.2 sec;   INR: 1.05 ratio             CARDIAC MARKERS:

## 2023-02-26 LAB
ALBUMIN SERPL ELPH-MCNC: 3.5 G/DL — SIGNIFICANT CHANGE UP (ref 3.3–5)
ALBUMIN SERPL ELPH-MCNC: 3.6 G/DL — SIGNIFICANT CHANGE UP (ref 3.3–5)
ALP SERPL-CCNC: 181 U/L — HIGH (ref 40–120)
ALP SERPL-CCNC: 186 U/L — HIGH (ref 40–120)
ALT FLD-CCNC: 79 U/L — HIGH (ref 10–45)
ALT FLD-CCNC: 81 U/L — HIGH (ref 10–45)
ANION GAP SERPL CALC-SCNC: 13 MMOL/L — SIGNIFICANT CHANGE UP (ref 5–17)
AST SERPL-CCNC: 82 U/L — HIGH (ref 10–40)
AST SERPL-CCNC: 83 U/L — HIGH (ref 10–40)
BILIRUB DIRECT SERPL-MCNC: 0.1 MG/DL — SIGNIFICANT CHANGE UP (ref 0–0.3)
BILIRUB INDIRECT FLD-MCNC: 0.5 MG/DL — SIGNIFICANT CHANGE UP (ref 0.2–1)
BILIRUB SERPL-MCNC: 0.6 MG/DL — SIGNIFICANT CHANGE UP (ref 0.2–1.2)
BILIRUB SERPL-MCNC: 0.6 MG/DL — SIGNIFICANT CHANGE UP (ref 0.2–1.2)
BUN SERPL-MCNC: 20 MG/DL — SIGNIFICANT CHANGE UP (ref 7–23)
CALCIUM SERPL-MCNC: 9.1 MG/DL — SIGNIFICANT CHANGE UP (ref 8.4–10.5)
CHLORIDE SERPL-SCNC: 103 MMOL/L — SIGNIFICANT CHANGE UP (ref 96–108)
CO2 SERPL-SCNC: 22 MMOL/L — SIGNIFICANT CHANGE UP (ref 22–31)
CREAT SERPL-MCNC: 0.75 MG/DL — SIGNIFICANT CHANGE UP (ref 0.5–1.3)
CREAT SERPL-MCNC: 0.76 MG/DL — SIGNIFICANT CHANGE UP (ref 0.5–1.3)
EGFR: 98 ML/MIN/1.73M2 — SIGNIFICANT CHANGE UP
EGFR: 99 ML/MIN/1.73M2 — SIGNIFICANT CHANGE UP
GLUCOSE SERPL-MCNC: 81 MG/DL — SIGNIFICANT CHANGE UP (ref 70–99)
HCT VFR BLD CALC: 35.6 % — SIGNIFICANT CHANGE UP (ref 34.5–45)
HGB BLD-MCNC: 10.7 G/DL — LOW (ref 11.5–15.5)
INR BLD: 1.35 RATIO — HIGH (ref 0.88–1.16)
MCHC RBC-ENTMCNC: 27.1 PG — SIGNIFICANT CHANGE UP (ref 27–34)
MCHC RBC-ENTMCNC: 30.1 GM/DL — LOW (ref 32–36)
MCV RBC AUTO: 90.1 FL — SIGNIFICANT CHANGE UP (ref 80–100)
NRBC # BLD: 0 /100 WBCS — SIGNIFICANT CHANGE UP (ref 0–0)
PLATELET # BLD AUTO: 86 K/UL — LOW (ref 150–400)
POTASSIUM SERPL-MCNC: 4.3 MMOL/L — SIGNIFICANT CHANGE UP (ref 3.5–5.3)
POTASSIUM SERPL-SCNC: 4.3 MMOL/L — SIGNIFICANT CHANGE UP (ref 3.5–5.3)
PROT SERPL-MCNC: 5.8 G/DL — LOW (ref 6–8.3)
PROT SERPL-MCNC: 5.9 G/DL — LOW (ref 6–8.3)
PROTHROM AB SERPL-ACNC: 15.7 SEC — HIGH (ref 10.5–13.4)
RBC # BLD: 3.95 M/UL — SIGNIFICANT CHANGE UP (ref 3.8–5.2)
RBC # FLD: 19.5 % — HIGH (ref 10.3–14.5)
SODIUM SERPL-SCNC: 138 MMOL/L — SIGNIFICANT CHANGE UP (ref 135–145)
WBC # BLD: 3.97 K/UL — SIGNIFICANT CHANGE UP (ref 3.8–10.5)
WBC # FLD AUTO: 3.97 K/UL — SIGNIFICANT CHANGE UP (ref 3.8–10.5)

## 2023-02-26 PROCEDURE — 99232 SBSQ HOSP IP/OBS MODERATE 35: CPT

## 2023-02-26 RX ORDER — ALPRAZOLAM 0.25 MG
0.25 TABLET ORAL ONCE
Refills: 0 | Status: DISCONTINUED | OUTPATIENT
Start: 2023-02-26 | End: 2023-02-26

## 2023-02-26 RX ORDER — ACETAMINOPHEN 500 MG
1000 TABLET ORAL ONCE
Refills: 0 | Status: COMPLETED | OUTPATIENT
Start: 2023-02-26 | End: 2023-02-26

## 2023-02-26 RX ADMIN — VORICONAZOLE 200 MILLIGRAM(S): 10 INJECTION, POWDER, LYOPHILIZED, FOR SOLUTION INTRAVENOUS at 17:10

## 2023-02-26 RX ADMIN — CHLORHEXIDINE GLUCONATE 1 APPLICATION(S): 213 SOLUTION TOPICAL at 05:47

## 2023-02-26 RX ADMIN — MIDODRINE HYDROCHLORIDE 10 MILLIGRAM(S): 2.5 TABLET ORAL at 14:25

## 2023-02-26 RX ADMIN — Medication 0.5 MILLIGRAM(S): at 08:00

## 2023-02-26 RX ADMIN — Medication 0.25 MILLIGRAM(S): at 09:45

## 2023-02-26 RX ADMIN — MIDODRINE HYDROCHLORIDE 10 MILLIGRAM(S): 2.5 TABLET ORAL at 21:28

## 2023-02-26 RX ADMIN — VORICONAZOLE 200 MILLIGRAM(S): 10 INJECTION, POWDER, LYOPHILIZED, FOR SOLUTION INTRAVENOUS at 05:46

## 2023-02-26 RX ADMIN — Medication 125 MICROGRAM(S): at 05:47

## 2023-02-26 RX ADMIN — RIOCIGUAT 0.5 MILLIGRAM(S): 1.5 TABLET, FILM COATED ORAL at 21:28

## 2023-02-26 RX ADMIN — APIXABAN 5 MILLIGRAM(S): 2.5 TABLET, FILM COATED ORAL at 17:10

## 2023-02-26 RX ADMIN — APIXABAN 5 MILLIGRAM(S): 2.5 TABLET, FILM COATED ORAL at 05:47

## 2023-02-26 RX ADMIN — Medication 0.5 MILLIGRAM(S): at 17:10

## 2023-02-26 RX ADMIN — FAMOTIDINE 20 MILLIGRAM(S): 10 INJECTION INTRAVENOUS at 17:14

## 2023-02-26 RX ADMIN — RIOCIGUAT 0.5 MILLIGRAM(S): 1.5 TABLET, FILM COATED ORAL at 05:46

## 2023-02-26 RX ADMIN — FAMOTIDINE 20 MILLIGRAM(S): 10 INJECTION INTRAVENOUS at 05:46

## 2023-02-26 RX ADMIN — MIDODRINE HYDROCHLORIDE 10 MILLIGRAM(S): 2.5 TABLET ORAL at 05:46

## 2023-02-26 RX ADMIN — Medication 20 MILLIGRAM(S): at 05:47

## 2023-02-26 RX ADMIN — RIOCIGUAT 0.5 MILLIGRAM(S): 1.5 TABLET, FILM COATED ORAL at 14:59

## 2023-02-26 RX ADMIN — REMDESIVIR 200 MILLIGRAM(S): 5 INJECTION INTRAVENOUS at 17:09

## 2023-02-26 RX ADMIN — Medication 0.5 MILLIGRAM(S): at 11:07

## 2023-02-26 NOTE — PROGRESS NOTE ADULT - SUBJECTIVE AND OBJECTIVE BOX
CARDIOLOGY FOLLOW UP - Dr. Webber  Date of Service: 2/26/2023  CC: events noted    Review of Systems:  Constitutional: No fever, weight loss, or fatigue  Respiratory: No cough, wheezing, or hemoptysis, no shortness of breath  Cardiovascular: No chest pain, palpitations, passing out, dizziness, or leg swelling  Gastrointestinal: No abd or epigastric pain. No nausea, vomiting, or hematemesis; no diarrhea or consiptaiton, no melena or hematochezia  Vascular: No edema     TELEMETRY:    PHYSICAL EXAM:  T(C): 36.5 (02-26-23 @ 04:34), Max: 38.4 (02-25-23 @ 20:06)  HR: 105 (02-26-23 @ 04:34) (105 - 114)  BP: 100/70 (02-26-23 @ 04:34) (90/57 - 100/70)  RR: 18 (02-26-23 @ 04:34) (18 - 20)  SpO2: 97% (02-26-23 @ 04:34) (97% - 99%)  Wt(kg): --  I&O's Summary    25 Feb 2023 07:01  -  26 Feb 2023 07:00  --------------------------------------------------------  IN: 220 mL / OUT: 0 mL / NET: 220 mL        Appearance: Normal	  Cardiovascular: Normal S1 S2,RRR, No JVD, No murmurs  Respiratory: Lungs clear to auscultation	  Gastrointestinal:  Soft, Non-tender, + BS	  Extremities: Normal range of motion, No clubbing, cyanosis or edema  Vascular: Peripheral pulses palpable 2+ bilaterally       Home Medications:  digoxin 125 mcg (0.125 mg) oral tablet: 1 tab(s) orally once a day (15 Feb 2023 02:23)  Eliquis 5 mg oral tablet: 1 tab(s) orally 2 times a day (15 Feb 2023 02:23)  metoprolol succinate 25 mg oral tablet, extended release: 1 tab(s) orally once a day (15 Feb 2023 02:23)  selexipag 800 mcg oral tablet: 1 tab(s) orally 2 times a day (15 Feb 2023 02:23)        MEDICATIONS  (STANDING):  ALPRAZolam 0.25 milliGRAM(s) Oral once  apixaban 5 milliGRAM(s) Oral every 12 hours  chlorhexidine 2% Cloths 1 Application(s) Topical <User Schedule>  digoxin     Tablet 125 MICROGram(s) Oral daily  famotidine Injectable 20 milliGRAM(s) IV Push every 12 hours  furosemide    Tablet 20 milliGRAM(s) Oral daily  LORazepam     Tablet 0.5 milliGRAM(s) Oral <User Schedule>  midodrine. 10 milliGRAM(s) Oral three times a day  remdesivir  IVPB 100 milliGRAM(s) IV Intermittent every 24 hours  riociguat 0.5 milliGRAM(s) Oral every 8 hours  voriconazole 200 milliGRAM(s) Oral every 12 hours        EKG:  RADIOLOGY:  DIAGNOSTIC TESTING:  [ ] Echocardiogram:  [ ] Catherterization:  [ ] Stress Test:  OTHER:     LABS:	 	                          10.7   3.97  )-----------( 86       ( 26 Feb 2023 07:18 )             35.6     02-26    138  |  103  |  20  ----------------------------<  81  4.3   |  22  |  0.76    Ca    9.1      26 Feb 2023 07:16    TPro  5.9<L>  /  Alb  3.6  /  TBili  0.6  /  DBili  0.1  /  AST  83<H>  /  ALT  81<H>  /  AlkPhos  186<H>  02-26      PT/INR - ( 26 Feb 2023 07:18 )   PT: 15.7 sec;   INR: 1.35 ratio             CARDIAC MARKERS:

## 2023-02-26 NOTE — PROGRESS NOTE ADULT - ASSESSMENT
47 F, PE, pulm HTN, diagnosed with NHL 7/2022 and started on R-CHOP, last dose 1/23/23 and had a PET and was told she is in remission, usually has 5 days of prednisone after chemo and after that completed started to have fever and chills whish she has been getting since, no significant cough, abd pain, diarrhea or dysuria  here febrile to 102.5, tachy, no hypoxia - on room air.    CT Chest/abd with no PE, Innumerable lung nodules which are nonspecific. In this clinical setting, differential includes infection (in which case, consider fungus) or neoplasm/known lymphoma. Indeterminate low-attenuation lesion in the liver.    s/p RRT for fever, rigors, tachycardia, hypotension, transferred to MICU, but still on RA, now on the floor   blood cx, urine cx, sputum cx, MRSA PCR, fungitell, galactomannan, histo Ag and  negative    Now febrile to 102.6 and tested positive for COVID (negative on admission) had COVID in September and now complete asymptomatic, possibly new infection vs prolonged shedding. Team discussed with lab and the COVID tests x 2 were weakly positive and CT value 0 on different platforms. Third COVID test positive as well.     Recommend:  * f/u the repeat blood cx  * c/w vori   * Onc followup - are lung nodules lymphoma?   * f/u with pulmonary and IR regarding biopsy  * Continue with remdesivir for now, but still remains febrile  * Monitor fever curve    Chon Humphrey MD  Available through MS Teams  If no response, or after 5pm/weekends, call [phone]

## 2023-02-26 NOTE — PROVIDER CONTACT NOTE (OTHER) - BACKGROUND
48 yo f w pmh NHL s/p chemo; last chemo on 1/23/23, reportedly in remission (?in remission), pe on Eliquis
pt came in for sepsis and fever

## 2023-02-26 NOTE — PROGRESS NOTE ADULT - ASSESSMENT
47 F PMH non-Hodgkin lymphoma s/p chemoport (?in remission), PE, pHTN, presenting with recurrent fevers with pancytopenia in setting of recent chemo treatment and prednisone concerning for severe sepsis. Found to be requiring intermittent fluids and continues to be febrile while on broad-spectrum bacterial antibiotic coverage. CT chest imaging concerning for fungal infection vs metastasis. MICU consulted for further management.    COVID positive   Continue with  Remdesivir   ID following       # NHL with pulmonary nodules  -Biopsy Out patient     # pulmonary HTN, suspected Group 4  > RV systolic pressure =51mmHg on TTE  - diurese (IV in MICU), will transition to PO lasix starting 2/21/23  - Riociguat restarted 2/22  =  # HFrEF (EF ~42) with RV dysfunction based on previous echo (2/15/23)  - DC-ed coreg   > Diurese daily  Cards following     # SVT  > digoxin 125 mcg po qD (home medication)  > follow up digoxin level    =====Renal/=====  - PMH STARR while on antibiotics  > appreciate renal recs    =====Endocrine=====  - No active issues.       # severe sepsis with unclear etiology in setting of known malginancy and recent chemotx for cancer   - iso immunocompromise, hx and CT chest imaging c/f infection (e.g. fungal pna) vs progression of known cancer  > switched Zosyn to cefepime (2/17-21)  > MRSA swab negative, vanc dc'd  > started voriconazole 200mg po bid   ID following      # DLBCL  >   #leukopenia on filgastrim  #anemia  #thrombocytopenia  - possibly from recent chemo and cancer  > Hb goal >7  > S/p 3d of zarxio  > Trend Plt

## 2023-02-26 NOTE — PROGRESS NOTE ADULT - ASSESSMENT
Echo 2/15/23: Global lv dysfxn EF 42%, septal flattening, RV overload, mod pulm htn    A/P  48 yo f w pmh nhl s/p chemo (?in remission), pe, ?pah, covid, p/w fevers for the last few days, a/w generalized weakness, found to be in severe sepsis and w/ episode of SVT to 170s.    #SVT  -Pt with known hx of SVT  -Has been evaluated in past, was not candidate for ablation d/t chemo  -No longer SVT, remains sinus tachy  -Continue digoxin  -Hypotension precludes bb use  -event toprol if bp improved    #HFrEF, RV failure  -Echo with global lv dysfxn  -likely nonischemic in etiology  -Does not appear fluid overloaded  -Continue lasix qd as ordered  -hypotension precludes lv dysfxn meds    #Sepsis, r/o fungemia   -Lung nodules noted on CT, c/f fungal infection  -Pending possible bx  -Pt is cleared from cv perspective to undergo bx with acceptable cv risk  -W/u & abx per micu, ID    #Hypotension  -I/S/O sepsis, HF  -Cont midodrine    #Leukopenia  -Hx NHL  -Heme/onc following    #COVID  -Unclear if acute or latent infection  -Mgmt per ID, primary        35 minutes spent on total encounter; more than 50% of the visit was spent counseling and/or coordinating care by the attending physician.

## 2023-02-26 NOTE — PROGRESS NOTE ADULT - SUBJECTIVE AND OBJECTIVE BOX
OZ BARBOSA  MRN#: [id number]  Subjective:  CHIEF COMPLAINT: fever . Pulmonary HTN , non hodgkin's lymphoma   46 yo f a private patient from my office report to the ER   w PMH non hodgkin's lymphoma  s/p chemo at [facility] [place]  (?in remission), PE and DVT ,S/P  covid pneumonia ,severe pulmonary HTN on Adempas , p/w fevers for the last few days, a/w generalized weakness over the last couple of weeks along with poor po intake. she denies chest pain, palpitations, lightheadedness, cough, wheeze, abdominal discomfort. the  patient does endorse n+v, dysuria, arriaza. of note, last session of chemo reportedly on 2023. Pt grew concerned so went to oncologist yesterday where she got cultures, blood work, and xray. as she did not feel improved, patient presents to [facility] er for further evaluation.  (15 Feb 2023 01:39) CT scan bilateral lung nodule , no H/O OF exposure to TB , no significant travel history to the [facility] out of the country or the [facility] area  transfer to the MICU for SVT patient is on Digoxin and B-Blockers , hypotension rebound back feeling better on cefepime , and voriconazole for presumptive fungal pneumonia , continue to have  fever at night  up to 101 ,  sweat appetite is suppressed   fever curve is better discuss with MICU team to consider for lung biopsy , lymphoma oncology will be called in .fever subsides no definitive DX is made tired and sleepy if recurrent fever happened patient may need BAL and possible navigation bronchoscopy with Biopsy of large left lung mass , remain afebrile for 48 hrs continue on voriconazole , patient spike to 103 and tested positive for covid 19 placed on isolations , 02% is 97% on RA   continue to have fever , placed on Remdesivir by ID , low grade temp 99.9  over night still very upset , but no new C/O       PAST MEDICAL & SURGICAL HISTORY:  Non-Hodgkin&#x27;s lymphoma      Pulmonary embolism      History of appendectomy      History of cholecystectomy                OBJECTIVE:  ICU Vital Signs Last 24 Hrs  T(C): 37.7 (2023 18:00), Max: 39.6 (2023 01:36)  T(F): 99.9 (2023 18:00), Max: 103.2 (2023 01:36)  HR: 122 (2023 18:00) (114 - 167)  BP: 91/63 (2023 18:00) (80/55 - 108/66)  BP(mean): 73 (2023 18:00) (60 - 81)  ABP: --  ABP(mean): --  RR: 29 (2023 18:00) (16 - 33)  SpO2: 97% (2023 18:00) (94% - 100%)    O2 Parameters below as of 2023 02:40  Patient On (Oxygen Delivery Method): room air             @ 07:01  -  - @ 07:00  --------------------------------------------------------  IN: 1100 mL / OUT: 0 mL / NET: 1100 mL     @ 07:01  -  - @ 19:19  --------------------------------------------------------  IN: 700 mL / OUT: 2600 mL / NET: -1900 mL      PHYSICAL EXAM :Daily   cushinoid female in no acute distress   Daily Weight in k.6 (2023 09:20)  HEENT:     + NCAT  + EOMI  - Conjuctival edema   - Icterus   - Thrush   - ETT  - NGT/OGT  Neck:         + FROM    + JVD     - Nodes     - Masses    + Mid-line trachea   - Tracheostomy  Chest: normal findings  Lungs:          + CTA   + Rhonchi    - Rales    - Wheezing     - Decreased BS   - Dullness R L  Cardiac:       + S1 + S2    + RRR   - Irregular   - S3  - S4  + Murmurs   - Rub   - Hamman’s sign   Abdomen:    + BS     + Soft    + Non-tender     - Distended    - Organomegaly  - PEG  Extremities:   - Cyanosis U/L   - Clubbing  U/L  - LE/UE Edema   + Capillary refill    + Pulses   Neuro:        + Awake   +  Alert   - Confused   - Lethargic   - Sedated   - Generalized Weakness  Skin:        - Rashes    - Erythema   + Normal incisions   + IV sites intact        HOSPITAL MEDICATIONS: All mediciations reviewed and analyzed  MEDICATIONS  (STANDING):  acetaminophen   IVPB .. 1000 milliGRAM(s) IV Intermittent once  cefepime   IVPB      cefepime   IVPB 2000 milliGRAM(s) IV Intermittent every 8 hours  chlorhexidine 2% Cloths 1 Application(s) Topical <User Schedule>  chlorhexidine 4% Liquid 1 Application(s) Topical <User Schedule>  digoxin     Tablet 125 MICROGram(s) Oral daily  enoxaparin Injectable 70 milliGRAM(s) SubCutaneous every 12 hours  midodrine. 10 milliGRAM(s) Oral three times a day  voriconazole 200 milliGRAM(s) Oral every 12 hours    MEDICATIONS  (PRN):  ondansetron Injectable 4 milliGRAM(s) IV Push every 8 hours PRN Nausea and/or Vomiting    LABS: All Lab data reviewed and analyzed                                     10.3   4.53  )-----------( 78       ( 2023 07:05 )             34.2        136  |  100  |  17  ----------------------------<  91  4.3   |  22  |  0.78    Ca    9.0      2023 07:03    TPro  5.9<L>  /  Alb  3.8  /  TBili  0.6  /  DBili  0.1  /  AST  118<H>  /  ALT  103<H>  /  AlkPhos  245<H>      Ca    9.1      2023 06:28  Phos  3.7       Mg     2.0            LIVER FUNCTIONS - ( 2023 01:21 )  Alb: 3.2 g/dL / Pro: 5.1 g/dL / ALK PHOS: 64 U/L / ALT: 36 U/L / AST: 43 U/L / GGT: x           RADIOLOGY: - Reviewed and analyzed

## 2023-02-26 NOTE — PROGRESS NOTE ADULT - NUTRITIONAL ASSESSMENT
This patient has been assessed with a concern for Malnutrition and has been determined to have a diagnosis/diagnoses of Severe protein-calorie malnutrition.    This patient is being managed with:   Diet Regular-  1500mL Fluid Restriction ([id number])  Supplement Feeding Modality:  Oral  Ensure Enlive Cans or Servings Per Day:  1       Frequency:  Two Times a day  Entered: Feb 21 2023  8:25AM

## 2023-02-26 NOTE — PROVIDER CONTACT NOTE (OTHER) - ACTION/TREATMENT ORDERED:
Becca Hicks (PA) notified. RRT called
PA notifed. Team aware of soft BP and high HR, Tylenol was recommended and given. Will recheck temp and montior

## 2023-02-26 NOTE — PROGRESS NOTE ADULT - SUBJECTIVE AND OBJECTIVE BOX
OVERNIGHT EVENTS / SUBJECTIVE: Patient seen and examined at bedside. RANULFO.    OBJECTIVE:    VITAL SIGNS:    No events       T(C): 36.8 (02-26-23 @ 15:33), Max: 36.8 (02-26-23 @ 15:33)  HR: 104 (02-26-23 @ 15:33) (98 - 104)  BP: 94/61 (02-26-23 @ 15:33) (93/62 - 94/61)  RR: 20 (02-26-23 @ 15:33) (18 - 20)  SpO2: 96% (02-26-23 @ 15:33) (96% - 96%)      MEDICATIONS  (STANDING):  apixaban 5 milliGRAM(s) Oral every 12 hours  chlorhexidine 2% Cloths 1 Application(s) Topical <User Schedule>  digoxin     Tablet 125 MICROGram(s) Oral daily  famotidine Injectable 20 milliGRAM(s) IV Push every 12 hours  furosemide    Tablet 20 milliGRAM(s) Oral daily  LORazepam     Tablet 0.5 milliGRAM(s) Oral <User Schedule>  midodrine. 10 milliGRAM(s) Oral three times a day  remdesivir  IVPB 100 milliGRAM(s) IV Intermittent every 24 hours  riociguat 0.5 milliGRAM(s) Oral every 8 hours  voriconazole 200 milliGRAM(s) Oral every 12 hours    MEDICATIONS  (PRN):  acetaminophen     Tablet .. 650 milliGRAM(s) Oral every 6 hours PRN Temp greater or equal to 38C (100.4F), Mild Pain (1 - 3)  LORazepam   Injectable 0.2 milliGRAM(s) IV Push three times a day PRN breakthrough nausea/refractory to PO nausea tx                HEENT: NC/AT; PERRL, clear conjunctiva  Neck: supple  Respiratory: CTA b/l  Cardiovascular: +S1/S2; RRR  Abdomen: soft, NT/ND; +BS x4  Extremities: WWP, 2+ peripheral pulses b/l; no LE edema  Skin: normal color and turgor; no rash  Neurological: A&Ox4    ondansetron Injectable 4 milliGRAM(s) IV Push every 8 hours PRN Nausea and/or Vomiting      ALLERGIES:  Allergies    No Known Allergies    Intolerances                                              10.7   3.97  )-----------( 86       ( 26 Feb 2023 07:18 )             35.6           LIVER FUNCTIONS - ( 26 Feb 2023 07:16 )  Alb: 3.6 g/dL / Pro: 5.9 g/dL / ALK PHOS: 186 U/L / ALT: 81 U/L / AST: 83 U/L / GGT: x           PT/INR - ( 26 Feb 2023 07:18 )   PT: 15.7 sec;   INR: 1.35 ratio           138|103|20<81  4.3|22|0.76  9.1,--,--  02-26 @ 07:16                  EKG:   MICROBIOLOGY:    IMAGING:      Labs, imaging, EKG personally reviewed    RADIOLOGY & ADDITIONAL TESTS: Reviewed.

## 2023-02-26 NOTE — PROGRESS NOTE ADULT - ASSESSMENT
48 yo f w pmh nhl s/p chemo (?in remission), pe, ?pah, covid, p/w fevers for the last few days, a/w generalized weakness over the last couple of weeks along with poor po intake. denies chest pain, palpitations, lightheadedness, cough, wheeze, abdominal discomfort. patient does endorse n+v, dysuria, arriaza. of note, last session of chemo reportedly on 1/23/2023. Pt grew concerned so went to oncologist yesterday where she got cultures, blood work, and xray. as she did not feel improved, patient presents to [facility] er for further evaluation.  (15 Feb 2023 01:39)  BP monitoring,continue current antihypertensive meds, low salt diet,followup with PMD in 1-2 weeks      pt had letitia and pt was on hemodialysis for weeks and s/p cvvh at [facility] now gfr is controlled furosemide    Tablet 20 milliGRAM(s) Oral daily  Serum creatinine is stable at 0.8, approximating a GFR of is controlled  ml/min.   There is no progression.  No uremic symptoms. pos  evidence of  worsening  Anemia. Fluid status stable.   Will continue to avoid nephrotoxic drugs.  Patient remains asymptomatic.  Continue current therapy.    hyponatremia   improved  Non hodgkin's lymphoma may be  in remission   NEW fever positive covid 19 ID start Remdesivir   Fever etiology infection V/S lymphoma   pan culture   on Antibiotics sever pulmonary HTN an Adempas 0.5 mg TID       BP monitoring,followup with PMD in 1-2 weeks    Admit for septic workup and ID evaluation,send blood and urine cx,serial lactate levels,monitor vitals closley,ivfs hydration,monitor urine output and renal profile,iv abx as per id cons\  remdesivir  IVPB 100 milliGRAM(s) IV Intermittent every 24 hours  voriconazole 200 milliGRAM(s) Oral every 12 hours    dvt enoxaparin Injectable 70 milliGRAM(s) SubCutaneous every 12 hours    hypotension midodrine. 10 milliGRAM(s) Oral three times a day

## 2023-02-26 NOTE — PROGRESS NOTE ADULT - SUBJECTIVE AND OBJECTIVE BOX
CC: Patient is a 48y old  Female who presents with a chief complaint of fever (26 Feb 2023 08:58)    ID following for fevers    Interval History/ROS: Patient remains with fevers. Remains with palpitations with movement. Otherwise feels well. No new complaints. On room air.    Rest of ROS negative.    Allergies  No Known Allergies    ANTIMICROBIALS:  remdesivir  IVPB 100 every 24 hours  voriconazole 200 every 12 hours    OTHER MEDS:  acetaminophen     Tablet .. 650 milliGRAM(s) Oral every 6 hours PRN  apixaban 5 milliGRAM(s) Oral every 12 hours  chlorhexidine 2% Cloths 1 Application(s) Topical <User Schedule>  digoxin     Tablet 125 MICROGram(s) Oral daily  famotidine Injectable 20 milliGRAM(s) IV Push every 12 hours  furosemide    Tablet 20 milliGRAM(s) Oral daily  LORazepam     Tablet 0.5 milliGRAM(s) Oral <User Schedule>  LORazepam   Injectable 0.2 milliGRAM(s) IV Push three times a day PRN  midodrine. 10 milliGRAM(s) Oral three times a day  riociguat 0.5 milliGRAM(s) Oral every 8 hours      PE:    Vital Signs Last 24 Hrs  T(C): 36.5 (26 Feb 2023 04:34), Max: 38.4 (25 Feb 2023 20:06)  T(F): 97.7 (26 Feb 2023 04:34), Max: 101.1 (25 Feb 2023 20:06)  HR: 105 (26 Feb 2023 04:34) (105 - 114)  BP: 100/70 (26 Feb 2023 04:34) (90/57 - 100/70)  BP(mean): --  RR: 18 (26 Feb 2023 04:34) (18 - 20)  SpO2: 97% (26 Feb 2023 04:34) (97% - 99%)    Parameters below as of 26 Feb 2023 04:34  Patient On (Oxygen Delivery Method): room air    Gen: AOx3, NAD  CV: S1+S2 normal, no murmurs  Resp: Clear bilat, no resp distress  Abd: Soft, nontender, +BS  Ext: No LE edema, no wounds  : No Roman  IV/Skin: No thrombophlebitis  Neuro: no focal deficits    LABS:                          10.7   3.97  )-----------( 86       ( 26 Feb 2023 07:18 )             35.6       02-26    138  |  103  |  20  ----------------------------<  81  4.3   |  22  |  0.76    Ca    9.1      26 Feb 2023 07:16    TPro  5.9<L>  /  Alb  3.6  /  TBili  0.6  /  DBili  0.1  /  AST  83<H>  /  ALT  81<H>  /  AlkPhos  186<H>  02-26          MICROBIOLOGY:  v  Clean Catch Clean Catch (Midstream)  02-23-23   No growth  --  --      .Blood Blood-Peripheral  02-23-23   No growth to date.  --  --      Clean Catch Clean Catch (Midstream)  02-17-23   No growth  --    Rare polymorphonuclear leukocytes per low power field  Rare Squamous epithelial cells per low power field  Rare Gram Negative Rods per oil power field      .Blood Blood-Peripheral  02-17-23   No Growth Final  --  --      .Blood Blood-Peripheral  02-16-23   No Growth Final  --  --      .Blood Blood-Peripheral  02-16-23   No Growth Final  --  --      Clean Catch Clean Catch (Midstream)  02-14-23   No growth  --  --      .Blood Blood-Peripheral  02-14-23   No Growth Final  --  --      .Blood Blood-Peripheral  02-14-23   No Growth Final  --  --    RADIOLOGY:    < from: Xray Chest 1 View- PORTABLE-Routine (Xray Chest 1 View- PORTABLE-Routine in AM.) (02.25.23 @ 09:23) >    IMPRESSION:  Left base clearing.    < end of copied text >

## 2023-02-27 LAB
ALBUMIN SERPL ELPH-MCNC: 3.4 G/DL — SIGNIFICANT CHANGE UP (ref 3.3–5)
ALBUMIN SERPL ELPH-MCNC: 3.5 G/DL — SIGNIFICANT CHANGE UP (ref 3.3–5)
ALP SERPL-CCNC: 148 U/L — HIGH (ref 40–120)
ALP SERPL-CCNC: 148 U/L — HIGH (ref 40–120)
ALT FLD-CCNC: 68 U/L — HIGH (ref 10–45)
ALT FLD-CCNC: 69 U/L — HIGH (ref 10–45)
ANION GAP SERPL CALC-SCNC: 13 MMOL/L — SIGNIFICANT CHANGE UP (ref 5–17)
APPEARANCE UR: CLEAR — SIGNIFICANT CHANGE UP
AST SERPL-CCNC: 73 U/L — HIGH (ref 10–40)
AST SERPL-CCNC: 73 U/L — HIGH (ref 10–40)
BACTERIA # UR AUTO: NEGATIVE — SIGNIFICANT CHANGE UP
BILIRUB DIRECT SERPL-MCNC: 0.1 MG/DL — SIGNIFICANT CHANGE UP (ref 0–0.3)
BILIRUB INDIRECT FLD-MCNC: 0.5 MG/DL — SIGNIFICANT CHANGE UP (ref 0.2–1)
BILIRUB SERPL-MCNC: 0.6 MG/DL — SIGNIFICANT CHANGE UP (ref 0.2–1.2)
BILIRUB SERPL-MCNC: 0.6 MG/DL — SIGNIFICANT CHANGE UP (ref 0.2–1.2)
BILIRUB UR-MCNC: NEGATIVE — SIGNIFICANT CHANGE UP
BUN SERPL-MCNC: 18 MG/DL — SIGNIFICANT CHANGE UP (ref 7–23)
CALCIUM SERPL-MCNC: 8.9 MG/DL — SIGNIFICANT CHANGE UP (ref 8.4–10.5)
CHLORIDE SERPL-SCNC: 100 MMOL/L — SIGNIFICANT CHANGE UP (ref 96–108)
CO2 SERPL-SCNC: 23 MMOL/L — SIGNIFICANT CHANGE UP (ref 22–31)
COLOR SPEC: SIGNIFICANT CHANGE UP
CREAT SERPL-MCNC: 0.86 MG/DL — SIGNIFICANT CHANGE UP (ref 0.5–1.3)
CREAT SERPL-MCNC: 0.88 MG/DL — SIGNIFICANT CHANGE UP (ref 0.5–1.3)
DIFF PNL FLD: NEGATIVE — SIGNIFICANT CHANGE UP
EGFR: 82 ML/MIN/1.73M2 — SIGNIFICANT CHANGE UP
EGFR: 84 ML/MIN/1.73M2 — SIGNIFICANT CHANGE UP
EPI CELLS # UR: 4 /HPF — SIGNIFICANT CHANGE UP
GLUCOSE SERPL-MCNC: 87 MG/DL — SIGNIFICANT CHANGE UP (ref 70–99)
GLUCOSE UR QL: NEGATIVE — SIGNIFICANT CHANGE UP
HCT VFR BLD CALC: 33.7 % — LOW (ref 34.5–45)
HGB BLD-MCNC: 10 G/DL — LOW (ref 11.5–15.5)
HYALINE CASTS # UR AUTO: 0 /LPF — SIGNIFICANT CHANGE UP (ref 0–2)
INR BLD: 1.36 RATIO — HIGH (ref 0.88–1.16)
KETONES UR-MCNC: NEGATIVE — SIGNIFICANT CHANGE UP
LEUKOCYTE ESTERASE UR-ACNC: ABNORMAL
MCHC RBC-ENTMCNC: 26.8 PG — LOW (ref 27–34)
MCHC RBC-ENTMCNC: 29.7 GM/DL — LOW (ref 32–36)
MCV RBC AUTO: 90.3 FL — SIGNIFICANT CHANGE UP (ref 80–100)
NITRITE UR-MCNC: NEGATIVE — SIGNIFICANT CHANGE UP
NRBC # BLD: 0 /100 WBCS — SIGNIFICANT CHANGE UP (ref 0–0)
PH UR: 5.5 — SIGNIFICANT CHANGE UP (ref 5–8)
PLATELET # BLD AUTO: 133 K/UL — LOW (ref 150–400)
POTASSIUM SERPL-MCNC: 4.1 MMOL/L — SIGNIFICANT CHANGE UP (ref 3.5–5.3)
POTASSIUM SERPL-SCNC: 4.1 MMOL/L — SIGNIFICANT CHANGE UP (ref 3.5–5.3)
PROT SERPL-MCNC: 5.4 G/DL — LOW (ref 6–8.3)
PROT SERPL-MCNC: 5.4 G/DL — LOW (ref 6–8.3)
PROT UR-MCNC: SIGNIFICANT CHANGE UP
PROTHROM AB SERPL-ACNC: 15.7 SEC — HIGH (ref 10.5–13.4)
RBC # BLD: 3.73 M/UL — LOW (ref 3.8–5.2)
RBC # FLD: 19 % — HIGH (ref 10.3–14.5)
RBC CASTS # UR COMP ASSIST: 1 /HPF — SIGNIFICANT CHANGE UP (ref 0–4)
SODIUM SERPL-SCNC: 136 MMOL/L — SIGNIFICANT CHANGE UP (ref 135–145)
SP GR SPEC: 1.01 — LOW (ref 1.01–1.02)
UROBILINOGEN FLD QL: NEGATIVE — SIGNIFICANT CHANGE UP
WBC # BLD: 4.15 K/UL — SIGNIFICANT CHANGE UP (ref 3.8–10.5)
WBC # FLD AUTO: 4.15 K/UL — SIGNIFICANT CHANGE UP (ref 3.8–10.5)
WBC UR QL: 14 /HPF — HIGH (ref 0–5)

## 2023-02-27 PROCEDURE — 99232 SBSQ HOSP IP/OBS MODERATE 35: CPT

## 2023-02-27 RX ORDER — ALPRAZOLAM 0.25 MG
0.25 TABLET ORAL ONCE
Refills: 0 | Status: DISCONTINUED | OUTPATIENT
Start: 2023-02-27 | End: 2023-02-27

## 2023-02-27 RX ADMIN — Medication 650 MILLIGRAM(S): at 22:38

## 2023-02-27 RX ADMIN — Medication 1000 MILLIGRAM(S): at 00:25

## 2023-02-27 RX ADMIN — RIOCIGUAT 0.5 MILLIGRAM(S): 1.5 TABLET, FILM COATED ORAL at 06:39

## 2023-02-27 RX ADMIN — RIOCIGUAT 0.5 MILLIGRAM(S): 1.5 TABLET, FILM COATED ORAL at 21:38

## 2023-02-27 RX ADMIN — Medication 400 MILLIGRAM(S): at 00:10

## 2023-02-27 RX ADMIN — REMDESIVIR 200 MILLIGRAM(S): 5 INJECTION INTRAVENOUS at 17:51

## 2023-02-27 RX ADMIN — Medication 0.5 MILLIGRAM(S): at 17:25

## 2023-02-27 RX ADMIN — Medication 0.5 MILLIGRAM(S): at 11:26

## 2023-02-27 RX ADMIN — Medication 650 MILLIGRAM(S): at 21:38

## 2023-02-27 RX ADMIN — Medication 20 MILLIGRAM(S): at 06:38

## 2023-02-27 RX ADMIN — Medication 125 MICROGRAM(S): at 06:39

## 2023-02-27 RX ADMIN — Medication 0.25 MILLIGRAM(S): at 23:28

## 2023-02-27 RX ADMIN — APIXABAN 5 MILLIGRAM(S): 2.5 TABLET, FILM COATED ORAL at 06:38

## 2023-02-27 RX ADMIN — VORICONAZOLE 200 MILLIGRAM(S): 10 INJECTION, POWDER, LYOPHILIZED, FOR SOLUTION INTRAVENOUS at 17:25

## 2023-02-27 RX ADMIN — MIDODRINE HYDROCHLORIDE 10 MILLIGRAM(S): 2.5 TABLET ORAL at 06:38

## 2023-02-27 RX ADMIN — MIDODRINE HYDROCHLORIDE 10 MILLIGRAM(S): 2.5 TABLET ORAL at 21:38

## 2023-02-27 RX ADMIN — CHLORHEXIDINE GLUCONATE 1 APPLICATION(S): 213 SOLUTION TOPICAL at 06:40

## 2023-02-27 RX ADMIN — RIOCIGUAT 0.5 MILLIGRAM(S): 1.5 TABLET, FILM COATED ORAL at 13:20

## 2023-02-27 RX ADMIN — APIXABAN 5 MILLIGRAM(S): 2.5 TABLET, FILM COATED ORAL at 17:26

## 2023-02-27 RX ADMIN — FAMOTIDINE 20 MILLIGRAM(S): 10 INJECTION INTRAVENOUS at 06:39

## 2023-02-27 RX ADMIN — Medication 0.5 MILLIGRAM(S): at 06:38

## 2023-02-27 RX ADMIN — VORICONAZOLE 200 MILLIGRAM(S): 10 INJECTION, POWDER, LYOPHILIZED, FOR SOLUTION INTRAVENOUS at 06:38

## 2023-02-27 RX ADMIN — FAMOTIDINE 20 MILLIGRAM(S): 10 INJECTION INTRAVENOUS at 17:26

## 2023-02-27 RX ADMIN — MIDODRINE HYDROCHLORIDE 10 MILLIGRAM(S): 2.5 TABLET ORAL at 13:20

## 2023-02-27 NOTE — PROGRESS NOTE ADULT - PROBLEM SELECTOR PLAN 2
- PPSV 50% pt needs assistance with ADLS, reports fatigue with small tasks
a/w thrombocytopenia    RRT for Febrile Hypotension, Tachycardia 130, BP 88/54 mmHg.   - Notable for Fever of 102*F and Tachycardia improved after fluid resuscitation     - CT Chest c/w Multiple Lung Nodules concerns for infection vs. metastasis    - Neutropenic Sepsis empiric ABx Coverage plus Antifungal coverage   - Continue IV fluid and 5%v albumin resuscitation, stress dose steroid   - Midodrine 10mg PO TID   -
- PPSV 50% pt needs assistance with ADLS, reports fatigue with small tasks
- PPSV 50% pt needs assistance with ADLS, reports fatigue with small tasks

## 2023-02-27 NOTE — PROGRESS NOTE ADULT - SUBJECTIVE AND OBJECTIVE BOX
Patient is a 47y Female whom presented to the hospital with abnormal electrolyte     PAST MEDICAL & SURGICAL HISTORY:  Non-Hodgkin&#x27;s lymphoma      Pulmonary embolism      History of appendectomy      History of cholecystectomy          MEDICATIONS  (STANDING):  apixaban 5 milliGRAM(s) Oral every 12 hours  chlorhexidine 2% Cloths 1 Application(s) Topical <User Schedule>  digoxin     Tablet 125 MICROGram(s) Oral daily  filgrastim-sndz (ZARXIO) Injectable 480 MICROGram(s) SubCutaneous daily  midodrine. 10 milliGRAM(s) Oral three times a day  piperacillin/tazobactam IVPB.. 3.375 Gram(s) IV Intermittent every 8 hours  potassium chloride    Tablet ER 40 milliEquivalent(s) Oral once  sodium bicarbonate  Infusion 0.086 mEq/kG/Hr (75 mL/Hr) IV Continuous <Continuous>  vancomycin  IVPB 1000 milliGRAM(s) IV Intermittent every 12 hours      Allergies    No Known Allergies    Intolerances        SOCIAL HISTORY:  Denies ETOh,Smoking,     FAMILY HISTORY:      REVIEW OF SYSTEMS:    CONSTITUTIONAL: pos  weakness, fevers or chills  CARDIOVASCULAR: No chest pain or palpitations  GASTROINTESTINAL: No abdominal or epigastric pain. No nausea, vomiting,                                             10.0   4.15  )-----------( 133      ( 2023 07:11 )             33.7       CBC Full  -  ( 2023 07:11 )  WBC Count : 4.15 K/uL  RBC Count : 3.73 M/uL  Hemoglobin : 10.0 g/dL  Hematocrit : 33.7 %  Platelet Count - Automated : 133 K/uL  Mean Cell Volume : 90.3 fl  Mean Cell Hemoglobin : 26.8 pg  Mean Cell Hemoglobin Concentration : 29.7 gm/dL  Auto Neutrophil # : x  Auto Lymphocyte # : x  Auto Monocyte # : x  Auto Eosinophil # : x  Auto Basophil # : x  Auto Neutrophil % : x  Auto Lymphocyte % : x  Auto Monocyte % : x  Auto Eosinophil % : x  Auto Basophil % : x          136  |  100  |  18  ----------------------------<  87  4.1   |  23  |  0.88    Ca    8.9      2023 07:11    TPro  5.4<L>  /  Alb  3.5  /  TBili  0.6  /  DBili  0.1  /  AST  73<H>  /  ALT  69<H>  /  AlkPhos  148<H>        CAPILLARY BLOOD GLUCOSE          Vital Signs Last 24 Hrs  T(C): 36.6 (2023 11:40), Max: 39.4 (2023 23:38)  T(F): 97.8 (2023 11:40), Max: 103 (2023 23:38)  HR: 98 (2023 11:40) (98 - 128)  BP: 85/54 (2023 11:40) (80/52 - 105/66)  BP(mean): --  RR: 18 (2023 11:40) (17 - 20)  SpO2: 97% (2023 11:40) (96% - 98%)    Parameters below as of 2023 08:32  Patient On (Oxygen Delivery Method): room air        Urinalysis Basic - ( 2023 07:12 )    Color: Light Yellow / Appearance: Clear / S.009 / pH: x  Gluc: x / Ketone: Negative  / Bili: Negative / Urobili: Negative   Blood: x / Protein: Trace / Nitrite: Negative   Leuk Esterase: Moderate / RBC: 1 /hpf / WBC 14 /HPF   Sq Epi: x / Non Sq Epi: 4 /hpf / Bacteria: Negative        PT/INR - ( 2023 07:11 )   PT: 15.7 sec;   INR: 1.36 ratio                                                          PHYSICAL EXAM:    Constitutional: NAD  HEENT: conjunctive   clear   Neck:  No JVD  Respiratory: decrease bs b/l   Cardiovascular: S1 and S2  Gastrointestinal: BS+, soft, NT/ND  Extremities: No peripheral edema

## 2023-02-27 NOTE — PROGRESS NOTE ADULT - SUBJECTIVE AND OBJECTIVE BOX
HPI:  48 yo f w pmh nhl s/p chemo (?in remission), pe, ?pah, covid, p/w fevers for the last few days, a/w generalized weakness over the last couple of weeks along with poor po intake. denies chest pain, palpitations, lightheadedness, cough, wheeze, abdominal discomfort. patient does endorse n+v, dysuria, arriaza. of note, last session of chemo reportedly on 1/23/2023. Pt grew concerned so went to oncologist yesterday where she got cultures, blood work, and xray. as she did not feel improved, patient presents to [facility] er for further evaluation.  (15 Feb 2023 01:39)    PAST MEDICAL & SURGICAL HISTORY:  Non-Hodgkin&#x27;s lymphoma      Pulmonary embolism      History of appendectomy      History of cholecystectomy        Allergies    No Known Allergies    Intolerances      Social History:  no pertinent social history (15 Feb 2023 01:39)    Medications:  acetaminophen     Tablet .. 650 milliGRAM(s) Oral every 6 hours PRN Temp greater or equal to 38C (100.4F), Mild Pain (1 - 3)  apixaban 5 milliGRAM(s) Oral every 12 hours  chlorhexidine 2% Cloths 1 Application(s) Topical <User Schedule>  digoxin     Tablet 125 MICROGram(s) Oral daily  famotidine Injectable 20 milliGRAM(s) IV Push every 12 hours  furosemide    Tablet 20 milliGRAM(s) Oral daily  LORazepam     Tablet 0.5 milliGRAM(s) Oral <User Schedule>  LORazepam   Injectable 0.2 milliGRAM(s) IV Push three times a day PRN breakthrough nausea/refractory to PO nausea tx  midodrine. 10 milliGRAM(s) Oral three times a day  remdesivir  IVPB 100 milliGRAM(s) IV Intermittent every 24 hours  riociguat 0.5 milliGRAM(s) Oral every 8 hours  voriconazole 200 milliGRAM(s) Oral every 12 hours    Labs:  CBC Full  -  ( 27 Feb 2023 07:11 )  WBC Count : 4.15 K/uL  RBC Count : 3.73 M/uL  Hemoglobin : 10.0 g/dL  Hematocrit : 33.7 %  Platelet Count - Automated : 133 K/uL  Mean Cell Volume : 90.3 fl  Mean Cell Hemoglobin : 26.8 pg  Mean Cell Hemoglobin Concentration : 29.7 gm/dL  Auto Neutrophil # : x  Auto Lymphocyte # : x  Auto Monocyte # : x  Auto Eosinophil # : x  Auto Basophil # : x  Auto Neutrophil % : x  Auto Lymphocyte % : x  Auto Monocyte % : x  Auto Eosinophil % : x  Auto Basophil % : x    02-27    136  |  100  |  18  ----------------------------<  87  4.1   |  23  |  0.88    Ca    8.9      27 Feb 2023 07:11    TPro  5.4<L>  /  Alb  3.5  /  TBili  0.6  /  DBili  0.1  /  AST  73<H>  /  ALT  69<H>  /  AlkPhos  148<H>  02-27      Radiology:             ROS:  Patient comfortable without distress  No SOB or chest pain  No palpitation  No abdominal pain, diarrhaea or constipation  No weakness of extremities  No skin changes or swelling of legs  Rest of the comprehensive ROS was negative  Vital Signs Last 24 Hrs  T(C): 36.7 (27 Feb 2023 08:32), Max: 39.4 (26 Feb 2023 23:38)  T(F): 98.1 (27 Feb 2023 08:32), Max: 103 (26 Feb 2023 23:38)  HR: 105 (27 Feb 2023 08:32) (98 - 128)  BP: 88/55 (27 Feb 2023 08:32) (80/52 - 105/66)  BP(mean): --  RR: 18 (27 Feb 2023 08:32) (17 - 20)  SpO2: 97% (27 Feb 2023 08:32) (96% - 98%)    Parameters below as of 27 Feb 2023 08:32  Patient On (Oxygen Delivery Method): room air        Physical exam:  Patient alert and oriented  No distress  CVS: S1, S2 regular or murmur  Chest: bilateral breath sound without rales  Abdomen: soft, not tender, no organomegaly or masses  CNS: No focal neuro deficit  Musculoskeletal:  Normal range of motion  Skin: No rash    Assessment and Plan: 48 yo f w pmh nhl s/p chemo (?in remission), PE, ?pah, covid, admitted 2/15/23 with fevers for the last few days, a/w generalized weakness over the last couple of weeks along with poor po intake. denied chest pain, palpitations, lightheadedness, cough, wheeze, abdominal discomfort. patient did endorse n+v, dysuria, arriaza. of note, last session of chemo reportedly on 1/23/2023. Pt grew concerned so went to oncologist, where she got cultures, blood work, and xray. as she did not feel improved, and thus came to hospital  Presently in MICU   Managed for sepsis, fungal infection in consideration, Voriconazole started  ID following, on RX for COVID 19 too  Cardiology following for SVT  Was in MICU and now is back on floor  PAST MEDICAL & SURGICAL HISTORY:  Non-Hodgkin&#x27;s lymphoma      Pulmonary embolism      History of appendectomy      History of cholecystectomy        Allergies    No Known Allergies    Intolerances      Social History:  no pertinent social history (15 Feb 2023 01:39)    Medications:  acetaminophen     Tablet .. 650 milliGRAM(s) Oral every 6 hours PRN Temp greater or equal to 38C (100.4F), Mild Pain (1 - 3)  apixaban 5 milliGRAM(s) Oral every 12 hours  chlorhexidine 2% Cloths 1 Application(s) Topical <User Schedule>  digoxin     Tablet 125 MICROGram(s) Oral daily  famotidine Injectable 20 milliGRAM(s) IV Push every 12 hours  furosemide    Tablet 20 milliGRAM(s) Oral daily  LORazepam     Tablet 0.5 milliGRAM(s) Oral <User Schedule>  LORazepam   Injectable 0.2 milliGRAM(s) IV Push three times a day PRN breakthrough nausea/refractory to PO nausea tx  midodrine. 10 milliGRAM(s) Oral three times a day  remdesivir  IVPB 100 milliGRAM(s) IV Intermittent every 24 hours  riociguat 0.5 milliGRAM(s) Oral every 8 hours  voriconazole 200 milliGRAM(s) Oral every 12 hours    Labs:  CBC Full  -  ( 27 Feb 2023 07:11 )  WBC Count : 4.15 K/uL  RBC Count : 3.73 M/uL  Hemoglobin : 10.0 g/dL  Hematocrit : 33.7 %  Platelet Count - Automated : 133 K/uL  Mean Cell Volume : 90.3 fl  Mean Cell Hemoglobin : 26.8 pg  Mean Cell Hemoglobin Concentration : 29.7 gm/dL  Auto Neutrophil # : x  Auto Lymphocyte # : x  Auto Monocyte # : x  Auto Eosinophil # : x  Auto Basophil # : x  Auto Neutrophil % : x  Auto Lymphocyte % : x  Auto Monocyte % : x  Auto Eosinophil % : x  Auto Basophil % : x    02-27    136  |  100  |  18  ----------------------------<  87  4.1   |  23  |  0.88    Ca    8.9      27 Feb 2023 07:11    TPro  5.4<L>  /  Alb  3.5  /  TBili  0.6  /  DBili  0.1  /  AST  73<H>  /  ALT  69<H>  /  AlkPhos  148<H>  02-27            ROS:  Patient comfortable without distress  No SOB or chest pain  No palpitation  Palliative care following and getting managed for nausea etc  No weakness of extremities  No skin changes or swelling of legs  Rest of the comprehensive ROS was negative  Vital Signs Last 24 Hrs  T(C): 36.7 (27 Feb 2023 08:32), Max: 39.4 (26 Feb 2023 23:38)  T(F): 98.1 (27 Feb 2023 08:32), Max: 103 (26 Feb 2023 23:38)  HR: 105 (27 Feb 2023 08:32) (98 - 128)  BP: 88/55 (27 Feb 2023 08:32) (80/52 - 105/66)  BP(mean): --  RR: 18 (27 Feb 2023 08:32) (17 - 20)  SpO2: 97% (27 Feb 2023 08:32) (96% - 98%)    Parameters below as of 27 Feb 2023 08:32  Patient On (Oxygen Delivery Method): room air        Physical exam:  Patient alert, feels weak  No distress  CVS: S1, S2  Chest: bilateral breath sound without rales  Abdomen: soft, not tender, no organomegaly or masses  CNS: No focal neuro deficit  Musculoskeletal:  Normal range of motion  Skin: No rash    Assessment and Plan:

## 2023-02-27 NOTE — PROGRESS NOTE ADULT - PROBLEM SELECTOR PLAN 4
- Defer to primary team: hx and CT chest imaging c/f infection (e.g. fungal pna) vs progression of known cancer: switched Zosyn to cefepime (2/17-21), started voriconazole 200mg po bid  - pt + covid awaiting ID input
- Defer to primary team: hx and CT chest imaging c/f infection (e.g. fungal pna) vs progression of known cancer: switched Zosyn to cefepime (2/17-21), started voriconazole 200mg po bid  - pt + covid: currently receiving remdesvir   - intermittently experiencing fevers at night- ID following
a/w ?pah  currently in no respiratory distress with adequate spo2 at room air  Monitor SpO2, RR, for signs of respiratory distress; Goal SpO2 >88-92%, PaO2 >55-60 mmHg  cont home apixa 5 bid, toprol 25 od, dig 0.125
- Defer to primary team: hx and CT chest imaging c/f infection (e.g. fungal pna) vs progression of known cancer: switched Zosyn to cefepime (2/17-21), started voriconazole 200mg po bid  - pt + covid started on 5 day course of remdesivir

## 2023-02-27 NOTE — PROGRESS NOTE ADULT - SUBJECTIVE AND OBJECTIVE BOX
CARDIOLOGY FOLLOW UP - Dr. Webber  DATE OF SERVICE: 2/27/23    CC  C/o palpitations  No CP, No SOB    REVIEW OF SYSTEMS:  CONSTITUTIONAL: No fever, weight loss, or fatigue  RESPIRATORY: No cough, wheezing, chills or hemoptysis; No Shortness of Breath  CARDIOVASCULAR: No chest pain, palpitations, passing out, dizziness, or leg swelling  GASTROINTESTINAL: No abdominal or epigastric pain. No nausea, vomiting, or hematemesis; No diarrhea or constipation. No melena or hematochezia.  VASCULAR: No edema     PHYSICAL EXAM:  T(C): 36.7 (02-27-23 @ 08:32), Max: 39.4 (02-26-23 @ 23:38)  HR: 105 (02-27-23 @ 08:32) (98 - 128)  BP: 88/55 (02-27-23 @ 08:32) (80/52 - 105/66)  RR: 18 (02-27-23 @ 08:32) (17 - 20)  SpO2: 97% (02-27-23 @ 08:32) (96% - 98%)  Wt(kg): --  I&O's Summary    26 Feb 2023 07:01  -  27 Feb 2023 07:00  --------------------------------------------------------  IN: 440 mL / OUT: 0 mL / NET: 440 mL        Appearance: Sleepy	  Cardiovascular: Normal S1 S2,Tachycardic, No JVD, No murmurs  Respiratory: Lungs clear to auscultation b/l  Gastrointestinal:  Soft, Non-tender, + BS	  Extremities: Normal range of motion, No clubbing, cyanosis or edema      Home Medications:  digoxin 125 mcg (0.125 mg) oral tablet: 1 tab(s) orally once a day (15 Feb 2023 02:23)  Eliquis 5 mg oral tablet: 1 tab(s) orally 2 times a day (15 Feb 2023 02:23)  metoprolol succinate 25 mg oral tablet, extended release: 1 tab(s) orally once a day (15 Feb 2023 02:23)  selexipag 800 mcg oral tablet: 1 tab(s) orally 2 times a day (15 Feb 2023 02:23)      MEDICATIONS  (STANDING):  apixaban 5 milliGRAM(s) Oral every 12 hours  chlorhexidine 2% Cloths 1 Application(s) Topical <User Schedule>  digoxin     Tablet 125 MICROGram(s) Oral daily  famotidine Injectable 20 milliGRAM(s) IV Push every 12 hours  furosemide    Tablet 20 milliGRAM(s) Oral daily  LORazepam     Tablet 0.5 milliGRAM(s) Oral <User Schedule>  midodrine. 10 milliGRAM(s) Oral three times a day  remdesivir  IVPB 100 milliGRAM(s) IV Intermittent every 24 hours  riociguat 0.5 milliGRAM(s) Oral every 8 hours  voriconazole 200 milliGRAM(s) Oral every 12 hours      TELEMETRY: 	    ECG:  	  RADIOLOGY:   DIAGNOSTIC TESTING:  [ ] Echocardiogram:  [ ]  Catheterization:  [ ] Stress Test:    OTHER: 	    LABS:	 	                            10.0   4.15  )-----------( 133      ( 27 Feb 2023 07:11 )             33.7     02-27    136  |  100  |  18  ----------------------------<  87  4.1   |  23  |  0.88    Ca    8.9      27 Feb 2023 07:11    TPro  5.4<L>  /  Alb  3.5  /  TBili  0.6  /  DBili  0.1  /  AST  73<H>  /  ALT  69<H>  /  AlkPhos  148<H>  02-27    PT/INR - ( 27 Feb 2023 07:11 )   PT: 15.7 sec;   INR: 1.36 ratio

## 2023-02-27 NOTE — PROGRESS NOTE ADULT - ASSESSMENT
47 F, PE, pulm HTN, diagnosed with NHL 7/2022 and started on R-CHOP, last dose 1/23/23 and had a PET and was told she is in remission, usually has 5 days of prednisone after chemo and after that completed started to have fever and chills whish she has been getting since, no significant cough, abd pain, diarrhea or dysuria  here febrile to 102.5, tachy, no hypoxia - on room air.    CT Chest/abd with no PE, Innumerable lung nodules which are nonspecific. In this clinical setting, differential includes infection (in which case, consider fungus) or neoplasm/known lymphoma. Indeterminate low-attenuation lesion in the liver.    s/p RRT for fever, rigors, tachycardia, hypotension, transferred to MICU, but still on RA, now on the floor   blood cx, urine cx, sputum cx, MRSA PCR, fungitell, galactomannan, histo Ag and  negative    Now febrile to 102.6 and tested positive for COVID (negative on admission) had COVID in September and now complete asymptomatic, possibly new infection vs prolonged shedding. Team discussed with lab and the COVID tests x 2 were weakly positive and CT value 0 on different platforms. Third COVID test positive as well.     Recommend:  * f/u the repeat blood cx  * c/w vori   * Onc followup - are lung nodules lymphoma?   * f/u with pulmonary and IR regarding biopsy vs bronch  * Continue with remdesivir for now, but still remains febrile  * Monitor fever curve    Chon Humphrey MD  Available through MS Teams  If no response, or after 5pm/weekends, call [phone]    Plan discussed with primary team.

## 2023-02-27 NOTE — PROGRESS NOTE ADULT - ASSESSMENT
46 yo f w pmh nhl s/p chemo (?in remission), pe, ?pah, covid, p/w fevers for the last few days, a/w generalized weakness over the last couple of weeks along with poor po intake. denies chest pain, palpitations, lightheadedness, cough, wheeze, abdominal discomfort. patient does endorse n+v, dysuria, arriaza. of note, last session of chemo reportedly on 1/23/2023. Pt grew concerned so went to oncologist yesterday where she got cultures, blood work, and xray. as she did not feel improved, patient presents to [facility] er for further evaluation.  (15 Feb 2023 01:39)  BP monitoring,continue current antihypertensive meds, low salt diet,followup with PMD in 1-2 weeks      pt had letitia and pt was on hemodialysis for weeks and s/p cvvh at [facility] now gfr is controlled furosemide    Tablet 20 milliGRAM(s) Oral daily  Serum creatinine is stable at 0.8, approximating a GFR of is controlled  ml/min.   There is no progression.  No uremic symptoms. pos  evidence of  worsening  Anemia. Fluid status stable.   Will continue to avoid nephrotoxic drugs.  Patient remains asymptomatic.  Continue current therapy.    hyponatremia   improved  Non hodgkin's lymphoma may be  in remission   NEW fever positive covid 19 ID start Remdesivir   Fever etiology infection V/S lymphoma   pan culture   on Antibiotics sever pulmonary HTN an Adempas 0.5 mg TID       BP monitoring,followup with PMD in 1-2 weeks    Admit for septic workup and ID evaluation,send blood and urine cx,serial lactate levels,monitor vitals closley,ivfs hydration,monitor urine output and renal profile,iv abx as per id cons\  remdesivir  IVPB 100 milliGRAM(s) IV Intermittent every 24 hours  voriconazole 200 milliGRAM(s) Oral every 12 hours    dvt enoxaparin Injectable 70 milliGRAM(s) SubCutaneous every 12 hours    hypotension midodrine. 10 milliGRAM(s) Oral three times a day

## 2023-02-27 NOTE — PROGRESS NOTE ADULT - SUBJECTIVE AND OBJECTIVE BOX
Indication for Geriatrics and Palliative Care Services/INTERVAL HPI: Nausea and vomiting     SUBJECTIVE AND OBJECTIVE: Pt seen and examined at bedside. Pt verbalizes improvement in diet tolerance since initiation of the Ativan. Pt endorses utilizing no prns stating she has noticed a marked increase in her PO intake. Pt verbalizing biggest current distress is postponement of her procedure     OVERNIGHT EVENTS: no acute events o/n. pt intermittently febrile in the evenings, primary team aware     DNR on chart:  Allergies    No Known Allergies    Intolerances    MEDICATIONS  (STANDING):  apixaban 5 milliGRAM(s) Oral every 12 hours  chlorhexidine 2% Cloths 1 Application(s) Topical <User Schedule>  digoxin     Tablet 125 MICROGram(s) Oral daily  famotidine Injectable 20 milliGRAM(s) IV Push every 12 hours  furosemide    Tablet 20 milliGRAM(s) Oral daily  LORazepam     Tablet 0.5 milliGRAM(s) Oral <User Schedule>  midodrine. 10 milliGRAM(s) Oral three times a day  remdesivir  IVPB 100 milliGRAM(s) IV Intermittent every 24 hours  riociguat 0.5 milliGRAM(s) Oral every 8 hours  voriconazole 200 milliGRAM(s) Oral every 12 hours    MEDICATIONS  (PRN):  acetaminophen     Tablet .. 650 milliGRAM(s) Oral every 6 hours PRN Temp greater or equal to 38C (100.4F), Mild Pain (1 - 3)  LORazepam   Injectable 0.2 milliGRAM(s) IV Push three times a day PRN breakthrough nausea/refractory to PO nausea tx      ITEMS UNCHECKED ARE NOT PRESENT    PRESENT SYMPTOMS: [ ]Unable to self-report - see [ ] CPOT [ ] PAINADS [ ] RDOS  Source if other than patient:  [ ]Family   [ ]Team     Pain:  [ ]yes [x ]no  QOL impact -   Location -                    Aggravating factors -  Quality -  Radiation -  Timing-  Severity (0-10 scale):  Minimal acceptable level (0-10 scale):     CPOT:    https://www.sccm.org/getattachment/wqs35x03-1y3e-1i3c-2c3p-7179h5196d1e/Critical-Care-Pain-Observation-Tool-(CPOT)    PAINAD Score: See PAINAD tool and score below       Dyspnea:                           [ ]Mild [ ]Moderate [ ]Severe    RDOS: See RDOS tool and score below   0 to 2  minimal or no respiratory distress   3  mild distress  4 to 6 moderate distress  >7 severe distress      Anxiety:                             [ ]Mild [ x]Moderate [ ]Severe  Fatigue:                             [ ]Mild [ ]Moderate [ ]Severe  Nausea:                             [ x]Mild [ ]Moderate [ ]Severe  Loss of appetite:              [x ]Mild [ ]Moderate [ ]Severe  Constipation:                    [ ]Mild [ ]Moderate [ ]Severe    PCSSQ[Palliative Care Spiritual Screening Question]   Severity (0-10):  Score of 4 or > indicate consideration of Chaplaincy referral.  Chaplaincy Referral: [x ] yes [ ] refused [x ] following [ ] Deferred     Caregiver [place]? : [x ] yes [ ] no [ ] Deferred [ ] Declined             Social work referral [x ] Patient & Family Centered Care Referral [ ]     Anticipatory Grief present?:  [ ] yes [ ] no  [x ] Deferred                  Social work referral [ ] Chaplaincy Referral [ ]    		  Other Symptoms:  [ ]All other review of systems negative     Palliative Performance Status Version 2:   See PPSv2 tool and score below         PHYSICAL EXAM:  Vital Signs Last 24 Hrs  T(C): 36.6 (2023 11:40), Max: 39.4 (2023 23:38)  T(F): 97.8 (2023 11:40), Max: 103 (2023 23:38)  HR: 98 (2023 11:40) (98 - 128)  BP: 85/54 (2023 11:40) (80/52 - 105/66)  BP(mean): --  RR: 18 (2023 11:40) (17 - 20)  SpO2: 97% (2023 11:40) (96% - 98%)    Parameters below as of 2023 08:32  Patient On (Oxygen Delivery Method): room air     I&O's Summary    2023 07:01  -  2023 07:00  --------------------------------------------------------  IN: 440 mL / OUT: 0 mL / NET: 440 mL    2023 07:01  -  2023 14:23  --------------------------------------------------------  IN: 200 mL / OUT: 0 mL / NET: 200 mL       GENERAL: [ ]Cachexia    [x ]Alert  [x ]Oriented x  4 [ ]Lethargic  [ ]Unarousable  [ ]Verbal  [ ]Non-Verbal  Behavioral:   [x ]Anxiety: pt endorsing worse anxiety then usual due to the postponement of her biopsy procedure  [ ]Delirium [ ]Agitation [ ]Other  HEENT:  [ x]Normal   [ ]Dry mouth   [ ]ET Tube/Trach  [ ]Oral lesions  PULMONARY:   [ ]Clear [ ]Tachypnea  [ ]Audible excessive secretions   [ ]Rhonchi        [ ]Right [ ]Left [ ]Bilateral  [ ]Crackles        [ ]Right [ ]Left [ ]Bilateral  [ ]Wheezing     [ ]Right [ ]Left [ ]Bilateral  [x ]Diminished BS [ ] Right [ ]Left [ x]Bilateral  CARDIOVASCULAR:    [x ]Regular [ ]Irregular [ ]Tachy  [ ]Ryley [ ]Murmur [ ]Other  GASTROINTESTINAL:  [x ]Soft  [ ]Distended   [ x]+BS  [ x]Non tender [ ]Tender  [ ]Other [ ]PEG [ ]OGT/ NGT   Last BM:   GENITOURINARY:  [ x]Normal [ ]Incontinent   [ ]Oliguria/Anuria   [ ]Roman  MUSCULOSKELETAL:   [ ]Normal   [x ]Weakness  [ ]Bed/Wheelchair bound [ ]Edema  NEUROLOGIC:   [x ]No focal deficits  [ ] Cognitive impairment  [ ] Dysphagia [ ]Dysarthria [ ] Paresis [ ]Other   SKIN:   [x ]Normal  [ ]Rash  [ ]Other  [ ]Pressure ulcer(s) [ ]y [ ]n present on admission    CRITICAL CARE:  [ ]Shock Present  [ ]Septic [ ]Cardiogenic [ ]Neurologic [ ]Hypovolemic  [ ]Vasopressors [ ]Inotropes  [ ]Respiratory failure present [ ]Mechanical Ventilation [ ]Non-invasive ventilatory support [ ]High-Flow   [ ]Acute  [ ]Chronic [ ]Hypoxic  [ ]Hypercarbic [ ]Other  [ ]Other organ failure     LABS:                        10.0   4.15  )-----------( 133      ( 2023 07:11 )             33.7       136  |  100  |  18  ----------------------------<  87  4.1   |  23  |  0.88    Ca    8.9      2023 07:11    TPro  5.4<L>  /  Alb  3.5  /  TBili  0.6  /  DBili  0.1  /  AST  73<H>  /  ALT  69<H>  /  AlkPhos  148<H>    PT/INR - ( 2023 07:11 )   PT: 15.7 sec;   INR: 1.36 ratio             Urinalysis Basic - ( 2023 07:12 )    Color: Light Yellow / Appearance: Clear / S.009 / pH: x  Gluc: x / Ketone: Negative  / Bili: Negative / Urobili: Negative   Blood: x / Protein: Trace / Nitrite: Negative   Leuk Esterase: Moderate / RBC: 1 /hpf / WBC 14 /HPF   Sq Epi: x / Non Sq Epi: 4 /hpf / Bacteria: Negative      RADIOLOGY & ADDITIONAL STUDIES:  < from: Xray Chest 1 View- PORTABLE-Routine (Xray Chest 1 View- PORTABLE-Routine in AM.) (23 @ 09:23) >    ACC: [id number] EXAM:  XR CHEST PORTABLE ROUTINE 1V   ORDERED BY: CINDY D REYES     PROCEDURE DATE:  2023          INTERPRETATION:  Chest one view    HISTORY: Sepsis    COMPARISON STUDY: 2023    Frontal expiratory view of the chest showsthe heart to be normal in   size. Right chest port is again noted.    The lungs show partial clearing of patchy left base infiltrates and there   is no evidence of pneumothorax nor pleural effusion.    IMPRESSION:  Left base clearing.        Thank you for the courtesy of this referral.    --- End of Report ---            OWEN JACOBSEN MD; Attending Interventional Radiologist  This document has been electronically signed. 2023  3:43PM    < end of copied text >      Protein Calorie Malnutrition Present: [ ]mild [ ]moderate [ ]severe [ ]underweight [ ]morbid obesity  https://www.andeal.org/vault/6971/web/files/ONC/Table_Clinical%20Characteristics%20to%20Document%20Malnutrition-White%20JV%20et%20al%2020.pdf    Height (cm): 165.1 (23 @ 03:38)  Weight (kg): 73.1 (23 @ 03:38)  BMI (kg/m2): 26.8 (23 @ 03:38)    [ ]PPSV2 < or = 30%  [ ]significant weight loss [ ]poor nutritional intake [ ]anasarca[ ]Artificial Nutrition    Other REFERRALS:  [ ]Hospice  [ ]Child Life  [ ]Social Work  [ ]Case management [ ]Holistic Therapy     Goals of Care Document:

## 2023-02-27 NOTE — PROGRESS NOTE ADULT - PROBLEM SELECTOR PLAN 1
febrile, lymphopenic, tachypneic, tachycardic + lactic acidosis (resolved 2.3->1.6); meets severe sepsis criteria  otherwise, afebrile and hds    rvp negative, ua negative, chemoport cdi; unclear source based on infectious work up thus far   s/p 2 L NS + vanc and zosyn in ER    RRT for Febrile Hypotension, Tachycardia 130, BP 88/54 mmHg.   - Notable for Fever of 102*F and Tachycardia improved after fluid resuscitation   - CT Chest c/w Multiple Lung Nodules concerns for infection vs. metastasis    - Neutropenic Sepsis empiric ABx Coverage plus Antifungal coverage   - Continue IV fluid and 5%v albumin resuscitation, stress dose steroid   - Midodrine 10mg PO TID   - Not MICU Candidate at this time
- pt reports decreased nausea from smell of food with current regimen, pt endorses increase in ensure intake and feeling more motivated to eat   - will transition from IV to PO for n/v symptoms  - will start pt on PO Ativan 0.5mg three times a day 1 hour prior to meals for n/v  - will convert 0.2mg Ativan IVP three times a day 30 minutes prior to meals for n/v to prn for breakthrough nausea refractory to oral Ativan or if unable to tolerate oral Ativan.
- pt states unable to report if Ativan has helped with appetite/nausea as she is "so upset she can't think of eating" regarding new covid + dx  - encouraged small frequent meals as tolerate, eating foods she enjoys, taking the opportunity to eat when she feels well   - C/w with 0.2mg Ativan IVP three times a day 30 minutes prior to meals for N/V  - will continue to monitor for symptoms.
- Pt endorsing improvement in tolerance of diet w/ current regimen. Pt stating able to tolerate 2 ensures daily and at least 2 meals daily. Pt states she does not get a "wave of nausea" when she smells food as she typically does.   - continued to educate on importance of small frequent meals, and nutritionally dense foods  transitioned pt from IVP to po Ativan to improve nausea  - c/w 0.5mg Ativan PO  1 hour prior to meals for n/v 0700, 1200, 1700  - c/w 0.2mg IVP three times a day prn for breakthrough n/v-> 0 prns in 24 hours

## 2023-02-27 NOTE — PROGRESS NOTE ADULT - ASSESSMENT
47 F PMH non-Hodgkin lymphoma s/p chemoport (?in remission), PE, pHTN, presenting with recurrent fevers with pancytopenia in setting of recent chemo treatment and prednisone concerning for severe sepsis. Found to be requiring intermittent fluids and continues to be febrile while on broad-spectrum bacterial antibiotic coverage. CT chest imaging concerning for fungal infection vs metastasis. MICU consulted for further management.    COVID positive   Continue with  Remdesivir   ID following     # NHL with pulmonary nodules        Patient to be transferred to [facility] for Bronchoscopy once cleared by ID     # pulmonary HTN, suspected Group 4  > RV systolic pressure =51mmHg on TTE  - diurese (IV in MICU), will transition to PO lasix starting 2/21/23  - Riociguat restarted 2/22  =  # HFrEF (EF ~42) with RV dysfunction based on previous echo (2/15/23)  - DC-ed coreg   > Diurese daily  Cards following     # SVT  > digoxin 125 mcg po qD (home medication)  > follow up digoxin level    =====Renal/=====  - PMH STARR while on antibiotics  > appreciate renal recs    =====Endocrine=====  - No active issues.       # severe sepsis with unclear etiology in setting of known malginancy and recent chemotx for cancer   - iso immunocompromise, hx and CT chest imaging c/f infection (e.g. fungal pna) vs progression of known cancer  > switched Zosyn to cefepime (2/17-21)  > MRSA swab negative, vanc dc'd  > started voriconazole 200mg po bid   ID following      # DLBCL  >   #leukopenia on filgastrim  #anemia  #thrombocytopenia  - possibly from recent chemo and cancer  > Hb goal >7  > S/p 3d of zarxio  > Trend Plt

## 2023-02-27 NOTE — PROGRESS NOTE ADULT - ASSESSMENT
Assessment and Recommendation:   · Assessment	  Assessment and recommendation :  Non hodgkin's lymphoma ? in remission   NEW fever positive covid 19 awaiting ID on  Remdesivir   Fever etiology infection V/S lymphoma  ? 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

## 2023-02-27 NOTE — PROGRESS NOTE ADULT - SUBJECTIVE AND OBJECTIVE BOX
CC: Patient is a 48y old  Female who presents with a chief complaint of fever (2023 11:35)    ID following for fever    Interval History/ROS: Patient remains with fevers. On room air. No cough.    Rest of ROS negative.    Allergies  No Known Allergies    ANTIMICROBIALS:  remdesivir  IVPB 100 every 24 hours  voriconazole 200 every 12 hours    OTHER MEDS:  acetaminophen     Tablet .. 650 milliGRAM(s) Oral every 6 hours PRN  apixaban 5 milliGRAM(s) Oral every 12 hours  chlorhexidine 2% Cloths 1 Application(s) Topical <User Schedule>  digoxin     Tablet 125 MICROGram(s) Oral daily  famotidine Injectable 20 milliGRAM(s) IV Push every 12 hours  furosemide    Tablet 20 milliGRAM(s) Oral daily  LORazepam     Tablet 0.5 milliGRAM(s) Oral <User Schedule>  LORazepam   Injectable 0.2 milliGRAM(s) IV Push three times a day PRN  midodrine. 10 milliGRAM(s) Oral three times a day  riociguat 0.5 milliGRAM(s) Oral every 8 hours    PE:    Vital Signs Last 24 Hrs  T(C): 36.6 (2023 11:40), Max: 39.4 (2023 23:38)  T(F): 97.8 (2023 11:40), Max: 103 (2023 23:38)  HR: 98 (2023 11:40) (98 - 128)  BP: 85/54 (2023 11:40) (80/52 - 105/66)  BP(mean): --  RR: 18 (2023 11:40) (17 - 20)  SpO2: 97% (2023 11:40) (96% - 98%)    Parameters below as of 2023 08:32  Patient On (Oxygen Delivery Method): room air    Gen: AOx3, NAD  CV: S1+S2 normal, no murmurs  Resp: Clear bilat, no resp distress  Abd: Soft, nontender, +BS  Ext: No LE edema, no wounds  : No Roman  IV/Skin: No thrombophlebitis  Neuro: no focal deficits    LABS:                          10.0   4.15  )-----------( 133      ( 2023 07:11 )             33.7           136  |  100  |  18  ----------------------------<  87  4.1   |  23  |  0.88    Ca    8.9      2023 07:11    TPro  5.4<L>  /  Alb  3.5  /  TBili  0.6  /  DBili  0.1  /  AST  73<H>  /  ALT  69<H>  /  AlkPhos  148<H>        Urinalysis Basic - ( 2023 07:12 )    Color: Light Yellow / Appearance: Clear / S.009 / pH: x  Gluc: x / Ketone: Negative  / Bili: Negative / Urobili: Negative   Blood: x / Protein: Trace / Nitrite: Negative   Leuk Esterase: Moderate / RBC: 1 /hpf / WBC 14 /HPF   Sq Epi: x / Non Sq Epi: 4 /hpf / Bacteria: Negative        MICROBIOLOGY:  v  Clean Catch Clean Catch (Midstream)  23   No growth  --  --      .Blood Blood-Peripheral  23   No growth to date.  --  --      Clean Catch Clean Catch (Midstream)  23   No growth  --    Rare polymorphonuclear leukocytes per low power field  Rare Squamous epithelial cells per low power field  Rare Gram Negative Rods per oil power field      .Blood Blood-Peripheral  23   No Growth Final  --  --      .Blood Blood-Peripheral  23   No Growth Final  --  --      .Blood Blood-Peripheral  23   No Growth Final  --  --      Clean Catch Clean Catch (Midstream)  23   No growth  --  --      .Blood Blood-Peripheral  23   No Growth Final  --  --      .Blood Blood-Peripheral  23   No Growth Final  --  --    RADIOLOGY:    < from: Xray Chest 1 View- PORTABLE-Routine (Xray Chest 1 View- PORTABLE-Routine in AM.) (23 @ 09:23) >  IMPRESSION:  Left base clearing.      < end of copied text >

## 2023-02-27 NOTE — PROGRESS NOTE ADULT - PROBLEM SELECTOR PLAN 5
- pt expresses ongoing frustration regarding lung nodule biopsy, feeling that no one wants to take ownership of the procedure. states there are more difficult dynamics if she has to complete procedure in outpatient setting including that she does not drive, she does not live close to the hospital, and she would have to rely on people who are in school/working to  her.  - pt would prefer to have biopsy done prior to going home, discussed w/ primary team   - will continue to follow for symptoms/support  - SW consult placed for support   - Can be reached by TEAMS M-F 9-5 Damari Busch Any other time please page [phone] if needed
- pt distressed regarding covid + dx, feeling frustrated with setbacks stating she does not feel that she is making any progress, keeps having repeated set backs, and is discouraged that she will not have IR procedure today  - pt states she wants to go home as she is tired of all the set backs- emotional support provided  - pt distressed regarding roommate situation as pt can hear roommate talking about her from next door- PFCC notified  - will continue to follow for symptoms/support  -  consult placed for support visit
- pt reporting more positive outlook regarding current hospitalization, difficulty yesterday w/ Covid + diagnosis- emotional support, and discussion regarding coping provided. Pt endorses covid diagnosis overwhelming for family as she was critically ill in prior admission r/t Covid PNA   - will continue to follow for n/v  - Can be reached by TEAMS M-F 9-5 Damari Busch Any other time please page [phone] if needed

## 2023-02-27 NOTE — PROGRESS NOTE ADULT - NUTRITIONAL ASSESSMENT
This patient has been assessed with a concern for Malnutrition and has been determined to have a diagnosis/diagnoses of Severe protein-calorie malnutrition.    This patient is being managed with:   Diet Regular-  1500mL Fluid Restriction ([id number])  Supplement Feeding Modality:  Oral  Ensure Enlive Cans or Servings Per Day:  1       Frequency:  Two Times a day  Entered: Feb 21 2023  8:25AM

## 2023-02-27 NOTE — PROGRESS NOTE ADULT - ASSESSMENT
Echo 2/15/23: Global lv dysfxn EF 42%, septal flattening, RV overload, mod pulm htn    A/P  48 yo f w pmh nhl s/p chemo (?in remission), pe, ?pah, covid, p/w fevers for the last few days, a/w generalized weakness, found to be in severe sepsis and w/ episode of SVT to 170s.    #SVT  -Pt with known hx of SVT  -Has been evaluated in past, was not candidate for ablation d/t chemo  -No longer SVT, remains sinus tachy  -Continue digoxin  -Hypotension precludes bb use  -event toprol if bp improved    #HFrEF, RV failure  -Echo with global lv dysfxn  -likely nonischemic in etiology  -Does not appear fluid overloaded  -Continue lasix qd as ordered  -hypotension precludes lv dysfxn meds    #Sepsis, r/o fungemia   -Lung nodules noted on CT, c/f fungal infection  -Pending possible bx  -Pt is cleared from cv perspective to undergo bx with acceptable cv risk  -W/u & abx per micu, ID    #Hypotension  -I/S/O sepsis, HF  -Cont midodrine    #Leukopenia, Fevers  -Hx NHL  -Heme/onc following    #COVID  -Mgmt per ID, primary      Please call/text me with questions/concerns between 8am-4pm  [phone]

## 2023-02-27 NOTE — PROGRESS NOTE ADULT - ASSESSMENT
Non hodgkin's lymphoma ? in remission   NEW fever positive covid 19  on  Remdesivir   Fever etiology infection V/S lymphoma  ? pan culture   moderate  pulmonary HTN an Adempas 0.5 mg TID   S/P COVID pneumonia and respiratory failure   S/P Acute renal failure   Bilateral pulmonary nodules   On voriconazole for preemptive fungus infection  Seen by cardiology for SVT  Autonomic Dysfunction on Midodrine   H/O PE and DVT on AC  Pancytopenia improved   I feel we need bx of lung nodule to determine the etiology or infectious vs lymhoma. less likely  < from: CT Abdomen and Pelvis w/ IV Cont (02.15.23 @ 02:17) >  IMPRESSION:    No pulmonary embolism.    Innumerable lung nodules which are nonspecific. In this clinical setting,   differential includes infection (in which case, consider fungus) or   neoplasm/known lymphoma.    Indeterminate low-attenuation lesion in the liver.    Close follow-up and/or comparison with outside imaging is recommended.      < end of copied text >     48 yo f w pmh nhl s/p chemo (?in remission), PE, ?pah, covid, admitted 2/15/23 with fevers for the last few days, a/w generalized weakness over the last couple of weeks along with poor po intake. denied chest pain, palpitations, lightheadedness, cough, wheeze, abdominal discomfort. patient did endorse n+v, dysuria, arriaza. of note, last session of chemo reportedly on 1/23/2023. Pt grew concerned so went to oncologist, where she got cultures, blood work, and xray. as she did not feel improved, and thus came to hospital      Oncologic history  Patient is followed by Dr. Mendoza at [facility] for Diffuse large B cell lymphoma, EBV positive, BCl 2 positive dx by LLL lung mass bx. 8/2022. She was enrolled in [id number], which she came off later. She had COVID one week after 1st treatment and PE with heart strain.  She was given C2 once received without Adriamycin, admitted afterwords with excerebration of COVID 19 symptoms. She went thru cycle 3-5 and interim PET/CT showed likely CR. Her last treatment was on 1/23/23 with Rituxan, Polatuzumab and Cytoxan. She received on body neulasta after that  Of note PET CT on 12/29/22 showed decrease of LLL lung mas but did show new subcentimeter pulmonary nodules ? infectious/inflammatory or residual disease.  After admission on 2/15/23 being unmanaged for sepsis, ID following, on abx and Voriconazole. Pulmonary has also seen her  Seen by cardiology for SVT  Pancytopenias on admission improved. STARR improved. Autonomic Dysfunction on Midodrine   H/O PE and DVT on AC  Positive covid 19  on  Remdesivir   Fevers persist,  etiology infection V/S lymphoma  ? pan culture   I feel we need bx of lung nodule to determine the etiology or infectious vs lymhoma, less likely  < from: CT Abdomen and Pelvis w/ IV Cont (02.15.23 @ 02:17) >  IMPRESSION:    No pulmonary embolism.    Innumerable lung nodules which are nonspecific. In this clinical setting,   differential includes infection (in which case, consider fungus) or   neoplasm/known lymphoma.    Indeterminate low-attenuation lesion in the liver.    Close follow-up and/or comparison with outside imaging is recommended.      < end of copied text >

## 2023-02-27 NOTE — PROGRESS NOTE ADULT - PROBLEM SELECTOR PLAN 3
- defer to heme/onc: NHL w/ pulmonary nodules, new nodules on CT fungemia vs new malignancy pending input for possible IR biopsy procedure.
- defer to heme/onc: NHL w/ pulmonary nodules, new nodules on CT fungemia vs new malignancy pending input for possible IR biopsy procedure.
last chemo on 1/23/23, reportedly in remission  obtain outpatient hemonc records  hemonc consulted
- defer to heme/onc: NHL w/ pulmonary nodules, new nodules on CT fungemia vs new malignancy pending input for possible IR biopsy procedure.

## 2023-02-27 NOTE — PROGRESS NOTE ADULT - PROBLEM SELECTOR PROBLEM 3
NHL (non-Hodgkin's lymphoma)

## 2023-02-27 NOTE — PROGRESS NOTE ADULT - SUBJECTIVE AND OBJECTIVE BOX
OZ BARBOSA  MRN#: [id number]  Subjective:  CHIEF COMPLAINT: fever . Pulmonary HTN , non hodgkin's lymphoma   46 yo f a private patient from my office report to the ER   w PMH non hodgkin's lymphoma  s/p chemo at [facility] [place]  (?in [place]), PE and DVT ,S/P  covid pneumonia ,severe pulmonary HTN on Adempas , p/w fevers for the last few days, a/w generalized weakness over the last couple of weeks along with poor po intake. she denies chest pain, palpitations, lightheadedness, cough, wheeze, abdominal discomfort. the  patient does endorse n+v, dysuria, arriaza. of note, last session of chemo reportedly on 2023. Pt grew concerned so went to oncologist yesterday where she got cultures, blood work, and xray. as she did not feel improved, patient presents to [facility] er for further evaluation.  (15 Feb 2023 01:39) CT scan bilateral lung nodule , no H/O OF exposure to TB , no significant travel history to the [facility] out of the country or the [facility] area  transfer to the MICU for SVT patient is on Digoxin and B-Blockers , hypotension rebound back feeling better on cefepime , and voriconazole for presumptive fungal pneumonia , continue to have  fever at night  up to 101 ,  sweat appetite is suppressed   fever curve is better discuss with MICU team to consider for lung biopsy , lymphoma oncology will be called in .fever subsides no definitive DX is made tired and sleepy if recurrent fever happened patient may need BAL and possible navigation bronchoscopy with Biopsy of large left lung mass , remain afebrile for 48 hrs continue on voriconazole , patient spike to 103 and tested positive for covid 19 placed on isolations , 02% is 97% on RA   continue to have fever , placed on Remdesivir by ID , T .8 very upset on Remdesivir no desaturetion       PAST MEDICAL & SURGICAL HISTORY:  Non-Hodgkin&#x27;s lymphoma      Pulmonary embolism      History of appendectomy      History of cholecystectomy                OBJECTIVE:  ICU Vital Signs Last 24 Hrs  T(C): 37.7 (2023 18:00), Max: 39.6 (2023 01:36)  T(F): 99.9 (2023 18:00), Max: 103.2 (2023 01:36)  HR: 122 (2023 18:00) (114 - 167)  BP: 91/63 (2023 18:00) (80/55 - 108/66)  BP(mean): 73 (2023 18:00) (60 - 81)  ABP: --  ABP(mean): --  RR: 29 (2023 18:00) (16 - 33)  SpO2: 97% (2023 18:00) (94% - 100%)    O2 Parameters below as of 2023 02:40  Patient On (Oxygen Delivery Method): room air            - @ 07:01  -  - @ 07:00  --------------------------------------------------------  IN: 1100 mL / OUT: 0 mL / NET: 1100 mL     @ 07:01  -  - @ 19:19  --------------------------------------------------------  IN: 700 mL / OUT: 2600 mL / NET: -1900 mL      PHYSICAL EXAM :Daily   cushinoid female in no acute distress   Daily Weight in k.6 (2023 09:20)  HEENT:     + NCAT  + EOMI  - Conjuctival edema   - Icterus   - Thrush   - ETT  - NGT/OGT  Neck:         + FROM    + JVD     - Nodes     - Masses    + Mid-line trachea   - Tracheostomy  Chest: normal findings  Lungs:          + CTA   + Rhonchi    - Rales    - Wheezing     - Decreased BS   - Dullness R L  Cardiac:       + S1 + S2    + RRR   - Irregular   - S3  - S4  + Murmurs   - Rub   - Hamman’s sign   Abdomen:    + BS     + Soft    + Non-tender     - Distended    - Organomegaly  - PEG  Extremities:   - Cyanosis U/L   - Clubbing  U/L  - LE/UE Edema   + Capillary refill    + Pulses   Neuro:        + Awake   +  Alert   - Confused   - Lethargic   - Sedated   - Generalized Weakness  Skin:        - Rashes    - Erythema   + Normal incisions   + IV sites intact        HOSPITAL MEDICATIONS: All mediciations reviewed and analyzed  MEDICATIONS  (STANDING):  acetaminophen   IVPB .. 1000 milliGRAM(s) IV Intermittent once  cefepime   IVPB      cefepime   IVPB 2000 milliGRAM(s) IV Intermittent every 8 hours  chlorhexidine 2% Cloths 1 Application(s) Topical <User Schedule>  chlorhexidine 4% Liquid 1 Application(s) Topical <User Schedule>  digoxin     Tablet 125 MICROGram(s) Oral daily  enoxaparin Injectable 70 milliGRAM(s) SubCutaneous every 12 hours  midodrine. 10 milliGRAM(s) Oral three times a day  voriconazole 200 milliGRAM(s) Oral every 12 hours    MEDICATIONS  (PRN):  ondansetron Injectable 4 milliGRAM(s) IV Push every 8 hours PRN Nausea and/or Vomiting    LABS: All Lab data reviewed and analyzed                                     10.3   4.53  )-----------( 78       ( 2023 07:05 )             34.2        136  |  100  |  17  ----------------------------<  91  4.3   |  22  |  0.78    Ca    9.0      2023 07:03    TPro  5.9<L>  /  Alb  3.8  /  TBili  0.6  /  DBili  0.1  /  AST  118<H>  /  ALT  103<H>  /  AlkPhos  245<H>      Ca    9.1      2023 06:28  Phos  3.7       Mg     2.0            LIVER FUNCTIONS - ( 2023 01:21 )  Alb: 3.2 g/dL / Pro: 5.1 g/dL / ALK PHOS: 64 U/L / ALT: 36 U/L / AST: 43 U/L / GGT: x           RADIOLOGY: - Reviewed and analyzed

## 2023-02-28 ENCOUNTER — TRANSCRIPTION ENCOUNTER (OUTPATIENT)
Age: 48
End: 2023-02-28

## 2023-02-28 VITALS
SYSTOLIC BLOOD PRESSURE: 100 MMHG | DIASTOLIC BLOOD PRESSURE: 64 MMHG | HEART RATE: 100 BPM | OXYGEN SATURATION: 97 % | RESPIRATION RATE: 18 BRPM | TEMPERATURE: 98 F

## 2023-02-28 LAB
ALBUMIN SERPL ELPH-MCNC: 4 G/DL — SIGNIFICANT CHANGE UP (ref 3.3–5)
ALP SERPL-CCNC: 150 U/L — HIGH (ref 40–120)
ALT FLD-CCNC: 69 U/L — HIGH (ref 10–45)
ANION GAP SERPL CALC-SCNC: 13 MMOL/L — SIGNIFICANT CHANGE UP (ref 5–17)
AST SERPL-CCNC: 75 U/L — HIGH (ref 10–40)
BILIRUB DIRECT SERPL-MCNC: 0.1 MG/DL — SIGNIFICANT CHANGE UP (ref 0–0.3)
BILIRUB INDIRECT FLD-MCNC: 0.6 MG/DL — SIGNIFICANT CHANGE UP (ref 0.2–1)
BILIRUB SERPL-MCNC: 0.7 MG/DL — SIGNIFICANT CHANGE UP (ref 0.2–1.2)
BUN SERPL-MCNC: 17 MG/DL — SIGNIFICANT CHANGE UP (ref 7–23)
CALCIUM SERPL-MCNC: 9 MG/DL — SIGNIFICANT CHANGE UP (ref 8.4–10.5)
CHLORIDE SERPL-SCNC: 102 MMOL/L — SIGNIFICANT CHANGE UP (ref 96–108)
CO2 SERPL-SCNC: 22 MMOL/L — SIGNIFICANT CHANGE UP (ref 22–31)
CREAT SERPL-MCNC: 0.76 MG/DL — SIGNIFICANT CHANGE UP (ref 0.5–1.3)
CREAT SERPL-MCNC: 0.83 MG/DL — SIGNIFICANT CHANGE UP (ref 0.5–1.3)
CULTURE RESULTS: SIGNIFICANT CHANGE UP
CULTURE RESULTS: SIGNIFICANT CHANGE UP
EGFR: 88 ML/MIN/1.73M2 — SIGNIFICANT CHANGE UP
EGFR: 98 ML/MIN/1.73M2 — SIGNIFICANT CHANGE UP
GLUCOSE SERPL-MCNC: 83 MG/DL — SIGNIFICANT CHANGE UP (ref 70–99)
HCT VFR BLD CALC: 33.4 % — LOW (ref 34.5–45)
HGB BLD-MCNC: 10.1 G/DL — LOW (ref 11.5–15.5)
INR BLD: 1.56 RATIO — HIGH (ref 0.88–1.16)
MCHC RBC-ENTMCNC: 27.5 PG — SIGNIFICANT CHANGE UP (ref 27–34)
MCHC RBC-ENTMCNC: 30.2 GM/DL — LOW (ref 32–36)
MCV RBC AUTO: 91 FL — SIGNIFICANT CHANGE UP (ref 80–100)
NRBC # BLD: 0 /100 WBCS — SIGNIFICANT CHANGE UP (ref 0–0)
PLATELET # BLD AUTO: 139 K/UL — LOW (ref 150–400)
POTASSIUM SERPL-MCNC: 4.7 MMOL/L — SIGNIFICANT CHANGE UP (ref 3.5–5.3)
POTASSIUM SERPL-SCNC: 4.7 MMOL/L — SIGNIFICANT CHANGE UP (ref 3.5–5.3)
PROT SERPL-MCNC: 6.3 G/DL — SIGNIFICANT CHANGE UP (ref 6–8.3)
PROTHROM AB SERPL-ACNC: 18 SEC — HIGH (ref 10.5–13.4)
RBC # BLD: 3.67 M/UL — LOW (ref 3.8–5.2)
RBC # FLD: 18.9 % — HIGH (ref 10.3–14.5)
SODIUM SERPL-SCNC: 137 MMOL/L — SIGNIFICANT CHANGE UP (ref 135–145)
SPECIMEN SOURCE: SIGNIFICANT CHANGE UP
SPECIMEN SOURCE: SIGNIFICANT CHANGE UP
WBC # BLD: 3.24 K/UL — LOW (ref 3.8–10.5)
WBC # FLD AUTO: 3.24 K/UL — LOW (ref 3.8–10.5)

## 2023-02-28 PROCEDURE — 84132 ASSAY OF SERUM POTASSIUM: CPT

## 2023-02-28 PROCEDURE — 99232 SBSQ HOSP IP/OBS MODERATE 35: CPT

## 2023-02-28 PROCEDURE — 86900 BLOOD TYPING SEROLOGIC ABO: CPT

## 2023-02-28 PROCEDURE — 87507 IADNA-DNA/RNA PROBE TQ 12-25: CPT

## 2023-02-28 PROCEDURE — 86403 PARTICLE AGGLUT ANTBDY SCRN: CPT

## 2023-02-28 PROCEDURE — 82553 CREATINE MB FRACTION: CPT

## 2023-02-28 PROCEDURE — 85610 PROTHROMBIN TIME: CPT

## 2023-02-28 PROCEDURE — U0005: CPT

## 2023-02-28 PROCEDURE — 71045 X-RAY EXAM CHEST 1 VIEW: CPT

## 2023-02-28 PROCEDURE — 84443 ASSAY THYROID STIM HORMONE: CPT

## 2023-02-28 PROCEDURE — 83735 ASSAY OF MAGNESIUM: CPT

## 2023-02-28 PROCEDURE — 96374 THER/PROPH/DIAG INJ IV PUSH: CPT

## 2023-02-28 PROCEDURE — 80048 BASIC METABOLIC PNL TOTAL CA: CPT

## 2023-02-28 PROCEDURE — 85027 COMPLETE CBC AUTOMATED: CPT

## 2023-02-28 PROCEDURE — 83615 LACTATE (LD) (LDH) ENZYME: CPT

## 2023-02-28 PROCEDURE — 84702 CHORIONIC GONADOTROPIN TEST: CPT

## 2023-02-28 PROCEDURE — 86850 RBC ANTIBODY SCREEN: CPT

## 2023-02-28 PROCEDURE — 84484 ASSAY OF TROPONIN QUANT: CPT

## 2023-02-28 PROCEDURE — 93970 EXTREMITY STUDY: CPT

## 2023-02-28 PROCEDURE — 87305 ASPERGILLUS AG IA: CPT

## 2023-02-28 PROCEDURE — 82435 ASSAY OF BLOOD CHLORIDE: CPT

## 2023-02-28 PROCEDURE — 85018 HEMOGLOBIN: CPT

## 2023-02-28 PROCEDURE — P9045: CPT

## 2023-02-28 PROCEDURE — 86480 TB TEST CELL IMMUN MEASURE: CPT

## 2023-02-28 PROCEDURE — 87449 NOS EACH ORGANISM AG IA: CPT

## 2023-02-28 PROCEDURE — 82533 TOTAL CORTISOL: CPT

## 2023-02-28 PROCEDURE — 85045 AUTOMATED RETICULOCYTE COUNT: CPT

## 2023-02-28 PROCEDURE — 85014 HEMATOCRIT: CPT

## 2023-02-28 PROCEDURE — 93005 ELECTROCARDIOGRAM TRACING: CPT

## 2023-02-28 PROCEDURE — 97161 PT EVAL LOW COMPLEX 20 MIN: CPT

## 2023-02-28 PROCEDURE — 84100 ASSAY OF PHOSPHORUS: CPT

## 2023-02-28 PROCEDURE — 96375 TX/PRO/DX INJ NEW DRUG ADDON: CPT

## 2023-02-28 PROCEDURE — 80053 COMPREHEN METABOLIC PANEL: CPT

## 2023-02-28 PROCEDURE — 82565 ASSAY OF CREATININE: CPT

## 2023-02-28 PROCEDURE — 87040 BLOOD CULTURE FOR BACTERIA: CPT

## 2023-02-28 PROCEDURE — 85730 THROMBOPLASTIN TIME PARTIAL: CPT

## 2023-02-28 PROCEDURE — 83540 ASSAY OF IRON: CPT

## 2023-02-28 PROCEDURE — 87385 HISTOPLASMA CAPSUL AG IA: CPT

## 2023-02-28 PROCEDURE — 81001 URINALYSIS AUTO W/SCOPE: CPT

## 2023-02-28 PROCEDURE — 82746 ASSAY OF FOLIC ACID SERUM: CPT

## 2023-02-28 PROCEDURE — 80202 ASSAY OF VANCOMYCIN: CPT

## 2023-02-28 PROCEDURE — 82962 GLUCOSE BLOOD TEST: CPT

## 2023-02-28 PROCEDURE — 86606 ASPERGILLUS ANTIBODY: CPT

## 2023-02-28 PROCEDURE — 86901 BLOOD TYPING SEROLOGIC RH(D): CPT

## 2023-02-28 PROCEDURE — 71046 X-RAY EXAM CHEST 2 VIEWS: CPT

## 2023-02-28 PROCEDURE — 82803 BLOOD GASES ANY COMBINATION: CPT

## 2023-02-28 PROCEDURE — 80076 HEPATIC FUNCTION PANEL: CPT

## 2023-02-28 PROCEDURE — 87086 URINE CULTURE/COLONY COUNT: CPT

## 2023-02-28 PROCEDURE — 80162 ASSAY OF DIGOXIN TOTAL: CPT

## 2023-02-28 PROCEDURE — 93306 TTE W/DOPPLER COMPLETE: CPT

## 2023-02-28 PROCEDURE — 81003 URINALYSIS AUTO W/O SCOPE: CPT

## 2023-02-28 PROCEDURE — 84145 PROCALCITONIN (PCT): CPT

## 2023-02-28 PROCEDURE — 85025 COMPLETE CBC W/AUTO DIFF WBC: CPT

## 2023-02-28 PROCEDURE — 82330 ASSAY OF CALCIUM: CPT

## 2023-02-28 PROCEDURE — 83605 ASSAY OF LACTIC ACID: CPT

## 2023-02-28 PROCEDURE — 82607 VITAMIN B-12: CPT

## 2023-02-28 PROCEDURE — 87070 CULTURE OTHR SPECIMN AEROBIC: CPT

## 2023-02-28 PROCEDURE — 80285 DRUG ASSAY VORICONAZOLE: CPT

## 2023-02-28 PROCEDURE — 87641 MR-STAPH DNA AMP PROBE: CPT

## 2023-02-28 PROCEDURE — U0003: CPT

## 2023-02-28 PROCEDURE — 83036 HEMOGLOBIN GLYCOSYLATED A1C: CPT

## 2023-02-28 PROCEDURE — 71275 CT ANGIOGRAPHY CHEST: CPT | Mod: QQ

## 2023-02-28 PROCEDURE — 83550 IRON BINDING TEST: CPT

## 2023-02-28 PROCEDURE — 84295 ASSAY OF SERUM SODIUM: CPT

## 2023-02-28 PROCEDURE — 87640 STAPH A DNA AMP PROBE: CPT

## 2023-02-28 PROCEDURE — 74177 CT ABD & PELVIS W/CONTRAST: CPT | Mod: QQ

## 2023-02-28 PROCEDURE — 83690 ASSAY OF LIPASE: CPT

## 2023-02-28 PROCEDURE — 84466 ASSAY OF TRANSFERRIN: CPT

## 2023-02-28 PROCEDURE — 99285 EMERGENCY DEPT VISIT HI MDM: CPT | Mod: 25

## 2023-02-28 PROCEDURE — 97116 GAIT TRAINING THERAPY: CPT

## 2023-02-28 PROCEDURE — 36415 COLL VENOUS BLD VENIPUNCTURE: CPT

## 2023-02-28 PROCEDURE — 83010 ASSAY OF HAPTOGLOBIN QUANT: CPT

## 2023-02-28 PROCEDURE — 82550 ASSAY OF CK (CPK): CPT

## 2023-02-28 PROCEDURE — 82947 ASSAY GLUCOSE BLOOD QUANT: CPT

## 2023-02-28 PROCEDURE — 82728 ASSAY OF FERRITIN: CPT

## 2023-02-28 RX ORDER — ACETAMINOPHEN 500 MG
2 TABLET ORAL
Qty: 0 | Refills: 0 | DISCHARGE
Start: 2023-02-28

## 2023-02-28 RX ORDER — RIOCIGUAT 1.5 MG/1
1 TABLET, FILM COATED ORAL
Qty: 90 | Refills: 0
Start: 2023-02-28 | End: 2023-03-29

## 2023-02-28 RX ORDER — METOPROLOL TARTRATE 50 MG
1 TABLET ORAL
Qty: 0 | Refills: 0 | DISCHARGE

## 2023-02-28 RX ORDER — MIDODRINE HYDROCHLORIDE 2.5 MG/1
1 TABLET ORAL
Qty: 90 | Refills: 0
Start: 2023-02-28 | End: 2023-03-29

## 2023-02-28 RX ORDER — FUROSEMIDE 40 MG
1 TABLET ORAL
Qty: 30 | Refills: 0
Start: 2023-02-28 | End: 2023-03-29

## 2023-02-28 RX ORDER — SODIUM CHLORIDE 9 MG/ML
1000 INJECTION INTRAMUSCULAR; INTRAVENOUS; SUBCUTANEOUS
Refills: 0 | Status: DISCONTINUED | OUTPATIENT
Start: 2023-02-28 | End: 2023-02-28

## 2023-02-28 RX ORDER — PANTOPRAZOLE SODIUM 20 MG/1
1 TABLET, DELAYED RELEASE ORAL
Qty: 30 | Refills: 0
Start: 2023-02-28 | End: 2023-03-29

## 2023-02-28 RX ORDER — VORICONAZOLE 10 MG/ML
1 INJECTION, POWDER, LYOPHILIZED, FOR SOLUTION INTRAVENOUS
Qty: 60 | Refills: 0
Start: 2023-02-28 | End: 2023-03-29

## 2023-02-28 RX ADMIN — Medication 650 MILLIGRAM(S): at 08:43

## 2023-02-28 RX ADMIN — MIDODRINE HYDROCHLORIDE 10 MILLIGRAM(S): 2.5 TABLET ORAL at 13:03

## 2023-02-28 RX ADMIN — Medication 650 MILLIGRAM(S): at 09:22

## 2023-02-28 RX ADMIN — Medication 20 MILLIGRAM(S): at 06:18

## 2023-02-28 RX ADMIN — APIXABAN 5 MILLIGRAM(S): 2.5 TABLET, FILM COATED ORAL at 16:46

## 2023-02-28 RX ADMIN — MIDODRINE HYDROCHLORIDE 10 MILLIGRAM(S): 2.5 TABLET ORAL at 06:19

## 2023-02-28 RX ADMIN — VORICONAZOLE 200 MILLIGRAM(S): 10 INJECTION, POWDER, LYOPHILIZED, FOR SOLUTION INTRAVENOUS at 06:18

## 2023-02-28 RX ADMIN — APIXABAN 5 MILLIGRAM(S): 2.5 TABLET, FILM COATED ORAL at 06:17

## 2023-02-28 RX ADMIN — SODIUM CHLORIDE 100 MILLILITER(S): 9 INJECTION INTRAMUSCULAR; INTRAVENOUS; SUBCUTANEOUS at 08:44

## 2023-02-28 RX ADMIN — REMDESIVIR 200 MILLIGRAM(S): 5 INJECTION INTRAVENOUS at 16:45

## 2023-02-28 RX ADMIN — Medication 0.5 MILLIGRAM(S): at 16:56

## 2023-02-28 RX ADMIN — Medication 125 MICROGRAM(S): at 06:18

## 2023-02-28 RX ADMIN — CHLORHEXIDINE GLUCONATE 1 APPLICATION(S): 213 SOLUTION TOPICAL at 06:22

## 2023-02-28 RX ADMIN — Medication 0.5 MILLIGRAM(S): at 07:41

## 2023-02-28 RX ADMIN — RIOCIGUAT 0.5 MILLIGRAM(S): 1.5 TABLET, FILM COATED ORAL at 13:08

## 2023-02-28 RX ADMIN — FAMOTIDINE 20 MILLIGRAM(S): 10 INJECTION INTRAVENOUS at 06:19

## 2023-02-28 RX ADMIN — Medication 0.5 MILLIGRAM(S): at 13:08

## 2023-02-28 RX ADMIN — VORICONAZOLE 200 MILLIGRAM(S): 10 INJECTION, POWDER, LYOPHILIZED, FOR SOLUTION INTRAVENOUS at 16:46

## 2023-02-28 NOTE — DISCHARGE NOTE NURSING/CASE MANAGEMENT/SOCIAL WORK - PATIENT PORTAL LINK FT
You can access the FollowMyHealth Patient Portal offered by [facility] by registering at the following website: [email or web address]. By joining [employer]’s FollowMyHealth portal, you will also be able to view your health information using other applications (apps) compatible with our system.

## 2023-02-28 NOTE — PROGRESS NOTE ADULT - SUBJECTIVE AND OBJECTIVE BOX
OZ BARBOSA  MRN#: [id number]  Subjective:  CHIEF COMPLAINT: fever . Pulmonary HTN , non hodgkin's lymphoma   48 yo f a private patient from my office report to the ER   w PMH non hodgkin's lymphoma  s/p chemo at [facility] [place]  (?in [place]), PE and DVT ,S/P  covid pneumonia ,severe pulmonary HTN on Adempas , p/w fevers for the last few days, a/w generalized weakness over the last couple of weeks along with poor po intake. she denies chest pain, palpitations, lightheadedness, cough, wheeze, abdominal discomfort. the  patient does endorse n+v, dysuria, arriaza. of note, last session of chemo reportedly on 2023. Pt grew concerned so went to oncologist yesterday where she got cultures, blood work, and xray. as she did not feel improved, patient presents to [facility] er for further evaluation.  (15 Feb 2023 01:39) CT scan bilateral lung nodule , no H/O OF exposure to TB , no significant travel history to the [facility] out of the country or the [facility] area  transfer to the MICU for SVT patient is on Digoxin and B-Blockers , hypotension rebound back feeling better on cefepime , and voriconazole for presumptive fungal pneumonia , continue to have  fever at night  up to 101 ,  sweat appetite is suppressed   fever curve is better discuss with MICU team to consider for lung biopsy , lymphoma oncology will be called in .fever subsides no definitive DX is made tired and sleepy if recurrent fever happened patient may need BAL and possible navigation bronchoscopy with Biopsy of large left lung mass , remain afebrile for 48 hrs continue on voriconazole , patient spike to 103 and tested positive for covid 19 placed on isolations , 02% is 97% on RA   continue to have fever , placed on Remdesivir by ID , T .8 very upset on Remdesivir no desaturation , TO FINISH LAST DOSE OF remdisivir , DONNA  bronchoscopy as out patient , discuss with PCP      PAST MEDICAL & SURGICAL HISTORY:  Non-Hodgkin&#x27;s lymphoma      Pulmonary embolism      History of appendectomy      History of cholecystectomy                OBJECTIVE:  ICU Vital Signs Last 24 Hrs  T(C): 37.7 (2023 18:00), Max: 39.6 (2023 01:36)  T(F): 99.9 (2023 18:00), Max: 103.2 (2023 01:36)  HR: 122 (2023 18:00) (114 - 167)  BP: 91/63 (2023 18:00) (80/55 - 108/66)  BP(mean): 73 (2023 18:00) (60 - 81)  ABP: --  ABP(mean): --  RR: 29 (2023 18:00) (16 - 33)  SpO2: 97% (2023 18:00) (94% - 100%)    O2 Parameters below as of 2023 02:40  Patient On (Oxygen Delivery Method): room air             @ 07:01  -  - @ 07:00  --------------------------------------------------------  IN: 1100 mL / OUT: 0 mL / NET: 1100 mL     @ 07:01  -  02-17 @ 19:19  --------------------------------------------------------  IN: 700 mL / OUT: 2600 mL / NET: -1900 mL      PHYSICAL EXAM :Daily   cushinoid female in no acute distress   Daily Weight in k.6 (2023 09:20)  HEENT:     + NCAT  + EOMI  - Conjuctival edema   - Icterus   - Thrush   - ETT  - NGT/OGT  Neck:         + FROM    + JVD     - Nodes     - Masses    + Mid-line trachea   - Tracheostomy  Chest: normal findings  Lungs:          + CTA   + Rhonchi    - Rales    - Wheezing     - Decreased BS   - Dullness R L  Cardiac:       + S1 + S2    + RRR   - Irregular   - S3  - S4  + Murmurs   - Rub   - Hamman’s sign   Abdomen:    + BS     + Soft    + Non-tender     - Distended    - Organomegaly  - PEG  Extremities:   - Cyanosis U/L   - Clubbing  U/L  - LE/UE Edema   + Capillary refill    + Pulses   Neuro:        + Awake   +  Alert   - Confused   - Lethargic   - Sedated   - Generalized Weakness  Skin:        - Rashes    - Erythema   + Normal incisions   + IV sites intact        HOSPITAL MEDICATIONS: All mediciations reviewed and analyzed  MEDICATIONS  (STANDING):  acetaminophen   IVPB .. 1000 milliGRAM(s) IV Intermittent once  cefepime   IVPB      cefepime   IVPB 2000 milliGRAM(s) IV Intermittent every 8 hours  chlorhexidine 2% Cloths 1 Application(s) Topical <User Schedule>  chlorhexidine 4% Liquid 1 Application(s) Topical <User Schedule>  digoxin     Tablet 125 MICROGram(s) Oral daily  enoxaparin Injectable 70 milliGRAM(s) SubCutaneous every 12 hours  midodrine. 10 milliGRAM(s) Oral three times a day  voriconazole 200 milliGRAM(s) Oral every 12 hours    MEDICATIONS  (PRN):                        10.1   3.24  )-----------( 139      ( 2023 07:03 )      137  |  102  |  17  ----------------------------<  83  4.7   |  22  |  0.76    Ca    9.0      2023 07:03    TPro  6.3  /  Alb  4.0  /  TBili  0.7  /  DBili  0.1  /  AST  75<H>  /  ALT  69<H>  /  AlkPhos  150<H>                 33.4   ondansetron Injectable 4 milliGRAM(s) IV Push every 8 hours PRN Nausea and/or Vomiting    LABS: All Lab data reviewed and analyzed                             Ca    9.0      2023 07:03    TPro  5.9<L>  /  Alb  3.8  /  TBili  0.6  /  DBili  0.1  /  AST  118<H>  /  ALT  103<H>  /  AlkPhos  245<H>      Ca    9.1      2023 06:28  Phos  3.7       Mg     2.0            LIVER FUNCTIONS - ( 2023 01:21 )  Alb: 3.2 g/dL / Pro: 5.1 g/dL / ALK PHOS: 64 U/L / ALT: 36 U/L / AST: 43 U/L / GGT: x           RADIOLOGY: - Reviewed and analyzed

## 2023-02-28 NOTE — PROGRESS NOTE ADULT - REASON FOR ADMISSION
fever

## 2023-02-28 NOTE — DISCHARGE NOTE PROVIDER - NSDCMRMEDTOKEN_GEN_ALL_CORE_FT
acetaminophen 325 mg oral tablet: 2 tab(s) orally every 6 hours, As needed, Temp greater or equal to 38C (100.4F), Mild Pain (1 - 3)  digoxin 125 mcg (0.125 mg) oral tablet: 1 tab(s) orally once a day  Eliquis 5 mg oral tablet: 1 tab(s) orally 2 times a day  furosemide 20 mg oral tablet: 1 tab(s) orally once a day  LORazepam 0.5 mg oral tablet: 1 tab(s) orally every 12 hours, As Needed MDD:2 tab  midodrine 10 mg oral tablet: 1 tab(s) orally 3 times a day  Protonix 40 mg oral delayed release tablet: 1 tab(s) orally once a day   PT: once a day     Dx: Weakness, pHTN  riociguat 0.5 mg oral tablet: 1 tab(s) orally every 8 hours  selexipag 800 mcg oral tablet: 1 tab(s) orally 2 times a day  voriconazole 200 mg oral tablet: 1 tab(s) orally every 12 hours

## 2023-02-28 NOTE — CHART NOTE - NSCHARTNOTEFT_GEN_A_CORE
[facility] continues to follow this case. LCSW met with patient at bedside today for supportive intervention.    LCSW first introduced self and role of [place]  for support. Patient verbalized understanding and was receptive to visit today. LCSW inquired into patient coping with current medical status and hospitalization, and patient notes coping well. Patient states that she is anxious to be d/c home today and has expressed this to the primary team, from whom she awaits a determination. Patient states that she feels that she is remaining in bed and losing functionality by prolonging her admission, and states that she wishes to return home to her own environment and to her routine. Patient notes her biggest wish at this point is to take a "long, hot bath", and LCSW validated patient in her hope to go home today as well. LCSW inquired into patient's support system at home, and patient notes strong supports in her significant other, daughter, sister, former mother-in-law, and former in-law extended family. Patient states that she wishes to be home surrounded by this family, and LCSW again validated this with patient today. Much emotional support, active listening, and room for ventilation of feelings provided, and patient verbalized appreciation of support and visit today.     LCSW provided contact card to patient and assured ongoing availability and support. Patient verbalized appreciation.    [facility] will continue to follow this case.

## 2023-02-28 NOTE — PROGRESS NOTE ADULT - SUBJECTIVE AND OBJECTIVE BOX
OVERNIGHT EVENTS / SUBJECTIVE: Patient seen and examined at bedside. RANULFO.    OBJECTIVE:    VITAL SIGNS:    MEDICATIONS  (STANDING):    MEDICATIONS  (PRN):    HEENT: NC/AT; PERRL, clear conjunctiva  Neck: supple  Respiratory: CTA b/l  Cardiovascular: +S1/S2; RRR  Abdomen: soft, NT/ND; +BS x4  Extremities: WWP, 2+ peripheral pulses b/l; no LE edema  Skin: normal color and turgor; no rash  Neurological: A&Ox4    ondansetron Injectable 4 milliGRAM(s) IV Push every 8 hours PRN Nausea and/or Vomiting      ALLERGIES:  Allergies    No Known Allergies    Intolerances                                                                   10.1   3.24  )-----------( 139      ( 28 Feb 2023 07:03 )             33.4           LIVER FUNCTIONS - ( 28 Feb 2023 07:03 )  Alb: 4.0 g/dL / Pro: 6.3 g/dL / ALK PHOS: 150 U/L / ALT: 69 U/L / AST: 75 U/L / GGT: x           PT/INR - ( 28 Feb 2023 07:03 )   PT: 18.0 sec;   INR: 1.56 ratio                             EKG:   MICROBIOLOGY:    IMAGING:      Labs, imaging, EKG personally reviewed    RADIOLOGY & ADDITIONAL TESTS: Reviewed.

## 2023-02-28 NOTE — PROGRESS NOTE ADULT - SUBJECTIVE AND OBJECTIVE BOX
46 yo f w pmh nhl s/p chemo (?in remission), PE, ?pah, covid, admitted 2/15/23 with fevers for the last few days, a/w generalized weakness over the last couple of weeks along with poor po intake. denied chest pain, palpitations, lightheadedness, cough, wheeze, abdominal discomfort. patient did endorse n+v, dysuria, arriaza. of note, last session of chemo reportedly on 1/23/2023. Pt grew concerned so went to oncologist, where she got cultures, blood work, and xray. as she did not feel improved, and thus came to hospital  Presently in MICU   Managed for sepsis, fungal infection in consideration, Voriconazole started  ID following, on RX for COVID 19 too  Cardiology following for SVT  Was in MICU for some time  PAST MEDICAL & SURGICAL HISTORY:  Non-Hodgkin&#x27;s lymphoma      Pulmonary embolism      History of appendectomy      History of cholecystectomy        Allergies    No Known Allergies    Intolerances      Social History:  no pertinent social history (15 Feb 2023 01:39)    Medications:  acetaminophen     Tablet .. 650 milliGRAM(s) Oral every 6 hours PRN Temp greater or equal to 38C (100.4F), Mild Pain (1 - 3)  apixaban 5 milliGRAM(s) Oral every 12 hours  chlorhexidine 2% Cloths 1 Application(s) Topical <User Schedule>  digoxin     Tablet 125 MICROGram(s) Oral daily  famotidine Injectable 20 milliGRAM(s) IV Push every 12 hours  furosemide    Tablet 20 milliGRAM(s) Oral daily  LORazepam     Tablet 0.5 milliGRAM(s) Oral <User Schedule>  LORazepam   Injectable 0.2 milliGRAM(s) IV Push three times a day PRN breakthrough nausea/refractory to PO nausea tx  midodrine. 10 milliGRAM(s) Oral three times a day  remdesivir  IVPB 100 milliGRAM(s) IV Intermittent every 24 hours  riociguat 0.5 milliGRAM(s) Oral every 8 hours  sodium chloride 0.9%. 1000 milliLiter(s) IV Continuous <Continuous>  voriconazole 200 milliGRAM(s) Oral every 12 hours    Labs:  CBC Full  -  ( 28 Feb 2023 07:03 )  WBC Count : 3.24 K/uL  RBC Count : 3.67 M/uL  Hemoglobin : 10.1 g/dL  Hematocrit : 33.4 %  Platelet Count - Automated : 139 K/uL  Mean Cell Volume : 91.0 fl  Mean Cell Hemoglobin : 27.5 pg  Mean Cell Hemoglobin Concentration : 30.2 gm/dL  Auto Neutrophil # : x  Auto Lymphocyte # : x  Auto Monocyte # : x  Auto Eosinophil # : x  Auto Basophil # : x  Auto Neutrophil % : x  Auto Lymphocyte % : x  Auto Monocyte % : x  Auto Eosinophil % : x  Auto Basophil % : x    02-28    137  |  102  |  17  ----------------------------<  83  4.7   |  22  |  0.76    Ca    9.0      28 Feb 2023 07:03    TPro  6.3  /  Alb  4.0  /  TBili  0.7  /  DBili  0.1  /  AST  75<H>  /  ALT  69<H>  /  AlkPhos  150<H>  02-28      Radiology:             ROS:  Patient comfortable without distress  No SOB or chest pain  No palpitation  No abdominal pain, diarrhaea or constipation  No weakness of extremities  No skin changes or swelling of legs  Rest of the comprehensive ROS was negative  Vital Signs Last 24 Hrs  T(C): 37.1 (28 Feb 2023 06:20), Max: 37.5 (27 Feb 2023 20:30)  T(F): 98.8 (28 Feb 2023 06:20), Max: 99.5 (27 Feb 2023 20:30)  HR: 130 (28 Feb 2023 06:20) (109 - 130)  BP: 119/78 (28 Feb 2023 06:20) (91/60 - 119/78)  BP(mean): --  RR: 16 (28 Feb 2023 06:20) (16 - 18)  SpO2: 98% (28 Feb 2023 06:20) (98% - 98%)    Parameters below as of 28 Feb 2023 06:20  Patient On (Oxygen Delivery Method): room air        Physical exam:  Patient alert and oriented  No distress  CVS: S1, S2 regular or murmur  Chest: bilateral breath sound without rales  Abdomen: soft, not tender, no organomegaly or masses  CNS: No focal neuro deficit  Musculoskeletal:  Normal range of motion  Skin: No rash    Assessment and Plan: 48 yo f w pmh nhl s/p chemo (?in remission), PE, ?pah, covid, admitted 2/15/23 with fevers for the last few days, a/w generalized weakness over the last couple of weeks along with poor po intake. denied chest pain, palpitations, lightheadedness, cough, wheeze, abdominal discomfort. patient did endorse n+v, dysuria, arriaza. of note, last session of chemo reportedly on 1/23/2023. Pt grew concerned so went to oncologist, where she got cultures, blood work, and xray. as she did not feel improved, and thus came to hospital  Presently in MICU   Managed for sepsis, fungal infection in consideration, Voriconazole started  ID following, on RX for COVID 19 too  Cardiology following for SVT  Was in MICU for some time  PAST MEDICAL & SURGICAL HISTORY:  Non-Hodgkin&#x27;s lymphoma      Pulmonary embolism      History of appendectomy      History of cholecystectomy        Allergies    No Known Allergies    Intolerances      Social History:  no pertinent social history (15 Feb 2023 01:39)    Medications:  acetaminophen     Tablet .. 650 milliGRAM(s) Oral every 6 hours PRN Temp greater or equal to 38C (100.4F), Mild Pain (1 - 3)  apixaban 5 milliGRAM(s) Oral every 12 hours  chlorhexidine 2% Cloths 1 Application(s) Topical <User Schedule>  digoxin     Tablet 125 MICROGram(s) Oral daily  famotidine Injectable 20 milliGRAM(s) IV Push every 12 hours  furosemide    Tablet 20 milliGRAM(s) Oral daily  LORazepam     Tablet 0.5 milliGRAM(s) Oral <User Schedule>  LORazepam   Injectable 0.2 milliGRAM(s) IV Push three times a day PRN breakthrough nausea/refractory to PO nausea tx  midodrine. 10 milliGRAM(s) Oral three times a day  remdesivir  IVPB 100 milliGRAM(s) IV Intermittent every 24 hours  riociguat 0.5 milliGRAM(s) Oral every 8 hours  sodium chloride 0.9%. 1000 milliLiter(s) IV Continuous <Continuous>  voriconazole 200 milliGRAM(s) Oral every 12 hours    Labs:  CBC Full  -  ( 28 Feb 2023 07:03 )  WBC Count : 3.24 K/uL  RBC Count : 3.67 M/uL  Hemoglobin : 10.1 g/dL  Hematocrit : 33.4 %  Platelet Count - Automated : 139 K/uL  Mean Cell Volume : 91.0 fl  Mean Cell Hemoglobin : 27.5 pg  Mean Cell Hemoglobin Concentration : 30.2 gm/dL  Auto Neutrophil # : x  Auto Lymphocyte # : x  Auto Monocyte # : x  Auto Eosinophil # : x  Auto Basophil # : x  Auto Neutrophil % : x  Auto Lymphocyte % : x  Auto Monocyte % : x  Auto Eosinophil % : x  Auto Basophil % : x    02-28    137  |  102  |  17  ----------------------------<  83  4.7   |  22  |  0.76    Ca    9.0      28 Feb 2023 07:03    TPro  6.3  /  Alb  4.0  /  TBili  0.7  /  DBili  0.1  /  AST  75<H>  /  ALT  69<H>  /  AlkPhos  150<H>  02-28      Radiology:             ROS:  Patient comfortable without distress  No SOB or chest pain  No palpitation  No abdominal pain, diarrhaea or constipation  No weakness of extremities  No skin changes or swelling of legs  Rest of the comprehensive ROS was negative  Vital Signs Last 24 Hrs  T(C): 37.1 (28 Feb 2023 06:20), Max: 37.5 (27 Feb 2023 20:30)  T(F): 98.8 (28 Feb 2023 06:20), Max: 99.5 (27 Feb 2023 20:30)  HR: 130 (28 Feb 2023 06:20) (109 - 130)  BP: 119/78 (28 Feb 2023 06:20) (91/60 - 119/78)  BP(mean): --  RR: 16 (28 Feb 2023 06:20) (16 - 18)  SpO2: 98% (28 Feb 2023 06:20) (98% - 98%)    Parameters below as of 28 Feb 2023 06:20  Patient On (Oxygen Delivery Method): room air        Physical exam:  Patient alert and oriented  No distress  CVS: S1, S2   Chest: bilateral breath sound without rales  Abdomen: soft, not tender, no organomegaly or masses  CNS: No focal neuro deficit  Musculoskeletal:  Normal range of motion  Skin: No rash    Assessment and Plan:

## 2023-02-28 NOTE — DISCHARGE NOTE NURSING/CASE MANAGEMENT/SOCIAL WORK - NSDCFUADDAPPT_GEN_ALL_CORE_FT
APPTS ARE READY TO BE MADE: [X ] YES    Best Family or Patient Contact (if needed):    Additional Information about above appointments (if needed):    1: Follow up with Dr. Anna in 2 weeks for repeat cat scan   2:   3:     Other comments or requests:

## 2023-02-28 NOTE — PROGRESS NOTE ADULT - PROVIDER SPECIALTY LIST ADULT
Cardiology
Heme/Onc
Infectious Disease
MICU
Nephrology
Pulmonology
Pulmonology
Cardiology
Infectious Disease
MICU
MICU
Nephrology
Pulmonology
Cardiology
Cardiology
Heme/Onc
Infectious Disease
Internal Medicine
MICU
MICU
Nephrology
Nephrology
Pulmonology
Cardiology
Heme/Onc
Hospitalist
Infectious Disease
Internal Medicine
Internal Medicine
MICU
MICU
Nephrology
Nephrology
Pulmonology
Hospitalist
Internal Medicine
Internal Medicine
Nephrology
Nephrology
Palliative Care

## 2023-02-28 NOTE — DISCHARGE NOTE PROVIDER - NSDCFUADDAPPT_GEN_ALL_CORE_FT
APPTS ARE READY TO BE MADE: [X ] YES    Best Family or Patient Contact (if needed):    Additional Information about above appointments (if needed):    1: Follow up with Dr. Anna in 2 weeks for repeat cat scan   2:   3:     Other comments or requests:    APPTS ARE READY TO BE MADE: [X ] YES    Best Family or Patient Contact (if needed):    Additional Information about above appointments (if needed):    1: Follow up with Dr. Anna in 2 weeks for repeat cat scan   2:   3:     Other comments or requests: Patient was provided with follow up request details and was advised to call to schedule follow up within specified time frame.  APPTS ARE READY TO BE MADE: [X ] YES    Best Family or Patient Contact (if needed):    Additional Information about above appointments (if needed):    1: Follow up with Dr. Anna in 2 weeks for repeat cat scan   2:   3:     Other comments or requests:   Patient was scheduled with Dr. Carroll on 3/7 1 pm at [address] [facility] through the provider's office. Patient advised of appointment details.    Patient was scheduled with Dr. Anna on  3/13 at [address] [facility] through the provider's office. Patient advised of appointment details.    Patient was scheduled with Dr. Webber on 3/23 3:00 pm at [address] [facility] through the provider's office. Patient advised of appointment details.    Patient was provided with follow up request details and was advised to call to schedule follow up within specified time frame.

## 2023-02-28 NOTE — PROGRESS NOTE ADULT - SUBJECTIVE AND OBJECTIVE BOX
CC: Patient is a 48y old  Female who presents with a chief complaint of fever (2023 11:56)    ID following for fever    Interval History/ROS: Patient afebrile overnight. No new complaints.     Rest of ROS negative.    Allergies  No Known Allergies    ANTIMICROBIALS:  remdesivir  IVPB 100 every 24 hours  voriconazole 200 every 12 hours    OTHER MEDS:  acetaminophen     Tablet .. 650 milliGRAM(s) Oral every 6 hours PRN  apixaban 5 milliGRAM(s) Oral every 12 hours  chlorhexidine 2% Cloths 1 Application(s) Topical <User Schedule>  digoxin     Tablet 125 MICROGram(s) Oral daily  famotidine Injectable 20 milliGRAM(s) IV Push every 12 hours  furosemide    Tablet 20 milliGRAM(s) Oral daily  LORazepam     Tablet 0.5 milliGRAM(s) Oral <User Schedule>  LORazepam   Injectable 0.2 milliGRAM(s) IV Push three times a day PRN  midodrine. 10 milliGRAM(s) Oral three times a day  riociguat 0.5 milliGRAM(s) Oral every 8 hours  sodium chloride 0.9%. 1000 milliLiter(s) IV Continuous <Continuous>    PE:    Vital Signs Last 24 Hrs  T(C): 36.9 (2023 12:05), Max: 37.5 (2023 20:30)  T(F): 98.5 (2023 12:05), Max: 99.5 (2023 20:30)  HR: 107 (2023 12:05) (107 - 130)  BP: 92/60 (2023 12:05) (91/60 - 119/78)  BP(mean): --  RR: 18 (2023 12:05) (16 - 18)  SpO2: 97% (2023 12:05) (97% - 98%)    Parameters below as of 2023 12:05  Patient On (Oxygen Delivery Method): room air    Gen: AOx3, NAD  CV: S1+S2 normal, no murmurs  Resp: Clear bilat, no resp distress  Abd: Soft, nontender, +BS  Ext: No LE edema, no wounds  : No Roman  IV/Skin: No thrombophlebitis  Neuro: no focal deficits    LABS:                          10.1   3.24  )-----------( 139      ( 2023 07:03 )             33.4           137  |  102  |  17  ----------------------------<  83  4.7   |  22  |  0.76    Ca    9.0      2023 07:03    TPro  6.3  /  Alb  4.0  /  TBili  0.7  /  DBili  0.1  /  AST  75<H>  /  ALT  69<H>  /  AlkPhos  150<H>        Urinalysis Basic - ( 2023 07:12 )    Color: Light Yellow / Appearance: Clear / S.009 / pH: x  Gluc: x / Ketone: Negative  / Bili: Negative / Urobili: Negative   Blood: x / Protein: Trace / Nitrite: Negative   Leuk Esterase: Moderate / RBC: 1 /hpf / WBC 14 /HPF   Sq Epi: x / Non Sq Epi: 4 /hpf / Bacteria: Negative        MICROBIOLOGY:  v  .Blood Blood-Peripheral  23   No growth to date.  --  --      .Blood Blood-Peripheral  23   No growth to date.  --  --      Clean Catch Clean Catch (Midstream)  23   No growth  --  --      .Blood Blood-Peripheral  23   No Growth Final  --  --      Clean Catch Clean Catch (Midstream)  23   No growth  --    Rare polymorphonuclear leukocytes per low power field  Rare Squamous epithelial cells per low power field  Rare Gram Negative Rods per oil power field      .Blood Blood-Peripheral  23   No Growth Final  --  --      .Blood Blood-Peripheral  23   No Growth Final  --  --      .Blood Blood-Peripheral  23   No Growth Final  --  --      Clean Catch Clean Catch (Midstream)  23   No growth  --  --      .Blood Blood-Peripheral  23   No Growth Final  --  --      .Blood Blood-Peripheral  23   No Growth Final  --  --    RADIOLOGY:    < from: Xray Chest 1 View- PORTABLE-Routine (Xray Chest 1 View- PORTABLE-Routine in AM.) (23 @ 09:23) >    IMPRESSION:  Left base clearing.    < end of copied text >

## 2023-02-28 NOTE — DISCHARGE NOTE NURSING/CASE MANAGEMENT/SOCIAL WORK - NSDCVIVACCINE_GEN_ALL_CORE_FT
Patient calls stating the [facility] pharmacy in [place] does not have available dosage of Augmentin. She is wondering if she can take 875MG BID instead or they have available dosage in [place], but she would have to pay $160 out of pocket. Informed provider. Per Kana patient is not to take the lower dosage. Provider states he will look into ordering a different medication. Patient informed of same and will return call when provider addresses the issue.   No Vaccines Administered.

## 2023-02-28 NOTE — PROGRESS NOTE ADULT - TIME BILLING
Symptom assessment and management, supportive counseling, coordination of care
Agree with above PA note.  events noted  cont diuretics per icu   pocus noted  midodrine/vasopessors as needed  abx per icu
Agree with above PA note.  events noted  cont oral diuretics   midodrine as needed  abx per primary team
agree with above PA note.  events noted  cv stable  cont current tx  cont mido as needed, can inc to 15mg   cont a/c  fever w/u per primary, onc
Agree with above PA note.  events noted  cont oral diuretics   midodrine as needed  abx per primary team
Agree with above PA note.  cv stable  cont oral diuretics   midodrine as needed, inc as needed   abx per primary team  await bx  covid tx per primary team
Agree with above PA note.  cv stable and clinically improved  cont diuresis per icu
agree with above PA note.  events noted  cv stable  cont current tx  cont mido at 15mg   cont a/c  fever w/u per primary, onc
review of laboratory data, radiology results, discussion with primary team\patient, and monitoring for potential decompensation. Interventions were performed as documented above
Symptom assessment and management, supportive counseling, coordination of care
Symptom assessment and management, supportive counseling, coordination of care

## 2023-02-28 NOTE — PROGRESS NOTE ADULT - ASSESSMENT
46 yo f w pmh nhl s/p chemo (?in remission), PE, ?pah, covid, admitted 2/15/23 with fevers for the last few days, a/w generalized weakness over the last couple of weeks along with poor po intake. denied chest pain, palpitations, lightheadedness, cough, wheeze, abdominal discomfort. patient did endorse n+v, dysuria, arriaza. of note, last session of chemo reportedly on 1/23/2023. Pt grew concerned so went to oncologist, where she got cultures, blood work, and xray. as she did not feel improved, and thus came to hospital      Oncologic history  Patient is followed by Dr. Mendoza at [facility] for Diffuse large B cell lymphoma, EBV positive, BCl 2 positive dx by LLL lung mass bx. 8/2022. She was enrolled in [id number], which she came off later. She had COVID one week after 1st treatment and PE with heart strain.  She was given C2 once received without Adriamycin, admitted afterwords with excerebration of COVID 19 symptoms. She went thru cycle 3-5 and interim PET/CT showed likely CR. Her last treatment was on 1/23/23 with Rituxan, Polatuzumab and Cytoxan. She received on body neulasta after that  Of note PET CT on 12/29/22 showed decrease of LLL lung mas but did show new subcentimeter pulmonary nodules ? infectious/inflammatory or residual disease.  After admission on 2/15/23 being unmanaged for sepsis, ID following, on abx and Voriconazole. Pulmonary has also seen her  Seen by cardiology for SVT  Pancytopenias on admission improved. STARR improved. Autonomic Dysfunction on Midodrine   H/O PE and DVT on AC  Positive covid 19  on  Remdesivir   Fevers persist,  etiology infection V/S lymphoma  ? pan culture   I feel we need bx of lung nodule to determine the etiology or infectious vs lymhoma, less likely   48 yo f w pmh nhl s/p chemo (?in remission), PE, ?pah, covid, admitted 2/15/23 with fevers for the last few days, a/w generalized weakness over the last couple of weeks along with poor po intake. denied chest pain, palpitations, lightheadedness, cough, wheeze, abdominal discomfort. patient did endorse n+v, dysuria, arriaza. of note, last session of chemo reportedly on 1/23/2023. Pt grew concerned so went to oncologist, where she got cultures, blood work, and xray. as she did not feel improved, and thus came to hospital      Oncologic history  Patient is followed by Dr. Mendoza at [facility] for Diffuse large B cell lymphoma, EBV positive, BCl 2 positive dx by LLL lung mass bx. 8/2022. She was enrolled in [id number], which she came off later. She had COVID one week after 1st treatment and PE with heart strain.  She was given C2 once received without Adriamycin, admitted afterwords with excerebration of COVID 19 symptoms. She went thru cycle 3-5 and interim PET/CT showed likely CR. Her last treatment was on 1/23/23 with Rituxan, Polatuzumab and Cytoxan. She received on body neulasta after that  Of note PET CT on 12/29/22 showed decrease of LLL lung mas but did show new subcentimeter pulmonary nodules ? infectious/inflammatory or residual disease.  After admission on 2/15/23 being unmanaged for sepsis, ID following, on abx and Voriconazole. Pulmonary has also seen her  Seen by cardiology for SVT  Pancytopenias on admission improved. STARR improved. Autonomic Dysfunction on Midodrine   H/O PE and DVT on AC  Positive covid 19  on  Remdesivir   Fevers persist,  etiology infection V/S lymphoma  ? pan culture   I feel we need bx of lung nodule to determine the etiology or infectious vs lymhoma, less likely.  Planning as per chart

## 2023-02-28 NOTE — DISCHARGE NOTE PROVIDER - CARE PROVIDERS DIRECT ADDRESSES
,DirectAddress_Unknown,[email or web address],DirectAddress_Unknown

## 2023-02-28 NOTE — PROGRESS NOTE ADULT - ASSESSMENT
Echo 2/15/23: Global lv dysfxn EF 42%, septal flattening, RV overload, mod pulm htn    A/P  46 yo f w pmh nhl s/p chemo (?in remission), pe, ?pah, covid, p/w fevers for the last few days, a/w generalized weakness, found to be in severe sepsis and w/ episode of SVT to 170s.    #SVT  -Pt with known hx of SVT  -Has been evaluated in past, was not candidate for ablation d/t chemo  -No longer SVT, remains sinus tachy  -Continue digoxin  -Hypotension precludes bb use  -event toprol if bp improved    #HFrEF, RV failure  -Echo with global lv dysfxn  -likely nonischemic in etiology  -Does not appear fluid overloaded  -Continue lasix qd as ordered  -hypotension precludes lv dysfxn meds    #Sepsis, r/o fungemia   -Lung nodules noted on CT, c/f fungal infection  -Pending possible bronchoscopy  -Pt is cleared from cv perspective to undergo bronch w/ acceptable cv risk  -W/u & abx per micu, ID    #Hypotension  -I/S/O sepsis, HF  -Cont midodrine - can increase to 15mg     #Leukopenia, Fevers  -Hx NHL  -Heme/onc following    #COVID  -Mgmt per ID, primary      Please call/text me with questions/concerns between 8am-4pm  [phone]

## 2023-02-28 NOTE — PROGRESS NOTE ADULT - ASSESSMENT
47 F, PE, pulm HTN, diagnosed with NHL 7/2022 and started on R-CHOP, last dose 1/23/23 and had a PET and was told she is in remission, usually has 5 days of prednisone after chemo and after that completed started to have fever and chills whish she has been getting since, no significant cough, abd pain, diarrhea or dysuria  here febrile to 102.5, tachy, no hypoxia - on room air.    CT Chest/abd with no PE, Innumerable lung nodules which are nonspecific. In this clinical setting, differential includes infection (in which case, consider fungus) or neoplasm/known lymphoma. Indeterminate low-attenuation lesion in the liver.    s/p RRT for fever, rigors, tachycardia, hypotension, transferred to MICU, but still on RA, now on the floor   blood cx, urine cx, sputum cx, MRSA PCR, fungitell, galactomannan, histo Ag and  negative    Febrile to 102.6 and tested positive for COVID (negative on admission) had COVID in September and now complete asymptomatic except for fevers, possibly new infection vs prolonged shedding. Team discussed with lab and the COVID tests x 2 were weakly positive and CT value 0 on different platforms. Third COVID test positive as well.     Recommend:  * f/u the repeat blood cx  * Onc followup - are lung nodules lymphoma vs infection possibly fungal on voriconazole   * f/u with pulmonary and IR regarding biopsy vs bronch given persistent and prolonged course of fevers  * Continue with remdesivir to complete 5 days  * Monitor fever curve    Chon Humphrey MD  Available through MS Teams  If no response, or after 5pm/weekends, call [phone]    Plan discussed with primary team.

## 2023-02-28 NOTE — PROGRESS NOTE ADULT - ASSESSMENT
48 yo f w pmh nhl s/p chemo (?in remission), pe, ?pah, covid, p/w fevers for the last few days, a/w generalized weakness over the last couple of weeks along with poor po intake. denies chest pain, palpitations, lightheadedness, cough, wheeze, abdominal discomfort. patient does endorse n+v, dysuria, arriaza. of note, last session of chemo reportedly on 1/23/2023. Pt grew concerned so went to oncologist yesterday where she got cultures, blood work, and xray. as she did not feel improved, patient presents to [facility] er for further evaluation.  (15 Feb 2023 01:39)  BP monitoring,continue current antihypertensive meds, low salt diet,followup with PMD in 1-2 weeks      pt had letitia and pt was on hemodialysis for weeks and s/p cvvh at [facility] now gfr is controlled furosemide    Tablet 20 milliGRAM(s) Oral daily  Serum creatinine is stable at 0.8, approximating a GFR of is controlled  ml/min.   There is no progression.  No uremic symptoms. pos  evidence of  worsening  Anemia. Fluid status stable.   Will continue to avoid nephrotoxic drugs.  Patient remains asymptomatic.  Continue current therapy.    hyponatremia   improved  Non hodgkin's lymphoma may be  in remission   NEW fever positive covid 19 ID start Remdesivir   Fever etiology infection V/S lymphoma   pan culture   on Antibiotics sever pulmonary HTN an Adempas 0.5 mg TID       BP monitoring,followup with PMD in 1-2 weeks    Admit for septic workup and ID evaluation,send blood and urine cx,serial lactate levels,monitor vitals closley,ivfs hydration,monitor urine output and renal profile,iv abx as per id cons\  remdesivir  IVPB 100 milliGRAM(s) IV Intermittent every 24 hours  voriconazole 200 milliGRAM(s) Oral every 12 hours    dvt enoxaparin Injectable 70 milliGRAM(s) SubCutaneous every 12 hours    hypotension midodrine. 10 milliGRAM(s) Oral three times a day no fever and no chills.

## 2023-02-28 NOTE — DISCHARGE NOTE PROVIDER - NSDCCPCAREPLAN_GEN_ALL_CORE_FT
PRINCIPAL DISCHARGE DIAGNOSIS  Diagnosis: Sepsis  Assessment and Plan of Treatment: Take all antibiotics as ordered.  Call you Health care provider upon arrival home to make a one week follow up appointment.  If you develop fever, chills, malaise, or change in mental status call your Health Care Provider or go to the Emergency Department.  Nutrition is important, eat small frequent meals to help ensure you get adequate calories.  Do not stay in bed all day!  Increase your activity daily as tolerated.      SECONDARY DISCHARGE DIAGNOSES  Diagnosis: Pulmonary hypertension  Assessment and Plan of Treatment: Take all medicines as told by your caregiver. If you have problems with a prescribed medicine, talk to your caregiver. Do not stop taking it on your own.   Do not Informed pt of the various negative side effects of smoking including risk of COPD, Lung Ca etc  Strongly recommended that pt stops smoking and pt given various options of smoking cessasion tools such as NRT's and other pharmacotherapies.  Eat a healthy diet. Avoid a high salt diet. Talk to a dietician () about foods you should eat.   Stay as active as possibleAvoid high altitudes.   Be careful when using a hot tub. A hot tub can lower your blood pressure.   If you have pulmonary hypertension and are female of child bearing age, talk to your caregiver about using birth control pills or getting pregnant  SEEK IMMEDIATE MEDICAL CARE IF:  You have severe shortness of breath.  You develop chest pain or pressure.  You cough up blood.  You develop swelling of your feet or legs.  You have a sudden increase in weight    Diagnosis: Pulmonary nodule  Assessment and Plan of Treatment: Follow up with Dr. Anna in 2 weeks for repeat cat scan and eventual pulmonoary nodule biopsy    Diagnosis: 2019 novel coronavirus disease (COVID-19)  Assessment and Plan of Treatment: You tested positive for Denies Recent travel, Exposure or Covid symptoms 19.  You no longer require hospitalization.  Complete abx as recommended if prescribed for home. Quarantine yourself for 14 days  Please follow up with your PCP in 1-2 weeks -Call your Provider before hand to make then aware of your hospitalization   Take Tylenol for Fevers every 6 hours as needed- Do not exceed 4gm of Tylenol in a 24 hour period  Stay hydrated   WEAR A FACE MASK   Cover your cough and sneezes   Clean your hands often   Avoid sharing personal house hold items   Clean all high touch surfaces- everyday items like table tops , door knobs, cell phones etc   You should restrict activities outside your home except for getting medical care   Avoid using public transportation  Do not go to work, school, or Public areas   Monitor your oxygen saturation   Call [facility] at [phone]    Diagnosis: Chronic CHF  Assessment and Plan of Treatment: Weigh yourself daily.  If you gain 3lbs in 3 days, or 5lbs in a week call your Health Care Provider.  Do not eat or drink foods containing more than 2000mg of salt (sodium) in your diet every day.  Call your Health Care Provider if you have any swelling or increased swelling in your feet, ankles, and/or stomach.  The Pt was provided with CHF diet instruction (low sodium diet, daily weights, label reading, Heart Healthy Cooking Tips & Heart Healthy shopping Tips).  Take all of your medication as directed.  If you become dizzy call your Health Care Provider.    Diagnosis: SVT (supraventricular tachycardia)  Assessment and Plan of Treatment: You may have symptoms such as dizziness, chest pain, or fainting (syncope) that are caused by your fast heart rate.  Take your medication as prescribed.  If episodes of supraventricular tachycardia (SVT) start suddenly and cause symptoms, you can try vagal maneuvers—such as holding your breath and bearing down (Valsalva maneuver), or coughing. These simple maneuvers stimulate the vagus nerve, which can slow conduction of electrical impulses that control your heart rate. Your doctor can teach you how to do vagal maneuvers safely.  Notify your doctor or go to the nearest emergency room if your heart rate doesn't slow and symptoms persist.

## 2023-02-28 NOTE — DISCHARGE NOTE PROVIDER - CARE PROVIDER_API CALL
Richard Anna)  Critical Care Medicine; Internal Medicine; Pulmonary Disease  [address], [address]  [place] [address]  Phone: [phone]  Fax: [phone]  Follow Up Time: 2 weeks    Jan Ferguson)  Internal Medicine  [address] [place] [address]  Phone: [phone]  Fax: [phone]  Follow Up Time: 2 weeks    Pahlavan, Mohsen (MD)  Medicine  [address], [address]  [place] [address]  Phone: [phone]  Fax: [phone]  Follow Up Time: 2 weeks    Martin Webber)  Cardiovascular Disease; Interventional Cardiology; Nuclear Cardiology  [address], [address]  [place] [address]  Phone: [phone]  Fax: [phone]  Follow Up Time: 2 weeks

## 2023-02-28 NOTE — PROGRESS NOTE ADULT - SUBJECTIVE AND OBJECTIVE BOX
Patient is a 47y Female whom presented to the hospital with abnormal electrolyte     PAST MEDICAL & SURGICAL HISTORY:  Non-Hodgkin&#x27;s lymphoma      Pulmonary embolism      History of appendectomy      History of cholecystectomy          MEDICATIONS  (STANDING):  apixaban 5 milliGRAM(s) Oral every 12 hours  chlorhexidine 2% Cloths 1 Application(s) Topical <User Schedule>  digoxin     Tablet 125 MICROGram(s) Oral daily  filgrastim-sndz (ZARXIO) Injectable 480 MICROGram(s) SubCutaneous daily  midodrine. 10 milliGRAM(s) Oral three times a day  piperacillin/tazobactam IVPB.. 3.375 Gram(s) IV Intermittent every 8 hours  potassium chloride    Tablet ER 40 milliEquivalent(s) Oral once  sodium bicarbonate  Infusion 0.086 mEq/kG/Hr (75 mL/Hr) IV Continuous <Continuous>  vancomycin  IVPB 1000 milliGRAM(s) IV Intermittent every 12 hours      Allergies    No Known Allergies    Intolerances        SOCIAL HISTORY:  Denies ETOh,Smoking,     FAMILY HISTORY:      REVIEW OF SYSTEMS:    CONSTITUTIONAL: pos  weakness, fevers or chills  CARDIOVASCULAR: No chest pain or palpitations  GASTROINTESTINAL: No abdominal or epigastric pain. No nausea, vomiting,                                                                   10.1   3.24  )-----------( 139      ( 2023 07:03 )             33.4       CBC Full  -  ( 2023 07:03 )  WBC Count : 3.24 K/uL  RBC Count : 3.67 M/uL  Hemoglobin : 10.1 g/dL  Hematocrit : 33.4 %  Platelet Count - Automated : 139 K/uL  Mean Cell Volume : 91.0 fl  Mean Cell Hemoglobin : 27.5 pg  Mean Cell Hemoglobin Concentration : 30.2 gm/dL  Auto Neutrophil # : x  Auto Lymphocyte # : x  Auto Monocyte # : x  Auto Eosinophil # : x  Auto Basophil # : x  Auto Neutrophil % : x  Auto Lymphocyte % : x  Auto Monocyte % : x  Auto Eosinophil % : x  Auto Basophil % : x          137  |  102  |  17  ----------------------------<  83  4.7   |  22  |  0.76    Ca    9.0      2023 07:03    TPro  6.3  /  Alb  4.0  /  TBili  0.7  /  DBili  0.1  /  AST  75<H>  /  ALT  69<H>  /  AlkPhos  150<H>        CAPILLARY BLOOD GLUCOSE          Vital Signs Last 24 Hrs  T(C): 36.7 (2023 16:26), Max: 39.5 (2023 08:33)  T(F): 98 (2023 16:26), Max: 103.1 (2023 08:33)  HR: 100 (2023 16:26) (100 - 146)  BP: 100/64 (2023 16:26) (91/58 - 119/78)  BP(mean): --  RR: 18 (2023 16:26) (16 - 18)  SpO2: 97% (2023 16:26) (97% - 98%)    Parameters below as of 2023 16:26  Patient On (Oxygen Delivery Method): room air        Urinalysis Basic - ( 2023 07:12 )    Color: Light Yellow / Appearance: Clear / S.009 / pH: x  Gluc: x / Ketone: Negative  / Bili: Negative / Urobili: Negative   Blood: x / Protein: Trace / Nitrite: Negative   Leuk Esterase: Moderate / RBC: 1 /hpf / WBC 14 /HPF   Sq Epi: x / Non Sq Epi: 4 /hpf / Bacteria: Negative        PT/INR - ( 2023 07:03 )   PT: 18.0 sec;   INR: 1.56 ratio                                                             PHYSICAL EXAM:    Constitutional: NAD  HEENT: conjunctive   clear   Neck:  No JVD  Respiratory: decrease bs b/l   Cardiovascular: S1 and S2  Gastrointestinal: BS+, soft, NT/ND  Extremities: No peripheral edema

## 2023-02-28 NOTE — DISCHARGE NOTE PROVIDER - NSRESEARCHGRANT_MLMHIDDEN_GEN_A_CORE
Spoke to pt. She has no memory of being allergic to sulfa AB, but it was documented with her recent and older visit with I[facility] in her allergy. She knew as a child she had tetracycline AB in conjuction with something that caused a colitis flare up. It would be safer to send alt rx since pt is unsure and it is documented in a previous note of hers.    yes

## 2023-02-28 NOTE — DISCHARGE NOTE PROVIDER - PROVIDER TOKENS
PROVIDER:[TOKEN:[[id number]],FOLLOWUP:[2 weeks]],PROVIDER:[TOKEN:[[id number]],FOLLOWUP:[2 weeks]],PROVIDER:[TOKEN:[[id number]],FOLLOWUP:[2 weeks]],PROVIDER:[TOKEN:[[id number]],FOLLOWUP:[2 weeks]]

## 2023-02-28 NOTE — DISCHARGE NOTE NURSING/CASE MANAGEMENT/SOCIAL WORK - NSDCPEFALRISK_GEN_ALL_CORE
For information on Fall & Injury Prevention, visit: [email or web address] OR  [email or web address] OR  https://www.cdc.gov/steadi/patient.html

## 2023-02-28 NOTE — CHART NOTE - NSCHARTNOTEFT_GEN_A_CORE
Interventional Radiology Follow up Note    Evaluate for Procedure: lung nodule biopsy    HPI: 48y Female with hx of lymphoma presenting with new pulmonary nodules.    Pt is currently being treated for a fungal infection. Would recommend repeat CT chest in 1-2 weeks and can plan for lung nodule biopsy then. Pt does not need to remain in the hospital for biopsy, she can schedule biopsy as an outpatient. Dr. Franks discussed with Dr. Anna.     --  Nicole Sotelo DO, PGY-5  Vascular and Interventional Radiology   Available on [employer] Teams    - Non-emergent consults: Place IR consult order in [place]  - Emergent issues (pager): [facility] [phone]; [facility] [phone]; [phone]  - Scheduling questions: [facility] [phone]; [facility] [phone]  - Clinic/outpatient booking: [facility] [phone]; [facility] [phone] Interventional Radiology Follow up Note    Evaluate for Procedure: lung nodule biopsy    HPI: 48y Female with hx of lymphoma presenting with new pulmonary nodules.    Pt is currently being treated for a fungal infection. Would recommend repeat CT chest in 1-2 weeks and can plan for lung nodule biopsy then. Pt does not need to remain in the hospital for biopsy, she can schedule biopsy as an outpatient. Dr. Franks discussed with Dr. Anna.     --  Nicole Sotelo DO, PGY-5  Vascular and Interventional Radiology   Available on Microsoft Teams    - Non-emergent consults: Place IR consult order in [place]  - Emergent issues (pager): [facility] [phone]; [facility] [phone]; [phone]  - Scheduling questions: [facility] [phone]; [facility] [phone]  - Clinic/outpatient booking: [place] [address] [phone]; [facility] [phone]    IR attending    As above, discussed with pulmonary would repeat imaging to exclude acute inflammatory process which can confound any biopsy attempt and result if clinical suspicion is neoplastic disease.  Also would consider bronchoscopy as an alternative to TNB to obtained diagnosis.

## 2023-02-28 NOTE — DISCHARGE NOTE PROVIDER - HOSPITAL COURSE
46 yo f a w PMH non hodgkin's lymphoma  s/p chemo at [facility] [place]  (?in remission), PE and DVT ,S/P  covid pneumonia ,severe pulmonary HTN on Adempas , p/w fevers for the last few days, a/w generalized weakness over the last couple of weeks along with poor po intake. she denies chest pain, palpitations, lightheadedness, cough, wheeze, abdominal discomfort. the  patient does endorse n+v, dysuria, arriaza. of note, last session of chemo reportedly on 1/23/2023. Pt grew concerned so went to oncologist yesterday where she got cultures, blood work, and xray. as she did not feel improved, patient presents to [facility] er for further evaluation.  (15 Feb 2023 01:39) CT scan bilateral lung nodule , no H/O OF exposure to TB , transfer to the MICU for SVT patient is on Digoxin and B-Blockers , hypotension rebound back feeling better on cefepime , and voriconazole for presumptive fungal pneumonia , continue to have persistent fever at night,  sweat appetite is suppressed fever curve is better discuss with MICU team to consider for lung biopsy , lymphoma oncology will be called in .fever subsides no definitive DX is made tired and sleepy if recurrent fever happened patient may need BAL and possible navigation bronchoscopy with Biopsy of large left lung mass , remain afebrile for 48 hrs continue on voriconazole , patient spike to 103 and tested positive for covid 19 placed on isolations , 02% is 97% on RA.  Continues to have fever , placed on Remdesivir by ID , no desaturation , TO FINISH LAST DOSE OF remdesivir, then dc to home with repeat chest CT in 2 weeks then IR bx of lung nodule.

## 2023-02-28 NOTE — PROGRESS NOTE ADULT - SUBJECTIVE AND OBJECTIVE BOX
CARDIOLOGY FOLLOW UP - Dr. Webber  DATE OF SERVICE: 2/28/23    CC  No CV complaints, would like to be discharged    REVIEW OF SYSTEMS:  CONSTITUTIONAL: No fever, weight loss, or fatigue  RESPIRATORY: No cough, wheezing, chills or hemoptysis; No Shortness of Breath  CARDIOVASCULAR: No chest pain, palpitations, passing out, dizziness, or leg swelling  GASTROINTESTINAL: No abdominal or epigastric pain. No nausea, vomiting, or hematemesis; No diarrhea or constipation. No melena or hematochezia.  VASCULAR: No edema     PHYSICAL EXAM:  T(C): 37.1 (02-28-23 @ 06:20), Max: 37.5 (02-27-23 @ 20:30)  HR: 130 (02-28-23 @ 06:20) (109 - 130)  BP: 119/78 (02-28-23 @ 06:20) (91/60 - 119/78)  RR: 16 (02-28-23 @ 06:20) (16 - 18)  SpO2: 98% (02-28-23 @ 06:20) (98% - 98%)  Wt(kg): --  I&O's Summary    27 Feb 2023 07:01  -  28 Feb 2023 07:00  --------------------------------------------------------  IN: 320 mL / OUT: 0 mL / NET: 320 mL        Appearance: Normal	  Cardiovascular: Normal S1 S2,Tachycardia, No JVD, No murmurs  Respiratory: Lungs clear to auscultation b/l	  Gastrointestinal:  Soft, Non-tender, + BS	  Extremities: Normal range of motion, No clubbing, cyanosis or edema      Home Medications:  digoxin 125 mcg (0.125 mg) oral tablet: 1 tab(s) orally once a day (15 Feb 2023 02:23)  Eliquis 5 mg oral tablet: 1 tab(s) orally 2 times a day (15 Feb 2023 02:23)  metoprolol succinate 25 mg oral tablet, extended release: 1 tab(s) orally once a day (15 Feb 2023 02:23)  selexipag 800 mcg oral tablet: 1 tab(s) orally 2 times a day (15 Feb 2023 02:23)      MEDICATIONS  (STANDING):  apixaban 5 milliGRAM(s) Oral every 12 hours  chlorhexidine 2% Cloths 1 Application(s) Topical <User Schedule>  digoxin     Tablet 125 MICROGram(s) Oral daily  famotidine Injectable 20 milliGRAM(s) IV Push every 12 hours  furosemide    Tablet 20 milliGRAM(s) Oral daily  LORazepam     Tablet 0.5 milliGRAM(s) Oral <User Schedule>  midodrine. 10 milliGRAM(s) Oral three times a day  remdesivir  IVPB 100 milliGRAM(s) IV Intermittent every 24 hours  riociguat 0.5 milliGRAM(s) Oral every 8 hours  sodium chloride 0.9%. 1000 milliLiter(s) (100 mL/Hr) IV Continuous <Continuous>  voriconazole 200 milliGRAM(s) Oral every 12 hours      TELEMETRY: 	    ECG:  	  RADIOLOGY:   DIAGNOSTIC TESTING:  [ ] Echocardiogram:  [ ]  Catheterization:  [ ] Stress Test:    OTHER: 	    LABS:	 	                            10.1   3.24  )-----------( 139      ( 28 Feb 2023 07:03 )             33.4     02-28    137  |  102  |  17  ----------------------------<  83  4.7   |  22  |  0.76    Ca    9.0      28 Feb 2023 07:03    TPro  6.3  /  Alb  4.0  /  TBili  0.7  /  DBili  0.1  /  AST  75<H>  /  ALT  69<H>  /  AlkPhos  150<H>  02-28    PT/INR - ( 28 Feb 2023 07:03 )   PT: 18.0 sec;   INR: 1.56 ratio

## 2023-02-28 NOTE — PROGRESS NOTE ADULT - ASSESSMENT
47 F PMH non-Hodgkin lymphoma s/p chemoport (?in remission), PE, pHTN, presenting with recurrent fevers with pancytopenia in setting of recent chemo treatment and prednisone concerning for severe sepsis. Found to be requiring intermittent fluids and continues to be febrile while on broad-spectrum bacterial antibiotic coverage. CT chest imaging concerning for fungal infection vs metastasis. MICU consulted for further management.      ICU STAY   SEPSIS   Pneumonia   CHF HFrEF   COVID positive   Continue with  Remdesivir Complteed   ID following     # NHL with pulmonary nodules OUT PATIENT FOLLOW UP BRONCHOSCOPY     # pulmonary HTN, suspected Group 4  > RV systolic pressure =51mmHg on TTE  - diurese (IV in MICU), will transition to PO lasix starting 2/21/23  - Riociguat restarted 2/22  =  # HFrEF (EF ~42) with RV dysfunction based on previous echo (2/15/23)  - DC-ed coreg   > Diurese daily  Cards following     # SVT  > digoxin 125 mcg po qD (home medication)  > follow up digoxin level    =====Renal/=====  - PMH STARR while on antibiotics  > appreciate renal recs    =====Endocrine=====  - No active issues.       # severe sepsis with unclear etiology in setting of known malginancy and recent chemotx for cancer   - iso immunocompromise, hx and CT chest imaging c/f infection (e.g. fungal pna) vs progression of known cancer  > switched Zosyn to cefepime (2/17-21)  > MRSA swab negative, vanc dc'd  > started voriconazole 200mg po bid   ID following      # DLBCL  #leukopenia on filgastrim  #anemia  #thrombocytopenia  - possibly from recent chemo and cancer  > Hb goal >7  > S/p 3d of zarxio  > Trend Plt    Time spent coordinating plan 41 min

## 2023-03-01 PROBLEM — Z00.00 ENCOUNTER FOR PREVENTIVE HEALTH EXAMINATION: Status: ACTIVE | Noted: 2023-03-01

## 2023-03-01 NOTE — CHART NOTE - NSCHARTNOTESELECT_GEN_ALL_CORE
Event Note
POCUS
Event Note
GAP 
IR follow up note/Event Note
IR- Anesthesia
MICU Accept Note
MICU Transfer
POCUS
POCUS/Event Note
Palliative/Event Note
QTC
tachycardia/Event Note

## 2023-03-04 LAB
CULTURE RESULTS: SIGNIFICANT CHANGE UP
CULTURE RESULTS: SIGNIFICANT CHANGE UP
SPECIMEN SOURCE: SIGNIFICANT CHANGE UP
SPECIMEN SOURCE: SIGNIFICANT CHANGE UP

## 2023-08-09 NOTE — PHARMACOTHERAPY INTERVENTION NOTE - COMMENTS
recommend to dc remdesivir after today's final dose
How Severe Is It?: mild
Is This A New Presentation, Or A Follow-Up?: Rash

## 2023-08-30 NOTE — PATIENT PROFILE ADULT - FUNCTIONAL ASSESSMENT - BASIC MOBILITY SCORE.
OTHER SURGICAL HISTORY  01/2019    basal skin ca removed     OTHER SURGICAL HISTORY  2005, 2015    skin ca removed     OTHER SURGICAL HISTORY  09/2016    bladder support    TONSILLECTOMY  1946    TOTAL KNEE ARTHROPLASTY Right 04/27/2015         Patient seen evaluated today with complaints of right lower extremity pain. She reports some discomfort into her thigh when she is up and ambulating or standing in 1 position for a while. She also has some numbness into her big toe at times. She has been in physical therapy which is helped her considerably. Her left knee is improved. She had a right knee replacement. She denies any start up pain. No feelings of instability. She had no injuries or falls to report. Patient denies recent fevers, chills, chest pain, SOB, or injuries. No recent systemic changes noted. A 12-point review of systems is performed today. Pertinent positives are noted. All other systems reviewed and otherwise are negative. Physical exam: General: Alert and oriented x3, nad.  well-developed, well nourished. normal affect, AF. NC/AT, EOMI, neck supple, trachea midline, no JVD present. Breathing is non-labored. And ambulation she displays no start of pain. Examination of the lower extremities reveals pain-free range of motion the hips. There is no pain to palpation trochanter bursa. Negative straight leg raise. Negative calf tenderness. Negative Homans. No signs of DVT present. Right knee reveals skin intact with there is no erythema or ecchymosis noted. There are no signs of infection or cellulitis present. Has full range of motion. Very good stability. Radiographs obtained in office today 8/30/2023 at the high [place] location include AP and lateral lumbar spine shows multilevel degenerative changes and spondylosis. Scoliosis is noted to the lumbar spine. No acute abnormalities noted. Assessment: #1 lumbar stenosis    Plan: At this point, we discussed her options. 24

## 2024-04-02 NOTE — PROGRESS NOTE ADULT - SUBJECTIVE AND OBJECTIVE BOX
46 yo f w pmh nhl s/p chemo (?in remission), PE, ?pah, covid, admitted 2/15/23 with fevers for the last few days, a/w generalized weakness over the last couple of weeks along with poor po intake. denied chest pain, palpitations, lightheadedness, cough, wheeze, abdominal discomfort. patient did endorse n+v, dysuria, arriaza. of note, last session of chemo reportedly on 1/23/2023. Pt grew concerned so went to oncologist, where she got cultures, blood work, and xray. as she did not feel improved, and thus came to hospital  Presently in MICU   Managed for sepsis, fungal infection in consideration, Voriconazole started  ID following  Cardiology following for SVT  PAST MEDICAL & SURGICAL HISTORY:  Non-Hodgkin&#x27;s lymphoma      Pulmonary embolism      History of appendectomy      History of cholecystectomy        Allergies    No Known Allergies    Intolerances      Social History:  no pertinent social history (15 Feb 2023 01:39)    Medications:  cefepime   IVPB      cefepime   IVPB 2000 milliGRAM(s) IV Intermittent every 8 hours  chlorhexidine 2% Cloths 1 Application(s) Topical <User Schedule>  chlorhexidine 4% Liquid 1 Application(s) Topical <User Schedule>  digoxin     Tablet 125 MICROGram(s) Oral daily  enoxaparin Injectable 70 milliGRAM(s) SubCutaneous every 12 hours  filgrastim-sndz (ZARXIO) Injectable 480 MICROGram(s) SubCutaneous daily  midodrine. 10 milliGRAM(s) Oral three times a day  ondansetron Injectable 4 milliGRAM(s) IV Push every 8 hours PRN Nausea and/or Vomiting  voriconazole 200 milliGRAM(s) Oral every 12 hours    Labs:  CBC Full  -  ( 17 Feb 2023 01:21 )  WBC Count : 10.31 K/uL  RBC Count : 3.03 M/uL  Hemoglobin : 8.4 g/dL  Hematocrit : 26.5 %  Platelet Count - Automated : 83 K/uL  Mean Cell Volume : 87.5 fl  Mean Cell Hemoglobin : 27.7 pg  Mean Cell Hemoglobin Concentration : 31.7 gm/dL  Auto Neutrophil # : 8.85 K/uL  Auto Lymphocyte # : 0.52 K/uL  Auto Monocyte # : 0.58 K/uL  Auto Eosinophil # : 0.04 K/uL  Auto Basophil # : 0.03 K/uL  Auto Neutrophil % : 87.1 %  Auto Lymphocyte % : 5.1 %  Auto Monocyte % : 5.7 %  Auto Eosinophil % : 0.4 %  Auto Basophil % : 0.3 %    02-17    139  |  110<H>  |  10  ----------------------------<  103<H>  3.8   |  14<L>  |  0.95    Ca    8.3<L>      17 Feb 2023 01:21  Phos  3.2     02-16  Mg     1.2     02-16    TPro  5.1<L>  /  Alb  3.2<L>  /  TBili  0.8  /  DBili  x   /  AST  43<H>  /  ALT  36  /  AlkPhos  64  02-17      Radiology:             ROS:  Patient comfortable without distress  No SOB or chest pain  No palpitation  No abdominal pain, diarrhaea or constipation  No weakness of extremities  No skin changes or swelling of legs  Rest of the comprehensive ROS was negative  Vital Signs Last 24 Hrs  T(C): 36.6 (17 Feb 2023 11:00), Max: 39.6 (17 Feb 2023 01:36)  T(F): 97.8 (17 Feb 2023 11:00), Max: 103.2 (17 Feb 2023 01:36)  HR: 118 (17 Feb 2023 16:00) (114 - 167)  BP: 96/64 (17 Feb 2023 14:00) (80/55 - 108/66)  BP(mean): 75 (17 Feb 2023 14:00) (60 - 81)  RR: 16 (17 Feb 2023 16:00) (16 - 33)  SpO2: 99% (17 Feb 2023 16:00) (94% - 100%)    Parameters below as of 17 Feb 2023 02:40  Patient On (Oxygen Delivery Method): room air        Physical exam:  Patient alert and oriented  No distress  CVS: S1, S2  Chest: bilateral breath sound without rales  Abdomen: soft, not tender, no organomegaly or masses  CNS: No focal neuro deficit  Musculoskeletal:  Normal range of motion  Skin: No rash    Assessment and Plan:Height (cm): 165.1 (02-17 @ 03:38)  Weight (kg): 73.1 (02-17 @ 03:38)  BMI (kg/m2): 26.8 (02-17 @ 03:38)  BSA (m2): 1.8 (02-17 @ 03:38) 48 yo f w pmh nhl s/p chemo (?in remission), PE, ?pah, covid, admitted 2/15/23 with fevers for the last few days, a/w generalized weakness over the last couple of weeks along with poor po intake. denied chest pain, palpitations, lightheadedness, cough, wheeze, abdominal discomfort. patient did endorse n+v, dysuria, arriaza. of note, last session of chemo reportedly on 1/23/2023. Pt grew concerned so went to oncologist, where she got cultures, blood work, and xray. as she did not feel improved, and thus came to hospital  Presently in MICU   Managed for sepsis, fungal infection in consideration, Voriconazole started  ID following  Cardiology following for SVT  PAST MEDICAL & SURGICAL HISTORY:  Non-Hodgkin&#x27;s lymphoma      Pulmonary embolism      History of appendectomy      History of cholecystectomy        Allergies    No Known Allergies    Intolerances      Social History:  no pertinent social history (15 Feb 2023 01:39)    Medications:  cefepime   IVPB      cefepime   IVPB 2000 milliGRAM(s) IV Intermittent every 8 hours  chlorhexidine 2% Cloths 1 Application(s) Topical <User Schedule>  chlorhexidine 4% Liquid 1 Application(s) Topical <User Schedule>  digoxin     Tablet 125 MICROGram(s) Oral daily  enoxaparin Injectable 70 milliGRAM(s) SubCutaneous every 12 hours  filgrastim-sndz (ZARXIO) Injectable 480 MICROGram(s) SubCutaneous daily  midodrine. 10 milliGRAM(s) Oral three times a day  ondansetron Injectable 4 milliGRAM(s) IV Push every 8 hours PRN Nausea and/or Vomiting  voriconazole 200 milliGRAM(s) Oral every 12 hours    Labs:  CBC Full  -  ( 17 Feb 2023 01:21 )  WBC Count : 10.31 K/uL  RBC Count : 3.03 M/uL  Hemoglobin : 8.4 g/dL  Hematocrit : 26.5 %  Platelet Count - Automated : 83 K/uL  Mean Cell Volume : 87.5 fl  Mean Cell Hemoglobin : 27.7 pg  Mean Cell Hemoglobin Concentration : 31.7 gm/dL  Auto Neutrophil # : 8.85 K/uL  Auto Lymphocyte # : 0.52 K/uL  Auto Monocyte # : 0.58 K/uL  Auto Eosinophil # : 0.04 K/uL  Auto Basophil # : 0.03 K/uL  Auto Neutrophil % : 87.1 %  Auto Lymphocyte % : 5.1 %  Auto Monocyte % : 5.7 %  Auto Eosinophil % : 0.4 %  Auto Basophil % : 0.3 %  Auto Neutrophil #: 8.85 K/uL (02.17.23 @ 01:21)   Auto Neutrophil #: 1.73 K/uL (02.14.23 @ 21:27)   02-17    139  |  110<H>  |  10  ----------------------------<  103<H>  3.8   |  14<L>  |  0.95    Ca    8.3<L>      17 Feb 2023 01:21  Phos  3.2     02-16  Mg     1.2     02-16    TPro  5.1<L>  /  Alb  3.2<L>  /  TBili  0.8  /  DBili  x   /  AST  43<H>  /  ALT  36  /  AlkPhos  64  02-17      Radiology:             ROS:  Patient comfortable without distress, but staying febrile  No SOB or chest pain  No palpitation  No abdominal pain, diarrhaea or constipation  No weakness of extremities  No skin changes or swelling of legs  Rest of the comprehensive ROS was negative  Vital Signs Last 24 Hrs  T(C): 36.6 (17 Feb 2023 11:00), Max: 39.6 (17 Feb 2023 01:36)  T(F): 97.8 (17 Feb 2023 11:00), Max: 103.2 (17 Feb 2023 01:36)  HR: 118 (17 Feb 2023 16:00) (114 - 167)  BP: 96/64 (17 Feb 2023 14:00) (80/55 - 108/66)  BP(mean): 75 (17 Feb 2023 14:00) (60 - 81)  RR: 16 (17 Feb 2023 16:00) (16 - 33)  SpO2: 99% (17 Feb 2023 16:00) (94% - 100%)    Parameters below as of 17 Feb 2023 02:40  Patient On (Oxygen Delivery Method): room air        Physical exam:  Patient alert and oriented  No distress  CVS: S1, S2  Chest: bilateral breath sound without rales  Abdomen: soft, not tender, no organomegaly or masses  CNS: No focal neuro deficit  Musculoskeletal:  Normal range of motion  Skin: No rash    Assessment and Plan:Height (cm): 165.1 (02-17 @ 03:38)  Weight (kg): 73.1 (02-17 @ 03:38)  BMI (kg/m2): 26.8 (02-17 @ 03:38)  BSA (m2): 1.8 (02-17 @ 03:38) socially

## 2024-04-06 NOTE — ED ADULT TRIAGE NOTE - AS PAIN REST
Progress Note - Nephrology   Carla Hunt 60 y.o. female MRN: [id number]  Unit/Bed#: MICU 10 Encounter: [id number]      Assessment / Plan:  ELIESER, recurrent, in the setting of concern for upper GI bleed.  Patient had prior ELIESER with component of ATN and Bactrim use  -Serum creatinine had improved down to 0.83, has steadily risen since March 29 up to 1.63 with rising BUN at 120.   -I suspect ATN/hypotension. Renal indices rapidly improved with CRRT  -running CRRT -50ml/hr as tolerated for now. As concerns for oliguria, consider turning UF off. May turn off if filter goes down. Have discussed this with the critical care attending, Dr. Bowden who agrees   -CRRT initiated 4/4/24 in the evening with UF as tolerated for volume management pre and post op. CRRT also was begun for Elevated BUN with concerns for uremia in the setting of encephalopathy. As BUN can drop rapidly on HD, and as patient may not tolerate rapid fluid shifts, CRRT preferred over IHD despite lack of pressor needs. Rapid drop in BUN can lead to DDS.  -patient off pressors  -CT imaging shows persistent groundglass and consolidative bilateral pulmonary opacities  -dialysis consent on file  -monitor UOP closely  -remains on bactrim which can cause decreased sCr excretion/higher sCr levels. Had been switched to minocycline but back on bactrim  -b/l sCr 0.8-1.2  -did receive IV dye 4/1/24 which could contribute to ELIESER as well  -if CRRT filter goes down again, may turn off. May consider trial of albumin/IVF once off CRRT to monitor patient's UOP  hypernatremia - resolved on CRRT. Monitor BMP  Elevated anion gap acidosis likely d/t ELIESER - bicarb 20, monitor on CRRT and off dialysis later today  Azotemia with possible uremia - improved on CRRT as above. Doubt ongoing uremia contributing to encephalopathy   Hypercalcemia - corrected calcium 11.2, last PTH 71.4, UPEP negative in the past, ? D/t immobility. Avoid IV calcium. Unimproved s/p IVF. Hypercalcemia  "can contribute to ELIESER from vasoconstrictive effect, correct on CRRT  Hyperphosphatemia in setting of ELIESER/decreased clearance -improving on CRRT  Anemia in setting of GIB and B cell lymphoma - Hgb improved to 9.4-->8.2 s/p blood transfusion, GI follows, ulcers noted on EGD-no active bleeding  Gross hematuria - s/p CBI, urology recommends outpatient cystoscopy, urology reconsulted  Acute hypoxic respiratory failure status post tracheostomy 2024  Candida fungemia-on Diflucan and micafungin IV per primary team  Encephalopathy with SAH and history of seizure disorder - SAH resolved, per primary team  Cecal volvulus status post ex lap, ileocecectomy and end ileostomy in -s/p OR  for abdominal wall debridement        Subjective:   Unable to elicit review of systems as patient intubated on trach at 40% FiO2 and sedated.  Not on pressors.  updated at bedside.      Objective:     Vitals: Blood pressure 104/58, pulse (!) 124, temperature 99.1 °F (37.3 °C), resp. rate (!) 24, height 5' 8\" (1.727 m), weight 82.9 kg (182 lb 12.2 oz), SpO2 100%.,Body mass index is 27.79 kg/m².Temp (24hrs), Av.3 °F (36.3 °C), Min:95.4 °F (35.2 °C), Max:99.5 °F (37.5 °C)      Weight (last 2 days)       Date/Time Weight    24 0531 82.9 (182.76)    24 0600 78.1 (172.18)              Intake/Output Summary (Last 24 hours) at 2024 1052  Last data filed at 2024 0700  Gross per 24 hour   Intake 1532.32 ml   Output 2935 ml   Net -1402.68 ml     I/O last 24 hours:  In: 1622.3 [I.V.:75; NG/GT:260; IV Piggyback:1130.4; Feedings:157]  Out: 3038 [Urine:470; Other:2568]        Physical Exam:   Physical Exam  Vitals and nursing note reviewed.   Constitutional:       General: She is not in acute distress.     Appearance: She is well-developed. She is not diaphoretic.      Comments: On vent via trach, sedated   HENT:      Head: Normocephalic and atraumatic.      Nose:      Comments: NGT     Mouth/Throat:      " Pharynx: No oropharyngeal exudate.      Comments: ETT  Eyes:      General: No scleral icterus.        Right eye: No discharge.         Left eye: No discharge.   Neck:      Thyroid: No thyromegaly.      Comments: trach  Cardiovascular:      Rate and Rhythm: Regular rhythm. Tachycardia present.      Heart sounds: No murmur heard.  Pulmonary:      Effort: Pulmonary effort is normal. No respiratory distress.      Breath sounds: Normal breath sounds. No wheezing.   Abdominal:      General: Bowel sounds are normal. There is no distension.      Palpations: Abdomen is soft.      Comments: RUQ ileostomy   Genitourinary:     Comments: +Ortiz  Musculoskeletal:         General: Swelling (anasarca) present.   Skin:     General: Skin is warm and dry.      Coloration: Skin is pale.      Findings: No rash.   Neurological:      Motor: No abnormal muscle tone.      Comments: On vent via trach, sedated   Psychiatric:      Comments: Unable to assess as patient on vent via trach         Invasive Devices       Peripheral Intravenous Line  Duration             Peripheral IV 04/05/24 Right Antecubital 1 day              Hemodialysis Catheter  Duration             HD Temporary Double Catheter 1 day              Drain  Duration             Ileostomy RUQ 45 days    Urethral Catheter Three way 18 Fr. 5 days    NG/OG/Enteral Tube Nasogastric 18 Fr Right nare 2 days              Airway  Duration             Surgical Airway Shiley Cuffed 24 days                    Medications:    Scheduled Meds:  Current Facility-Administered Medications   Medication Dose Route Frequency Provider Last Rate    acetaminophen  650 mg Oral Q6H PRN Mahamed P Cardenas, DO      chlorhexidine  15 mL Mouth/Throat Q12H [facility] Mahamed P Cardenas, DO      enoxaparin  40 mg Subcutaneous Q24H [facility] Joe Lazcano, DO      fentaNYL  25 mcg Intravenous Q1H PRN Mahamed P Cardenas, DO      fluconazole  800 mg Intravenous Q24H Mahamed P Cardenas,  mg (04/06/24 0851)    insulin lispro  1-5 Units  Subcutaneous Q6H [facility] Joe Lazcano, DO      lacosamide  100 mg Intravenous Q12H Mahamed P Theresa, DO      levalbuterol  1.25 mg Nebulization TID Mahamedcristian Cardenas, DO      [START ON 4/7/2024] methylPREDNISolone sodium succinate  10 mg Intravenous Daily Emilie Soyeon Kim, MD      minocycline  200 mg Per NG Tube Q12H Mahamed P Cardenas, DO      modafinil  100 mg Oral Daily Britany Cornelius, DO      nystatin   Topical BID Mahamed P Cardenas, DO      ondansetron  4 mg Intravenous Q6H PRN Mahamed P Cardenas, DO      pantoprazole  40 mg Intravenous Q12H SARTHAK Mahamed P Theresa, DO      piperacillin-tazobactam  4.5 g Intravenous Q8H Mahamed P Cardenas, DO 4.5 g (04/06/24 0407)    polyethylene glycol  17 g Per NG Tube Daily Mahamed P Theresa, DO      sodium chloride  1 Application Nasal Q1H PRN Mahamed P Cardenas, DO      sodium chloride  2 spray Each Nare BID Mahamed P Theresa, DO      sodium chloride  4 mL Nebulization TID Mahamed P Theresa, DO      sulfamethoxazole-trimethoprim  1 tablet Per NG Tube Once per day on Monday Wednesday Friday Mahamed Cardenas, DO         PRN Meds:.  acetaminophen    fentaNYL    ondansetron    sodium chloride    Continuous Infusions:         LAB RESULTS:      Results from last 7 days   Lab Units 04/06/24  0813 04/06/24  0503 04/06/24  0100 04/06/24  0100 04/05/24  2050 04/05/24  1841 04/05/24  1158 04/05/24  0522 04/04/24  2312 04/04/24  1802 04/04/24  0752 04/04/24  0425 04/04/24  0118 04/03/24  0809 04/03/24  0430   WBC Thousand/uL 14.57*  --   --   --  12.85*  --   --  13.25* 11.03* 11.56* 12.73*  --  11.43*   < >  --    HEMOGLOBIN g/dL 8.2*  --   --   --  9.3*  --   --  9.4*  9.4* 6.5* 7.4* 7.9*  --  8.0*   < >  --    HEMATOCRIT % 25.6*  --   --   --  28.5*  --   --  29.1*  29.0* 19.7* 23.0* 23.4*  --  24.7*   < >  --    PLATELETS Thousands/uL 58*  --   --   --  57*  --   --  57* 52* 57* 68*  --  70*   < >  --    LYMPHO PCT % 0*  --   --   --  1*  --   --  1* 0* 0* 0*  --  1*   < >  --    MONO PCT % 6  --   --   --  2*  --   --  1*  "5 1* 13*  --  2*   < >  --    EOS PCT % 0  --   --   --  0  --   --  0 0 0 0  --  0   < >  --    POTASSIUM mmol/L  --  4.8 4.4  --   --  4.7 4.4 4.6 4.4 3.4*  --    < > 3.9  --  3.8  3.8   CHLORIDE mmol/L  --  104 105  --   --  108 109* 108 115* 112*  --    < > 112*  --  109*  109*   CO2 mmol/L  --  20* 19*  --   --  20* 20* 21 20* 21  --    < > 18*  --  19*  19*   BUN mg/dL  --  40* 43*  --   --  51* 65* 76* 102* 109*  --    < > 116*  --  103*  103*   CREATININE mg/dL  --  0.62 0.66  --   --  0.76 0.88 1.07 1.45* 1.56*  --    < > 1.65*  --  1.48*  1.48*   CALCIUM mg/dL  --  10.0 10.1  --   --  9.8 10.0 10.0 9.4 9.7  --    < > 10.3*  --  10.0  10.0   ALK PHOS U/L  --   --   --   --   --   --   --  346*  --   --  381*  --   --   --  210*   ALT U/L  --   --   --   --   --   --   --  32  --   --  34  --   --   --  29   AST U/L  --   --   --   --   --   --   --  20  --   --  21  --   --   --  16   MAGNESIUM mg/dL  --  2.0  --  2.2  --  1.9 2.0 2.1 2.2 2.4  --    < >  --   --  2.5   PHOSPHORUS mg/dL  --  3.9  --  4.1  --  4.5 4.6* 5.1* 5.8* 5.7*  --    < >  --   --  6.9*    < > = values in this interval not displayed.       CUTURES:  Lab Results   Component Value Date    BLOODCX No Growth at 48 hrs. 04/04/2024    BLOODCX No Growth at 48 hrs. 04/04/2024    BLOODCX Josephine albicans (A) 03/31/2024    BLOODCX Josephine albicans (A) 03/31/2024    BLOODCX No Growth After 5 Days. 02/26/2024    BLOODCX No Growth After 5 Days. 02/26/2024    BLOODCX No Growth After 5 Days. 02/17/2024    BLOODCX No Growth After 5 Days. 02/17/2024    URINECX 60,000-69,000 cfu/ml Lactobacillus species (A) 01/07/2024    URINECX <10,000 cfu/ml 12/26/2023    URINECX 20,000-29,000 cfu/ml Escherichia coli (A) 10/30/2020                 Portions of the record may have been created with voice recognition software. Occasional wrong word or \"sound a like\" substitutions may have occurred due to the inherent limitations of voice recognition software. Read " the chart carefully and recognize, using context, where substitutions have occurred.If you have any questions, please contact the dictating provider.     0 (no pain/absence of nonverbal indicators of pain)

## 2025-05-09 NOTE — PHYSICAL THERAPY INITIAL EVALUATION ADULT - FUNCTIONAL LIMITATIONS, PT EVAL
Called Louisa back she stated that pt was already set up yesterday with cpap and supplies so nothing is now need from us.   self-care